# Patient Record
Sex: MALE | Race: WHITE | NOT HISPANIC OR LATINO | Employment: OTHER | URBAN - METROPOLITAN AREA
[De-identification: names, ages, dates, MRNs, and addresses within clinical notes are randomized per-mention and may not be internally consistent; named-entity substitution may affect disease eponyms.]

---

## 2017-01-04 ENCOUNTER — ANTICOAGULATION VISIT (OUTPATIENT)
Dept: FAMILY MEDICINE CLINIC | Facility: CLINIC | Age: 72
End: 2017-01-04

## 2017-01-04 LAB — INR PPP: 2.5 (ref 2–3)

## 2017-01-11 LAB — INR PPP: 1.9 (ref 2–3)

## 2017-01-12 ENCOUNTER — ANTICOAGULATION VISIT (OUTPATIENT)
Dept: FAMILY MEDICINE CLINIC | Facility: CLINIC | Age: 72
End: 2017-01-12

## 2017-01-18 ENCOUNTER — ANTICOAGULATION VISIT (OUTPATIENT)
Dept: FAMILY MEDICINE CLINIC | Facility: CLINIC | Age: 72
End: 2017-01-18

## 2017-01-18 LAB — INR PPP: 2.7 (ref 2–3)

## 2017-01-25 LAB — INR PPP: 2.3 (ref 2–3)

## 2017-01-26 ENCOUNTER — ANTICOAGULATION VISIT (OUTPATIENT)
Dept: FAMILY MEDICINE CLINIC | Facility: CLINIC | Age: 72
End: 2017-01-26

## 2017-02-01 DIAGNOSIS — I82.409 DEEP VEIN THROMBOSIS (DVT) (HCC): ICD-10-CM

## 2017-02-01 RX ORDER — WARFARIN SODIUM 7.5 MG/1
TABLET ORAL
Qty: 90 TABLET | Refills: 1 | Status: SHIPPED | OUTPATIENT
Start: 2017-02-01 | End: 2017-03-30 | Stop reason: SDUPTHER

## 2017-02-02 ENCOUNTER — ANTICOAGULATION VISIT (OUTPATIENT)
Dept: FAMILY MEDICINE CLINIC | Facility: CLINIC | Age: 72
End: 2017-02-02

## 2017-02-02 LAB — INR PPP: 2.2 (ref 2–3)

## 2017-02-08 ENCOUNTER — ANTICOAGULATION VISIT (OUTPATIENT)
Dept: FAMILY MEDICINE CLINIC | Facility: CLINIC | Age: 72
End: 2017-02-08

## 2017-02-08 LAB — INR PPP: 2.2 (ref 2–3)

## 2017-02-16 ENCOUNTER — ANTICOAGULATION VISIT (OUTPATIENT)
Dept: FAMILY MEDICINE CLINIC | Facility: CLINIC | Age: 72
End: 2017-02-16

## 2017-02-16 LAB — INR PPP: 2.4 (ref 2–3)

## 2017-02-22 ENCOUNTER — ANTICOAGULATION VISIT (OUTPATIENT)
Dept: FAMILY MEDICINE CLINIC | Facility: CLINIC | Age: 72
End: 2017-02-22

## 2017-02-22 LAB — INR PPP: 2.3 (ref 2–3)

## 2017-03-01 DIAGNOSIS — I10 BENIGN ESSENTIAL HYPERTENSION: ICD-10-CM

## 2017-03-01 DIAGNOSIS — I82.409 DEEP VEIN THROMBOSIS (DVT) (HCC): ICD-10-CM

## 2017-03-01 DIAGNOSIS — E78.5 HYPERLIPIDEMIA: ICD-10-CM

## 2017-03-01 RX ORDER — WARFARIN SODIUM 10 MG/1
TABLET ORAL
Qty: 90 TABLET | Refills: 1 | OUTPATIENT
Start: 2017-03-01

## 2017-03-01 RX ORDER — AMLODIPINE BESYLATE 10 MG/1
TABLET ORAL
Qty: 90 TABLET | Refills: 1 | OUTPATIENT
Start: 2017-03-01

## 2017-03-01 RX ORDER — ATORVASTATIN CALCIUM 10 MG/1
TABLET, FILM COATED ORAL
Qty: 90 TABLET | Refills: 1 | OUTPATIENT
Start: 2017-03-01

## 2017-03-02 ENCOUNTER — ANTICOAGULATION VISIT (OUTPATIENT)
Dept: FAMILY MEDICINE CLINIC | Facility: CLINIC | Age: 72
End: 2017-03-02

## 2017-03-02 LAB — INR PPP: 2.5 (ref 2–3)

## 2017-03-08 ENCOUNTER — ANTICOAGULATION VISIT (OUTPATIENT)
Dept: FAMILY MEDICINE CLINIC | Facility: CLINIC | Age: 72
End: 2017-03-08

## 2017-03-08 LAB — INR PPP: 2.1 (ref 2–3)

## 2017-03-30 ENCOUNTER — OFFICE VISIT (OUTPATIENT)
Dept: FAMILY MEDICINE CLINIC | Facility: CLINIC | Age: 72
End: 2017-03-30

## 2017-03-30 VITALS
RESPIRATION RATE: 16 BRPM | BODY MASS INDEX: 41.75 KG/M2 | WEIGHT: 315 LBS | HEART RATE: 64 BPM | HEIGHT: 73 IN | DIASTOLIC BLOOD PRESSURE: 87 MMHG | SYSTOLIC BLOOD PRESSURE: 147 MMHG | TEMPERATURE: 98.6 F

## 2017-03-30 DIAGNOSIS — I82.4Y9 DEEP VEIN THROMBOSIS (DVT) OF PROXIMAL LOWER EXTREMITY, UNSPECIFIED CHRONICITY, UNSPECIFIED LATERALITY (HCC): ICD-10-CM

## 2017-03-30 DIAGNOSIS — I10 BENIGN ESSENTIAL HYPERTENSION: ICD-10-CM

## 2017-03-30 DIAGNOSIS — E78.2 MIXED HYPERLIPIDEMIA: ICD-10-CM

## 2017-03-30 PROCEDURE — 99214 OFFICE O/P EST MOD 30 MIN: CPT | Performed by: FAMILY MEDICINE

## 2017-03-30 RX ORDER — WARFARIN SODIUM 10 MG/1
10 TABLET ORAL
Qty: 90 TABLET | Refills: 1 | Status: SHIPPED | OUTPATIENT
Start: 2017-03-30

## 2017-03-30 RX ORDER — ATORVASTATIN CALCIUM 10 MG/1
10 TABLET, FILM COATED ORAL DAILY
Qty: 90 TABLET | Refills: 1 | Status: SHIPPED | OUTPATIENT
Start: 2017-03-30

## 2017-03-30 RX ORDER — AMLODIPINE BESYLATE 10 MG/1
10 TABLET ORAL DAILY
Qty: 90 TABLET | Refills: 1 | Status: SHIPPED | OUTPATIENT
Start: 2017-03-30

## 2017-03-30 RX ORDER — WARFARIN SODIUM 7.5 MG/1
7.5 TABLET ORAL
Qty: 90 TABLET | Refills: 1 | Status: SHIPPED | OUTPATIENT
Start: 2017-03-30

## 2017-03-30 NOTE — PROGRESS NOTES
"Subjective   Sumit Jules is a 71 y.o. male.     History of Present Illness     Chief Complaint:   Chief Complaint   Patient presents with   • Hypertension   • Hyperlipidemia   • DVT Management       Sumit Jules 71 y.o. male who presents today for Medical Management of the below listed issues and medication refills.  he has a history of   Patient Active Problem List   Diagnosis   • Antithrombin III deficiency   • Atherosclerosis of aorta   • DVT (deep venous thrombosis)   • Impaired fasting glucose   • Prostate cancer   • Benign essential hypertension   • Hyperlipidemia   .  Since the last visit, he has overall felt well.  he has been compliant with   Current Outpatient Prescriptions:   •  amLODIPine (NORVASC) 10 MG tablet, Take 1 tablet by mouth Daily., Disp: 90 tablet, Rfl: 1  •  atorvastatin (LIPITOR) 10 MG tablet, Take 1 tablet by mouth Daily., Disp: 90 tablet, Rfl: 1  •  warfarin (COUMADIN) 10 MG tablet, Take 1 tablet by mouth Daily., Disp: 90 tablet, Rfl: 1  •  warfarin (COUMADIN) 7.5 MG tablet, Take 1 tablet by mouth Daily., Disp: 90 tablet, Rfl: 1.  he denies medication side effects.    All of the chronic condition(s) listed above are stable w/o issues.    /87  Pulse 64  Temp 98.6 °F (37 °C) (Oral)   Resp 16  Ht 73\" (185.4 cm)  Wt (!) 323 lb (147 kg)  BMI 42.61 kg/m2    Results for orders placed or performed in visit on 03/08/17   Protime-INR   Result Value Ref Range    INR 2.10 2 - 3         The following portions of the patient's history were reviewed and updated as appropriate: allergies, current medications, past family history, past medical history, past social history, past surgical history and problem list.    Review of Systems   Constitutional: Negative for activity change, chills, fatigue and fever.   Respiratory: Negative for cough and chest tightness.    Cardiovascular: Negative for chest pain and palpitations.   Gastrointestinal: Negative for abdominal pain and nausea. "   Endocrine: Negative for cold intolerance and polydipsia.   Psychiatric/Behavioral: Negative for behavioral problems and dysphoric mood.   All other systems reviewed and are negative.      Objective   Physical Exam   Constitutional: He appears well-developed and well-nourished.   Neck: Neck supple. No thyromegaly present.   Cardiovascular: Normal rate and regular rhythm.    No murmur heard.  Pulmonary/Chest: Effort normal and breath sounds normal.   Abdominal: Bowel sounds are normal.   Psychiatric: He has a normal mood and affect. His behavior is normal.   Nursing note and vitals reviewed.      Assessment/Plan   Sumit was seen today for hypertension, hyperlipidemia and dvt management.    Diagnoses and all orders for this visit:    Benign essential hypertension  -     amLODIPine (NORVASC) 10 MG tablet; Take 1 tablet by mouth Daily.    Mixed hyperlipidemia  -     atorvastatin (LIPITOR) 10 MG tablet; Take 1 tablet by mouth Daily.    Deep vein thrombosis (DVT) of proximal lower extremity, unspecified chronicity, unspecified laterality  -     warfarin (COUMADIN) 7.5 MG tablet; Take 1 tablet by mouth Daily.  -     warfarin (COUMADIN) 10 MG tablet; Take 1 tablet by mouth Daily.

## 2017-04-05 ENCOUNTER — TELEPHONE (OUTPATIENT)
Dept: FAMILY MEDICINE CLINIC | Facility: CLINIC | Age: 72
End: 2017-04-05

## 2017-04-05 DIAGNOSIS — D68.59 ANTITHROMBIN III DEFICIENCY (HCC): Primary | ICD-10-CM

## 2017-04-05 NOTE — TELEPHONE ENCOUNTER
Pt calls and states that we will no longer do his INR and we can discontinue the Home testing. [facility].

## 2017-04-10 ENCOUNTER — ANTICOAGULATION VISIT (OUTPATIENT)
Dept: FAMILY MEDICINE CLINIC | Facility: CLINIC | Age: 72
End: 2017-04-10

## 2024-02-12 ENCOUNTER — HOSPITAL ENCOUNTER (INPATIENT)
Facility: HOSPITAL | Age: 79
LOS: 2 days | Discharge: HOME | End: 2024-02-14
Attending: EMERGENCY MEDICINE
Payer: MEDICARE

## 2024-02-12 ENCOUNTER — APPOINTMENT (OUTPATIENT)
Dept: CT IMAGING | Facility: HOSPITAL | Age: 79
End: 2024-02-12
Payer: MEDICARE

## 2024-02-12 DIAGNOSIS — R53.83 FATIGUE, UNSPECIFIED TYPE: ICD-10-CM

## 2024-02-12 DIAGNOSIS — K85.90 ACUTE PANCREATITIS, UNSPECIFIED COMPLICATION STATUS, UNSPECIFIED PANCREATITIS TYPE: Primary | ICD-10-CM

## 2024-02-12 DIAGNOSIS — R10.13 EPIGASTRIC PAIN: ICD-10-CM

## 2024-02-12 PROBLEM — K85.00 IDIOPATHIC ACUTE PANCREATITIS WITHOUT INFECTION OR NECROSIS: Status: ACTIVE | Noted: 2024-02-12

## 2024-02-12 LAB
ALBUMIN SERPL-MCNC: 3.9 G/DL (ref 3.5–5.2)
ALBUMIN/GLOB SERPL: 1.2 G/DL
ALP SERPL-CCNC: 85 U/L (ref 39–117)
ALT SERPL W P-5'-P-CCNC: 10 U/L (ref 1–41)
ANION GAP SERPL CALCULATED.3IONS-SCNC: 9 MMOL/L (ref 5–15)
AST SERPL-CCNC: 16 U/L (ref 1–40)
BACTERIA UR QL AUTO: NORMAL /HPF
BASOPHILS # BLD AUTO: 0.1 10*3/MM3 (ref 0–0.2)
BASOPHILS NFR BLD AUTO: 0.3 % (ref 0–1.5)
BILIRUB SERPL-MCNC: 1.2 MG/DL (ref 0–1.2)
BILIRUB UR QL STRIP: NEGATIVE
BUN SERPL-MCNC: 13 MG/DL (ref 8–23)
BUN/CREAT SERPL: 13.1 (ref 7–25)
CALCIUM SPEC-SCNC: 9.3 MG/DL (ref 8.6–10.5)
CHLORIDE SERPL-SCNC: 103 MMOL/L (ref 98–107)
CK SERPL-CCNC: 105 U/L (ref 20–200)
CLARITY UR: CLEAR
CO2 SERPL-SCNC: 26 MMOL/L (ref 22–29)
COLOR UR: ABNORMAL
CREAT SERPL-MCNC: 0.99 MG/DL (ref 0.76–1.27)
DEPRECATED RDW RBC AUTO: 44.2 FL (ref 37–54)
EGFRCR SERPLBLD CKD-EPI 2021: 78.5 ML/MIN/1.73
EOSINOPHIL # BLD AUTO: 0 10*3/MM3 (ref 0–0.4)
EOSINOPHIL NFR BLD AUTO: 0 % (ref 0.3–6.2)
ERYTHROCYTE [DISTWIDTH] IN BLOOD BY AUTOMATED COUNT: 13.9 % (ref 12.3–15.4)
FLUAV SUBTYP SPEC NAA+PROBE: NOT DETECTED
FLUBV RNA ISLT QL NAA+PROBE: NOT DETECTED
GLOBULIN UR ELPH-MCNC: 3.2 GM/DL
GLUCOSE SERPL-MCNC: 149 MG/DL (ref 65–99)
GLUCOSE UR STRIP-MCNC: NEGATIVE MG/DL
HCT VFR BLD AUTO: 40.9 % (ref 37.5–51)
HGB BLD-MCNC: 13.2 G/DL (ref 13–17.7)
HGB UR QL STRIP.AUTO: ABNORMAL
HYALINE CASTS UR QL AUTO: NORMAL /LPF
INR PPP: 2.51 (ref 2–3)
KETONES UR QL STRIP: ABNORMAL
LEUKOCYTE ESTERASE UR QL STRIP.AUTO: ABNORMAL
LIPASE SERPL-CCNC: 1023 U/L (ref 13–60)
LYMPHOCYTES # BLD AUTO: 1 10*3/MM3 (ref 0.7–3.1)
LYMPHOCYTES NFR BLD AUTO: 6.1 % (ref 19.6–45.3)
MAGNESIUM SERPL-MCNC: 1.8 MG/DL (ref 1.6–2.4)
MCH RBC QN AUTO: 29.4 PG (ref 26.6–33)
MCHC RBC AUTO-ENTMCNC: 32.3 G/DL (ref 31.5–35.7)
MCV RBC AUTO: 91 FL (ref 79–97)
MONOCYTES # BLD AUTO: 1.4 10*3/MM3 (ref 0.1–0.9)
MONOCYTES NFR BLD AUTO: 8.5 % (ref 5–12)
NEUTROPHILS NFR BLD AUTO: 14.2 10*3/MM3 (ref 1.7–7)
NEUTROPHILS NFR BLD AUTO: 85.1 % (ref 42.7–76)
NITRITE UR QL STRIP: NEGATIVE
NRBC BLD AUTO-RTO: 0 /100 WBC (ref 0–0.2)
PH UR STRIP.AUTO: <=5 [PH] (ref 5–8)
PLATELET # BLD AUTO: 125 10*3/MM3 (ref 140–450)
PMV BLD AUTO: 11.2 FL (ref 6–12)
POTASSIUM SERPL-SCNC: 4 MMOL/L (ref 3.5–5.2)
PROT SERPL-MCNC: 7.1 G/DL (ref 6–8.5)
PROT UR QL STRIP: ABNORMAL
PROTHROMBIN TIME: 25.6 SECONDS (ref 19.4–28.5)
RBC # BLD AUTO: 4.49 10*6/MM3 (ref 4.14–5.8)
RBC # UR STRIP: NORMAL /HPF
REF LAB TEST METHOD: NORMAL
SARS-COV-2 RNA RESP QL NAA+PROBE: NOT DETECTED
SODIUM SERPL-SCNC: 138 MMOL/L (ref 136–145)
SP GR UR STRIP: 1.04 (ref 1–1.03)
SQUAMOUS #/AREA URNS HPF: NORMAL /HPF
UROBILINOGEN UR QL STRIP: ABNORMAL
WBC # UR STRIP: NORMAL /HPF
WBC NRBC COR # BLD AUTO: 16.6 10*3/MM3 (ref 3.4–10.8)

## 2024-02-12 PROCEDURE — 85025 COMPLETE CBC W/AUTO DIFF WBC: CPT | Performed by: PHYSICIAN ASSISTANT

## 2024-02-12 PROCEDURE — 25510000001 IOPAMIDOL PER 1 ML: Performed by: EMERGENCY MEDICINE

## 2024-02-12 PROCEDURE — 83690 ASSAY OF LIPASE: CPT | Performed by: PHYSICIAN ASSISTANT

## 2024-02-12 PROCEDURE — 25010000002 METRONIDAZOLE 500 MG/100ML SOLUTION: Performed by: STUDENT IN AN ORGANIZED HEALTH CARE EDUCATION/TRAINING PROGRAM

## 2024-02-12 PROCEDURE — 83735 ASSAY OF MAGNESIUM: CPT | Performed by: PHYSICIAN ASSISTANT

## 2024-02-12 PROCEDURE — 85610 PROTHROMBIN TIME: CPT | Performed by: PHYSICIAN ASSISTANT

## 2024-02-12 PROCEDURE — 80053 COMPREHEN METABOLIC PANEL: CPT | Performed by: PHYSICIAN ASSISTANT

## 2024-02-12 PROCEDURE — 82550 ASSAY OF CK (CPK): CPT | Performed by: PHYSICIAN ASSISTANT

## 2024-02-12 PROCEDURE — 87636 SARSCOV2 & INF A&B AMP PRB: CPT | Performed by: PHYSICIAN ASSISTANT

## 2024-02-12 PROCEDURE — 25810000003 SODIUM CHLORIDE 0.9 % SOLUTION: Performed by: PHYSICIAN ASSISTANT

## 2024-02-12 PROCEDURE — 25810000003 LACTATED RINGERS PER 1000 ML: Performed by: STUDENT IN AN ORGANIZED HEALTH CARE EDUCATION/TRAINING PROGRAM

## 2024-02-12 PROCEDURE — 81001 URINALYSIS AUTO W/SCOPE: CPT | Performed by: PHYSICIAN ASSISTANT

## 2024-02-12 PROCEDURE — 74177 CT ABD & PELVIS W/CONTRAST: CPT

## 2024-02-12 PROCEDURE — 93005 ELECTROCARDIOGRAM TRACING: CPT | Performed by: PHYSICIAN ASSISTANT

## 2024-02-12 PROCEDURE — 99285 EMERGENCY DEPT VISIT HI MDM: CPT

## 2024-02-12 PROCEDURE — 25010000002 CEFTRIAXONE PER 250 MG: Performed by: STUDENT IN AN ORGANIZED HEALTH CARE EDUCATION/TRAINING PROGRAM

## 2024-02-12 RX ORDER — METRONIDAZOLE 500 MG/100ML
500 INJECTION, SOLUTION INTRAVENOUS EVERY 8 HOURS
Qty: 900 ML | Refills: 0 | Status: DISCONTINUED | OUTPATIENT
Start: 2024-02-12 | End: 2024-02-14 | Stop reason: HOSPADM

## 2024-02-12 RX ORDER — WARFARIN SODIUM 3 MG/1
4 TABLET ORAL
COMMUNITY
End: 2024-02-23

## 2024-02-12 RX ORDER — SODIUM CHLORIDE, SODIUM LACTATE, POTASSIUM CHLORIDE, CALCIUM CHLORIDE 600; 310; 30; 20 MG/100ML; MG/100ML; MG/100ML; MG/100ML
150 INJECTION, SOLUTION INTRAVENOUS CONTINUOUS
Status: DISCONTINUED | OUTPATIENT
Start: 2024-02-12 | End: 2024-02-14 | Stop reason: HOSPADM

## 2024-02-12 RX ORDER — SODIUM CHLORIDE 0.9 % (FLUSH) 0.9 %
10 SYRINGE (ML) INJECTION AS NEEDED
Status: DISCONTINUED | OUTPATIENT
Start: 2024-02-12 | End: 2024-02-14 | Stop reason: HOSPADM

## 2024-02-12 RX ORDER — ONDANSETRON 2 MG/ML
4 INJECTION INTRAMUSCULAR; INTRAVENOUS EVERY 6 HOURS PRN
Status: DISCONTINUED | OUTPATIENT
Start: 2024-02-12 | End: 2024-02-14 | Stop reason: HOSPADM

## 2024-02-12 RX ORDER — TAMSULOSIN HYDROCHLORIDE 0.4 MG/1
1 CAPSULE ORAL DAILY
COMMUNITY

## 2024-02-12 RX ORDER — AMLODIPINE BESYLATE 10 MG/1
10 TABLET ORAL NIGHTLY
COMMUNITY
End: 2024-02-23 | Stop reason: SDUPTHER

## 2024-02-12 RX ADMIN — IOPAMIDOL 90 ML: 755 INJECTION, SOLUTION INTRAVENOUS at 12:05

## 2024-02-12 RX ADMIN — METRONIDAZOLE 500 MG: 500 INJECTION, SOLUTION INTRAVENOUS at 20:57

## 2024-02-12 RX ADMIN — CEFTRIAXONE 2000 MG: 2 INJECTION, POWDER, FOR SOLUTION INTRAMUSCULAR; INTRAVENOUS at 20:18

## 2024-02-12 RX ADMIN — SODIUM CHLORIDE, POTASSIUM CHLORIDE, SODIUM LACTATE AND CALCIUM CHLORIDE 150 ML/HR: 600; 310; 30; 20 INJECTION, SOLUTION INTRAVENOUS at 19:16

## 2024-02-12 RX ADMIN — SODIUM CHLORIDE 1000 ML: 9 INJECTION, SOLUTION INTRAVENOUS at 13:09

## 2024-02-12 NOTE — H&P
[facility]  History & Physical    Patient Name: Sumit Jules  : 1945  MRN: [id number]  Primary Care Physician:  Stevan, No Known  Date of admission: 2024  Date and Time of Service: 2024 at 4:00 PM    Subjective      Chief Complaint: abdominal pain    History of Present Illness: Sumit Jules is a 78 y.o. male with a PMH of Antithrombin deficiency, DVT, on chronic anticoagulation with Coumadin, hypertension, prostate cancer who presented to [facility] on 2024 with  gradual onset periumbilical abdominal pain since the past 2 days which the patient describes as constant, around 4 or 5/10 at times radiating to back dull-like.  Patient denies nausea, vomiting, diarrhea, melena, hematochezia, jaundice, recent weight loss.  Workup in ED is notable for CT abdomen showing stranding about the pancreatic head and of the known.  Findings may be suggestive of pancreatitis and duodenitis.  There is a 2.18 mm hypodense lesion within the body of the pancreas possibly pancreatic cysts.  There is mild biliary dilation.  CBC is notable for neutrophil leukocytosis white blood cell 16.6, chemistry notable for elevated lipase 1023.  Patient afebrile during ED course.  Hospitalist team is requested to admit.      Review of Systems   Constitutional:  Negative for appetite change and fatigue.   HENT:  Negative for dental problem, ear pain, nosebleeds, sinus pressure and tinnitus.    Eyes:  Negative for photophobia and itching.   Respiratory:  Negative for choking and stridor.    Cardiovascular:  Negative for leg swelling.   Gastrointestinal:  Positive for abdominal pain. Negative for anal bleeding, blood in stool, diarrhea, nausea, rectal pain and vomiting.   Musculoskeletal:  Negative for back pain and myalgias.   Neurological:  Negative for syncope, facial asymmetry and numbness.   Psychiatric/Behavioral:  Negative for behavioral problems and dysphoric mood. The patient is  not nervous/anxious.        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 Otherwise pertinent FHx was reviewed and not pertinent to current issue.    Social History:  reports that he has never smoked. He has never used smokeless tobacco. He reports that he does not drink alcohol and does not use drugs.    Home Medications:  Prior to Admission Medications       Prescriptions Last Dose Informant Patient Reported? Taking?    amLODIPine (NORVASC) 10 MG tablet 2/11/2024  Yes Yes    Take 1 tablet by mouth Every Night.    atorvastatin (LIPITOR) 10 MG tablet 2/11/2024  No Yes    Take 1 tablet by mouth Daily.    tamsulosin (FLOMAX) 0.4 MG capsule 24 hr capsule 2/11/2024  Yes Yes    Take 1 capsule by mouth Daily.    warfarin (COUMADIN) 3 MG tablet 2/11/2024  Yes Yes    Take 3 tablets by mouth Daily.              Allergies:  No Known Allergies    Objective      Vitals:   Temp:  [97.7 °F (36.5 °C)-98.2 °F (36.8 °C)] 97.7 °F (36.5 °C)  Heart Rate:  [53-73] 60  Resp:  [12-24] 14  BP: (144-173)/(64-81) 144/64  Body mass index is 40.31 kg/m².  Physical Exam  Constitutional:       Appearance: Normal appearance.   HENT:      Head: Normocephalic.      Nose: Nose normal.      Mouth/Throat:      Mouth: Mucous membranes are moist. Mucous membranes are dry.   Eyes:      Extraocular Movements: Extraocular movements  intact.      Pupils: Pupils are equal, round, and reactive to light.   Pulmonary:      Effort: Pulmonary effort is normal.      Breath sounds: Normal breath sounds.   Abdominal:      General: Abdomen is flat.      Palpations: Abdomen is soft.      Comments: Old healed horizontal surgical incision, incisional hernia present, easily reducible.  There is mild periumbilical tenderness.  No guarding or rigidity.  Edward sign is negative.   Musculoskeletal:         General: Normal range of motion.   Skin:     General: Skin is dry.      Coloration: Skin is not pale.   Neurological:      General: No focal deficit present.      Mental Status: He is alert and oriented to person, place, and time.      Cranial Nerves: No cranial nerve deficit.      Motor: No weakness.   Psychiatric:         Mood and Affect: Mood normal.         Behavior: Behavior normal.         Diagnostic Data:  Lab Results (last 24 hours)       Procedure Component Value Units Date/Time    Urinalysis With Culture If Indicated - Urine, Clean Catch [[id number]]  (Abnormal) Collected: 02/12/24 1215    Specimen: Urine, Clean Catch Updated: 02/12/24 1226     Color, UA Orange     Comment: Any Substance that causes an abnormal urine color can alter the accuracy of the chemical reactions.        Appearance, UA Clear     pH, UA <=5.0     Specific Gravity, UA 1.040     Glucose, UA Negative     Ketones, UA Trace     Bilirubin, UA Negative     Blood, UA Trace     Protein, UA 30 mg/dL (1+)     Leuk Esterase, UA Trace     Nitrite, UA Negative     Urobilinogen, UA 1.0 E.U./dL    Narrative:      In absence of clinical symptoms, the presence of pyuria, bacteria, and/or nitrites on the urinalysis result does not correlate with infection.    Urinalysis, Microscopic Only - Urine, Clean Catch [[id number]] Collected: 02/12/24 1215    Specimen: Urine, Clean Catch Updated: 02/12/24 1226     RBC, UA 0-2 /HPF      WBC, UA 0-2 /HPF      Comment: Urine culture not indicated.         Bacteria, UA None Seen /HPF      Squamous Epithelial Cells, UA 0-2 /HPF      Hyaline Casts, UA 0-2 /LPF      Methodology Automated Microscopy    Lipase [[id number]]  (Abnormal) Collected: 02/12/24 1019    Specimen: Blood from Arm, Right Updated: 02/12/24 1058     Lipase 1,023 U/L     Comprehensive Metabolic Panel [[id number]]  (Abnormal) Collected: 02/12/24 1019    Specimen: Blood from Arm, Right Updated: 02/12/24 1048     Glucose 149 mg/dL      BUN 13 mg/dL      Creatinine 0.99 mg/dL      Sodium 138 mmol/L      Potassium 4.0 mmol/L      Chloride 103 mmol/L      CO2 26.0 mmol/L      Calcium 9.3 mg/dL      Total Protein 7.1 g/dL      Albumin 3.9 g/dL      ALT (SGPT) 10 U/L      AST (SGOT) 16 U/L      Alkaline Phosphatase 85 U/L      Total Bilirubin 1.2 mg/dL      Globulin 3.2 gm/dL      A/G Ratio 1.2 g/dL      BUN/Creatinine Ratio 13.1     Anion Gap 9.0 mmol/L      eGFR 78.5 mL/min/1.73     Narrative:      GFR Normal >60  Chronic Kidney Disease <60  Kidney Failure <15    The GFR formula is only valid for adults with stable renal function between ages 18 and 70.    Magnesium [[id number]]  (Normal) Collected: 02/12/24 1019    Specimen: Blood from Arm, Right Updated: 02/12/24 1048     Magnesium 1.8 mg/dL     CK [[id number]]  (Normal) Collected: 02/12/24 1019    Specimen: Blood from Arm, Right Updated: 02/12/24 1048     Creatine Kinase 105 U/L     COVID-19 and FLU A/B PCR, 1 HR TAT - Swab, Nasopharynx [[id number]]  (Normal) Collected: 02/12/24 0946    Specimen: Swab from Nasopharynx Updated: 02/12/24 1010     COVID19 Not Detected     Influenza A PCR Not Detected     Influenza B PCR Not Detected    Narrative:      Fact sheet for providers: https://www.fda.gov/media/173196/download    Fact sheet for patients: https://www.fda.gov/media/459699/download    Test performed by PCR.    Protime-INR [[id number]]  (Normal) Collected: 02/12/24 0944    Specimen: Blood from Arm, Right Updated: 02/12/24 1004     Protime 25.6 Seconds      INR  2.51    CBC & Differential [[id number]]  (Abnormal) Collected: 02/12/24 0944    Specimen: Blood from Arm, Right Updated: 02/12/24 0958    Narrative:      The following orders were created for panel order CBC & Differential.  Procedure                               Abnormality         Status                     ---------                               -----------         ------                     CBC Auto Differential[[id number]]        Abnormal            Final result                 Please view results for these tests on the individual orders.    CBC Auto Differential [[id number]]  (Abnormal) Collected: 02/12/24 0944    Specimen: Blood from Arm, Right Updated: 02/12/24 0958     WBC 16.60 10*3/mm3      RBC 4.49 10*6/mm3      Hemoglobin 13.2 g/dL      Hematocrit 40.9 %      MCV 91.0 fL      MCH 29.4 pg      MCHC 32.3 g/dL      RDW 13.9 %      RDW-SD 44.2 fl      MPV 11.2 fL      Platelets 125 10*3/mm3      Neutrophil % 85.1 %      Lymphocyte % 6.1 %      Monocyte % 8.5 %      Eosinophil % 0.0 %      Basophil % 0.3 %      Neutrophils, Absolute 14.20 10*3/mm3      Lymphocytes, Absolute 1.00 10*3/mm3      Monocytes, Absolute 1.40 10*3/mm3      Eosinophils, Absolute 0.00 10*3/mm3      Basophils, Absolute 0.10 10*3/mm3      nRBC 0.0 /100 WBC              Imaging Results (Last 24 Hours)       Procedure Component Value Units Date/Time    CT Abdomen Pelvis With Contrast [[id number]] Collected: 02/12/24 1207     Updated: 02/12/24 1218    Narrative:      CT ABDOMEN PELVIS W CONTRAST    Date of Exam: 2/12/2024 11:56 AM EST    Indication: abd pain, nausea.    Comparison: None available.    Technique: Axial CT images were obtained of the abdomen and pelvis following the uneventful intravenous administration of iodinated contrast. Sagittal and coronal reconstructions were performed.  Automated exposure control and iterative reconstruction   methods were used.      Findings:    Liver: The liver is unremarkable in morphology. No focal  liver lesion is seen. There is mild biliary dilation which may be related to prior cholecystectomy.    Gallbladder: Surgically absent.    Pancreas: Stranding about the pancreatic head and duodenum. No discrete peripancreatic fluid collection is seen. 18 mm hypodense lesion within the body of the pancreas (series 5, image 52), possibly a pancreatic cyst or sidebranch IPMN.    Spleen: Several splenic granulomas.    Adrenal glands: 1 cm right adrenal gland is unremarkable. Low-density nodule of the apex of the left adrenal gland, incompletely characterized. Suspect an adenoma. Right adrenal gland appears unremarkable.    Genitourinary tract: 3 mm nonobstructing calculus within the left kidney. Right kidney is unremarkable. No hydronephrosis is seen. The ureters, urinary bladder, and prostate gland appear unremarkable.    Gastrointestinal tract: Stranding about the pancreatic head/duodenum. Supraumbilical fascial defect contains a knuckle of nonobstructed transverse colon (series 5, image 67). Hollow viscera is otherwise unremarkable. No findings to suggest bowel   obstruction.    Appendix: No findings to suggest acute appendicitis.    Other findings: No free air or free fluid is identified. No pathologically enlarged lymph nodes are seen. Mild vascular calcifications are present. The IVC is unremarkable.    Bones and soft tissues: Several paramidline fascial defects are seen above the level of the umbilicus. 1 of these contains a knuckle of nonobstructed transverse colon (series 5, image 67). The others contain only fat. No acute osseous lesion is seen.   There are degenerative changes of the spine.    Lung bases: Scattered bibasilar atelectasis.      Impression:      Impression:  1.Stranding about the pancreatic head and duodenum. Findings may be related to acute pancreatitis and/or duodenitis. Please correlate clinically.  2.18 mm hypodense lesion within the body of the pancreas (series 5, image 52), possibly a  pancreatic cyst or sidebranch IPMN. Further characterization with pancreatic protocol MRI may be considered if clinically indicated.  3.Left nonobstructive nephrolithiasis.  4.Several paramidline fascial defects are seen above the level of the umbilicus. 1 of these contains a knuckle of nonobstructed transverse colon (series 5, image 67). The others contain only fat.  5.Mild biliary dilation may be related to prior cholecystectomy. Please correlate with serum bilirubin levels.  6.Additional findings as detailed above.    Electronically Signed: Rene Clifford MD    2/12/2024 12:16 PM EST    Workstation ID: [id number]              Assessment & Plan        This is a 78 y.o. male with:    Active and Resolved Problems  Active Hospital Problems    Diagnosis  POA    **Acute pancreatitis [K85.90]  Yes    Idiopathic acute pancreatitis without infection or necrosis [K85.00]  Unknown      Resolved Hospital Problems   No resolved problems to display.     Possible acute pancreatitis vs duodenitis  Pancreatic cyst  Leukocytosis  Patient started on CLD in ED, tolerating well so far.   Will advance his diet gradually to low-fat diet.  IV morphine 2 mg every 4 as needed for severe pain  Zofran 4 mg every 8 as needed for nausea vomiting  Serial abdominal examinations  Start LR at 150 mill per hour, reassess rate in the morning  Strict intake and output  GI consultation  Obtain MRI abdomen  Started on IV ceftriaxone and flagyl course    History of Antithrombin III deficiency  Continue Coumadin, dosing per pharmacy      History of Hypertension  Continue Norvasc 10 mg once daily          DVT prophylaxis:  Medical DVT prophylaxis orders are signed and held. Medical DVT prophylaxis orders are present.        The patient desires to be as follows:    CODE STATUS:     Full code          Admission Status:  I believe this patient meets inpatient status.    Expected Length of Stay: 3    PDMP and Medication Dispenses via Sidebar reviewed and  consistent with patient reported medications.    I discussed the patient's findings and my recommendations with patient.      Signature:     This document has been electronically signed by Carlos Llanes Alvarez, MD on February 12, 2024 18:35 [facility] Team

## 2024-02-12 NOTE — ED PROVIDER NOTES
Subjective   History of Present Illness  Chief Complaint: Generalized weakness fatigue, abdominal pain    Patient is a 78-year-old  male with history of previous DVT, prostate cancer, clotting disorder presents to the ER with complaints of generalized fatigue and weakness for 2 days.  Patient states that he came home from grocery shopping not feeling well.  He states that he and his wife went to [employer] and had chicken dumplings and states that he felt like the chicken dumplings did not taste normal and had a hard time chewing them.  Patient states since then he has not felt well.  He denies any significant complaints of pain but does report some abdominal cramping.  He has some nausea but no vomiting or diarrhea.  No hematemesis hematochezia or melena.  No chest pain or shortness of breath.  No headache lightheadedness or dizziness.  No recent sick contacts.  No fever or chills.    PCP: Catarino Portillo    History provided by:  Patient      Review of Systems   Constitutional:  Positive for fatigue. Negative for fever.   HENT:  Negative for congestion, sore throat and trouble swallowing.    Eyes: Negative.    Respiratory:  Negative for shortness of breath and wheezing.    Cardiovascular:  Negative for chest pain.   Gastrointestinal:  Positive for abdominal pain and nausea. Negative for diarrhea and vomiting.   Endocrine: Negative.    Genitourinary:  Negative for dysuria.   Musculoskeletal:  Negative for myalgias.   Skin:  Negative for rash.   Allergic/Immunologic: Negative.    Neurological:  Negative for light-headedness and headaches.   Psychiatric/Behavioral:  Negative for behavioral problems.    All other systems reviewed and are negative.      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    DVT (deep venous thrombosis)     History of  complete eye exam SCHEDULED    Impaired fasting glucose 12/13/2011    Prostate cancer        No Known Allergies    Past Surgical History:   Procedure Laterality Date    CHOLECYSTECTOMY      COLONOSCOPY  10/2013       Family History   Problem Relation Age of Onset    Breast cancer Other     Diabetes Other     Deep vein thrombosis Other     Diabetes type II Other        Social History     Socioeconomic History    Marital status:    Tobacco Use    Smoking status: Never   Substance and Sexual Activity    Alcohol use: No    Drug use: Never           Objective   Physical Exam  Vitals and nursing note reviewed.   Constitutional:       Appearance: Normal appearance.   HENT:      Right Ear: Tympanic membrane normal.      Left Ear: Tympanic membrane normal.      Nose: Nose normal.      Mouth/Throat:      Mouth: Mucous membranes are moist.   Eyes:      Extraocular Movements: Extraocular movements intact.      Pupils: Pupils are equal, round, and reactive to light.   Cardiovascular:      Rate and Rhythm: Normal rate and regular rhythm.      Pulses: Normal pulses.      Heart sounds: Normal heart sounds. No murmur heard.  Pulmonary:      Effort: Pulmonary effort is normal.      Breath sounds: Normal breath sounds.   Abdominal:      General: Abdomen is flat.      Palpations: Abdomen is soft.      Tenderness: There is abdominal tenderness. There is no guarding.   Musculoskeletal:         General: Normal range of motion.      Cervical back: Normal range of motion.   Skin:     General: Skin is warm.      Capillary Refill: Capillary refill takes less than 2 seconds.   Neurological:      General: No focal deficit present.      Mental Status: He is alert and oriented to person, place, and time.      Cranial Nerves: No cranial nerve deficit.      Motor: No weakness.         ECG 12 Lead      Date/Time: 2/12/2024 4:54 PM    Performed by: Cira Walker PA  Authorized by: Alvarez, Carlos Llanes, MD  Interpreted by ED  "physician  Previous ECG: no previous ECG available  Rhythm: sinus rhythm  Rate: normal  BPM: 74  QRS axis: normal  Conduction: 1st degree  Clinical impression: non-specific ECG               ED Course  ED Course as of 02/12/24 1700   Mon Feb 12, 2024   1310 I spoke with Dr. Meyers for admission []      ED Course User Index  [] Cira Walker Kay, PA    /67   Pulse 62   Temp 98.2 °F (36.8 °C) (Oral)   Resp 16   Ht 182.9 cm (72\")   Wt (!) 144 kg (317 lb 7.4 oz)   SpO2 90%   BMI 43.06 kg/m²   Labs Reviewed   COMPREHENSIVE METABOLIC PANEL - Abnormal; Notable for the following components:       Result Value    Glucose 149 (*)     All other components within normal limits    Narrative:     GFR Normal >60  Chronic Kidney Disease <60  Kidney Failure <15    The GFR formula is only valid for adults with stable renal function between ages 18 and 70.   LIPASE - Abnormal; Notable for the following components:    Lipase 1,023 (*)     All other components within normal limits   URINALYSIS W/ CULTURE IF INDICATED - Abnormal; Notable for the following components:    Color, UA Orange (*)     Specific Gravity, UA 1.040 (*)     Ketones, UA Trace (*)     Blood, UA Trace (*)     Protein, UA 30 mg/dL (1+) (*)     Leuk Esterase, UA Trace (*)     All other components within normal limits    Narrative:     In absence of clinical symptoms, the presence of pyuria, bacteria, and/or nitrites on the urinalysis result does not correlate with infection.   CBC WITH AUTO DIFFERENTIAL - Abnormal; Notable for the following components:    WBC 16.60 (*)     Platelets 125 (*)     Neutrophil % 85.1 (*)     Lymphocyte % 6.1 (*)     Eosinophil % 0.0 (*)     Neutrophils, Absolute 14.20 (*)     Monocytes, Absolute 1.40 (*)     All other components within normal limits   COVID-19 AND FLU A/B, NP SWAB IN TRANSPORT MEDIA 1 HR TAT - Normal    Narrative:     Fact sheet for providers: https://www.fda.gov/media/941927/download    Fact sheet for " patients: https://www.[employer].gov/media/218008/download    Test performed by PCR.   MAGNESIUM - Normal   CK - Normal   PROTIME-INR - Normal   URINALYSIS, MICROSCOPIC ONLY   CBC AND DIFFERENTIAL    Narrative:     The following orders were created for panel order CBC & Differential.  Procedure                               Abnormality         Status                     ---------                               -----------         ------                     CBC Auto Differential[[id number]]        Abnormal            Final result                 Please view results for these tests on the individual orders.     Medications   sodium chloride 0.9 % flush 10 mL (has no administration in time range)   iopamidol (ISOVUE-370) 76 % injection 100 mL (90 mL Intravenous Given 2/12/24 1205)   sodium chloride 0.9 % bolus 1,000 mL (0 mL Intravenous Stopped 2/12/24 1339)     CT Abdomen Pelvis With Contrast    Result Date: 2/12/2024  Impression: 1.Stranding about the pancreatic head and duodenum. Findings may be related to acute pancreatitis and/or duodenitis. Please correlate clinically. 2.18 mm hypodense lesion within the body of the pancreas (series 5, image 52), possibly a pancreatic cyst or sidebranch IPMN. Further characterization with pancreatic protocol MRI may be considered if clinically indicated. 3.Left nonobstructive nephrolithiasis. 4.Several paramidline fascial defects are seen above the level of the umbilicus. 1 of these contains a knuckle of nonobstructed transverse colon (series 5, image 67). The others contain only fat. 5.Mild biliary dilation may be related to prior cholecystectomy. Please correlate with serum bilirubin levels. 6.Additional findings as detailed above. Electronically Signed: Rene Clifford MD  2/12/2024 12:16 PM EST  Workstation ID: [id number]                                            Medical Decision Making  Differential Dx (Includes but not limited to): Pancreatitis, NSTEMI, hiatal hernia, bowel  obstruction, diverticulitis  Medical Records Reviewed: No pertinent records to review  Labs: On my interpretation urinalysis orange, trace blood 1+ protein trace leukocytes.  No bacteria.  CMP glucose 149.  Lipase greater than 1000.  Normal magnesium, CK.  COVID and flu negative.  CBC leukocytosis 16,000.  Imaging: On my interpretation, there is stranding around the pancreatic head and duodenum concerning for acute pancreatitis and/or duodenitis.  There is also an 18 mm hypodense lesion in the body of the pancreas could be a pancreatic cyst.  There is a hernia above the level of umbilicus but no evidence of obstruction.  Telemetry: EKG interpretation: Reviewed myself interpreted by ER attending sinus rhythm rate of 74 with no acute ST changes.  There is mildly prolonged CO interval  Testing considered but not ordered: CT head patient denies headache or head injury  Nature of Complaint: Acute  Admission vs Discharge: Admission  Discussion: While in the ED IV was placed and labs were obtained appropriate PPE was worn during exam and throughout all encounters with the patient.  Patient had the above evaluation.  He is afebrile nontoxic and in no acute distress.  Patient pain controlled at this time vital signs are stable.  Lab work significant for elevated lipase, LFTs otherwise normal.  EKG unremarkable.  CT scan shows evidence of acute pancreatitis versus duodenitis with possible pancreatic cyst.  No evidence of biliary duct obstruction at this time.  On reevaluation he does report feeling better.  He was notified of lab work and imaging findings.  Patient was given IV fluids, recommended admission for IV fluids, GI consultation for acute pancreatitis to rule out biliary or pancreatic duct obstruction.  Patient is agreeable.  I spoke with Dr. Meyers regarding admission.    Based on the clinical findings at this time I anticipate that the patient will require 2 midnight stay.    Problems Addressed:  Acute  pancreatitis, unspecified complication status, unspecified pancreatitis type: acute illness or injury  Epigastric pain: acute illness or injury  Fatigue, unspecified type: acute illness or injury    Amount and/or Complexity of Data Reviewed  Labs: ordered. Decision-making details documented in ED Course.  Radiology: ordered. Decision-making details documented in ED Course.  ECG/medicine tests: ordered. Decision-making details documented in ED Course.    Risk  Prescription drug management.  Decision regarding hospitalization.        Final diagnoses:   Acute pancreatitis, unspecified complication status, unspecified pancreatitis type   Epigastric pain   Fatigue, unspecified type       ED Disposition  ED Disposition       ED Disposition   Decision to Admit    Condition   --    Comment   Level of Care: Telemetry [5]   Admitting Physician: ALVAREZ, CARLOS LLANES [[id number]]   Attending Physician: ALVAREZ, CARLOS LLANES [[id number]]                 No follow-up provider specified.       Medication List        ASK your doctor about these medications      amLODIPine 10 MG tablet  Commonly known as: NORVASC  Ask about: Which instructions should I use?     warfarin 3 MG tablet  Commonly known as: COUMADIN  Ask about: Which instructions should I use?                 Cira Walker PA  02/12/24 1700

## 2024-02-12 NOTE — Clinical Note
Level of Care: Med/Surg [1]   Diagnosis: UTI (urinary tract infection) [426728]   Admitting Physician: NAHEED JACINTO [[id number]]   Attending Physician: ALVAREZ, CARLOS LLANES [[id number]]   Certification: I Certify That Inpatient Hospital Services Are Medically Necessary For Greater Than 2 Midnights

## 2024-02-12 NOTE — PLAN OF CARE
Goal Outcome Evaluation:               Pt a/ox4, moved to ambulatory room 3 at 1800 from ER, pt here from home where he lives with his wife for abd pain and fatigue since Saturday, pt is on a clear liquid diet and umang well, up to bathroom with stand by assistance, call light in reach, reviewed plan of care with pt and verbalizes understanding,   Pt menu selection were faxed and received by dietary, dr sampson notified by secure chat where pt was at.

## 2024-02-12 NOTE — ED NOTES
Pt placed on a hospital bed for comfort in lowest position call system in reach. Pt given water to drink.

## 2024-02-13 ENCOUNTER — INPATIENT HOSPITAL (OUTPATIENT)
Dept: URBAN - METROPOLITAN AREA HOSPITAL 84 | Facility: HOSPITAL | Age: 79
End: 2024-02-13
Payer: MEDICARE

## 2024-02-13 VITALS
TEMPERATURE: 98.7 F | OXYGEN SATURATION: 94 % | HEART RATE: 66 BPM | SYSTOLIC BLOOD PRESSURE: 140 MMHG | DIASTOLIC BLOOD PRESSURE: 67 MMHG | BODY MASS INDEX: 40.43 KG/M2 | HEIGHT: 74 IN | WEIGHT: 315 LBS | RESPIRATION RATE: 16 BRPM

## 2024-02-13 DIAGNOSIS — K85.90 ACUTE PANCREATITIS WITHOUT NECROSIS OR INFECTION, UNSPECIFIE: ICD-10-CM

## 2024-02-13 DIAGNOSIS — Z90.49 ACQUIRED ABSENCE OF OTHER SPECIFIED PARTS OF DIGESTIVE TRACT: ICD-10-CM

## 2024-02-13 LAB
ANION GAP SERPL CALCULATED.3IONS-SCNC: 13 MMOL/L (ref 5–15)
BASOPHILS # BLD AUTO: 0 10*3/MM3 (ref 0–0.2)
BASOPHILS NFR BLD AUTO: 0.1 % (ref 0–1.5)
BUN SERPL-MCNC: 13 MG/DL (ref 8–23)
BUN/CREAT SERPL: 16.9 (ref 7–25)
CALCIUM SPEC-SCNC: 8.4 MG/DL (ref 8.6–10.5)
CANCER AG19-9 SERPL-ACNC: 222 U/ML
CHLORIDE SERPL-SCNC: 103 MMOL/L (ref 98–107)
CHOLEST SERPL-MCNC: 86 MG/DL (ref 0–200)
CO2 SERPL-SCNC: 22 MMOL/L (ref 22–29)
CREAT SERPL-MCNC: 0.77 MG/DL (ref 0.76–1.27)
DEPRECATED RDW RBC AUTO: 42.9 FL (ref 37–54)
EGFRCR SERPLBLD CKD-EPI 2021: 92.2 ML/MIN/1.73
EOSINOPHIL # BLD AUTO: 0 10*3/MM3 (ref 0–0.4)
EOSINOPHIL NFR BLD AUTO: 0.1 % (ref 0.3–6.2)
ERYTHROCYTE [DISTWIDTH] IN BLOOD BY AUTOMATED COUNT: 13.7 % (ref 12.3–15.4)
GLUCOSE SERPL-MCNC: 124 MG/DL (ref 65–99)
HCT VFR BLD AUTO: 38.1 % (ref 37.5–51)
HDLC SERPL-MCNC: 40 MG/DL (ref 40–60)
HGB BLD-MCNC: 12.4 G/DL (ref 13–17.7)
INR PPP: 2.17 (ref 2–3)
LDLC SERPL CALC-MCNC: 33 MG/DL (ref 0–100)
LDLC/HDLC SERPL: 0.9 {RATIO}
LIPASE SERPL-CCNC: 265 U/L (ref 13–60)
LYMPHOCYTES # BLD AUTO: 0.8 10*3/MM3 (ref 0.7–3.1)
LYMPHOCYTES NFR BLD AUTO: 6.5 % (ref 19.6–45.3)
MCH RBC QN AUTO: 29.4 PG (ref 26.6–33)
MCHC RBC AUTO-ENTMCNC: 32.5 G/DL (ref 31.5–35.7)
MCV RBC AUTO: 90.5 FL (ref 79–97)
MONOCYTES # BLD AUTO: 1.2 10*3/MM3 (ref 0.1–0.9)
MONOCYTES NFR BLD AUTO: 8.9 % (ref 5–12)
NEUTROPHILS NFR BLD AUTO: 11 10*3/MM3 (ref 1.7–7)
NEUTROPHILS NFR BLD AUTO: 84.4 % (ref 42.7–76)
NRBC BLD AUTO-RTO: 0 /100 WBC (ref 0–0.2)
PLATELET # BLD AUTO: 99 10*3/MM3 (ref 140–450)
PMV BLD AUTO: 11 FL (ref 6–12)
POTASSIUM SERPL-SCNC: 3.6 MMOL/L (ref 3.5–5.2)
PROTHROMBIN TIME: 22.3 SECONDS (ref 19.4–28.5)
QT INTERVAL: 387 MS
QTC INTERVAL: 431 MS
RBC # BLD AUTO: 4.21 10*6/MM3 (ref 4.14–5.8)
SODIUM SERPL-SCNC: 138 MMOL/L (ref 136–145)
TRIGL SERPL-MCNC: 50 MG/DL (ref 0–150)
VLDLC SERPL-MCNC: 13 MG/DL (ref 5–40)
WBC NRBC COR # BLD AUTO: 13 10*3/MM3 (ref 3.4–10.8)

## 2024-02-13 PROCEDURE — 80048 BASIC METABOLIC PNL TOTAL CA: CPT | Performed by: STUDENT IN AN ORGANIZED HEALTH CARE EDUCATION/TRAINING PROGRAM

## 2024-02-13 PROCEDURE — 86301 IMMUNOASSAY TUMOR CA 19-9: CPT | Performed by: NURSE PRACTITIONER

## 2024-02-13 PROCEDURE — 36415 COLL VENOUS BLD VENIPUNCTURE: CPT | Performed by: STUDENT IN AN ORGANIZED HEALTH CARE EDUCATION/TRAINING PROGRAM

## 2024-02-13 PROCEDURE — 85025 COMPLETE CBC W/AUTO DIFF WBC: CPT | Performed by: STUDENT IN AN ORGANIZED HEALTH CARE EDUCATION/TRAINING PROGRAM

## 2024-02-13 PROCEDURE — 82784 ASSAY IGA/IGD/IGG/IGM EACH: CPT | Performed by: NURSE PRACTITIONER

## 2024-02-13 PROCEDURE — 82787 IGG 1 2 3 OR 4 EACH: CPT | Performed by: NURSE PRACTITIONER

## 2024-02-13 PROCEDURE — 25810000003 LACTATED RINGERS PER 1000 ML: Performed by: STUDENT IN AN ORGANIZED HEALTH CARE EDUCATION/TRAINING PROGRAM

## 2024-02-13 PROCEDURE — 80061 LIPID PANEL: CPT | Performed by: INTERNAL MEDICINE

## 2024-02-13 PROCEDURE — 86038 ANTINUCLEAR ANTIBODIES: CPT | Performed by: NURSE PRACTITIONER

## 2024-02-13 PROCEDURE — 99222 1ST HOSP IP/OBS MODERATE 55: CPT | Performed by: NURSE PRACTITIONER

## 2024-02-13 PROCEDURE — 83690 ASSAY OF LIPASE: CPT | Performed by: INTERNAL MEDICINE

## 2024-02-13 PROCEDURE — 85610 PROTHROMBIN TIME: CPT | Performed by: INTERNAL MEDICINE

## 2024-02-13 PROCEDURE — 25010000002 METRONIDAZOLE 500 MG/100ML SOLUTION: Performed by: STUDENT IN AN ORGANIZED HEALTH CARE EDUCATION/TRAINING PROGRAM

## 2024-02-13 RX ORDER — AMOXICILLIN 250 MG
2 CAPSULE ORAL 2 TIMES DAILY PRN
Status: DISCONTINUED | OUTPATIENT
Start: 2024-02-13 | End: 2024-02-14 | Stop reason: HOSPADM

## 2024-02-13 RX ORDER — SODIUM CHLORIDE, SODIUM LACTATE, POTASSIUM CHLORIDE, CALCIUM CHLORIDE 600; 310; 30; 20 MG/100ML; MG/100ML; MG/100ML; MG/100ML
150 INJECTION, SOLUTION INTRAVENOUS CONTINUOUS
Status: DISCONTINUED | OUTPATIENT
Start: 2024-02-13 | End: 2024-02-14 | Stop reason: HOSPADM

## 2024-02-13 RX ORDER — SODIUM CHLORIDE 0.9 % (FLUSH) 0.9 %
10 SYRINGE (ML) INJECTION AS NEEDED
Status: DISCONTINUED | OUTPATIENT
Start: 2024-02-13 | End: 2024-02-14 | Stop reason: HOSPADM

## 2024-02-13 RX ORDER — ENOXAPARIN SODIUM 100 MG/ML
40 INJECTION SUBCUTANEOUS 2 TIMES DAILY
Status: DISCONTINUED | OUTPATIENT
Start: 2024-02-13 | End: 2024-02-13

## 2024-02-13 RX ORDER — BISACODYL 10 MG
10 SUPPOSITORY, RECTAL RECTAL DAILY PRN
Status: DISCONTINUED | OUTPATIENT
Start: 2024-02-13 | End: 2024-02-14 | Stop reason: HOSPADM

## 2024-02-13 RX ORDER — AMLODIPINE BESYLATE 5 MG/1
10 TABLET ORAL NIGHTLY
Status: DISCONTINUED | OUTPATIENT
Start: 2024-02-13 | End: 2024-02-14 | Stop reason: HOSPADM

## 2024-02-13 RX ORDER — POLYETHYLENE GLYCOL 3350 17 G/17G
17 POWDER, FOR SOLUTION ORAL DAILY PRN
Status: DISCONTINUED | OUTPATIENT
Start: 2024-02-13 | End: 2024-02-14 | Stop reason: HOSPADM

## 2024-02-13 RX ORDER — MORPHINE SULFATE 2 MG/ML
2 INJECTION, SOLUTION INTRAMUSCULAR; INTRAVENOUS EVERY 4 HOURS PRN
Status: DISCONTINUED | OUTPATIENT
Start: 2024-02-13 | End: 2024-02-14 | Stop reason: HOSPADM

## 2024-02-13 RX ORDER — SODIUM CHLORIDE 9 MG/ML
40 INJECTION, SOLUTION INTRAVENOUS AS NEEDED
Status: DISCONTINUED | OUTPATIENT
Start: 2024-02-13 | End: 2024-02-14 | Stop reason: HOSPADM

## 2024-02-13 RX ORDER — TAMSULOSIN HYDROCHLORIDE 0.4 MG/1
0.4 CAPSULE ORAL NIGHTLY
Status: DISCONTINUED | OUTPATIENT
Start: 2024-02-13 | End: 2024-02-14 | Stop reason: HOSPADM

## 2024-02-13 RX ORDER — BISACODYL 5 MG/1
5 TABLET, DELAYED RELEASE ORAL DAILY PRN
Status: DISCONTINUED | OUTPATIENT
Start: 2024-02-13 | End: 2024-02-14 | Stop reason: HOSPADM

## 2024-02-13 RX ORDER — WARFARIN SODIUM 3 MG/1
3 TABLET ORAL
Status: DISCONTINUED | OUTPATIENT
Start: 2024-02-13 | End: 2024-02-14 | Stop reason: HOSPADM

## 2024-02-13 RX ORDER — ATORVASTATIN CALCIUM 10 MG/1
10 TABLET, FILM COATED ORAL NIGHTLY
Status: DISCONTINUED | OUTPATIENT
Start: 2024-02-13 | End: 2024-02-14 | Stop reason: HOSPADM

## 2024-02-13 RX ORDER — SODIUM CHLORIDE 0.9 % (FLUSH) 0.9 %
10 SYRINGE (ML) INJECTION EVERY 12 HOURS SCHEDULED
Status: DISCONTINUED | OUTPATIENT
Start: 2024-02-13 | End: 2024-02-14 | Stop reason: HOSPADM

## 2024-02-13 RX ADMIN — METRONIDAZOLE 500 MG: 500 INJECTION, SOLUTION INTRAVENOUS at 03:40

## 2024-02-13 RX ADMIN — SODIUM CHLORIDE, POTASSIUM CHLORIDE, SODIUM LACTATE AND CALCIUM CHLORIDE 150 ML/HR: 600; 310; 30; 20 INJECTION, SOLUTION INTRAVENOUS at 03:33

## 2024-02-13 RX ADMIN — Medication 10 ML: at 09:24

## 2024-02-13 RX ADMIN — METRONIDAZOLE 500 MG: 500 INJECTION, SOLUTION INTRAVENOUS at 13:00

## 2024-02-13 NOTE — CONSULTS
"GI CONSULT  NOTE:    Referring Provider:  Dr. Youssef    Chief complaint: Pancreatitis    Subjective . \"I started having pain Sunday\"    History of present illness: Sumit Jules is a 78 y.o. male who has a history of Antithrombin III deficiency on warfarin, DVT and prostate cancer with previous radiation.  Patient presents with acute onset upper abdominal pain that started Sunday.  This started after eating out at [employer].  Pain was constant in the upper abdomen and radiated into his back.  No history of similar complaints.  No personal history of pancreatic disease.  No new medications or NSAID use.  No nausea or vomiting.  Regular daily bowel movements without melena or rectal bleeding but did have some diarrhea in hospital.  Pain has since completely resolved.  Gallbladder absent.  No tobacco or alcohol abuse.  Tolerating clear liquid diet.    Endo History:  No previous EGD.  Remote history of colonoscopy.  Reports negative cologuard 3 years ago.    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    DVT (deep venous thrombosis)     History of complete eye exam SCHEDULED    Impaired fasting glucose 12/13/2011    Prostate cancer        Past Surgical History:  Past Surgical History:   Procedure Laterality Date    CHOLECYSTECTOMY      COLONOSCOPY  10/2013       Social History:  Social History     Tobacco Use    Smoking status: Never    Smokeless tobacco: Never   Vaping Use    Vaping Use: Never used   Substance Use Topics    Alcohol use: No    Drug use: Never   No tobacco or alcohol use    Family History:  Family History   Problem Relation Age of Onset    Breast cancer Other     Diabetes Other     Deep vein thrombosis Other     Diabetes type II Other    No family history of pancreatic disease    Medications:  (Not in a hospital " admission)      Scheduled Meds:amLODIPine, 10 mg, Oral, Nightly  atorvastatin, 10 mg, Oral, Nightly  cefTRIAXone, 2,000 mg, Intravenous, Q24H  metroNIDAZOLE, 500 mg, Intravenous, Q8H  sodium chloride, 10 mL, Intravenous, Q12H  tamsulosin, 0.4 mg, Oral, Nightly  warfarin, 3 mg, Oral, Daily      Continuous Infusions:lactated ringers, 150 mL/hr, Last Rate: 150 mL/hr (02/13/24 0809)  lactated ringers, 150 mL/hr, Last Rate: 150 mL/hr (02/13/24 0333)  Pharmacy to dose warfarin,       PRN Meds:.  senna-docusate sodium **AND** polyethylene glycol **AND** bisacodyl **AND** bisacodyl    Morphine    ondansetron    Pharmacy to dose warfarin    [COMPLETED] Insert Peripheral IV **AND** sodium chloride    sodium chloride    sodium chloride    ALLERGIES:  Patient has no known allergies.    ROS:  The following systems were reviewed   Constitution:  No fevers, chills, no unintentional weight loss  Skin: no rash, no jaundice  Eyes:  No blurry vision, no eye pain  HENT:  No change in hearing or smell  Resp:  No dyspnea or cough  CV:  No chest pain or palpitations  :  No dysuria, hematuria  Musculoskeletal:  No leg cramps or arthralgias  Neuro:  No tremor, no numbness  Psych:  No depression or confusion    Objective resting in bed, no acute distress, no family present    Vital Signs:   Vitals:    02/12/24 2006 02/13/24 0342 02/13/24 0804 02/13/24 1150   BP: 145/60 139/65 145/75 140/67   BP Location: Left arm Left arm Left arm Left arm   Patient Position: Lying Lying Lying Lying   Pulse: 63 70 72 66   Resp: 16 20 20 16   Temp: 99.3 °F (37.4 °C) 98.7 °F (37.1 °C) 98.6 °F (37 °C) 98.7 °F (37.1 °C)   TempSrc: Oral Oral Oral Oral   SpO2: 95% 92% 93% 94%   Weight:       Height:           Physical Exam:     General Appearance:    Awake and alert, in no acute distress, obese   Head:    Normocephalic, without obvious abnormality, atraumatic   Throat:   No oral lesions, no thrush, oral mucosa moist   Lungs:     Respirations regular, even and  "unlabored   Chest Wall:    No abnormalities observed   Abdomen:     Soft, non-tender, nondistended, periumbilical hernia easily reduced   Rectal:     Deferred   Extremities:   Moves all extremities, no cyanosis       Skin:   No rash, no jaundice, normal palpation       Neurologic:   Cranial nerves 2 - 12 grossly intact       Results Review:   I reviewed the patient's labs and imaging.  CBC    Results from last 7 days   Lab Units 02/13/24  0849 02/12/24  0944   WBC 10*3/mm3 13.00* 16.60*   HEMOGLOBIN g/dL 12.4* 13.2   PLATELETS 10*3/mm3 99* 125*     CMP   Results from last 7 days   Lab Units 02/13/24  0849 02/12/24  1019   SODIUM mmol/L 138 138   POTASSIUM mmol/L 3.6 4.0   CHLORIDE mmol/L 103 103   CO2 mmol/L 22.0 26.0   BUN mg/dL 13 13   CREATININE mg/dL 0.77 0.99   GLUCOSE mg/dL 124* 149*   ALBUMIN g/dL  --  3.9   BILIRUBIN mg/dL  --  1.2   ALK PHOS U/L  --  85   AST (SGOT) U/L  --  16   ALT (SGPT) U/L  --  10   MAGNESIUM mg/dL  --  1.8   LIPASE U/L 265* 1,023*     Cr Clearance Estimated Creatinine Clearance: 122.7 mL/min (by C-G formula based on SCr of 0.77 mg/dL).  Coag   Results from last 7 days   Lab Units 02/13/24  0849 02/12/24  0944   INR  2.17 2.51     HbA1C   Lab Results   Component Value Date    HGBA1C 6.30 (H) 09/20/2016    HGBA1C 6.2 (H) 03/28/2016     Blood Glucose No results found for: \"POCGLU\"  Infection     UA    Results from last 7 days   Lab Units 02/12/24  1215   NITRITE UA  Negative   WBC UA /HPF 0-2   BACTERIA UA /HPF None Seen   SQUAM EPITHEL UA /HPF 0-2     Imaging Results (Last 72 Hours)       Procedure Component Value Units Date/Time    CT Abdomen Pelvis With Contrast [[id number]] Collected: 02/12/24 1207     Updated: 02/12/24 1218    Narrative:      CT ABDOMEN PELVIS W CONTRAST    Date of Exam: 2/12/2024 11:56 AM EST    Indication: abd pain, nausea.    Comparison: None available.    Technique: Axial CT images were obtained of the abdomen and pelvis following the uneventful intravenous " administration of iodinated contrast. Sagittal and coronal reconstructions were performed.  Automated exposure control and iterative reconstruction   methods were used.      Findings:    Liver: The liver is unremarkable in morphology. No focal liver lesion is seen. There is mild biliary dilation which may be related to prior cholecystectomy.    Gallbladder: Surgically absent.    Pancreas: Stranding about the pancreatic head and duodenum. No discrete peripancreatic fluid collection is seen. 18 mm hypodense lesion within the body of the pancreas (series 5, image 52), possibly a pancreatic cyst or sidebranch IPMN.    Spleen: Several splenic granulomas.    Adrenal glands: 1 cm right adrenal gland is unremarkable. Low-density nodule of the apex of the left adrenal gland, incompletely characterized. Suspect an adenoma. Right adrenal gland appears unremarkable.    Genitourinary tract: 3 mm nonobstructing calculus within the left kidney. Right kidney is unremarkable. No hydronephrosis is seen. The ureters, urinary bladder, and prostate gland appear unremarkable.    Gastrointestinal tract: Stranding about the pancreatic head/duodenum. Supraumbilical fascial defect contains a knuckle of nonobstructed transverse colon (series 5, image 67). Hollow viscera is otherwise unremarkable. No findings to suggest bowel   obstruction.    Appendix: No findings to suggest acute appendicitis.    Other findings: No free air or free fluid is identified. No pathologically enlarged lymph nodes are seen. Mild vascular calcifications are present. The IVC is unremarkable.    Bones and soft tissues: Several paramidline fascial defects are seen above the level of the umbilicus. 1 of these contains a knuckle of nonobstructed transverse colon (series 5, image 67). The others contain only fat. No acute osseous lesion is seen.   There are degenerative changes of the spine.    Lung bases: Scattered bibasilar atelectasis.      Impression:       Impression:  1.Stranding about the pancreatic head and duodenum. Findings may be related to acute pancreatitis and/or duodenitis. Please correlate clinically.  2.18 mm hypodense lesion within the body of the pancreas (series 5, image 52), possibly a pancreatic cyst or sidebranch IPMN. Further characterization with pancreatic protocol MRI may be considered if clinically indicated.  3.Left nonobstructive nephrolithiasis.  4.Several paramidline fascial defects are seen above the level of the umbilicus. 1 of these contains a knuckle of nonobstructed transverse colon (series 5, image 67). The others contain only fat.  5.Mild biliary dilation may be related to prior cholecystectomy. Please correlate with serum bilirubin levels.  6.Additional findings as detailed above.    Electronically Signed: Rene Clifford MD    2/12/2024 12:16 PM EST    Workstation ID: [id number]            ASSESSMENT:  Acute upper abdominal pain -resolved  Abnormal CT abdomen suggesting acute pancreatitis  Pancreatic body lesion  History of cholecystectomy  Thrombocytopenia  Antithrombin III deficiency/DVT -on warfarin  History of prostate cancer with radiation therapy    PLAN:  Patient is 78-year-old male who presents with acute onset upper abdominal pain that radiated to his back without history of similar complaints.  CT suggests stranding of the pancreatic head and duodenum consistent with acute pancreatitis.  Lipase 1000 with normal LFTs.  Triglycerides 50 and gallbladder absent.  Check MACY, IgG subclasses.  No new medications.  CT also suggest 18 mm pancreatic body lesion, ? IPMN or cyst.  Check CA 19-9.  Plan outpatient endoscopic ultrasound evaluation once acute inflammation has resolved.  MRI has been ordered.  Tolerating clear liquid diet, advance to low-fat diet as tolerated.  If tolerating diet without further abdominal pain.  Okay to discharge home from GI standpoint if otherwise medically stable with outpatient evaluation with  EUS.  Discussed with bedside RN.    I discussed the patients findings and my recommendations with the patient.    We appreciate the referral    Electronically signed by JARROD Singletary, 02/13/24, 12:43 PM EST.

## 2024-02-13 NOTE — DISCHARGE SUMMARY
[facility] Medicine Services       Patient Name: Sumit Jules  : 1945  MRN: [id number]  Primary Care Physician:  Stevan, No Known  Date of admission: 2024  Date and Time of Service: 2024 at 4:00 PM     Subjective       Chief Complaint: abdominal pain     History of Present Illness: Sumit Jules is a 78 y.o. male with a PMH of Antithrombin deficiency, DVT, on chronic anticoagulation with Coumadin, hypertension, prostate cancer who presented to [facility] on 2024 with  gradual onset periumbilical abdominal pain since the past 2 days which the patient describes as constant, around 4 or 5/10 at times radiating to back dull-like.  Patient denies nausea, vomiting, diarrhea, melena, hematochezia, jaundice, recent weight loss.  Workup in ED is notable for CT abdomen showing stranding about the pancreatic head and of the known.  Findings may be suggestive of pancreatitis and duodenitis.  There is a 2.18 mm hypodense lesion within the body of the pancreas possibly pancreatic cysts.  There is mild biliary dilation.  CBC is notable for neutrophil leukocytosis white blood cell 16.6, chemistry notable for elevated lipase 1023.  Patient afebrile during ED course.  Hospitalist team is requested to admit.        Review of Systems   Constitutional:  Negative for appetite change and fatigue.   HENT:  Negative for dental problem, ear pain, nosebleeds, sinus pressure and tinnitus.    Eyes:  Negative for photophobia and itching.   Respiratory:  Negative for choking and stridor.    Cardiovascular:  Negative for leg swelling.   Gastrointestinal:  Positive for abdominal pain. Negative for anal bleeding, blood in stool, diarrhea, nausea, rectal pain and vomiting.   Musculoskeletal:  Negative for back pain and myalgias.   Neurological:  Negative for syncope, facial asymmetry and numbness.   Psychiatric/Behavioral:  Negative for behavioral problems and dysphoric mood. The patient is not  nervous/anxious.     2/13  Patient admitted with acute pancreatitis with lipase of 1100  Patient had previous cholecystectomy  Denied alcohol  Will check lipid profile  Continue n.p.o. 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 Otherwise pertinent FHx was reviewed and not pertinent to current issue.     Social History:  reports that he has never smoked. He has never used smokeless tobacco. He reports that he does not drink alcohol and does not use drugs.     Home Medications:  Prior to Admission Medications         Prescriptions Last Dose Informant Patient Reported? Taking?     amLODIPine (NORVASC) 10 MG tablet 2/11/2024   Yes Yes     Take 1 tablet by mouth Every Night.     atorvastatin (LIPITOR) 10 MG tablet 2/11/2024   No Yes     Take 1 tablet by mouth Daily.     tamsulosin (FLOMAX) 0.4 MG capsule 24 hr capsule 2/11/2024   Yes Yes     Take 1 capsule by mouth Daily.     warfarin (COUMADIN) 3 MG tablet 2/11/2024   Yes Yes     Take 3 tablets by mouth Daily.                   Allergies:  No Known Allergies      Objective       Vitals:   Temp:  [97.7 °F (36.5 °C)-98.2 °F (36.8 °C)] 97.7 °F (36.5 °C)  Heart Rate:  [53-73] 60  Resp:  [12-24] 14  BP: (144-173)/(64-81) 144/64  Body mass index is 40.31 kg/m².  Physical Exam  Constitutional:       Appearance: Normal appearance.   HENT:      Head: Normocephalic.      Nose: Nose normal.      Mouth/Throat:      Mouth: Mucous membranes are moist. Mucous membranes are dry.   Eyes:      Extraocular Movements: Extraocular movements intact.      Pupils: Pupils are equal, round, and reactive to light.   Pulmonary:      Effort: Pulmonary effort is normal.      Breath sounds: Normal breath sounds.   Abdominal:      General: Abdomen is flat.      Palpations: Abdomen is soft.      Comments: Old healed horizontal surgical incision, incisional hernia present, easily reducible.  There is mild periumbilical tenderness.  No guarding or rigidity.  Edward sign is negative.   Musculoskeletal:         General: Normal range of motion.   Skin:     General: Skin is dry.      Coloration: Skin is not pale.   Neurological:      General: No focal deficit present.      Mental Status: He is alert and oriented to person, place, and time.      Cranial Nerves: No cranial nerve deficit.      Motor: No weakness.   Psychiatric:         Mood and Affect: Mood normal.         Behavior: Behavior normal.            Diagnostic Data:  Lab Results (last 24 hours)         Procedure Component Value Units Date/Time     Urinalysis With Culture If Indicated - Urine, Clean Catch [[id number]]  (Abnormal) Collected: 02/12/24 1215     Specimen: Urine, Clean Catch Updated: 02/12/24 1226       Color, UA Orange       Comment: Any Substance that causes an abnormal urine color can alter the accuracy of the chemical reactions.          Appearance, UA Clear       pH, UA <=5.0       Specific Gravity, UA 1.040       Glucose, UA Negative       Ketones, UA Trace       Bilirubin, UA Negative       Blood, UA Trace       Protein, UA 30 mg/dL  (1+)       Leuk Esterase, UA Trace       Nitrite, UA Negative       Urobilinogen, UA 1.0 E.U./dL     Narrative:       In absence of clinical symptoms, the presence of pyuria, bacteria, and/or nitrites on the urinalysis result does not correlate with infection.     Urinalysis, Microscopic Only - Urine, Clean Catch [[id number]] Collected: 02/12/24 1215     Specimen: Urine, Clean Catch Updated: 02/12/24 1226       RBC, UA 0-2 /HPF         WBC, UA 0-2 /HPF         Comment: Urine culture not indicated.          Bacteria, UA None Seen /HPF         Squamous Epithelial Cells, UA 0-2 /HPF         Hyaline Casts, UA 0-2 /LPF         Methodology Automated Microscopy     Lipase [[id number]]  (Abnormal) Collected: 02/12/24 1019     Specimen: Blood from Arm, Right Updated: 02/12/24 1058       Lipase 1,023 U/L       Comprehensive Metabolic Panel [[id number]]  (Abnormal) Collected: 02/12/24 1019     Specimen: Blood from Arm, Right Updated: 02/12/24 1048       Glucose 149 mg/dL         BUN 13 mg/dL         Creatinine 0.99 mg/dL         Sodium 138 mmol/L         Potassium 4.0 mmol/L         Chloride 103 mmol/L         CO2 26.0 mmol/L         Calcium 9.3 mg/dL         Total Protein 7.1 g/dL         Albumin 3.9 g/dL         ALT (SGPT) 10 U/L         AST (SGOT) 16 U/L         Alkaline Phosphatase 85 U/L         Total Bilirubin 1.2 mg/dL         Globulin 3.2 gm/dL         A/G Ratio 1.2 g/dL         BUN/Creatinine Ratio 13.1       Anion Gap 9.0 mmol/L         eGFR 78.5 mL/min/1.73       Narrative:       GFR Normal >60  Chronic Kidney Disease <60  Kidney Failure <15     The GFR formula is only valid for adults with stable renal function between ages 18 and 70.     Magnesium [[id number]]  (Normal) Collected: 02/12/24 1019     Specimen: Blood from Arm, Right Updated: 02/12/24 1048       Magnesium 1.8 mg/dL       CK [[id number]]  (Normal) Collected: 02/12/24 1019     Specimen: Blood from Arm, Right Updated: 02/12/24 1048       Creatine Kinase 105  U/L       COVID-19 and FLU A/B PCR, 1 HR TAT - Swab, Nasopharynx [[id number]]  (Normal) Collected: 02/12/24 0946     Specimen: Swab from Nasopharynx Updated: 02/12/24 1010       COVID19 Not Detected       Influenza A PCR Not Detected       Influenza B PCR Not Detected     Narrative:       Fact sheet for providers: https://www.fda.gov/media/242473/download     Fact sheet for patients: https://www.fda.gov/media/904516/download     Test performed by PCR.     Protime-INR [[id number]]  (Normal) Collected: 02/12/24 0944     Specimen: Blood from Arm, Right Updated: 02/12/24 1004       Protime 25.6 Seconds         INR 2.51     CBC & Differential [[id number]]  (Abnormal) Collected: 02/12/24 0944     Specimen: Blood from Arm, Right Updated: 02/12/24 0958     Narrative:       The following orders were created for panel order CBC & Differential.  Procedure                               Abnormality         Status                     ---------                               -----------         ------                     CBC Auto Differential[[id number]]        Abnormal            Final result                  Please view results for these tests on the individual orders.     CBC Auto Differential [[id number]]  (Abnormal) Collected: 02/12/24 0944     Specimen: Blood from Arm, Right Updated: 02/12/24 0958       WBC 16.60 10*3/mm3         RBC 4.49 10*6/mm3         Hemoglobin 13.2 g/dL         Hematocrit 40.9 %         MCV 91.0 fL         MCH 29.4 pg         MCHC 32.3 g/dL         RDW 13.9 %         RDW-SD 44.2 fl         MPV 11.2 fL         Platelets 125 10*3/mm3         Neutrophil % 85.1 %         Lymphocyte % 6.1 %         Monocyte % 8.5 %         Eosinophil % 0.0 %         Basophil % 0.3 %         Neutrophils, Absolute 14.20 10*3/mm3         Lymphocytes, Absolute 1.00 10*3/mm3         Monocytes, Absolute 1.40 10*3/mm3         Eosinophils, Absolute 0.00 10*3/mm3         Basophils, Absolute 0.10 10*3/mm3         nRBC 0.0 /100 WBC                              Imaging Results (Last 24 Hours)         Procedure Component Value Units Date/Time     CT Abdomen Pelvis With Contrast [[id number]] Collected: 02/12/24 1207       Updated: 02/12/24 1218     Narrative:       CT ABDOMEN PELVIS W CONTRAST     Date of Exam: 2/12/2024 11:56 AM EST     Indication: abd pain, nausea.     Comparison: None available.     Technique: Axial CT images were obtained of the abdomen and pelvis following the uneventful intravenous administration of iodinated contrast. Sagittal and coronal reconstructions were performed.  Automated exposure control and iterative reconstruction   methods were used.        Findings:     Liver: The liver is unremarkable in morphology. No focal liver lesion is seen. There is mild biliary dilation which may be related to prior cholecystectomy.     Gallbladder: Surgically absent.     Pancreas: Stranding about the pancreatic head and duodenum. No discrete peripancreatic fluid collection is seen. 18 mm hypodense lesion within the body of the pancreas (series 5, image 52), possibly a pancreatic cyst or sidebranch IPMN.     Spleen: Several splenic granulomas.     Adrenal glands: 1 cm right adrenal gland is unremarkable. Low-density nodule of the apex of the left adrenal gland, incompletely characterized. Suspect an adenoma. Right adrenal gland appears unremarkable.     Genitourinary tract: 3 mm nonobstructing calculus within the left kidney. Right kidney is unremarkable. No hydronephrosis is seen. The ureters, urinary bladder, and prostate gland appear unremarkable.     Gastrointestinal tract: Stranding about the pancreatic head/duodenum. Supraumbilical fascial defect contains a knuckle of nonobstructed transverse colon (series 5, image 67). Hollow viscera is otherwise unremarkable. No findings to suggest bowel   obstruction.     Appendix: No findings to suggest acute appendicitis.     Other findings: No free air or free fluid is identified. No pathologically  enlarged lymph nodes are seen. Mild vascular calcifications are present. The IVC is unremarkable.     Bones and soft tissues: Several paramidline fascial defects are seen above the level of the umbilicus. 1 of these contains a knuckle of nonobstructed transverse colon (series 5, image 67). The others contain only fat. No acute osseous lesion is seen.   There are degenerative changes of the spine.     Lung bases: Scattered bibasilar atelectasis.        Impression:       Impression:  1.Stranding about the pancreatic head and duodenum. Findings may be related to acute pancreatitis and/or duodenitis. Please correlate clinically.  2.18 mm hypodense lesion within the body of the pancreas (series 5, image 52), possibly a pancreatic cyst or sidebranch IPMN. Further characterization with pancreatic protocol MRI may be considered if clinically indicated.  3.Left nonobstructive nephrolithiasis.  4.Several paramidline fascial defects are seen above the level of the umbilicus. 1 of these contains a knuckle of nonobstructed transverse colon (series 5, image 67). The others contain only fat.  5.Mild biliary dilation may be related to prior cholecystectomy. Please correlate with serum bilirubin levels.  6.Additional findings as detailed above.     Electronically Signed: Rene Clifford MD    2/12/2024 12:16 PM EST    Workstation ID: [id number]                   Assessment & Plan          This is a 78 y.o. male with:     Active and Resolved Problems            Active Hospital Problems     Diagnosis   POA    **Acute pancreatitis [K85.90]   Yes    Idiopathic acute pancreatitis without infection or necrosis [K85.00]   Unknown       Resolved Hospital Problems   No resolved problems to display.      Possible acute pancreatitis vs duodenitis  Pancreatic cyst  Leukocytosis  Patient started on CLD in ED, tolerating well so far.   Will advance his diet gradually to low-fat diet.  IV morphine 2 mg every 4 as needed for severe pain  Zofran 4 mg  every 8 as needed for nausea vomiting  Serial abdominal examinations  Start LR at 150 mill per hour, reassess rate in the morning  Strict intake and output  GI consultation  Obtain MRI abdomen  Started on IV ceftriaxone and flagyl course     History of Antithrombin III deficiency  Continue Coumadin, dosing per pharmacy        History of Hypertension  Continue Norvasc 10 mg once daily              DVT prophylaxis:  Medical DVT prophylaxis orders are signed and held. Medical DVT prophylaxis orders are present.           The patient desires to be as follows:     CODE STATUS:     Full code              Admission Status:  I believe this patient meets inpatient status.     Expected Length of Stay: 3     PDMP and Medication Dispenses via Sidebar reviewed and consistent with patient reported medications.     I discussed the patient's findings and my recommendations with patient.

## 2024-02-13 NOTE — CASE MANAGEMENT/SOCIAL WORK
Discharge Planning Assessment   [facility]     Patient Name: Sumit Jules  MRN: [id number]  Today's Date: 2/13/2024    Admit Date: 2/12/2024    Plan: Routine home with spouse   Discharge Needs Assessment       Row Name 02/13/24 1205       Living Environment    People in Home spouse    Name(s) of People in Home Spouse, Bina    Current Living Arrangements home    Potentially Unsafe Housing Conditions none    In the past 12 months has the electric, gas, oil, or water company threatened to shut off services in your home? No    Primary Care Provided by self    Provides Primary Care For no one    Family Caregiver if Needed spouse    Quality of Family Relationships helpful;involved;supportive    Able to Return to Prior Arrangements yes       Resource/Environmental Concerns    Resource/Environmental Concerns none    Transportation Concerns none       Transportation Needs    In the past 12 months, has lack of transportation kept you from medical appointments or from getting medications? no    In the past 12 months, has lack of transportation kept you from meetings, work, or from getting things needed for daily living? No       Food Insecurity    Within the past 12 months, you worried that your food would run out before you got the money to buy more. Never true    Within the past 12 months, the food you bought just didn't last and you didn't have money to get more. Never true       Transition Planning    Patient/Family Anticipates Transition to home with family    Patient/Family Anticipated Services at Transition none    Transportation Anticipated car, drives self;family or friend will provide       Discharge Needs Assessment    Readmission Within the Last 30 Days no previous admission in last 30 days    Equipment Currently Used at Home none    Concerns to be Addressed no discharge needs identified    Anticipated Changes Related to Illness none    Equipment Needed After Discharge none              Discharge Plan       Kristy  Name 02/13/24 1205       Plan    Plan Routine home with spouse    Patient/Family in Agreement with Plan yes    Plan Comments CM met with patient at bedside. Confirmed PCP and pharmacy, cuadra not wish to enroll in M2B. IADLs, no DMEs. Pt lives at home with spouse, Bina and she will provide transportation at d/c. IMM 2/12/24 per registration. DC Barriers: Gastro consult              Demographic Summary       Row Name 02/13/24 1204       General Information    Admission Type inpatient    Arrived From emergency department    Required Notices Provided Important Message from Medicare    Referral Source admission list    Reason for Consult discharge planning    Preferred Language English                   Functional Status       Row Name 02/13/24 1205       Functional Status    Usual Activity Tolerance good    Current Activity Tolerance good       Functional Status, IADL    Medications independent    Meal Preparation independent    Housekeeping independent    Laundry independent    Shopping independent       Mental Status    General Appearance WDL WDL       Mental Status Summary    Recent Changes in Mental Status/Cognitive Functioning no changes              Patient Forms       Row Name 02/13/24 1208       Patient Forms    Important Message from Medicare ([facility]) --  IMM 2/12/24 per registration           Tabitha Deshpande RN    phone [phone]  fax [phone]

## 2024-02-13 NOTE — PROGRESS NOTES
"Pharmacy dosing service  Anticoagulant  Warfarin     Subjective:    Sumit Jules is a 78 y.o.male being continued on warfarin for History of DVT/PE.    INR Goal: 2 - 3  Home medication?: warfarin 3 mg PO daily  Bridge Therapy Present?:  No  Interacting Medications Evaluation (New/Present/Discontinued): Flagyl  Additional Contributing Factors: none      Assessment/Plan:    INR currently therapeutic.  Continue current home dose of warfarin 3mg daily.     Continue to monitor and adjust based on INR.         Date 2/12 2/13          INR 2.51           Dose  3mg              Objective:  [Ht: 189.2 cm (74.49\"); Wt: (!) 144 kg (318 lb 2 oz); BMI: Body mass index is 40.31 kg/m².]    Lab Results   Component Value Date    ALBUMIN 3.9 02/12/2024     Lab Results   Component Value Date    INR 2.51 02/12/2024    INR 2.10 03/08/2017    INR 2.50 03/02/2017    PROTIME 25.6 02/12/2024     Lab Results   Component Value Date    HGB 13.2 02/12/2024    HGB 14.1 09/20/2016     Lab Results   Component Value Date    HCT 40.9 02/12/2024    HCT 44.2 09/20/2016       Jose Luis Thomas, PharmEDWAR  02/13/24 08:24 EST   "

## 2024-02-13 NOTE — PROGRESS NOTES
[facility] Medicine Services  History & Physical     Patient Name: Sumit Jules  : 1945  MRN: [id number]  Primary Care Physician:  Stevan, No Known  Date of admission: 2024  Date and Time of Service: 2024 at 4:00 PM     Subjective       Chief Complaint: abdominal pain     History of Present Illness: Sumit Jules is a 78 y.o. male with a PMH of Antithrombin deficiency, DVT, on chronic anticoagulation with Coumadin, hypertension, prostate cancer who presented to [facility] on 2024 with  gradual onset periumbilical abdominal pain since the past 2 days which the patient describes as constant, around 4 or 5/10 at times radiating to back dull-like.  Patient denies nausea, vomiting, diarrhea, melena, hematochezia, jaundice, recent weight loss.  Workup in ED is notable for CT abdomen showing stranding about the pancreatic head and of the known.  Findings may be suggestive of pancreatitis and duodenitis.  There is a 2.18 mm hypodense lesion within the body of the pancreas possibly pancreatic cysts.  There is mild biliary dilation.  CBC is notable for neutrophil leukocytosis white blood cell 16.6, chemistry notable for elevated lipase 1023.  Patient afebrile during ED course.  Hospitalist team is requested to admit.        Review of Systems   Constitutional:  Negative for appetite change and fatigue.   HENT:  Negative for dental problem, ear pain, nosebleeds, sinus pressure and tinnitus.    Eyes:  Negative for photophobia and itching.   Respiratory:  Negative for choking and stridor.    Cardiovascular:  Negative for leg swelling.   Gastrointestinal:  Positive for abdominal pain. Negative for anal bleeding, blood in stool, diarrhea, nausea, rectal pain and vomiting.   Musculoskeletal:  Negative for back pain and myalgias.   Neurological:  Negative for syncope, facial asymmetry and numbness.   Psychiatric/Behavioral:  Negative for behavioral problems and dysphoric mood. The patient is  not nervous/anxious.     2/13  Patient admitted with acute pancreatitis with lipase of 1100  Patient had previous cholecystectomy  Denied alcohol  Will check lipid profile  Continue n.p.o. 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 Otherwise pertinent FHx was reviewed and not pertinent to current issue.     Social History:  reports that he has never smoked. He has never used smokeless tobacco. He reports that he does not drink alcohol and does not use drugs.     Home Medications:  Prior to Admission Medications         Prescriptions Last Dose Informant Patient Reported? Taking?     amLODIPine (NORVASC) 10 MG tablet 2/11/2024   Yes Yes     Take 1 tablet by mouth Every Night.     atorvastatin (LIPITOR) 10 MG tablet 2/11/2024   No Yes     Take 1 tablet by mouth Daily.     tamsulosin (FLOMAX) 0.4 MG capsule 24 hr capsule 2/11/2024   Yes Yes     Take 1 capsule by mouth Daily.     warfarin (COUMADIN) 3 MG tablet 2/11/2024   Yes Yes     Take 3 tablets by mouth Daily.                   Allergies:  No Known Allergies     Objective       Vitals:   Temp:  [97.7 °F (36.5 °C)-98.2 °F (36.8 °C)] 97.7 °F (36.5 °C)  Heart Rate:  [53-73] 60  Resp:  [12-24] 14  BP:  (144-173)/(64-81) 144/64  Body mass index is 40.31 kg/m².  Physical Exam  Constitutional:       Appearance: Normal appearance.   HENT:      Head: Normocephalic.      Nose: Nose normal.      Mouth/Throat:      Mouth: Mucous membranes are moist. Mucous membranes are dry.   Eyes:      Extraocular Movements: Extraocular movements intact.      Pupils: Pupils are equal, round, and reactive to light.   Pulmonary:      Effort: Pulmonary effort is normal.      Breath sounds: Normal breath sounds.   Abdominal:      General: Abdomen is flat.      Palpations: Abdomen is soft.      Comments: Old healed horizontal surgical incision, incisional hernia present, easily reducible.  There is mild periumbilical tenderness.  No guarding or rigidity.  Edward sign is negative.   Musculoskeletal:         General: Normal range of motion.   Skin:     General: Skin is dry.      Coloration: Skin is not pale.   Neurological:      General: No focal deficit present.      Mental Status: He is alert and oriented to person, place, and time.      Cranial Nerves: No cranial nerve deficit.      Motor: No weakness.   Psychiatric:         Mood and Affect: Mood normal.         Behavior: Behavior normal.            Diagnostic Data:  Lab Results (last 24 hours)         Procedure Component Value Units Date/Time     Urinalysis With Culture If Indicated - Urine, Clean Catch [[id number]]  (Abnormal) Collected: 02/12/24 1215     Specimen: Urine, Clean Catch Updated: 02/12/24 1226       Color, UA Orange       Comment: Any Substance that causes an abnormal urine color can alter the accuracy of the chemical reactions.          Appearance, UA Clear       pH, UA <=5.0       Specific Gravity, UA 1.040       Glucose, UA Negative       Ketones, UA Trace       Bilirubin, UA Negative       Blood, UA Trace       Protein, UA 30 mg/dL (1+)       Leuk Esterase, UA Trace       Nitrite, UA Negative       Urobilinogen, UA 1.0 E.U./dL     Narrative:       In absence of clinical  symptoms, the presence of pyuria, bacteria, and/or nitrites on the urinalysis result does not correlate with infection.     Urinalysis, Microscopic Only - Urine, Clean Catch [[id number]] Collected: 02/12/24 1215     Specimen: Urine, Clean Catch Updated: 02/12/24 1226       RBC, UA 0-2 /HPF         WBC, UA 0-2 /HPF         Comment: Urine culture not indicated.          Bacteria, UA None Seen /HPF         Squamous Epithelial Cells, UA 0-2 /HPF         Hyaline Casts, UA 0-2 /LPF         Methodology Automated Microscopy     Lipase [[id number]]  (Abnormal) Collected: 02/12/24 1019     Specimen: Blood from Arm, Right Updated: 02/12/24 1058       Lipase 1,023 U/L       Comprehensive Metabolic Panel [[id number]]  (Abnormal) Collected: 02/12/24 1019     Specimen: Blood from Arm, Right Updated: 02/12/24 1048       Glucose 149 mg/dL         BUN 13 mg/dL         Creatinine 0.99 mg/dL         Sodium 138 mmol/L         Potassium 4.0 mmol/L         Chloride 103 mmol/L         CO2 26.0 mmol/L         Calcium 9.3 mg/dL         Total Protein 7.1 g/dL         Albumin 3.9 g/dL         ALT (SGPT) 10 U/L         AST (SGOT) 16 U/L         Alkaline Phosphatase 85 U/L         Total Bilirubin 1.2 mg/dL         Globulin 3.2 gm/dL         A/G Ratio 1.2 g/dL         BUN/Creatinine Ratio 13.1       Anion Gap 9.0 mmol/L         eGFR 78.5 mL/min/1.73       Narrative:       GFR Normal >60  Chronic Kidney Disease <60  Kidney Failure <15     The GFR formula is only valid for adults with stable renal function between ages 18 and 70.     Magnesium [[id number]]  (Normal) Collected: 02/12/24 1019     Specimen: Blood from Arm, Right Updated: 02/12/24 1048       Magnesium 1.8 mg/dL       CK [[id number]]  (Normal) Collected: 02/12/24 1019     Specimen: Blood from Arm, Right Updated: 02/12/24 1048       Creatine Kinase 105 U/L       COVID-19 and FLU A/B PCR, 1 HR TAT - Swab, Nasopharynx [[id number]]  (Normal) Collected: 02/12/24 0946     Specimen: Swab from  Nasopharynx Updated: 02/12/24 1010       COVID19 Not Detected       Influenza A PCR Not Detected       Influenza B PCR Not Detected     Narrative:       Fact sheet for providers: https://www.fda.gov/media/963633/download     Fact sheet for patients: https://www.fda.gov/media/841841/download     Test performed by PCR.     Protime-INR [[id number]]  (Normal) Collected: 02/12/24 0944     Specimen: Blood from Arm, Right Updated: 02/12/24 1004       Protime 25.6 Seconds         INR 2.51     CBC & Differential [[id number]]  (Abnormal) Collected: 02/12/24 0944     Specimen: Blood from Arm, Right Updated: 02/12/24 0958     Narrative:       The following orders were created for panel order CBC & Differential.  Procedure                               Abnormality         Status                     ---------                               -----------         ------                     CBC Auto Differential[[id number]]        Abnormal            Final result                  Please view results for these tests on the individual orders.     CBC Auto Differential [[id number]]  (Abnormal) Collected: 02/12/24 0944     Specimen: Blood from Arm, Right Updated: 02/12/24 0958       WBC 16.60 10*3/mm3         RBC 4.49 10*6/mm3         Hemoglobin 13.2 g/dL         Hematocrit 40.9 %         MCV 91.0 fL         MCH 29.4 pg         MCHC 32.3 g/dL         RDW 13.9 %         RDW-SD 44.2 fl         MPV 11.2 fL         Platelets 125 10*3/mm3         Neutrophil % 85.1 %         Lymphocyte % 6.1 %         Monocyte % 8.5 %         Eosinophil % 0.0 %         Basophil % 0.3 %         Neutrophils, Absolute 14.20 10*3/mm3         Lymphocytes, Absolute 1.00 10*3/mm3         Monocytes, Absolute 1.40 10*3/mm3         Eosinophils, Absolute 0.00 10*3/mm3         Basophils, Absolute 0.10 10*3/mm3         nRBC 0.0 /100 WBC                             Imaging Results (Last 24 Hours)         Procedure Component Value Units Date/Time     CT Abdomen Pelvis With  Contrast [[id number]] Collected: 02/12/24 1207       Updated: 02/12/24 1218     Narrative:       CT ABDOMEN PELVIS W CONTRAST     Date of Exam: 2/12/2024 11:56 AM EST     Indication: abd pain, nausea.     Comparison: None available.     Technique: Axial CT images were obtained of the abdomen and pelvis following the uneventful intravenous administration of iodinated contrast. Sagittal and coronal reconstructions were performed.  Automated exposure control and iterative reconstruction   methods were used.        Findings:     Liver: The liver is unremarkable in morphology. No focal liver lesion is seen. There is mild biliary dilation which may be related to prior cholecystectomy.     Gallbladder: Surgically absent.     Pancreas: Stranding about the pancreatic head and duodenum. No discrete peripancreatic fluid collection is seen. 18 mm hypodense lesion within the body of the pancreas (series 5, image 52), possibly a pancreatic cyst or sidebranch IPMN.     Spleen: Several splenic granulomas.     Adrenal glands: 1 cm right adrenal gland is unremarkable. Low-density nodule of the apex of the left adrenal gland, incompletely characterized. Suspect an adenoma. Right adrenal gland appears unremarkable.     Genitourinary tract: 3 mm nonobstructing calculus within the left kidney. Right kidney is unremarkable. No hydronephrosis is seen. The ureters, urinary bladder, and prostate gland appear unremarkable.     Gastrointestinal tract: Stranding about the pancreatic head/duodenum. Supraumbilical fascial defect contains a knuckle of nonobstructed transverse colon (series 5, image 67). Hollow viscera is otherwise unremarkable. No findings to suggest bowel   obstruction.     Appendix: No findings to suggest acute appendicitis.     Other findings: No free air or free fluid is identified. No pathologically enlarged lymph nodes are seen. Mild vascular calcifications are present. The IVC is unremarkable.     Bones and soft tissues:  Several paramidline fascial defects are seen above the level of the umbilicus. 1 of these contains a knuckle of nonobstructed transverse colon (series 5, image 67). The others contain only fat. No acute osseous lesion is seen.   There are degenerative changes of the spine.     Lung bases: Scattered bibasilar atelectasis.        Impression:       Impression:  1.Stranding about the pancreatic head and duodenum. Findings may be related to acute pancreatitis and/or duodenitis. Please correlate clinically.  2.18 mm hypodense lesion within the body of the pancreas (series 5, image 52), possibly a pancreatic cyst or sidebranch IPMN. Further characterization with pancreatic protocol MRI may be considered if clinically indicated.  3.Left nonobstructive nephrolithiasis.  4.Several paramidline fascial defects are seen above the level of the umbilicus. 1 of these contains a knuckle of nonobstructed transverse colon (series 5, image 67). The others contain only fat.  5.Mild biliary dilation may be related to prior cholecystectomy. Please correlate with serum bilirubin levels.  6.Additional findings as detailed above.     Electronically Signed: Rene Clifford MD    2/12/2024 12:16 PM EST    Workstation ID: [id number]                   Assessment & Plan          This is a 78 y.o. male with:     Active and Resolved Problems        Active Hospital Problems     Diagnosis   POA    **Acute pancreatitis [K85.90]   Yes    Idiopathic acute pancreatitis without infection or necrosis [K85.00]   Unknown       Resolved Hospital Problems   No resolved problems to display.      Possible acute pancreatitis vs duodenitis  Pancreatic cyst  Leukocytosis  Patient started on CLD in ED, tolerating well so far.   Will advance his diet gradually to low-fat diet.  IV morphine 2 mg every 4 as needed for severe pain  Zofran 4 mg every 8 as needed for nausea vomiting  Serial abdominal examinations  Start LR at 150 mill per hour, reassess rate in the  morning  Strict intake and output  GI consultation  Obtain MRI abdomen  Started on IV ceftriaxone and flagyl course     History of Antithrombin III deficiency  Continue Coumadin, dosing per pharmacy        History of Hypertension  Continue Norvasc 10 mg once daily              DVT prophylaxis:  Medical DVT prophylaxis orders are signed and held. Medical DVT prophylaxis orders are present.           The patient desires to be as follows:     CODE STATUS:     Full code              Admission Status:  I believe this patient meets inpatient status.     Expected Length of Stay: 3     PDMP and Medication Dispenses via Sidebar reviewed and consistent with patient reported medications.     I discussed the patient's findings and my recommendations with patient.

## 2024-02-13 NOTE — PLAN OF CARE
Goal Outcome Evaluation:  Plan of Care Reviewed With: patient        Progress: no change  Outcome Evaluation: Patient in bed eyes closed resting.  Patient on continuous IV fluids. No c/o noted.

## 2024-02-13 NOTE — PAYOR COMM NOTE
"Minerva Jules (78 y.o. Male)       Date of Birth   1945    Social Security Number       Address   [address] [place] [address] [place] [facility]    Home Phone   [phone]    MRN   [id number]       Shinto   Unknown    Marital Status                               Admission Date   24    Admission Type   Emergency    Admitting Provider   Alvarez, Carlos Llanes, MD    Attending Provider   Ana Paula Youssef MD    Department, Room/Bed   [facility] EMERGENCY DEPARTMENT, [facility]/3       Discharge Date       Discharge Disposition       Discharge Destination                                 Attending Provider: Ana Paula Youssef MD    Allergies: No Known Allergies    Isolation: None   Infection: None   Code Status: CPR    Ht: 189.2 cm (74.49\")   Wt: 144 kg (318 lb 2 oz)    Admission Cmt: None   Principal Problem: Acute pancreatitis [K85.90]                   Active Insurance as of 2024       Primary Coverage       Payor Plan Insurance Group Employer/Plan Group    [employer] BETTER HE[employer] MEDICARE ADVANTAGE AET[employer] BETTER HEALTH MEDICARE ADVANTAGE [id number]       Payor Plan Address Payor Plan Phone Number Payor Plan Fax Number Effective Dates    [address] [address]   2023 - None Entered    [place] [address]         Subscriber Name Subscriber Birth Date Member ID       MINERVA JULES 1945 [id number]                     Emergency Contacts        (Rel.) Home Phone Work Phone Mobile Phone    Dianna Zambrano (Sister) -- -- [phone]                 History & Physical        Alvarez, Carlos Llanes, MD at [address] [place] [address] Medicine Services  History & Physical    Patient Name: Minerva Jules  : 1945  MRN: [id number]  Primary Care Physician:  Provider, No Known  Date of admission: 2024  Date and Time of Service: 2024 at 4:00 PM    Subjective      Chief Complaint: abdominal pain    History of Present Illness: Minerva Jules is a 78 y.o. male with a PMH " of Antithrombin deficiency, DVT, on chronic anticoagulation with Coumadin, hypertension, prostate cancer who presented to [facility] on 2/12/2024 with  gradual onset periumbilical abdominal pain since the past 2 days which the patient describes as constant, around 4 or 5/10 at times radiating to back dull-like.  Patient denies nausea, vomiting, diarrhea, melena, hematochezia, jaundice, recent weight loss.  Workup in ED is notable for CT abdomen showing stranding about the pancreatic head and of the known.  Findings may be suggestive of pancreatitis and duodenitis.  There is a 2.18 mm hypodense lesion within the body of the pancreas possibly pancreatic cysts.  There is mild biliary dilation.  CBC is notable for neutrophil leukocytosis white blood cell 16.6, chemistry notable for elevated lipase 1023.  Patient afebrile during ED course.  Hospitalist team is requested to admit.      Review of Systems   Constitutional:  Negative for appetite change and fatigue.   HENT:  Negative for dental problem, ear pain, nosebleeds, sinus pressure and tinnitus.    Eyes:  Negative for photophobia and itching.   Respiratory:  Negative for choking and stridor.    Cardiovascular:  Negative for leg swelling.   Gastrointestinal:  Positive for abdominal pain. Negative for anal bleeding, blood in stool, diarrhea, nausea, rectal pain and vomiting.   Musculoskeletal:  Negative for back pain and myalgias.   Neurological:  Negative for syncope, facial asymmetry and numbness.   Psychiatric/Behavioral:  Negative for behavioral problems and dysphoric mood. The patient is not nervous/anxious.        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 Otherwise pertinent FHx was reviewed and not pertinent to current issue.    Social History:  reports that he has never smoked. He has never used smokeless tobacco. He reports that he does not drink alcohol and does not use drugs.    Home Medications:  Prior to Admission Medications       Prescriptions Last Dose Informant Patient Reported? Taking?    amLODIPine (NORVASC) 10 MG tablet 2/11/2024  Yes Yes    Take 1 tablet by mouth Every Night.    atorvastatin (LIPITOR) 10 MG tablet 2/11/2024  No Yes    Take 1 tablet by mouth Daily.    tamsulosin (FLOMAX) 0.4 MG capsule 24 hr capsule 2/11/2024  Yes Yes    Take 1 capsule by mouth Daily.    warfarin (COUMADIN) 3 MG tablet 2/11/2024  Yes Yes    Take 3 tablets by mouth Daily.              Allergies:  No Known Allergies    Objective      Vitals:   Temp:  [97.7 °F (36.5 °C)-98.2 °F (36.8 °C)] 97.7 °F (36.5 °C)  Heart Rate:  [53-73] 60  Resp:  [12-24] 14  BP: (144-173)/(64-81) 144/64  Body mass index is 40.31 kg/m².  Physical Exam  Constitutional:       Appearance: Normal appearance.   HENT:      Head: Normocephalic.      Nose: Nose normal.      Mouth/Throat:      Mouth: Mucous membranes are moist. Mucous membranes are dry.   Eyes:      Extraocular Movements: Extraocular movements intact.      Pupils: Pupils are equal, round, and reactive to light.   Pulmonary:      Effort: Pulmonary effort is normal.      Breath sounds: Normal breath sounds.   Abdominal:      General: Abdomen is flat.      Palpations: Abdomen is soft.      Comments: Old healed horizontal surgical incision, incisional hernia present, easily reducible.  There is mild periumbilical  tenderness.  No guarding or rigidity.  Edward sign is negative.   Musculoskeletal:         General: Normal range of motion.   Skin:     General: Skin is dry.      Coloration: Skin is not pale.   Neurological:      General: No focal deficit present.      Mental Status: He is alert and oriented to person, place, and time.      Cranial Nerves: No cranial nerve deficit.      Motor: No weakness.   Psychiatric:         Mood and Affect: Mood normal.         Behavior: Behavior normal.         Diagnostic Data:  Lab Results (last 24 hours)       Procedure Component Value Units Date/Time    Urinalysis With Culture If Indicated - Urine, Clean Catch [[id number]]  (Abnormal) Collected: 02/12/24 1215    Specimen: Urine, Clean Catch Updated: 02/12/24 1226     Color, UA Orange     Comment: Any Substance that causes an abnormal urine color can alter the accuracy of the chemical reactions.        Appearance, UA Clear     pH, UA <=5.0     Specific Gravity, UA 1.040     Glucose, UA Negative     Ketones, UA Trace     Bilirubin, UA Negative     Blood, UA Trace     Protein, UA 30 mg/dL (1+)     Leuk Esterase, UA Trace     Nitrite, UA Negative     Urobilinogen, UA 1.0 E.U./dL    Narrative:      In absence of clinical symptoms, the presence of pyuria, bacteria, and/or nitrites on the urinalysis result does not correlate with infection.    Urinalysis, Microscopic Only - Urine, Clean Catch [[id number]] Collected: 02/12/24 1215    Specimen: Urine, Clean Catch Updated: 02/12/24 1226     RBC, UA 0-2 /HPF      WBC, UA 0-2 /HPF      Comment: Urine culture not indicated.        Bacteria, UA None Seen /HPF      Squamous Epithelial Cells, UA 0-2 /HPF      Hyaline Casts, UA 0-2 /LPF      Methodology Automated Microscopy    Lipase [[id number]]  (Abnormal) Collected: 02/12/24 1019    Specimen: Blood from Arm, Right Updated: 02/12/24 1058     Lipase 1,023 U/L     Comprehensive Metabolic Panel [[id number]]  (Abnormal) Collected: 02/12/24 1019    Specimen:  Blood from Arm, Right Updated: 02/12/24 1048     Glucose 149 mg/dL      BUN 13 mg/dL      Creatinine 0.99 mg/dL      Sodium 138 mmol/L      Potassium 4.0 mmol/L      Chloride 103 mmol/L      CO2 26.0 mmol/L      Calcium 9.3 mg/dL      Total Protein 7.1 g/dL      Albumin 3.9 g/dL      ALT (SGPT) 10 U/L      AST (SGOT) 16 U/L      Alkaline Phosphatase 85 U/L      Total Bilirubin 1.2 mg/dL      Globulin 3.2 gm/dL      A/G Ratio 1.2 g/dL      BUN/Creatinine Ratio 13.1     Anion Gap 9.0 mmol/L      eGFR 78.5 mL/min/1.73     Narrative:      GFR Normal >60  Chronic Kidney Disease <60  Kidney Failure <15    The GFR formula is only valid for adults with stable renal function between ages 18 and 70.    Magnesium [[id number]]  (Normal) Collected: 02/12/24 1019    Specimen: Blood from Arm, Right Updated: 02/12/24 1048     Magnesium 1.8 mg/dL     CK [[id number]]  (Normal) Collected: 02/12/24 1019    Specimen: Blood from Arm, Right Updated: 02/12/24 1048     Creatine Kinase 105 U/L     COVID-19 and FLU A/B PCR, 1 HR TAT - Swab, Nasopharynx [[id number]]  (Normal) Collected: 02/12/24 0946    Specimen: Swab from Nasopharynx Updated: 02/12/24 1010     COVID19 Not Detected     Influenza A PCR Not Detected     Influenza B PCR Not Detected    Narrative:      Fact sheet for providers: https://www.fda.gov/media/242332/download    Fact sheet for patients: https://www.fda.gov/media/227222/download    Test performed by PCR.    Protime-INR [[id number]]  (Normal) Collected: 02/12/24 0944    Specimen: Blood from Arm, Right Updated: 02/12/24 1004     Protime 25.6 Seconds      INR 2.51    CBC & Differential [[id number]]  (Abnormal) Collected: 02/12/24 0944    Specimen: Blood from Arm, Right Updated: 02/12/24 0958    Narrative:      The following orders were created for panel order CBC & Differential.  Procedure                               Abnormality         Status                     ---------                               -----------          ------                     CBC Auto Differential[[id number]]        Abnormal            Final result                 Please view results for these tests on the individual orders.    CBC Auto Differential [[id number]]  (Abnormal) Collected: 02/12/24 0944    Specimen: Blood from Arm, Right Updated: 02/12/24 0958     WBC 16.60 10*3/mm3      RBC 4.49 10*6/mm3      Hemoglobin 13.2 g/dL      Hematocrit 40.9 %      MCV 91.0 fL      MCH 29.4 pg      MCHC 32.3 g/dL      RDW 13.9 %      RDW-SD 44.2 fl      MPV 11.2 fL      Platelets 125 10*3/mm3      Neutrophil % 85.1 %      Lymphocyte % 6.1 %      Monocyte % 8.5 %      Eosinophil % 0.0 %      Basophil % 0.3 %      Neutrophils, Absolute 14.20 10*3/mm3      Lymphocytes, Absolute 1.00 10*3/mm3      Monocytes, Absolute 1.40 10*3/mm3      Eosinophils, Absolute 0.00 10*3/mm3      Basophils, Absolute 0.10 10*3/mm3      nRBC 0.0 /100 WBC              Imaging Results (Last 24 Hours)       Procedure Component Value Units Date/Time    CT Abdomen Pelvis With Contrast [[id number]] Collected: 02/12/24 1207     Updated: 02/12/24 1218    Narrative:      CT ABDOMEN PELVIS W CONTRAST    Date of Exam: 2/12/2024 11:56 AM EST    Indication: abd pain, nausea.    Comparison: None available.    Technique: Axial CT images were obtained of the abdomen and pelvis following the uneventful intravenous administration of iodinated contrast. Sagittal and coronal reconstructions were performed.  Automated exposure control and iterative reconstruction   methods were used.      Findings:    Liver: The liver is unremarkable in morphology. No focal liver lesion is seen. There is mild biliary dilation which may be related to prior cholecystectomy.    Gallbladder: Surgically absent.    Pancreas: Stranding about the pancreatic head and duodenum. No discrete peripancreatic fluid collection is seen. 18 mm hypodense lesion within the body of the pancreas (series 5, image 52), possibly a pancreatic cyst or sidebranch  IPMN.    Spleen: Several splenic granulomas.    Adrenal glands: 1 cm right adrenal gland is unremarkable. Low-density nodule of the apex of the left adrenal gland, incompletely characterized. Suspect an adenoma. Right adrenal gland appears unremarkable.    Genitourinary tract: 3 mm nonobstructing calculus within the left kidney. Right kidney is unremarkable. No hydronephrosis is seen. The ureters, urinary bladder, and prostate gland appear unremarkable.    Gastrointestinal tract: Stranding about the pancreatic head/duodenum. Supraumbilical fascial defect contains a knuckle of nonobstructed transverse colon (series 5, image 67). Hollow viscera is otherwise unremarkable. No findings to suggest bowel   obstruction.    Appendix: No findings to suggest acute appendicitis.    Other findings: No free air or free fluid is identified. No pathologically enlarged lymph nodes are seen. Mild vascular calcifications are present. The IVC is unremarkable.    Bones and soft tissues: Several paramidline fascial defects are seen above the level of the umbilicus. 1 of these contains a knuckle of nonobstructed transverse colon (series 5, image 67). The others contain only fat. No acute osseous lesion is seen.   There are degenerative changes of the spine.    Lung bases: Scattered bibasilar atelectasis.      Impression:      Impression:  1.Stranding about the pancreatic head and duodenum. Findings may be related to acute pancreatitis and/or duodenitis. Please correlate clinically.  2.18 mm hypodense lesion within the body of the pancreas (series 5, image 52), possibly a pancreatic cyst or sidebranch IPMN. Further characterization with pancreatic protocol MRI may be considered if clinically indicated.  3.Left nonobstructive nephrolithiasis.  4.Several paramidline fascial defects are seen above the level of the umbilicus. 1 of these contains a knuckle of nonobstructed transverse colon (series 5, image 67). The others contain only  fat.  5.Mild biliary dilation may be related to prior cholecystectomy. Please correlate with serum bilirubin levels.  6.Additional findings as detailed above.    Electronically Signed: Rene Clifford MD    2/12/2024 12:16 PM EST    Workstation ID: [id number]              Assessment & Plan        This is a 78 y.o. male with:    Active and Resolved Problems  Active Hospital Problems    Diagnosis  POA    **Acute pancreatitis [K85.90]  Yes    Idiopathic acute pancreatitis without infection or necrosis [K85.00]  Unknown      Resolved Hospital Problems   No resolved problems to display.     Possible acute pancreatitis vs duodenitis  Pancreatic cyst  Leukocytosis  Patient started on CLD in ED, tolerating well so far.   Will advance his diet gradually to low-fat diet.  IV morphine 2 mg every 4 as needed for severe pain  Zofran 4 mg every 8 as needed for nausea vomiting  Serial abdominal examinations  Start LR at 150 mill per hour, reassess rate in the morning  Strict intake and output  GI consultation  Obtain MRI abdomen  Started on IV ceftriaxone and flagyl course    History of Antithrombin III deficiency  Continue Coumadin, dosing per pharmacy      History of Hypertension  Continue Norvasc 10 mg once daily          DVT prophylaxis:  Medical DVT prophylaxis orders are signed and held. Medical DVT prophylaxis orders are present.        The patient desires to be as follows:    CODE STATUS:     Full code          Admission Status:  I believe this patient meets inpatient status.    Expected Length of Stay: 3    PDMP and Medication Dispenses via Sidebar reviewed and consistent with patient reported medications.    I discussed the patient's findings and my recommendations with patient.      Signature:     This document has been electronically signed by Carlos Llanes Alvarez, MD on February 12, 2024 18:35 EST   [facility] Hospitalist Team     Electronically signed by Alvarez, Carlos Llanes, MD at 02/12/24 8747           Emergency Department Notes        Cira Amin RN at 02/12/24 1517          Pt placed on a hospital bed for comfort in lowest position call system in reach. Pt given water to drink.    Electronically signed by Cira Amin RN at 02/12/24 1518       Cira Walker PA at 02/12/24 0924        Procedure Orders    1. ECG 12 Lead [[id number]] ordered by Cira Walker PA                 Subjective   History of Present Illness  Chief Complaint: Generalized weakness fatigue, abdominal pain    Patient is a 78-year-old  male with history of previous DVT, prostate cancer, clotting disorder presents to the ER with complaints of generalized fatigue and weakness for 2 days.  Patient states that he came home from grocery shopping not feeling well.  He states that he and his wife went to [employer] and had chicken dumplings and states that he felt like the chicken dumplings did not taste normal and had a hard time chewing them.  Patient states since then he has not felt well.  He denies any significant complaints of pain but does report some abdominal cramping.  He has some nausea but no vomiting or diarrhea.  No hematemesis hematochezia or melena.  No chest pain or shortness of breath.  No headache lightheadedness or dizziness.  No recent sick contacts.  No fever or chills.    PCP: Catarino Portillo    History provided by:  Patient      Review of Systems   Constitutional:  Positive for fatigue. Negative for fever.   HENT:  Negative for congestion, sore throat and trouble swallowing.    Eyes: Negative.    Respiratory:  Negative for shortness of breath and wheezing.    Cardiovascular:  Negative for chest pain.   Gastrointestinal:  Positive for abdominal pain and nausea. Negative for diarrhea and vomiting.   Endocrine: Negative.    Genitourinary:  Negative for dysuria.   Musculoskeletal:  Negative for myalgias.   Skin:  Negative for rash.   Allergic/Immunologic: Negative.    Neurological:  Negative for  light-headedness and headaches.   Psychiatric/Behavioral:  Negative for behavioral problems.    All other systems reviewed and are negative.      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    DVT (deep venous thrombosis)     History of complete eye exam SCHEDULED    Impaired fasting glucose 12/13/2011    Prostate cancer        No Known Allergies    Past Surgical History:   Procedure Laterality Date    CHOLECYSTECTOMY      COLONOSCOPY  10/2013       Family History   Problem Relation Age of Onset    Breast cancer Other     Diabetes Other     Deep vein thrombosis Other     Diabetes type II Other        Social History     Socioeconomic History    Marital status:    Tobacco Use    Smoking status: Never   Substance and Sexual Activity    Alcohol use: No    Drug use: Never           Objective   Physical Exam  Vitals and nursing note reviewed.   Constitutional:       Appearance: Normal appearance.   HENT:      Right Ear: Tympanic membrane normal.      Left Ear: Tympanic membrane normal.      Nose: Nose normal.      Mouth/Throat:      Mouth: Mucous membranes are moist.   Eyes:      Extraocular Movements: Extraocular movements intact.      Pupils: Pupils are equal, round, and reactive to light.   Cardiovascular:      Rate and Rhythm: Normal rate and regular rhythm.      Pulses: Normal pulses.      Heart sounds: Normal heart sounds. No murmur heard.  Pulmonary:      Effort: Pulmonary effort is normal.      Breath sounds: Normal breath sounds.   Abdominal:      General: Abdomen is flat.      Palpations: Abdomen is soft.      Tenderness: There is abdominal tenderness. There is no guarding.   Musculoskeletal:         General: Normal range of motion.      Cervical back: Normal range of motion.   Skin:     General: Skin is warm.      Capillary Refill: Capillary  "refill takes less than 2 seconds.   Neurological:      General: No focal deficit present.      Mental Status: He is alert and oriented to person, place, and time.      Cranial Nerves: No cranial nerve deficit.      Motor: No weakness.         ECG 12 Lead      Date/Time: 2/12/2024 4:54 PM    Performed by: Cira Walker PA  Authorized by: Alvarez, Carlos Llanes, MD  Interpreted by ED physician  Previous ECG: no previous ECG available  Rhythm: sinus rhythm  Rate: normal  BPM: 74  QRS axis: normal  Conduction: 1st degree  Clinical impression: non-specific ECG              ED Course  ED Course as of 02/12/24 1700   Mon Feb 12, 2024   1310 I spoke with Dr. Meyers for admission [MC]      ED Course User Index  [] Cira Walker PA    /67   Pulse 62   Temp 98.2 °F (36.8 °C) (Oral)   Resp 16   Ht 182.9 cm (72\")   Wt (!) 144 kg (317 lb 7.4 oz)   SpO2 90%   BMI 43.06 kg/m²   Labs Reviewed   COMPREHENSIVE METABOLIC PANEL - Abnormal; Notable for the following components:       Result Value    Glucose 149 (*)     All other components within normal limits    Narrative:     GFR Normal >60  Chronic Kidney Disease <60  Kidney Failure <15    The GFR formula is only valid for adults with stable renal function between ages 18 and 70.   LIPASE - Abnormal; Notable for the following components:    Lipase 1,023 (*)     All other components within normal limits   URINALYSIS W/ CULTURE IF INDICATED - Abnormal; Notable for the following components:    Color, UA Orange (*)     Specific Gravity, UA 1.040 (*)     Ketones, UA Trace (*)     Blood, UA Trace (*)     Protein, UA 30 mg/dL (1+) (*)     Leuk Esterase, UA Trace (*)     All other components within normal limits    Narrative:     In absence of clinical symptoms, the presence of pyuria, bacteria, and/or nitrites on the urinalysis result does not correlate with infection.   CBC WITH AUTO DIFFERENTIAL - Abnormal; Notable for the following components:    WBC 16.60 (*)     " Platelets 125 (*)     Neutrophil % 85.1 (*)     Lymphocyte % 6.1 (*)     Eosinophil % 0.0 (*)     Neutrophils, Absolute 14.20 (*)     Monocytes, Absolute 1.40 (*)     All other components within normal limits   COVID-19 AND FLU A/B, NP SWAB IN TRANSPORT MEDIA 1 HR TAT - Normal    Narrative:     Fact sheet for providers: https://www.fda.gov/media/246550/download    Fact sheet for patients: https://www.fda.gov/media/285904/download    Test performed by PCR.   MAGNESIUM - Normal   CK - Normal   PROTIME-INR - Normal   URINALYSIS, MICROSCOPIC ONLY   CBC AND DIFFERENTIAL    Narrative:     The following orders were created for panel order CBC & Differential.  Procedure                               Abnormality         Status                     ---------                               -----------         ------                     CBC Auto Differential[[id number]]        Abnormal            Final result                 Please view results for these tests on the individual orders.     Medications   sodium chloride 0.9 % flush 10 mL (has no administration in time range)   iopamidol (ISOVUE-370) 76 % injection 100 mL (90 mL Intravenous Given 2/12/24 1205)   sodium chloride 0.9 % bolus 1,000 mL (0 mL Intravenous Stopped 2/12/24 1339)     CT Abdomen Pelvis With Contrast    Result Date: 2/12/2024  Impression: 1.Stranding about the pancreatic head and duodenum. Findings may be related to acute pancreatitis and/or duodenitis. Please correlate clinically. 2.18 mm hypodense lesion within the body of the pancreas (series 5, image 52), possibly a pancreatic cyst or sidebranch IPMN. Further characterization with pancreatic protocol MRI may be considered if clinically indicated. 3.Left nonobstructive nephrolithiasis. 4.Several paramidline fascial defects are seen above the level of the umbilicus. 1 of these contains a knuckle of nonobstructed transverse colon (series 5, image 67). The others contain only fat. 5.Mild biliary dilation may  be related to prior cholecystectomy. Please correlate with serum bilirubin levels. 6.Additional findings as detailed above. Electronically Signed: Rene Clifford MD  2/12/2024 12:16 PM EST  Workstation ID: [id number]                                            Medical Decision Making  Differential Dx (Includes but not limited to): Pancreatitis, NSTEMI, hiatal hernia, bowel obstruction, diverticulitis  Medical Records Reviewed: No pertinent records to review  Labs: On my interpretation urinalysis orange, trace blood 1+ protein trace leukocytes.  No bacteria.  CMP glucose 149.  Lipase greater than 1000.  Normal magnesium, CK.  COVID and flu negative.  CBC leukocytosis 16,000.  Imaging: On my interpretation, there is stranding around the pancreatic head and duodenum concerning for acute pancreatitis and/or duodenitis.  There is also an 18 mm hypodense lesion in the body of the pancreas could be a pancreatic cyst.  There is a hernia above the level of umbilicus but no evidence of obstruction.  Telemetry: EKG interpretation: Reviewed myself interpreted by ER attending sinus rhythm rate of 74 with no acute ST changes.  There is mildly prolonged VA interval  Testing considered but not ordered: CT head patient denies headache or head injury  Nature of Complaint: Acute  Admission vs Discharge: Admission  Discussion: While in the ED IV was placed and labs were obtained appropriate PPE was worn during exam and throughout all encounters with the patient.  Patient had the above evaluation.  He is afebrile nontoxic and in no acute distress.  Patient pain controlled at this time vital signs are stable.  Lab work significant for elevated lipase, LFTs otherwise normal.  EKG unremarkable.  CT scan shows evidence of acute pancreatitis versus duodenitis with possible pancreatic cyst.  No evidence of biliary duct obstruction at this time.  On reevaluation he does report feeling better.  He was notified of lab work and imaging findings.   Patient was given IV fluids, recommended admission for IV fluids, GI consultation for acute pancreatitis to rule out biliary or pancreatic duct obstruction.  Patient is agreeable.  I spoke with Dr. Meyers regarding admission.    Based on the clinical findings at this time I anticipate that the patient will require 2 midnight stay.    Problems Addressed:  Acute pancreatitis, unspecified complication status, unspecified pancreatitis type: acute illness or injury  Epigastric pain: acute illness or injury  Fatigue, unspecified type: acute illness or injury    Amount and/or Complexity of Data Reviewed  Labs: ordered. Decision-making details documented in ED Course.  Radiology: ordered. Decision-making details documented in ED Course.  ECG/medicine tests: ordered. Decision-making details documented in ED Course.    Risk  Prescription drug management.  Decision regarding hospitalization.        Final diagnoses:   Acute pancreatitis, unspecified complication status, unspecified pancreatitis type   Epigastric pain   Fatigue, unspecified type       ED Disposition  ED Disposition       ED Disposition   Decision to Admit    Condition   --    Comment   Level of Care: Telemetry [5]   Admitting Physician: ALVAREZ, CARLOS LLANES [[id number]]   Attending Physician: ALVAREZ, CARLOS LLANES [[id number]]                 No follow-up provider specified.       Medication List        ASK your doctor about these medications      amLODIPine 10 MG tablet  Commonly known as: NORVASC  Ask about: Which instructions should I use?     warfarin 3 MG tablet  Commonly known as: COUMADIN  Ask about: Which instructions should I use?                 Cira Walker PA  02/12/24 1700      Electronically signed by Cira Walker PA at 02/12/24 1700       Vital Signs (last 2 days)       Date/Time Temp Temp src Pulse Resp BP Patient Position SpO2    02/13/24 0804 98.6 (37) Oral 72 20 145/75 Lying 93    02/13/24 0342 98.7 (37.1) Oral 70 20 139/65 Lying 92     02/12/24 2006 99.3 (37.4) Oral 63 16 145/60 Lying 95    02/12/24 1810 97.7 (36.5) Oral 60 14 144/64 Lying 94    02/12/24 1615 -- -- 62 -- 151/67 -- 90    02/12/24 1602 -- -- 53 16 152/70 -- 91    02/12/24 1547 -- -- 56 -- 149/64 Lying 92    02/12/24 1532 -- -- 63 16 151/76 Lying 92    02/12/24 1502 -- -- 64 17 156/68 Lying 92    02/12/24 1454 -- -- 57 -- -- -- 91    02/12/24 1439 -- -- 65 -- -- -- 93    02/12/24 1432 -- -- 61 16 147/64 -- 93    02/12/24 1424 -- -- 59 -- -- -- 93    02/12/24 1409 -- -- 53 -- -- -- 92    02/12/24 1354 -- -- 59 -- -- -- 92    02/12/24 1339 -- -- 65 -- -- -- 92    02/12/24 1332 -- -- 63 23 148/68 -- 94    02/12/24 1301 -- -- 59 19 156/74 -- 93    02/12/24 12:10:46 -- -- 63 20 152/65 Lying 94    02/12/24 1146 -- -- 69 17 152/67 Sitting 92    02/12/24 11:22:32 -- -- 73 12 154/77 -- 95    02/12/24 10:00:48 -- -- 72 24 173/81 Sitting 91    02/12/24 0905 98.2 (36.8) Oral 66 18 154/66 Sitting 98          Oxygen Therapy (last 2 days)       Date/Time SpO2 Device (Oxygen Therapy) Flow (L/min) Oxygen Concentration (%) ETCO2 (mmHg)    02/13/24 0804 93 room air -- -- --    02/13/24 0620 -- room air -- -- --    02/13/24 0405 -- room air -- -- --    02/13/24 0342 92 room air -- -- --    02/13/24 0200 -- room air -- -- --    02/13/24 0040 -- room air -- -- --    02/12/24 2225 -- room air -- -- --    02/12/24 2030 -- room air -- -- --    02/12/24 2006 95 room air -- -- --    02/12/24 1810 94 room air -- -- --    02/12/24 1800 -- room air -- -- --    02/12/24 1615 90 room air -- -- --    02/12/24 1602 91 room air -- -- --    02/12/24 1547 92 -- -- -- --    02/12/24 1532 92 room air -- -- --    02/12/24 1502 92 room air -- -- --    02/12/24 1454 91 -- -- -- --    02/12/24 1439 93 -- -- -- --    02/12/24 1432 93 room air -- -- --    02/12/24 1424 93 -- -- -- --    02/12/24 1409 92 -- -- -- --    02/12/24 1354 92 -- -- -- --    02/12/24 1339 92 -- -- -- --    02/12/24 1332 94 -- -- -- --    02/12/24 1301  93 room air -- -- --    02/12/24 12:10:46 94 -- -- -- --    02/12/24 1146 92 room air -- -- --    02/12/24 11:22:32 95 -- -- -- --    02/12/24 10:00:48 91 -- -- -- --    02/12/24 0905 98 -- -- -- --          Facility-Administered Medications as of 2/12/2024   Medication Dose Route Frequency Provider Last Rate Last Admin    amLODIPine (NORVASC) tablet 10 mg  10 mg Oral Nightly Alvarez, Carlos Llanes, MD        atorvastatin (LIPITOR) tablet 10 mg  10 mg Oral Nightly Alvarez, Carlos Llanes, MD        sennosides-docusate (PERICOLACE) 8.6-50 MG per tablet 2 tablet  2 tablet Oral BID PRN Alvarez, Carlos Llanes, MD        And    polyethylene glycol (MIRALAX) packet 17 g  17 g Oral Daily PRN Alvarez, Carlos Llanes, MD        And    bisacodyl (DULCOLAX) EC tablet 5 mg  5 mg Oral Daily PRN Alvarez, Carlos Llanes, MD        And    bisacodyl (DULCOLAX) suppository 10 mg  10 mg Rectal Daily PRN Alvarez, Carlos Llanes, MD        cefTRIAXone (ROCEPHIN) 2,000 mg in sodium chloride 0.9 % 100 mL IVPB  2,000 mg Intravenous Q24H Alvarez, Carlos Llanes,  mL/hr at 02/12/24 2018 2,000 mg at 02/12/24 2018    [COMPLETED] iopamidol (ISOVUE-370) 76 % injection 100 mL  100 mL Intravenous Once in imaging Adan Espinal,    90 mL at 02/12/24 1205    lactated ringers infusion  150 mL/hr Intravenous Continuous Alvarez, Carlos Llanes,  mL/hr at 02/13/24 0809 150 mL/hr at 02/13/24 0809    lactated ringers infusion  150 mL/hr Intravenous Continuous Alvarez, Carlos Llanes,  mL/hr at 02/13/24 0333 150 mL/hr at 02/13/24 0333    metroNIDAZOLE (FLAGYL) IVPB 500 mg  500 mg Intravenous Q8H Alvarez, Carlos Llanes,  mL/hr at 02/13/24 0340 500 mg at 02/13/24 0340    morphine injection 2 mg  2 mg Intravenous Q4H PRN Alvarez, Carlos Llanes, MD        ondansetron (ZOFRAN) injection 4 mg  4 mg Intravenous Q6H PRN Alvarez, Carlos Llanes, MD        Pharmacy to dose warfarin   Does not apply Continuous PRN Alvarez, Carlos Llanes, MD         [COMPLETED] sodium chloride 0.9 % bolus 1,000 mL  1,000 mL Intravenous Once Cira Walker PA   Stopped at 02/12/24 1339    sodium chloride 0.9 % flush 10 mL  10 mL Intravenous PRN Alvarez, Carlos Llanes, MD        sodium chloride 0.9 % flush 10 mL  10 mL Intravenous Q12H Alvarez, Carlos Llanes, MD   10 mL at 02/13/24 0924    sodium chloride 0.9 % flush 10 mL  10 mL Intravenous PRN Alvarez, Carlos Llanes, MD        sodium chloride 0.9 % infusion 40 mL  40 mL Intravenous PRN Alvarez, Carlos Llanes, MD        tamsulosin (FLOMAX) 24 hr capsule 0.4 mg  0.4 mg Oral Nightly Alvarez, Carlos Llanes, MD        warfarin (COUMADIN) tablet 3 mg  3 mg Oral Daily Ana Paula Youssef MD         Lab Results (all)       Procedure Component Value Units Date/Time    Basic Metabolic Panel [[id number]]  (Abnormal) Collected: 02/13/24 0849    Specimen: Blood Updated: 02/13/24 1001     Glucose 124 mg/dL      BUN 13 mg/dL      Creatinine 0.77 mg/dL      Sodium 138 mmol/L      Potassium 3.6 mmol/L      Chloride 103 mmol/L      CO2 22.0 mmol/L      Calcium 8.4 mg/dL      BUN/Creatinine Ratio 16.9     Anion Gap 13.0 mmol/L      eGFR 92.2 mL/min/1.73     Narrative:      GFR Normal >60  Chronic Kidney Disease <60  Kidney Failure <15    The GFR formula is only valid for adults with stable renal function between ages 18 and 70.    CBC & Differential [[id number]]  (Abnormal) Collected: 02/13/24 0849    Specimen: Blood Updated: 02/13/24 0938    Narrative:      The following orders were created for panel order CBC & Differential.  Procedure                               Abnormality         Status                     ---------                               -----------         ------                     CBC Auto Differential[[id number]]        Abnormal            Final result                 Please view results for these tests on the individual orders.    CBC Auto Differential [[id number]]  (Abnormal) Collected: 02/13/24 0849    Specimen: Blood  Updated: 02/13/24 0938     WBC 13.00 10*3/mm3      RBC 4.21 10*6/mm3      Hemoglobin 12.4 g/dL      Hematocrit 38.1 %      MCV 90.5 fL      MCH 29.4 pg      MCHC 32.5 g/dL      RDW 13.7 %      RDW-SD 42.9 fl      MPV 11.0 fL      Platelets 99 10*3/mm3      Neutrophil % 84.4 %      Lymphocyte % 6.5 %      Monocyte % 8.9 %      Eosinophil % 0.1 %      Basophil % 0.1 %      Neutrophils, Absolute 11.00 10*3/mm3      Lymphocytes, Absolute 0.80 10*3/mm3      Monocytes, Absolute 1.20 10*3/mm3      Eosinophils, Absolute 0.00 10*3/mm3      Basophils, Absolute 0.00 10*3/mm3      nRBC 0.0 /100 WBC     Protime-INR [[id number]]  (Normal) Collected: 02/13/24 0849    Specimen: Blood Updated: 02/13/24 0936     Protime 22.3 Seconds      INR 2.17    Urinalysis With Culture If Indicated - Urine, Clean Catch [[id number]]  (Abnormal) Collected: 02/12/24 1215    Specimen: Urine, Clean Catch Updated: 02/12/24 1226     Color, UA Orange     Comment: Any Substance that causes an abnormal urine color can alter the accuracy of the chemical reactions.        Appearance, UA Clear     pH, UA <=5.0     Specific Gravity, UA 1.040     Glucose, UA Negative     Ketones, UA Trace     Bilirubin, UA Negative     Blood, UA Trace     Protein, UA 30 mg/dL (1+)     Leuk Esterase, UA Trace     Nitrite, UA Negative     Urobilinogen, UA 1.0 E.U./dL    Narrative:      In absence of clinical symptoms, the presence of pyuria, bacteria, and/or nitrites on the urinalysis result does not correlate with infection.    Urinalysis, Microscopic Only - Urine, Clean Catch [[id number]] Collected: 02/12/24 1215    Specimen: Urine, Clean Catch Updated: 02/12/24 1226     RBC, UA 0-2 /HPF      WBC, UA 0-2 /HPF      Comment: Urine culture not indicated.        Bacteria, UA None Seen /HPF      Squamous Epithelial Cells, UA 0-2 /HPF      Hyaline Casts, UA 0-2 /LPF      Methodology Automated Microscopy    Lipase [[id number]]  (Abnormal) Collected: 02/12/24 1019    Specimen: Blood from  Arm, Right Updated: 02/12/24 1058     Lipase 1,023 U/L     Comprehensive Metabolic Panel [[id number]]  (Abnormal) Collected: 02/12/24 1019    Specimen: Blood from Arm, Right Updated: 02/12/24 1048     Glucose 149 mg/dL      BUN 13 mg/dL      Creatinine 0.99 mg/dL      Sodium 138 mmol/L      Potassium 4.0 mmol/L      Chloride 103 mmol/L      CO2 26.0 mmol/L      Calcium 9.3 mg/dL      Total Protein 7.1 g/dL      Albumin 3.9 g/dL      ALT (SGPT) 10 U/L      AST (SGOT) 16 U/L      Alkaline Phosphatase 85 U/L      Total Bilirubin 1.2 mg/dL      Globulin 3.2 gm/dL      A/G Ratio 1.2 g/dL      BUN/Creatinine Ratio 13.1     Anion Gap 9.0 mmol/L      eGFR 78.5 mL/min/1.73     Narrative:      GFR Normal >60  Chronic Kidney Disease <60  Kidney Failure <15    The GFR formula is only valid for adults with stable renal function between ages 18 and 70.    Magnesium [[id number]]  (Normal) Collected: 02/12/24 1019    Specimen: Blood from Arm, Right Updated: 02/12/24 1048     Magnesium 1.8 mg/dL     CK [[id number]]  (Normal) Collected: 02/12/24 1019    Specimen: Blood from Arm, Right Updated: 02/12/24 1048     Creatine Kinase 105 U/L     COVID-19 and FLU A/B PCR, 1 HR TAT - Swab, Nasopharynx [[id number]]  (Normal) Collected: 02/12/24 0946    Specimen: Swab from Nasopharynx Updated: 02/12/24 1010     COVID19 Not Detected     Influenza A PCR Not Detected     Influenza B PCR Not Detected    Narrative:      Fact sheet for providers: https://www.fda.gov/media/094491/download    Fact sheet for patients: https://www.fda.gov/media/433418/download    Test performed by PCR.    Protime-INR [[id number]]  (Normal) Collected: 02/12/24 0944    Specimen: Blood from Arm, Right Updated: 02/12/24 1004     Protime 25.6 Seconds      INR 2.51    CBC & Differential [[id number]]  (Abnormal) Collected: 02/12/24 0944    Specimen: Blood from Arm, Right Updated: 02/12/24 0958    Narrative:      The following orders were created for panel order CBC &  Differential.  Procedure                               Abnormality         Status                     ---------                               -----------         ------                     CBC Auto Differential[[id number]]        Abnormal            Final result                 Please view results for these tests on the individual orders.    CBC Auto Differential [[id number]]  (Abnormal) Collected: 02/12/24 0944    Specimen: Blood from Arm, Right Updated: 02/12/24 0958     WBC 16.60 10*3/mm3      RBC 4.49 10*6/mm3      Hemoglobin 13.2 g/dL      Hematocrit 40.9 %      MCV 91.0 fL      MCH 29.4 pg      MCHC 32.3 g/dL      RDW 13.9 %      RDW-SD 44.2 fl      MPV 11.2 fL      Platelets 125 10*3/mm3      Neutrophil % 85.1 %      Lymphocyte % 6.1 %      Monocyte % 8.5 %      Eosinophil % 0.0 %      Basophil % 0.3 %      Neutrophils, Absolute 14.20 10*3/mm3      Lymphocytes, Absolute 1.00 10*3/mm3      Monocytes, Absolute 1.40 10*3/mm3      Eosinophils, Absolute 0.00 10*3/mm3      Basophils, Absolute 0.10 10*3/mm3      nRBC 0.0 /100 WBC           Imaging Results (All)       Procedure Component Value Units Date/Time    CT Abdomen Pelvis With Contrast [[id number]] Collected: 02/12/24 1207     Updated: 02/12/24 1218    Narrative:      CT ABDOMEN PELVIS W CONTRAST    Date of Exam: 2/12/2024 11:56 AM EST    Indication: abd pain, nausea.    Comparison: None available.    Technique: Axial CT images were obtained of the abdomen and pelvis following the uneventful intravenous administration of iodinated contrast. Sagittal and coronal reconstructions were performed.  Automated exposure control and iterative reconstruction   methods were used.      Findings:    Liver: The liver is unremarkable in morphology. No focal liver lesion is seen. There is mild biliary dilation which may be related to prior cholecystectomy.    Gallbladder: Surgically absent.    Pancreas: Stranding about the pancreatic head and duodenum. No discrete  peripancreatic fluid collection is seen. 18 mm hypodense lesion within the body of the pancreas (series 5, image 52), possibly a pancreatic cyst or sidebranch IPMN.    Spleen: Several splenic granulomas.    Adrenal glands: 1 cm right adrenal gland is unremarkable. Low-density nodule of the apex of the left adrenal gland, incompletely characterized. Suspect an adenoma. Right adrenal gland appears unremarkable.    Genitourinary tract: 3 mm nonobstructing calculus within the left kidney. Right kidney is unremarkable. No hydronephrosis is seen. The ureters, urinary bladder, and prostate gland appear unremarkable.    Gastrointestinal tract: Stranding about the pancreatic head/duodenum. Supraumbilical fascial defect contains a knuckle of nonobstructed transverse colon (series 5, image 67). Hollow viscera is otherwise unremarkable. No findings to suggest bowel   obstruction.    Appendix: No findings to suggest acute appendicitis.    Other findings: No free air or free fluid is identified. No pathologically enlarged lymph nodes are seen. Mild vascular calcifications are present. The IVC is unremarkable.    Bones and soft tissues: Several paramidline fascial defects are seen above the level of the umbilicus. 1 of these contains a knuckle of nonobstructed transverse colon (series 5, image 67). The others contain only fat. No acute osseous lesion is seen.   There are degenerative changes of the spine.    Lung bases: Scattered bibasilar atelectasis.      Impression:      Impression:  1.Stranding about the pancreatic head and duodenum. Findings may be related to acute pancreatitis and/or duodenitis. Please correlate clinically.  2.18 mm hypodense lesion within the body of the pancreas (series 5, image 52), possibly a pancreatic cyst or sidebranch IPMN. Further characterization with pancreatic protocol MRI may be considered if clinically indicated.  3.Left nonobstructive nephrolithiasis.  4.Several paramidline fascial defects are  seen above the level of the umbilicus. 1 of these contains a knuckle of nonobstructed transverse colon (series 5, image 67). The others contain only fat.  5.Mild biliary dilation may be related to prior cholecystectomy. Please correlate with serum bilirubin levels.  6.Additional findings as detailed above.    Electronically Signed: Rene Clifford MD    2/12/2024 12:16 PM EST    Workstation ID: [id number]          Physician Progress Notes (all)    No notes of this type exist for this encounter.       Consult Notes (all)    No notes of this type exist for this encounter.

## 2024-02-14 ENCOUNTER — READMISSION MANAGEMENT (OUTPATIENT)
Dept: CALL CENTER | Facility: HOSPITAL | Age: 79
End: 2024-02-14
Payer: MEDICARE

## 2024-02-14 PROBLEM — N39.0 UTI (URINARY TRACT INFECTION): Status: ACTIVE | Noted: 2024-02-14

## 2024-02-14 LAB
ANA SER QL: NEGATIVE
IGG SERPL-MCNC: 1226 MG/DL (ref 603–1613)
IGG1 SER-MCNC: 556 MG/DL (ref 248–810)
IGG2 SER-MCNC: 462 MG/DL (ref 130–555)
IGG3 SER-MCNC: 48 MG/DL (ref 15–102)
IGG4 SER-MCNC: 115 MG/DL (ref 2–96)

## 2024-02-15 ENCOUNTER — TRANSITIONAL CARE MANAGEMENT TELEPHONE ENCOUNTER (OUTPATIENT)
Dept: CALL CENTER | Facility: HOSPITAL | Age: 79
End: 2024-02-15
Payer: MEDICARE

## 2024-02-15 NOTE — PAYOR COMM NOTE
"This is discharge notification for   Reference/Auth # [id number]   Pt discharged on 2/13/24    Confirmed with ED RN Kristie Lo RN that patient discharged on 2/13 despite discharge date showing as 2/14    PENDING INPATIENT AUTHORIZATION    Tabby Leija RN, BSN  Utilization Review Nurse  [facility]  Direct & confidential phone # [phone]  Fax # [phone]      Sumit Jules (78 y.o. Male)       Date of Birth   1945    Social Security Number       Address   [address] [place] [address] [address] [place] [facility]    Home Phone   [phone]    MRN   [id number]       Congregational   Unknown    Marital Status                               Admission Date   2/12/24    Admission Type   Emergency    Admitting Provider       Attending Provider       Department, Room/Bed   [facility] EMERGENCY DEPARTMENT, [facility]       Discharge Date   2/14/2024    Discharge Disposition   Home or Self Care    Discharge Destination   Home                              Attending Provider: (none)   Allergies: No Known Allergies    Isolation: None   Infection: None   Code Status: Prior    Ht: 189.2 cm (74.49\")   Wt: 144 kg (318 lb 2 oz)    Admission Cmt: None   Principal Problem: Acute pancreatitis [K85.90]                   Active Insurance as of 2/12/2024       Primary Coverage       Payor Plan Insurance Group Employer/Plan Group    [employer]NA MEDICARE REPLACEMENT [id number]       Payor Plan Address Payor Plan Phone Number Payor Plan Fax Number Effective Dates    [address] [phone]  2/1/2024 - None Entered    [facility] [id number]         Subscriber Name Subscriber Birth Date Member ID       SUMIT JULES 1945 [id number]                     Emergency Contacts        (Rel.) Home Phone Work Phone Mobile Phone    Dianna Zambrano (Sister) -- -- [phone]                 Discharge Summary        Ana Paula Youssef MD at 02/13/24 [facility]8          [facility] Medicine Services     "   Patient Name: Sumit Jules  : 1945  MRN: [id number]  Primary Care Physician:  Provider, No Known  Date of admission: 2024  Date and Time of Service: 2024 at 4:00 PM     Subjective       Chief Complaint: abdominal pain     History of Present Illness: Sumit Jules is a 78 y.o. male with a PMH of Antithrombin deficiency, DVT, on chronic anticoagulation with Coumadin, hypertension, prostate cancer who presented to [facility] on 2024 with  gradual onset periumbilical abdominal pain since the past 2 days which the patient describes as constant, around 4 or 5/10 at times radiating to back dull-like.  Patient denies nausea, vomiting, diarrhea, melena, hematochezia, jaundice, recent weight loss.  Workup in ED is notable for CT abdomen showing stranding about the pancreatic head and of the known.  Findings may be suggestive of pancreatitis and duodenitis.  There is a 2.18 mm hypodense lesion within the body of the pancreas possibly pancreatic cysts.  There is mild biliary dilation.  CBC is notable for neutrophil leukocytosis white blood cell 16.6, chemistry notable for elevated lipase 1023.  Patient afebrile during ED course.  Hospitalist team is requested to admit.        Review of Systems   Constitutional:  Negative for appetite change and fatigue.   HENT:  Negative for dental problem, ear pain, nosebleeds, sinus pressure and tinnitus.    Eyes:  Negative for photophobia and itching.   Respiratory:  Negative for choking and stridor.    Cardiovascular:  Negative for leg swelling.   Gastrointestinal:  Positive for abdominal pain. Negative for anal bleeding, blood in stool, diarrhea, nausea, rectal pain and vomiting.   Musculoskeletal:  Negative for back pain and myalgias.   Neurological:  Negative for syncope, facial asymmetry and numbness.   Psychiatric/Behavioral:  Negative for behavioral problems and dysphoric mood. The patient is not nervous/anxious.       Patient admitted with  acute pancreatitis with lipase of 1100  Patient had previous cholecystectomy  Denied alcohol  Will check lipid profile  Continue n.p.o. 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 Otherwise pertinent FHx was reviewed and not pertinent to current issue.     Social History:  reports that he has never smoked. He has never used smokeless tobacco. He reports that he does not drink alcohol and does not use drugs.     Home Medications:  Prior to Admission Medications         Prescriptions Last Dose Informant Patient Reported? Taking?     amLODIPine (NORVASC) 10 MG tablet 2/11/2024   Yes Yes     Take 1 tablet by mouth Every Night.     atorvastatin (LIPITOR) 10 MG tablet 2/11/2024   No Yes     Take 1 tablet by mouth Daily.     tamsulosin (FLOMAX) 0.4 MG capsule 24 hr capsule 2/11/2024   Yes Yes     Take 1 capsule by mouth Daily.     warfarin (COUMADIN) 3 MG tablet 2/11/2024   Yes Yes     Take 3 tablets by mouth Daily.                   Allergies:  No Known Allergies     Objective       Vitals:   Temp:  [97.7 °F (36.5  °C)-98.2 °F (36.8 °C)] 97.7 °F (36.5 °C)  Heart Rate:  [53-73] 60  Resp:  [12-24] 14  BP: (144-173)/(64-81) 144/64  Body mass index is 40.31 kg/m².  Physical Exam  Constitutional:       Appearance: Normal appearance.   HENT:      Head: Normocephalic.      Nose: Nose normal.      Mouth/Throat:      Mouth: Mucous membranes are moist. Mucous membranes are dry.   Eyes:      Extraocular Movements: Extraocular movements intact.      Pupils: Pupils are equal, round, and reactive to light.   Pulmonary:      Effort: Pulmonary effort is normal.      Breath sounds: Normal breath sounds.   Abdominal:      General: Abdomen is flat.      Palpations: Abdomen is soft.      Comments: Old healed horizontal surgical incision, incisional hernia present, easily reducible.  There is mild periumbilical tenderness.  No guarding or rigidity.  Edward sign is negative.   Musculoskeletal:         General: Normal range of motion.   Skin:     General: Skin is dry.      Coloration: Skin is not pale.   Neurological:      General: No focal deficit present.      Mental Status: He is alert and oriented to person, place, and time.      Cranial Nerves: No cranial nerve deficit.      Motor: No weakness.   Psychiatric:         Mood and Affect: Mood normal.         Behavior: Behavior normal.            Diagnostic Data:  Lab Results (last 24 hours)         Procedure Component Value Units Date/Time     Urinalysis With Culture If Indicated - Urine, Clean Catch [[id number]]  (Abnormal) Collected: 02/12/24 1215     Specimen: Urine, Clean Catch Updated: 02/12/24 1226       Color, UA Orange       Comment: Any Substance that causes an abnormal urine color can alter the accuracy of the chemical reactions.          Appearance, UA Clear       pH, UA <=5.0       Specific Gravity, UA 1.040       Glucose, UA Negative       Ketones, UA Trace       Bilirubin, UA Negative       Blood, UA Trace       Protein, UA 30 mg/dL (1+)       Leuk Esterase, UA Trace       Nitrite, UA  Negative       Urobilinogen, UA 1.0 E.U./dL     Narrative:       In absence of clinical symptoms, the presence of pyuria, bacteria, and/or nitrites on the urinalysis result does not correlate with infection.     Urinalysis, Microscopic Only - Urine, Clean Catch [[id number]] Collected: 02/12/24 1215     Specimen: Urine, Clean Catch Updated: 02/12/24 1226       RBC, UA 0-2 /HPF         WBC, UA 0-2 /HPF         Comment: Urine culture not indicated.          Bacteria, UA None Seen /HPF         Squamous Epithelial Cells, UA 0-2 /HPF         Hyaline Casts, UA 0-2 /LPF         Methodology Automated Microscopy     Lipase [[id number]]  (Abnormal) Collected: 02/12/24 1019     Specimen: Blood from Arm, Right Updated: 02/12/24 1058       Lipase 1,023 U/L       Comprehensive Metabolic Panel [[id number]]  (Abnormal) Collected: 02/12/24 1019     Specimen: Blood from Arm, Right Updated: 02/12/24 1048       Glucose 149 mg/dL         BUN 13 mg/dL         Creatinine 0.99 mg/dL         Sodium 138 mmol/L         Potassium 4.0 mmol/L         Chloride 103 mmol/L         CO2 26.0 mmol/L         Calcium 9.3 mg/dL         Total Protein 7.1 g/dL         Albumin 3.9 g/dL         ALT (SGPT) 10 U/L         AST (SGOT) 16 U/L         Alkaline Phosphatase 85 U/L         Total Bilirubin 1.2 mg/dL         Globulin 3.2 gm/dL         A/G Ratio 1.2 g/dL         BUN/Creatinine Ratio 13.1       Anion Gap 9.0 mmol/L         eGFR 78.5 mL/min/1.73       Narrative:       GFR Normal >60  Chronic Kidney Disease <60  Kidney Failure <15     The GFR formula is only valid for adults with stable renal function between ages 18 and 70.     Magnesium [[id number]]  (Normal) Collected: 02/12/24 1019     Specimen: Blood from Arm, Right Updated: 02/12/24 1048       Magnesium 1.8 mg/dL       CK [[id number]]  (Normal) Collected: 02/12/24 1019     Specimen: Blood from Arm, Right Updated: 02/12/24 1048       Creatine Kinase 105 U/L       COVID-19 and FLU A/B PCR, 1 HR TAT - Swab,  Nasopharynx [[id number]]  (Normal) Collected: 02/12/24 0946     Specimen: Swab from Nasopharynx Updated: 02/12/24 1010       COVID19 Not Detected       Influenza A PCR Not Detected       Influenza B PCR Not Detected     Narrative:       Fact sheet for providers: https://www.fda.gov/media/868663/download     Fact sheet for patients: https://www.fda.gov/media/572261/download     Test performed by PCR.     Protime-INR [[id number]]  (Normal) Collected: 02/12/24 0944     Specimen: Blood from Arm, Right Updated: 02/12/24 1004       Protime 25.6 Seconds         INR 2.51     CBC & Differential [[id number]]  (Abnormal) Collected: 02/12/24 0944     Specimen: Blood from Arm, Right Updated: 02/12/24 0958     Narrative:       The following orders were created for panel order CBC & Differential.  Procedure                               Abnormality         Status                     ---------                               -----------         ------                     CBC Auto Differential[[id number]]        Abnormal            Final result                  Please view results for these tests on the individual orders.     CBC Auto Differential [[id number]]  (Abnormal) Collected: 02/12/24 0944     Specimen: Blood from Arm, Right Updated: 02/12/24 0958       WBC 16.60 10*3/mm3         RBC 4.49 10*6/mm3         Hemoglobin 13.2 g/dL         Hematocrit 40.9 %         MCV 91.0 fL         MCH 29.4 pg         MCHC 32.3 g/dL         RDW 13.9 %         RDW-SD 44.2 fl         MPV 11.2 fL         Platelets 125 10*3/mm3         Neutrophil % 85.1 %         Lymphocyte % 6.1 %         Monocyte % 8.5 %         Eosinophil % 0.0 %         Basophil % 0.3 %         Neutrophils, Absolute 14.20 10*3/mm3         Lymphocytes, Absolute 1.00 10*3/mm3         Monocytes, Absolute 1.40 10*3/mm3         Eosinophils, Absolute 0.00 10*3/mm3         Basophils, Absolute 0.10 10*3/mm3         nRBC 0.0 /100 WBC                             Imaging Results (Last 24 Hours)          Procedure Component Value Units Date/Time     CT Abdomen Pelvis With Contrast [[id number]] Collected: 02/12/24 1207       Updated: 02/12/24 1218     Narrative:       CT ABDOMEN PELVIS W CONTRAST     Date of Exam: 2/12/2024 11:56 AM EST     Indication: abd pain, nausea.     Comparison: None available.     Technique: Axial CT images were obtained of the abdomen and pelvis following the uneventful intravenous administration of iodinated contrast. Sagittal and coronal reconstructions were performed.  Automated exposure control and iterative reconstruction   methods were used.        Findings:     Liver: The liver is unremarkable in morphology. No focal liver lesion is seen. There is mild biliary dilation which may be related to prior cholecystectomy.     Gallbladder: Surgically absent.     Pancreas: Stranding about the pancreatic head and duodenum. No discrete peripancreatic fluid collection is seen. 18 mm hypodense lesion within the body of the pancreas (series 5, image 52), possibly a pancreatic cyst or sidebranch IPMN.     Spleen: Several splenic granulomas.     Adrenal glands: 1 cm right adrenal gland is unremarkable. Low-density nodule of the apex of the left adrenal gland, incompletely characterized. Suspect an adenoma. Right adrenal gland appears unremarkable.     Genitourinary tract: 3 mm nonobstructing calculus within the left kidney. Right kidney is unremarkable. No hydronephrosis is seen. The ureters, urinary bladder, and prostate gland appear unremarkable.     Gastrointestinal tract: Stranding about the pancreatic head/duodenum. Supraumbilical fascial defect contains a knuckle of nonobstructed transverse colon (series 5, image 67). Hollow viscera is otherwise unremarkable. No findings to suggest bowel   obstruction.     Appendix: No findings to suggest acute appendicitis.     Other findings: No free air or free fluid is identified. No pathologically enlarged lymph nodes are seen. Mild vascular  calcifications are present. The IVC is unremarkable.     Bones and soft tissues: Several paramidline fascial defects are seen above the level of the umbilicus. 1 of these contains a knuckle of nonobstructed transverse colon (series 5, image 67). The others contain only fat. No acute osseous lesion is seen.   There are degenerative changes of the spine.     Lung bases: Scattered bibasilar atelectasis.        Impression:       Impression:  1.Stranding about the pancreatic head and duodenum. Findings may be related to acute pancreatitis and/or duodenitis. Please correlate clinically.  2.18 mm hypodense lesion within the body of the pancreas (series 5, image 52), possibly a pancreatic cyst or sidebranch IPMN. Further characterization with pancreatic protocol MRI may be considered if clinically indicated.  3.Left nonobstructive nephrolithiasis.  4.Several paramidline fascial defects are seen above the level of the umbilicus. 1 of these contains a knuckle of nonobstructed transverse colon (series 5, image 67). The others contain only fat.  5.Mild biliary dilation may be related to prior cholecystectomy. Please correlate with serum bilirubin levels.  6.Additional findings as detailed above.     Electronically Signed: Rene Clifford MD    2/12/2024 12:16 PM EST    Workstation ID: [id number]                   Assessment & Plan          This is a 78 y.o. male with:     Active and Resolved Problems            Active Hospital Problems     Diagnosis   POA    **Acute pancreatitis [K85.90]   Yes    Idiopathic acute pancreatitis without infection or necrosis [K85.00]   Unknown       Resolved Hospital Problems   No resolved problems to display.      Possible acute pancreatitis vs duodenitis  Pancreatic cyst  Leukocytosis  Patient started on CLD in ED, tolerating well so far.   Will advance his diet gradually to low-fat diet.  IV morphine 2 mg every 4 as needed for severe pain  Zofran 4 mg every 8 as needed for nausea vomiting  Serial  abdominal examinations  Start LR at 150 mill per hour, reassess rate in the morning  Strict intake and output  GI consultation  Obtain MRI abdomen  Started on IV ceftriaxone and flagyl course     History of Antithrombin III deficiency  Continue Coumadin, dosing per pharmacy        History of Hypertension  Continue Norvasc 10 mg once daily              DVT prophylaxis:  Medical DVT prophylaxis orders are signed and held. Medical DVT prophylaxis orders are present.           The patient desires to be as follows:     CODE STATUS:     Full code              Admission Status:  I believe this patient meets inpatient status.     Expected Length of Stay: 3     PDMP and Medication Dispenses via Sidebar reviewed and consistent with patient reported medications.     I discussed the patient's findings and my recommendations with patient.          Electronically signed by Ana Paula Youssef MD at 02/13/24 [id number]

## 2024-02-16 ENCOUNTER — TRANSITIONAL CARE MANAGEMENT TELEPHONE ENCOUNTER (OUTPATIENT)
Dept: CALL CENTER | Facility: HOSPITAL | Age: 79
End: 2024-02-16
Payer: MEDICARE

## 2024-02-23 ENCOUNTER — OFFICE VISIT (OUTPATIENT)
Dept: FAMILY MEDICINE CLINIC | Facility: CLINIC | Age: 79
End: 2024-02-23
Payer: MEDICARE

## 2024-02-23 VITALS
SYSTOLIC BLOOD PRESSURE: 150 MMHG | DIASTOLIC BLOOD PRESSURE: 80 MMHG | TEMPERATURE: 97.3 F | OXYGEN SATURATION: 97 % | WEIGHT: 309 LBS | RESPIRATION RATE: 18 BRPM | BODY MASS INDEX: 41.85 KG/M2 | HEART RATE: 60 BPM | HEIGHT: 72 IN

## 2024-02-23 DIAGNOSIS — Z76.89 ENCOUNTER TO ESTABLISH CARE: ICD-10-CM

## 2024-02-23 DIAGNOSIS — K86.9 PANCREATIC LESION: ICD-10-CM

## 2024-02-23 DIAGNOSIS — Z85.46 HISTORY OF PROSTATE CANCER: ICD-10-CM

## 2024-02-23 DIAGNOSIS — Z79.01 LONG TERM CURRENT USE OF ANTICOAGULANT: ICD-10-CM

## 2024-02-23 DIAGNOSIS — D68.59 ANTITHROMBIN III DEFICIENCY: ICD-10-CM

## 2024-02-23 DIAGNOSIS — I10 BENIGN ESSENTIAL HYPERTENSION: Primary | ICD-10-CM

## 2024-02-23 DIAGNOSIS — R73.01 IMPAIRED FASTING GLUCOSE: ICD-10-CM

## 2024-02-23 DIAGNOSIS — E78.5 HYPERLIPIDEMIA, UNSPECIFIED HYPERLIPIDEMIA TYPE: ICD-10-CM

## 2024-02-23 PROBLEM — I25.10 CHRONIC CORONARY ARTERY DISEASE: Status: ACTIVE | Noted: 2018-06-27

## 2024-02-23 PROBLEM — R93.3 ABNORMAL CT SCAN, GASTROINTESTINAL TRACT: Status: ACTIVE | Noted: 2024-02-23

## 2024-02-23 PROBLEM — L95.9 VASCULITIS LIMITED TO SKIN: Status: RESOLVED | Noted: 2024-02-23 | Resolved: 2024-02-23

## 2024-02-23 PROCEDURE — 84443 ASSAY THYROID STIM HORMONE: CPT | Performed by: NURSE PRACTITIONER

## 2024-02-23 PROCEDURE — 82043 UR ALBUMIN QUANTITATIVE: CPT | Performed by: NURSE PRACTITIONER

## 2024-02-23 PROCEDURE — 80061 LIPID PANEL: CPT | Performed by: NURSE PRACTITIONER

## 2024-02-23 PROCEDURE — 85027 COMPLETE CBC AUTOMATED: CPT | Performed by: NURSE PRACTITIONER

## 2024-02-23 PROCEDURE — 83036 HEMOGLOBIN GLYCOSYLATED A1C: CPT | Performed by: NURSE PRACTITIONER

## 2024-02-23 PROCEDURE — 80053 COMPREHEN METABOLIC PANEL: CPT | Performed by: NURSE PRACTITIONER

## 2024-02-23 RX ORDER — WARFARIN SODIUM 4 MG/1
4 TABLET ORAL
COMMUNITY

## 2024-02-23 RX ORDER — AMLODIPINE BESYLATE 10 MG/1
10 TABLET ORAL NIGHTLY
Qty: 90 TABLET | Refills: 0 | Status: SHIPPED | OUTPATIENT
Start: 2024-02-23

## 2024-02-23 RX ORDER — WARFARIN SODIUM 5 MG/1
5 TABLET ORAL
COMMUNITY

## 2024-02-23 NOTE — PROGRESS NOTES
"Chief Complaint  Establish Care    Subjective        Sumit Jules presents to [facility] PRIMARY CARE  History of Present Illness    Patient presents today to establish care.  Recent move from [place], [place]. Last PCP Rduolph Huerta MD with [facility]. Has a ranch in [place] currently for sale; moved to be near family. 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  Cologuard negative 3 years ago.     ER visit February 12, 2024:   Dx acute pancreatitis with observation.  Per notes- \"Atorvastatin could possibly contribute (class III, not common but possible cause). Recommend outpatient EUS for further evaluation of pancreatic cyst to rule out malignancy\". Contact information in hand today to schedule procedure;not wanting to take off work until summer.     Long term anticoagulation:   Coumadin since 1978; off one time and took Xarelto; could not afford at the time. Would consider switch to Eliquis. Coumadin dose 9 mg daily (5 mg tablet and 4 mg tablet). 2/13/24 INR 2.17. (Goal 2-3).     Hypertension:  Year of onset 1965. Severity is moderate. Status is no change. Current medication includes: amlodipine 10 mg daily. Context/risk factors in include age over 60, family hx hypertension, CAD, male gender, obesity. Nonsmoker. Documented atherosclerosis of aorta. There is no known stroke, DM II, MI, heart failure, or PVD. There is no known history of coarctation of aorta, hyperaldosteronism, hypercortisolism, or renovascular disease.  Pertinent negatives include chest pain, claudication, confusion, diaphoresis, dyspnea, epistaxis, fatigue, headache, hematuria, palpitations, nausea, vomiting, tinnitus,  transient weakness, tremor, and visual disturbance.         Objective   Vital Signs:  /80 (BP Location: Left arm, Patient " "Position: Sitting, Cuff Size: Adult)   Pulse 60   Temp 97.3 °F (36.3 °C)   Resp 18   Ht 182.9 cm (72\")   Wt (!) 140 kg (309 lb)   SpO2 97%   BMI 41.91 kg/m²   Estimated body mass index is 41.91 kg/m² as calculated from the following:    Height as of this encounter: 182.9 cm (72\").    Weight as of this encounter: 140 kg (309 lb).             Physical Exam  Constitutional:       General: He is not in acute distress.     Appearance: He is obese.      Comments: Pleasant, converses appropriately    HENT:      Head: Normocephalic and atraumatic.      Right Ear: Tympanic membrane, ear canal and external ear normal.      Left Ear: Tympanic membrane, ear canal and external ear normal.      Nose: Nose normal.      Mouth/Throat:      Lips: Pink.      Mouth: Mucous membranes are moist.      Pharynx: Oropharynx is clear.   Eyes:      Conjunctiva/sclera: Conjunctivae normal.   Cardiovascular:      Rate and Rhythm: Normal rate and regular rhythm.      Chest Wall: PMI is not displaced.      Pulses: Normal pulses.      Heart sounds: Normal heart sounds, S1 normal and S2 normal.   Pulmonary:      Effort: Pulmonary effort is normal.      Breath sounds: Normal breath sounds.   Abdominal:      General: Bowel sounds are normal.      Palpations: Abdomen is soft.      Tenderness: There is no abdominal tenderness.   Musculoskeletal:         General: Normal range of motion.      Cervical back: Neck supple.      Right lower leg: No edema.      Left lower leg: No edema.   Lymphadenopathy:      Cervical: No cervical adenopathy.      Upper Body:      Right upper body: No supraclavicular adenopathy.      Left upper body: No supraclavicular adenopathy.   Skin:     General: Skin is warm and dry.   Neurological:      Mental Status: He is alert and oriented to person, place, and time.      Gait: Gait is intact.   Psychiatric:         Mood and Affect: Mood and affect normal.         Thought Content: Thought content normal.         Judgment: " Judgment normal.        Result Review :      CMP          7/19/2023    12:41 2/12/2024    10:19 2/13/2024    08:49   CMP   Glucose  149  124    Glucose 147         BUN 17     13  13    Creatinine 0.95     0.99  0.77    EGFR  78.5  92.2    Sodium 141     138  138    Potassium 4.0     4.0  3.6    Chloride 106     103  103    Calcium 8.8     9.3  8.4    Total Protein  7.1     Albumin  3.9     Globulin  3.2     Total Bilirubin  1.2     Alkaline Phosphatase  85     AST (SGOT)  16     ALT (SGPT)  10     Albumin/Globulin Ratio  1.2     BUN/Creatinine Ratio 18     13.1  16.9    Anion Gap 14     9.0  13.0       Details          This result is from an external source.             CBC          2/12/2024    09:44 2/13/2024    08:49   CBC   WBC 16.60  13.00    RBC 4.49  4.21    Hemoglobin 13.2  12.4    Hematocrit 40.9  38.1    MCV 91.0  90.5    MCH 29.4  29.4    MCHC 32.3  32.5    RDW 13.9  13.7    Platelets 125  99      CBC w/diff          2/12/2024    09:44 2/13/2024    08:49   CBC w/Diff   WBC 16.60  13.00    RBC 4.49  4.21    Hemoglobin 13.2  12.4    Hematocrit 40.9  38.1    MCV 91.0  90.5    MCH 29.4  29.4    MCHC 32.3  32.5    RDW 13.9  13.7    Platelets 125  99    Neutrophil Rel % 85.1  84.4    Lymphocyte Rel % 6.1  6.5    Monocyte Rel % 8.5  8.9    Eosinophil Rel % 0.0  0.1    Basophil Rel % 0.3  0.1      Lipid Panel          2/13/2024    08:49   Lipid Panel   Total Cholesterol 86    Triglycerides 50    HDL Cholesterol 40    VLDL Cholesterol 13    LDL Cholesterol  33    LDL/HDL Ratio 0.90            UA          2/12/2024    12:15   Urinalysis   Squamous Epithelial Cells, UA 0-2    Specific Gravity, UA 1.040    Ketones, UA Trace    Blood, UA Trace    Leukocytes, UA Trace    Nitrite, UA Negative    RBC, UA 0-2    WBC, UA 0-2    Bacteria, UA None Seen            CT ABDOMEN PELVIS W CONTRAST     Date of Exam: 2/12/2024 11:56 AM EST     Indication: abd pain, nausea.     Comparison: None available.     Technique: Axial CT  images were obtained of the abdomen and pelvis following the uneventful intravenous administration of iodinated contrast. Sagittal and coronal reconstructions were performed.  Automated exposure control and iterative reconstruction   methods were used.        Findings:     Liver: The liver is unremarkable in morphology. No focal liver lesion is seen. There is mild biliary dilation which may be related to prior cholecystectomy.     Gallbladder: Surgically absent.     Pancreas: Stranding about the pancreatic head and duodenum. No discrete peripancreatic fluid collection is seen. 18 mm hypodense lesion within the body of the pancreas (series 5, image 52), possibly a pancreatic cyst or sidebranch IPMN.     Spleen: Several splenic granulomas.     Adrenal glands: 1 cm right adrenal gland is unremarkable. Low-density nodule of the apex of the left adrenal gland, incompletely characterized. Suspect an adenoma. Right adrenal gland appears unremarkable.     Genitourinary tract: 3 mm nonobstructing calculus within the left kidney. Right kidney is unremarkable. No hydronephrosis is seen. The ureters, urinary bladder, and prostate gland appear unremarkable.     Gastrointestinal tract: Stranding about the pancreatic head/duodenum. Supraumbilical fascial defect contains a knuckle of nonobstructed transverse colon (series 5, image 67). Hollow viscera is otherwise unremarkable. No findings to suggest bowel   obstruction.     Appendix: No findings to suggest acute appendicitis.     Other findings: No free air or free fluid is identified. No pathologically enlarged lymph nodes are seen. Mild vascular calcifications are present. The IVC is unremarkable.     Bones and soft tissues: Several paramidline fascial defects are seen above the level of the umbilicus. 1 of these contains a knuckle of nonobstructed transverse colon (series 5, image 67). The others contain only fat. No acute osseous lesion is seen.   There are degenerative changes  of the spine.     Lung bases: Scattered bibasilar atelectasis.     IMPRESSION:  Impression:  1.Stranding about the pancreatic head and duodenum. Findings may be related to acute pancreatitis and/or duodenitis. Please correlate clinically.  2.18 mm hypodense lesion within the body of the pancreas (series 5, image 52), possibly a pancreatic cyst or sidebranch IPMN. Further characterization with pancreatic protocol MRI may be considered if clinically indicated.  3.Left nonobstructive nephrolithiasis.  4.Several paramidline fascial defects are seen above the level of the umbilicus. 1 of these contains a knuckle of nonobstructed transverse colon (series 5, image 67). The others contain only fat.  5.Mild biliary dilation may be related to prior cholecystectomy. Please correlate with serum bilirubin levels.  6.Additional findings as detailed above.            Assessment and Plan     Diagnoses and all orders for this visit:    1. Benign essential hypertension (Primary)  -     CBC (No Diff)  -     Lipid Panel  -     Hemoglobin A1c  -     TSH  -     Comprehensive Metabolic Panel  -     MicroAlbumin, Urine, Random - Urine, Clean Catch  -     amLODIPine (NORVASC) 10 MG tablet; Take 1 tablet by mouth Every Night.  Dispense: 90 tablet; Refill: 0    2. Long term current use of anticoagulant  -     Protime-INR; Future  -     Cancel: Protime-INR    3. Antithrombin III deficiency  -     Protime-INR; Future  -     Cancel: Protime-INR    4. Hyperlipidemia, unspecified hyperlipidemia type  -     Lipid Panel    5. Pancreatic lesion  Comments:  Discussed importance of timely follow up with GI for endoscopy procedure.    6. Encounter to establish care  -     CBC (No Diff)  -     Lipid Panel  -     Hemoglobin A1c  -     TSH  -     Comprehensive Metabolic Panel    7. Impaired fasting glucose  -     Hemoglobin A1c    8. History of prostate cancer  -     Ambulatory Referral to Urology             Follow Up     Return in about 2 weeks (around  3/8/2024) for follow up new patient labs and discuss additional problems--30 min .  Patient was given instructions and counseling regarding his condition or for health maintenance advice. Please see specific information pulled into the AVS if appropriate.

## 2024-02-24 LAB
ALBUMIN SERPL-MCNC: 3.9 G/DL (ref 3.5–5.2)
ALBUMIN UR-MCNC: 9.1 MG/DL
ALBUMIN/GLOB SERPL: 1.1 G/DL
ALP SERPL-CCNC: 68 U/L (ref 39–117)
ALT SERPL W P-5'-P-CCNC: 13 U/L (ref 1–41)
ANION GAP SERPL CALCULATED.3IONS-SCNC: 13.7 MMOL/L (ref 5–15)
AST SERPL-CCNC: 13 U/L (ref 1–40)
BILIRUB SERPL-MCNC: 0.8 MG/DL (ref 0–1.2)
BUN SERPL-MCNC: 12 MG/DL (ref 8–23)
BUN/CREAT SERPL: 12.9 (ref 7–25)
CALCIUM SPEC-SCNC: 9 MG/DL (ref 8.6–10.5)
CHLORIDE SERPL-SCNC: 103 MMOL/L (ref 98–107)
CHOLEST SERPL-MCNC: 137 MG/DL (ref 0–200)
CO2 SERPL-SCNC: 23.3 MMOL/L (ref 22–29)
CREAT SERPL-MCNC: 0.93 MG/DL (ref 0.76–1.27)
DEPRECATED RDW RBC AUTO: 40.5 FL (ref 37–54)
EGFRCR SERPLBLD CKD-EPI 2021: 84.6 ML/MIN/1.73
ERYTHROCYTE [DISTWIDTH] IN BLOOD BY AUTOMATED COUNT: 12.7 % (ref 12.3–15.4)
GLOBULIN UR ELPH-MCNC: 3.6 GM/DL
GLUCOSE SERPL-MCNC: 165 MG/DL (ref 65–99)
HBA1C MFR BLD: 7.1 % (ref 4.8–5.6)
HCT VFR BLD AUTO: 40.9 % (ref 37.5–51)
HDLC SERPL-MCNC: 37 MG/DL (ref 40–60)
HGB BLD-MCNC: 13.3 G/DL (ref 13–17.7)
LDLC SERPL CALC-MCNC: 85 MG/DL (ref 0–100)
LDLC/HDLC SERPL: 2.29 {RATIO}
MCH RBC QN AUTO: 28.9 PG (ref 26.6–33)
MCHC RBC AUTO-ENTMCNC: 32.5 G/DL (ref 31.5–35.7)
MCV RBC AUTO: 88.7 FL (ref 79–97)
PLATELET # BLD AUTO: 173 10*3/MM3 (ref 140–450)
PMV BLD AUTO: 13.9 FL (ref 6–12)
POTASSIUM SERPL-SCNC: 4.2 MMOL/L (ref 3.5–5.2)
PROT SERPL-MCNC: 7.5 G/DL (ref 6–8.5)
RBC # BLD AUTO: 4.61 10*6/MM3 (ref 4.14–5.8)
SODIUM SERPL-SCNC: 140 MMOL/L (ref 136–145)
TRIGL SERPL-MCNC: 77 MG/DL (ref 0–150)
TSH SERPL DL<=0.05 MIU/L-ACNC: 1.93 UIU/ML (ref 0.27–4.2)
VLDLC SERPL-MCNC: 15 MG/DL (ref 5–40)
WBC NRBC COR # BLD AUTO: 8.41 10*3/MM3 (ref 3.4–10.8)

## 2024-03-04 ENCOUNTER — TELEPHONE (OUTPATIENT)
Dept: FAMILY MEDICINE CLINIC | Facility: CLINIC | Age: 79
End: 2024-03-04
Payer: MEDICARE

## 2024-03-04 NOTE — TELEPHONE ENCOUNTER
I called patient last week and left voicemail to return call for the following    Received notification from gastroenterology regarding need to hold coumadin for 5 days prior to endoscopic ultrasound (to evaluate pancreatic lesion).  Need to discuss risk/s involved. At recent appt, patient verbalized interest in switching from coumadin to Eliquis. There is scheduled follow up on 3/8 to discuss new patient labs. I can try to call him again tomorrow at end of day if needed, or we can wait and discuss all at follow up on Friday. Please call and ask what he prefers.

## 2024-03-08 ENCOUNTER — OFFICE VISIT (OUTPATIENT)
Dept: FAMILY MEDICINE CLINIC | Facility: CLINIC | Age: 79
End: 2024-03-08
Payer: MEDICARE

## 2024-03-08 VITALS
WEIGHT: 299.8 LBS | OXYGEN SATURATION: 98 % | BODY MASS INDEX: 40.61 KG/M2 | SYSTOLIC BLOOD PRESSURE: 142 MMHG | HEIGHT: 72 IN | DIASTOLIC BLOOD PRESSURE: 88 MMHG | HEART RATE: 78 BPM | TEMPERATURE: 97.3 F | RESPIRATION RATE: 18 BRPM

## 2024-03-08 DIAGNOSIS — E11.65 TYPE 2 DIABETES MELLITUS WITH HYPERGLYCEMIA, WITHOUT LONG-TERM CURRENT USE OF INSULIN: Primary | ICD-10-CM

## 2024-03-08 DIAGNOSIS — K86.9 PANCREATIC LESION: ICD-10-CM

## 2024-03-08 DIAGNOSIS — E78.2 MIXED HYPERLIPIDEMIA: ICD-10-CM

## 2024-03-08 DIAGNOSIS — N40.0 BENIGN PROSTATIC HYPERPLASIA WITHOUT LOWER URINARY TRACT SYMPTOMS: ICD-10-CM

## 2024-03-08 DIAGNOSIS — I10 BENIGN ESSENTIAL HYPERTENSION: ICD-10-CM

## 2024-03-08 RX ORDER — TAMSULOSIN HYDROCHLORIDE 0.4 MG/1
1 CAPSULE ORAL DAILY
Qty: 90 CAPSULE | Refills: 0 | Status: SHIPPED | OUTPATIENT
Start: 2024-03-08

## 2024-03-08 RX ORDER — ATORVASTATIN CALCIUM 10 MG/1
10 TABLET, FILM COATED ORAL DAILY
Qty: 90 TABLET | Refills: 1 | Status: SHIPPED | OUTPATIENT
Start: 2024-03-08

## 2024-03-08 RX ORDER — LOSARTAN POTASSIUM 25 MG/1
25 TABLET ORAL DAILY
Qty: 90 TABLET | Refills: 0 | Status: SHIPPED | OUTPATIENT
Start: 2024-03-08

## 2024-03-08 NOTE — PROGRESS NOTES
"Chief Complaint  Follow-up    Subjective        Sumit Jules presents to [facility] PRIMARY CARE  History of Present Illness    Patient presents today to follow-up on hypertension and new patient labs.  There is scheduled appointment with dermatology for skin check on March 20, 2023 with Dr. Riley.  There is scheduled appointment with urology on April 25, 2024 with Dr. Greenfield for hx prostate cancer; needs refill on Flomax for BPH. There is scheduled ultrasound to evaluate pancreatic lesion on May 3, 2024 with gastroenterology; currently taking Coumadin.  Aware of need to discontinue 5 days prior to procedure.  Risks and benefits have been discussed today.  Would like to switch to Eliquis following procedure.  Current with Coumadin 9 mg daily; INR due next week.     Hypertension:  Status is no change. Current medication includes: amlodipine 10 mg daily. . Nonsmoker.  Current A1c 7.10.  There is no known history of coarctation of aorta, hyperaldosteronism, hypercortisolism, or renovascular disease.  Pertinent negatives include chest pain, claudication, diaphoresis, dyspnea, headache,  palpitations, nausea, vomiting, transient weakness, tremor, and visual disturbance.     Diabetes II:   Status: New diagnosis. Risk factors include age over 45 years, family history, and obesity. Comorbidity includes: CAD, HTN. There has been weight loss since last visit.  Pertinent negatives include blurred vision, burning of extremities, chest pain, constant hunger, frequent urination, nocturia, diarrhea, dysesthesias, dyspnea, frequent infections, heartburn, increased fatigue, and polydipsia.           Objective   Vital Signs:  /88 (BP Location: Left arm, Patient Position: Sitting, Cuff Size: Adult)   Pulse 78   Temp 97.3 °F (36.3 °C)   Resp 18   Ht 182.9 cm (72\")   Wt 136 kg (299 lb 12.8 oz)   SpO2 98%   BMI 40.66 kg/m²   Estimated body mass index is 40.66 kg/m² as calculated from the following:    " "Height as of this encounter: 182.9 cm (72\").    Weight as of this encounter: 136 kg (299 lb 12.8 oz).       Class 3 Severe Obesity (BMI >=40). Obesity-related health conditions include the following: hypertension, coronary heart disease, diabetes mellitus, and dyslipidemias. Obesity is newly identified. BMI is is above average; BMI management plan is completed. We discussed portion control and increasing exercise.      Physical Exam  Constitutional:       General: He is not in acute distress.     Appearance: He is obese.   Cardiovascular:      Rate and Rhythm: Normal rate and regular rhythm.      Chest Wall: PMI is not displaced.      Heart sounds: Normal heart sounds, S1 normal and S2 normal.   Pulmonary:      Effort: Pulmonary effort is normal.      Breath sounds: Normal breath sounds and air entry.   Musculoskeletal:      Right lower leg: No edema.      Left lower leg: No edema.   Skin:     General: Skin is warm and dry.   Neurological:      Mental Status: He is alert and oriented to person, place, and time.      Gait: Gait is intact.   Psychiatric:         Mood and Affect: Mood normal.         Thought Content: Thought content normal.         Judgment: Judgment normal.        Result Review :      CMP          2/12/2024    10:19 2/13/2024    08:49 2/23/2024    11:10   CMP   Glucose 149  124  165    BUN 13  13  12    Creatinine 0.99  0.77  0.93    EGFR 78.5  92.2  84.6    Sodium 138  138  140    Potassium 4.0  3.6  4.2    Chloride 103  103  103    Calcium 9.3  8.4  9.0    Total Protein 7.1   7.5    Albumin 3.9   3.9    Globulin 3.2   3.6    Total Bilirubin 1.2   0.8    Alkaline Phosphatase 85   68    AST (SGOT) 16   13    ALT (SGPT) 10   13    Albumin/Globulin Ratio 1.2   1.1    BUN/Creatinine Ratio 13.1  16.9  12.9    Anion Gap 9.0  13.0  13.7      CBC          2/12/2024    09:44 2/13/2024    08:49 2/23/2024    11:10   CBC   WBC 16.60  13.00  8.41    RBC 4.49  4.21  4.61    Hemoglobin 13.2  12.4  13.3  "   Hematocrit 40.9  38.1  40.9    MCV 91.0  90.5  88.7    MCH 29.4  29.4  28.9    MCHC 32.3  32.5  32.5    RDW 13.9  13.7  12.7    Platelets 125  99  173      CBC w/diff          2/12/2024    09:44 2/13/2024    08:49 2/23/2024    11:10   CBC w/Diff   WBC 16.60  13.00  8.41    RBC 4.49  4.21  4.61    Hemoglobin 13.2  12.4  13.3    Hematocrit 40.9  38.1  40.9    MCV 91.0  90.5  88.7    MCH 29.4  29.4  28.9    MCHC 32.3  32.5  32.5    RDW 13.9  13.7  12.7    Platelets 125  99  173    Neutrophil Rel % 85.1  84.4     Lymphocyte Rel % 6.1  6.5     Monocyte Rel % 8.5  8.9     Eosinophil Rel % 0.0  0.1     Basophil Rel % 0.3  0.1       Lipid Panel          2/13/2024    08:49 2/23/2024    11:10   Lipid Panel   Total Cholesterol 86  137    Triglycerides 50  77    HDL Cholesterol 40  37    VLDL Cholesterol 13  15    LDL Cholesterol  33  85    LDL/HDL Ratio 0.90  2.29      TSH          2/23/2024    11:10   TSH   TSH 1.930      Most Recent A1C          2/23/2024    11:10   HGBA1C Most Recent   Hemoglobin A1C 7.10      Microalbumin          2/23/2024    11:10   Microalbumin   Microalbumin, Urine 9.1      UA          2/12/2024    12:15   Urinalysis   Squamous Epithelial Cells, UA 0-2    Specific Gravity, UA 1.040    Ketones, UA Trace    Blood, UA Trace    Leukocytes, UA Trace    Nitrite, UA Negative    RBC, UA 0-2    WBC, UA 0-2    Bacteria, UA None Seen            Microalbumin, Urine  mg/dL 9.1              Assessment and Plan     Diagnoses and all orders for this visit:    1. Type 2 diabetes mellitus with hyperglycemia, without long-term current use of insulin (Primary)  Assessment & Plan:  Start metformin 500 mg twice daily. Discussed common side effects.   Start losartan 25 mg daily.  Continue atorvastatin 10 mg daily.   Follow up 3 months.     Orders:  -     metFORMIN (GLUCOPHAGE) 500 MG tablet; Take 1 tablet by mouth 2 (Two) Times a Day With Meals.  Dispense: 180 tablet; Refill: 0  -     Comprehensive Metabolic Panel;  Future  -     Urinalysis without microscopic (no culture) - Urine, Clean Catch; Future  -     Hemoglobin A1c; Future    2. Benign essential hypertension  Assessment & Plan:  Continue amlodipine 10 mg daily.   Start losartan 25 mg daily.   Monitor home blood pressure and keep record.   Follow up in 3 months.     Orders:  -     losartan (COZAAR) 25 MG tablet; Take 1 tablet by mouth Daily.  Dispense: 90 tablet; Refill: 0  -     Comprehensive Metabolic Panel; Future  -     Urinalysis without microscopic (no culture) - Urine, Clean Catch; Future    3. Pancreatic lesion  Assessment & Plan:  Scheduled for endoscopic ultrasound May 3, 2024. Discussed need to hold coumadin 5 days prior to procedure. Risks discussed. Plan switch to Eliquis 5 mg BID following procedure. Follow up with gastroenterology as directed.       4. Mixed hyperlipidemia  Assessment & Plan:  Discussed LDL goal less than 70. Will continue atorvastatin 10 mg daily for now.   Follow up 6 months.       Orders:  -     atorvastatin (LIPITOR) 10 MG tablet; Take 1 tablet by mouth Daily.  Dispense: 90 tablet; Refill: 1    5. Benign prostatic hyperplasia without lower urinary tract symptoms  Comments:  Follow up with urology.  Orders:  -     tamsulosin (FLOMAX) 0.4 MG capsule 24 hr capsule; Take 1 capsule by mouth Daily.  Dispense: 90 capsule; Refill: 0         I spent 42 minutes caring for Sumit on this date of service. This time includes time spent by me in the following activities:preparing for the visit, reviewing tests, performing a medically appropriate examination and/or evaluation , counseling and educating the patient/family/caregiver, ordering medications, tests, or procedures, and documenting information in the medical record  Follow Up     Return in about 3 months (around 6/8/2024) for follow 3 months DMII, HTN, Anticoag check, Urology update .  Patient was given instructions and counseling regarding his condition or for health maintenance advice.  Please see specific information pulled into the AVS if appropriate.

## 2024-03-09 PROBLEM — N40.0 BENIGN PROSTATIC HYPERPLASIA WITHOUT LOWER URINARY TRACT SYMPTOMS: Status: ACTIVE | Noted: 2024-03-09

## 2024-03-09 PROBLEM — E11.65 TYPE 2 DIABETES MELLITUS WITH HYPERGLYCEMIA, WITHOUT LONG-TERM CURRENT USE OF INSULIN: Status: ACTIVE | Noted: 2024-03-09

## 2024-03-09 NOTE — ASSESSMENT & PLAN NOTE
Start metformin 500 mg twice daily. Discussed common side effects.   Start losartan 25 mg daily.  Continue atorvastatin 10 mg daily.   Follow up 3 months.

## 2024-03-09 NOTE — ASSESSMENT & PLAN NOTE
Continue amlodipine 10 mg daily.   Start losartan 25 mg daily.   Monitor home blood pressure and keep record.   Follow up in 3 months.

## 2024-03-09 NOTE — ASSESSMENT & PLAN NOTE
Scheduled for endoscopic ultrasound May 3, 2024. Discussed need to hold coumadin 5 days prior to procedure. Risks discussed. Plan switch to Eliquis 5 mg BID following procedure. Follow up with gastroenterology as directed.

## 2024-03-09 NOTE — ASSESSMENT & PLAN NOTE
Discussed LDL goal less than 70. Will continue atorvastatin 10 mg daily for now.   Follow up 6 months.

## 2024-03-12 ENCOUNTER — CLINICAL SUPPORT (OUTPATIENT)
Dept: FAMILY MEDICINE CLINIC | Facility: CLINIC | Age: 79
End: 2024-03-12
Payer: MEDICARE

## 2024-03-12 DIAGNOSIS — D68.9 COAGULATION DEFECT: Primary | ICD-10-CM

## 2024-03-12 DIAGNOSIS — I82.4Y9 DEEP VEIN THROMBOSIS (DVT) OF PROXIMAL LOWER EXTREMITY, UNSPECIFIED CHRONICITY, UNSPECIFIED LATERALITY: ICD-10-CM

## 2024-03-12 DIAGNOSIS — Z79.01 LONG TERM CURRENT USE OF ANTICOAGULANT: ICD-10-CM

## 2024-03-12 DIAGNOSIS — D68.59 ANTITHROMBIN III DEFICIENCY: Primary | ICD-10-CM

## 2024-03-12 LAB — INR PPP: 3.4 (ref 2–3)

## 2024-03-12 RX ORDER — WARFARIN SODIUM 4 MG/1
TABLET ORAL
Qty: 60 TABLET | Refills: 0 | Status: SHIPPED | OUTPATIENT
Start: 2024-03-12

## 2024-03-12 NOTE — PROGRESS NOTES
Capillary Blood Specimen Collection  Capillary blood collection performed in the right hand for INR check by Yani Walsh MA. Patient tolerated the procedure well without complications.   03/12/24   Yani Walsh MA

## 2024-04-15 ENCOUNTER — CLINICAL SUPPORT (OUTPATIENT)
Dept: FAMILY MEDICINE CLINIC | Facility: CLINIC | Age: 79
End: 2024-04-15
Payer: MEDICARE

## 2024-04-15 DIAGNOSIS — I10 BENIGN ESSENTIAL HYPERTENSION: ICD-10-CM

## 2024-04-15 DIAGNOSIS — E11.65 TYPE 2 DIABETES MELLITUS WITH HYPERGLYCEMIA, WITHOUT LONG-TERM CURRENT USE OF INSULIN: ICD-10-CM

## 2024-04-15 DIAGNOSIS — I82.4Y9 DEEP VEIN THROMBOSIS (DVT) OF PROXIMAL LOWER EXTREMITY, UNSPECIFIED CHRONICITY, UNSPECIFIED LATERALITY: Primary | ICD-10-CM

## 2024-04-15 LAB
ALBUMIN SERPL-MCNC: 3.8 G/DL (ref 3.5–5.2)
ALBUMIN/GLOB SERPL: 1.2 G/DL
ALP SERPL-CCNC: 74 U/L (ref 39–117)
ALT SERPL W P-5'-P-CCNC: 14 U/L (ref 1–41)
ANION GAP SERPL CALCULATED.3IONS-SCNC: 9.6 MMOL/L (ref 5–15)
AST SERPL-CCNC: 22 U/L (ref 1–40)
BILIRUB SERPL-MCNC: 0.7 MG/DL (ref 0–1.2)
BUN SERPL-MCNC: 12 MG/DL (ref 8–23)
BUN/CREAT SERPL: 12.5 (ref 7–25)
CALCIUM SPEC-SCNC: 9 MG/DL (ref 8.6–10.5)
CHLORIDE SERPL-SCNC: 105 MMOL/L (ref 98–107)
CO2 SERPL-SCNC: 25.4 MMOL/L (ref 22–29)
CREAT SERPL-MCNC: 0.96 MG/DL (ref 0.76–1.27)
EGFRCR SERPLBLD CKD-EPI 2021: 81.4 ML/MIN/1.73
GLOBULIN UR ELPH-MCNC: 3.3 GM/DL
GLUCOSE SERPL-MCNC: 134 MG/DL (ref 65–99)
HBA1C MFR BLD: 7.4 % (ref 4.8–5.6)
INR PPP: 1.7 (ref 2–3)
POTASSIUM SERPL-SCNC: 4.5 MMOL/L (ref 3.5–5.2)
PROT SERPL-MCNC: 7.1 G/DL (ref 6–8.5)
SODIUM SERPL-SCNC: 140 MMOL/L (ref 136–145)

## 2024-04-15 PROCEDURE — 36416 COLLJ CAPILLARY BLOOD SPEC: CPT | Performed by: NURSE PRACTITIONER

## 2024-04-15 PROCEDURE — 81003 URINALYSIS AUTO W/O SCOPE: CPT | Performed by: NURSE PRACTITIONER

## 2024-04-15 PROCEDURE — 80053 COMPREHEN METABOLIC PANEL: CPT | Performed by: NURSE PRACTITIONER

## 2024-04-15 PROCEDURE — 36415 COLL VENOUS BLD VENIPUNCTURE: CPT | Performed by: NURSE PRACTITIONER

## 2024-04-15 PROCEDURE — 83036 HEMOGLOBIN GLYCOSYLATED A1C: CPT | Performed by: NURSE PRACTITIONER

## 2024-04-15 PROCEDURE — 85610 PROTHROMBIN TIME: CPT | Performed by: NURSE PRACTITIONER

## 2024-04-15 NOTE — PROGRESS NOTES
Venipuncture Blood Specimen Collection  Venipuncture performed in the right arm by Yani Walsh MA with good hemostasis. Patient tolerated the procedure well without complications.   04/15/24   Yani Walsh MA

## 2024-04-16 DIAGNOSIS — E11.65 TYPE 2 DIABETES MELLITUS WITH HYPERGLYCEMIA, WITHOUT LONG-TERM CURRENT USE OF INSULIN: Primary | ICD-10-CM

## 2024-04-16 LAB
BILIRUB UR QL STRIP: NEGATIVE
CLARITY UR: ABNORMAL
COLOR UR: YELLOW
GLUCOSE UR STRIP-MCNC: NEGATIVE MG/DL
HGB UR QL STRIP.AUTO: NEGATIVE
KETONES UR QL STRIP: NEGATIVE
LEUKOCYTE ESTERASE UR QL STRIP.AUTO: NEGATIVE
NITRITE UR QL STRIP: NEGATIVE
PH UR STRIP.AUTO: 5.5 [PH] (ref 5–8)
PROT UR QL STRIP: NEGATIVE
SP GR UR STRIP: >=1.03 (ref 1–1.03)
UROBILINOGEN UR QL STRIP: ABNORMAL

## 2024-04-16 NOTE — PROGRESS NOTES
INR is 1.7. Coumadin needs to be increased to previous dose at 9 mg daily. He had been taking a 5 mg tab and a 4 mg tab. Will you please call and ask what he has available at home?

## 2024-05-02 ENCOUNTER — TELEPHONE (OUTPATIENT)
Dept: FAMILY MEDICINE CLINIC | Facility: CLINIC | Age: 79
End: 2024-05-02
Payer: MEDICARE

## 2024-05-02 DIAGNOSIS — D68.59 ANTITHROMBIN III DEFICIENCY: Primary | ICD-10-CM

## 2024-05-02 DIAGNOSIS — Z79.01 LONG TERM CURRENT USE OF ANTICOAGULANT: ICD-10-CM

## 2024-05-02 NOTE — TELEPHONE ENCOUNTER
Caller: Sumit Jules    Relationship: Self    Best call back number: [phone]    What medication are you requesting: ELOQUIS    What are your current symptoms: SUPPOSED TO START 2 DAYS AFTER PANCREAS TEST     How long have you been experiencing symptoms:     Have you had these symptoms before:    [x] Yes  [] No    Have you been treated for these symptoms before:   [x] Yes  [] No    If a prescription is needed, what is your preferred pharmacy and phone number: [id number] - [place], [place] - [address] - [phone]  - [phone]      Additional notes:  HAVING THE TEST DONE 5/3/24

## 2024-05-03 ENCOUNTER — ANESTHESIA (OUTPATIENT)
Dept: GASTROENTEROLOGY | Facility: HOSPITAL | Age: 79
End: 2024-05-03
Payer: MEDICARE

## 2024-05-03 ENCOUNTER — HOSPITAL ENCOUNTER (OUTPATIENT)
Facility: HOSPITAL | Age: 79
Setting detail: HOSPITAL OUTPATIENT SURGERY
Discharge: HOME | End: 2024-05-03
Attending: INTERNAL MEDICINE | Admitting: INTERNAL MEDICINE
Payer: MEDICARE

## 2024-05-03 ENCOUNTER — ANESTHESIA EVENT (OUTPATIENT)
Dept: GASTROENTEROLOGY | Facility: HOSPITAL | Age: 79
End: 2024-05-03
Payer: MEDICARE

## 2024-05-03 ENCOUNTER — ON CAMPUS - OUTPATIENT (OUTPATIENT)
Dept: URBAN - METROPOLITAN AREA HOSPITAL 85 | Facility: HOSPITAL | Age: 79
End: 2024-05-03

## 2024-05-03 VITALS
OXYGEN SATURATION: 94 % | DIASTOLIC BLOOD PRESSURE: 66 MMHG | WEIGHT: 300.27 LBS | TEMPERATURE: 98.2 F | SYSTOLIC BLOOD PRESSURE: 114 MMHG | BODY MASS INDEX: 40.67 KG/M2 | HEIGHT: 72 IN | HEART RATE: 65 BPM | RESPIRATION RATE: 16 BRPM

## 2024-05-03 DIAGNOSIS — K85.90 ACUTE PANCREATITIS: ICD-10-CM

## 2024-05-03 DIAGNOSIS — K86.2 CYST OF PANCREAS: ICD-10-CM

## 2024-05-03 DIAGNOSIS — K86.1 OTHER CHRONIC PANCREATITIS: ICD-10-CM

## 2024-05-03 DIAGNOSIS — E11.65 TYPE 2 DIABETES MELLITUS WITH HYPERGLYCEMIA, WITHOUT LONG-TERM CURRENT USE OF INSULIN: ICD-10-CM

## 2024-05-03 DIAGNOSIS — R93.3 ABNORMAL CT SCAN, GASTROINTESTINAL TRACT: ICD-10-CM

## 2024-05-03 DIAGNOSIS — K86.9 DISEASE OF PANCREAS, UNSPECIFIED: ICD-10-CM

## 2024-05-03 DIAGNOSIS — K86.9 PANCREATIC LESION: ICD-10-CM

## 2024-05-03 DIAGNOSIS — K85.90 ACUTE PANCREATITIS WITHOUT NECROSIS OR INFECTION, UNSPECIFIE: ICD-10-CM

## 2024-05-03 DIAGNOSIS — R93.3 ABNORMAL FINDINGS ON DIAGNOSTIC IMAGING OF OTHER PARTS OF DI: ICD-10-CM

## 2024-05-03 LAB
GLUCOSE BLDC GLUCOMTR-MCNC: 144 MG/DL (ref 70–105)
GLUCOSE FLD-MCNC: 63 MG/DL
INR PPP: 1.02 (ref 2–3)
PROTHROMBIN TIME: 11.1 SECONDS (ref 19.4–28.5)

## 2024-05-03 PROCEDURE — 25010000002 CEFTRIAXONE PER 250 MG

## 2024-05-03 PROCEDURE — 82945 GLUCOSE OTHER FLUID: CPT | Performed by: INTERNAL MEDICINE

## 2024-05-03 PROCEDURE — 88305 TISSUE EXAM BY PATHOLOGIST: CPT | Performed by: INTERNAL MEDICINE

## 2024-05-03 PROCEDURE — 82378 CARCINOEMBRYONIC ANTIGEN: CPT | Performed by: INTERNAL MEDICINE

## 2024-05-03 PROCEDURE — 85610 PROTHROMBIN TIME: CPT | Performed by: INTERNAL MEDICINE

## 2024-05-03 PROCEDURE — 88108 CYTOPATH CONCENTRATE TECH: CPT | Performed by: INTERNAL MEDICINE

## 2024-05-03 PROCEDURE — 43242 EGD US FINE NEEDLE BX/ASPIR: CPT | Performed by: INTERNAL MEDICINE

## 2024-05-03 PROCEDURE — 25010000002 PROPOFOL 200 MG/20ML EMULSION

## 2024-05-03 PROCEDURE — 82948 REAGENT STRIP/BLOOD GLUCOSE: CPT

## 2024-05-03 PROCEDURE — 25010000002 METRONIDAZOLE 500 MG/100ML SOLUTION

## 2024-05-03 RX ORDER — ONDANSETRON 2 MG/ML
4 INJECTION INTRAMUSCULAR; INTRAVENOUS ONCE AS NEEDED
Status: DISCONTINUED | OUTPATIENT
Start: 2024-05-03 | End: 2024-05-03 | Stop reason: HOSPADM

## 2024-05-03 RX ORDER — LIDOCAINE HYDROCHLORIDE 20 MG/ML
INJECTION, SOLUTION EPIDURAL; INFILTRATION; INTRACAUDAL; PERINEURAL AS NEEDED
Status: DISCONTINUED | OUTPATIENT
Start: 2024-05-03 | End: 2024-05-03 | Stop reason: SURG

## 2024-05-03 RX ORDER — CEFTRIAXONE 2 G/1
INJECTION, POWDER, FOR SOLUTION INTRAMUSCULAR; INTRAVENOUS AS NEEDED
Status: DISCONTINUED | OUTPATIENT
Start: 2024-05-03 | End: 2024-05-03 | Stop reason: SURG

## 2024-05-03 RX ORDER — IPRATROPIUM BROMIDE AND ALBUTEROL SULFATE 2.5; .5 MG/3ML; MG/3ML
3 SOLUTION RESPIRATORY (INHALATION) ONCE AS NEEDED
Status: DISCONTINUED | OUTPATIENT
Start: 2024-05-03 | End: 2024-05-03 | Stop reason: HOSPADM

## 2024-05-03 RX ORDER — LABETALOL HYDROCHLORIDE 5 MG/ML
5 INJECTION, SOLUTION INTRAVENOUS
Status: DISCONTINUED | OUTPATIENT
Start: 2024-05-03 | End: 2024-05-03 | Stop reason: HOSPADM

## 2024-05-03 RX ORDER — HYDRALAZINE HYDROCHLORIDE 20 MG/ML
5 INJECTION INTRAMUSCULAR; INTRAVENOUS
Status: DISCONTINUED | OUTPATIENT
Start: 2024-05-03 | End: 2024-05-03 | Stop reason: HOSPADM

## 2024-05-03 RX ORDER — EPHEDRINE SULFATE 5 MG/ML
5 INJECTION INTRAVENOUS ONCE AS NEEDED
Status: DISCONTINUED | OUTPATIENT
Start: 2024-05-03 | End: 2024-05-03 | Stop reason: HOSPADM

## 2024-05-03 RX ORDER — METRONIDAZOLE 500 MG/100ML
INJECTION, SOLUTION INTRAVENOUS AS NEEDED
Status: DISCONTINUED | OUTPATIENT
Start: 2024-05-03 | End: 2024-05-03 | Stop reason: SURG

## 2024-05-03 RX ORDER — DEXMEDETOMIDINE HYDROCHLORIDE 100 UG/ML
INJECTION, SOLUTION INTRAVENOUS AS NEEDED
Status: DISCONTINUED | OUTPATIENT
Start: 2024-05-03 | End: 2024-05-03 | Stop reason: SURG

## 2024-05-03 RX ORDER — PROPOFOL 10 MG/ML
INJECTION, EMULSION INTRAVENOUS AS NEEDED
Status: DISCONTINUED | OUTPATIENT
Start: 2024-05-03 | End: 2024-05-03 | Stop reason: SURG

## 2024-05-03 RX ORDER — SODIUM CHLORIDE 9 MG/ML
INJECTION, SOLUTION INTRAVENOUS CONTINUOUS PRN
Status: DISCONTINUED | OUTPATIENT
Start: 2024-05-03 | End: 2024-05-03 | Stop reason: SURG

## 2024-05-03 RX ADMIN — CEFTRIAXONE SODIUM 2 G: 2 INJECTION, POWDER, FOR SOLUTION INTRAMUSCULAR; INTRAVENOUS at 11:43

## 2024-05-03 RX ADMIN — DEXMEDETOMIDINE HYDROCHLORIDE 10 MCG: 100 INJECTION, SOLUTION INTRAVENOUS at 11:25

## 2024-05-03 RX ADMIN — SODIUM CHLORIDE: 9 INJECTION, SOLUTION INTRAVENOUS at 11:23

## 2024-05-03 RX ADMIN — DEXMEDETOMIDINE HYDROCHLORIDE 10 MCG: 100 INJECTION, SOLUTION INTRAVENOUS at 11:35

## 2024-05-03 RX ADMIN — PROPOFOL 100 MG: 10 INJECTION, EMULSION INTRAVENOUS at 11:28

## 2024-05-03 RX ADMIN — LIDOCAINE HYDROCHLORIDE 50 MG: 20 INJECTION, SOLUTION EPIDURAL; INFILTRATION; INTRACAUDAL; PERINEURAL at 11:28

## 2024-05-03 RX ADMIN — PROPOFOL 100 MCG/KG/MIN: 10 INJECTION, EMULSION INTRAVENOUS at 11:29

## 2024-05-03 RX ADMIN — METRONIDAZOLE 500 MG: 500 INJECTION, SOLUTION INTRAVENOUS at 11:43

## 2024-05-03 NOTE — DISCHARGE INSTRUCTIONS
A responsible adult should stay with you and you should rest quietly for the rest of the day.    Do not drink alcohol, drive, operate any heavy machinery or power tools or make any legal/important decisions for the next 24 hours.     Progress your diet as tolerated.  If you begin to experience severe pain, increased shortness of breath, racing heartbeat or a fever above 101 F, seek immediate medical attention.     Follow up with MD as instructed. Call office for results in 3 to 5 days if needed. Dr Ribera [phone]    Impression:  1.  Normal pancreatic parenchyma except minimal changes of chronic pancreatitis as well as 1.8 to 1.9 cm cystic lesion of the body of the pancreas with possible sidebranch communication likely represent IPMN.  Cystic fluid was aspirated and sent for CEA amylase as well as glucose.  Cyst wall was also sampled using 22-gauge needle and slides were made.  No other obvious mass or lesion was seen.  2.  Pancreatic duct and common bile duct was slightly prominent at the head of the pancreas with CBD close to 8.4 mm and pancreas duct 4.8 mm no obvious mass was noticed.  3.  Mild changes of chronic gastritis      Recommendations:   Patient to call GI clinic for any postprocedure abdominal pain nausea vomiting or fever   Continue with the PPI.  Follow-up with amylase, glucose CEA level as well as fine-needle biopsy.  Resume other preop medication.  Consider repeating CT scan of the pancreatic protocol again in 3 to 4 months depending on CEA level.           Gifty Ribera MD      Date: 5/3/2024    Time: 11:49 EDT

## 2024-05-03 NOTE — OP NOTE
ESOPHAGOGASTRODUODENOSCOPY WITH ULTRASOUND AND FINE NEEDLE ASPIRATION Procedure Report    Patient Name:  Sumit Jules  YOB: 1945    Date of Surgery:  5/3/2024     Pre-Op Diagnosis:  Acute pancreatitis [K85.90]  Pancreatic lesion [K86.9]  Abnormal CT scan, gastrointestinal tract [R93.3]       Post-Op Diagnosis Codes:     * Acute pancreatitis [K85.90]     * Pancreatic lesion [K86.9]     * Abnormal CT scan, gastrointestinal tract [R93.3]      Procedure/CPT® Codes:      Procedure(s):  ENDOSCOPIC ULTRASOUND with fine needle aspiration x1 area    Staff:  Surgeon(s):  Gifty Ribera MD      Anesthesia: Monitored Anesthesia Care    Description of Procedure:  A description of the procedure as well as risks, benefits and alternative methods were explained to the patient who voiced understanding and signed the corresponding consent form. A physical exam was performed and vital signs were monitored throughout the procedure.    Initially Pentax radial scope was advanced under direct realization from oropharynx, esophagus stomach and the second part duodenum.  Limited evaluation of the esophageal gastric antral mucosa looks normal.    Scanning of the body and the tail of the pancreas showed normal pancreatic parenchyma in the body and the tail area PD was really 1.3 mm there was no significant evidence of chronic pancreatitis except minimal hyperechoic bright foci noted the body and tail area right in the body there was a cystic lesion close to 1.8 to 1.9 cm noticed without mural nodule except slightly thickened wall was seen which was separately sampled using a 19-gauge needle.  Initially the cyst was aspirated with a linear scope and fluid was sent for CEA amylase and glucose.  Scanning of the head of the pancreas shows a normal pancreatic parenchyma except common bile duct is prominent 8.5 mm and PD was around 4.8 to 5 mm.  There was no obvious stone or mass noticed.  No pancreatic visit.  Patient tolerated  procedure very.  Esophageal gastric dual mucosa grossly looks normal.    Impression:  1.  Normal pancreatic parenchyma except minimal changes of chronic pancreatitis as well as 1.8 to 1.9 cm cystic lesion of the body of the pancreas with possible sidebranch communication likely represent IPMN.  Cystic fluid was aspirated and sent for CEA amylase as well as glucose.  Cyst wall was also sampled using 22-gauge needle and slides were made.  No other obvious mass or lesion was seen.  2.  Pancreatic duct and common bile duct was slightly prominent at the head of the pancreas with CBD close to 8.4 mm and pancreas duct 4.8 mm no obvious mass was noticed.  3.  Mild changes of chronic gastritis     Recommendations:   Patient to call GI clinic for any postprocedure abdominal pain nausea vomiting or fever   Continue with the PPI.  Follow-up with amylase, glucose CEA level as well as fine-needle biopsy.  Resume other preop medication.  Consider repeating CT scan of the pancreatic protocol again in 3 to 4 months depending on CEA level.        Gifty Ribera MD     Date: 5/3/2024    Time: 11:49 EDT

## 2024-05-03 NOTE — H&P
Patient Care Team:  Carol Rios APRN as PCP - General (Nurse Practitioner)      Subjective .     History of present illness:    Sumit Jules is a 78 y.o. male who presents today for Procedure(s):  ENDOSCOPIC ULTRASOUND for the indications listed below.     The updated Patient Profile was reviewed prior to the procedure, in conjunction with the Physical Exam, including medical conditions, surgical procedures, medications, allergies, family history and social history.     Pre-operatively, I reviewed the indication(s) for the procedure, the risks of the procedure [including but not limited to: unexpected bleeding possibly requiring hospitalization and/or unplanned repeat procedures, perforation possibly requiring surgical treatment, missed lesions and complications of sedation/MAC (also explained by anesthesia staff)].     I have evaluated the patient for risks associated with the planned anesthesia and the procedure to be performed and find the patient an acceptable candidate for IV sedation.    Multiple opportunities were provided for any questions or concerns, and all questions were answered satisfactorily before any anesthesia was administered. We will proceed with the planned procedure.      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   Converted from [employer]:   Description - SEBORRHEIC KERATOSIS    Pain of foot 03/19/2013    Formatting of this note might be different from the original.   Converted from [employer]:   Description - HEEL PAIN    Prostate cancer     Thrombocytopenia 03/19/2013    Formatting of this note might be different from the original.   Converted from [employer]:   Description - THROMBOCYTOPENIA    Vasculitis limited to skin 02/23/2024    Formatting of this note might be different from the original.   Converted from [employer]:   Description - VASCULAR DISORDER OF SKIN       Past Surgical History:  Past Surgical History:   Procedure Laterality Date    CHOLECYSTECTOMY      COLONOSCOPY  10/2013       Social History:  Social History     Tobacco Use    Smoking status: Never     Passive exposure: Never    Smokeless tobacco: Never   Vaping Use    Vaping status: Never Used   Substance Use Topics    Alcohol use: No    Drug use: Never       Family History:  Family History   Problem Relation Age of Onset    Breast cancer Other     Diabetes Other     Deep vein thrombosis Other     Diabetes type II Other        Medications:  Medications Prior to Admission   Medication Sig Dispense Refill Last Dose    amLODIPine (NORVASC) 10 MG tablet Take 1 tablet by mouth Every Night. 90 tablet 0     atorvastatin (LIPITOR) 10 MG tablet Take 1 tablet by mouth Daily. 90 tablet 1     losartan (COZAAR) 25 MG tablet Take 1 tablet by mouth Daily. 90 tablet 0     metFORMIN (GLUCOPHAGE) 500 MG tablet Take 1 tablet by mouth 2 (Two) Times a Day With Meals. 180 tablet 0     tamsulosin (FLOMAX) 0.4 MG capsule 24 hr capsule Take 1 capsule by mouth Daily. 90 capsule 0     apixaban (ELIQUIS) 5 MG tablet tablet Take 1 tablet by mouth 2 (Two) Times a Day. 60 tablet 2        Scheduled Meds:  Continuous Infusions:No current facility-administered medications for this  encounter.    PRN Meds:.    ALLERGIES:  Patient has no known allergies.        Objective     Vital Signs:        Physical Exam:      General Appearance:    Awake and alert, in no acute distress   Lungs:     Clear to auscultation bilaterally, respirations regular, even and unlabored    Heart:    Regular rhythm and normal rate, normal S1 and S2, no            murmur, no gallop, no rub   Abdomen:     Normal bowel sounds, soft, non-tender, no rebound or guarding, non-distended, no hepatosplenomegaly        Results Review:   I reviewed the patient's labs and imaging.  Lab Results (last 24 hours)       ** No results found for the last 24 hours. **            Imaging Results (Last 24 Hours)       ** No results found for the last 24 hours. **               I discussed the patients findings and my recommendations with the patient.  Gifty Ribera MD  05/03/24  09:30 EDT

## 2024-05-03 NOTE — ANESTHESIA POSTPROCEDURE EVALUATION
Patient: Sumit Jules    Procedure Summary       Date: 05/03/24 Room / Location: [facility] ENDOSCOPY 2 / [facility] ENDOSCOPY    Anesthesia Start: 1123 Anesthesia Stop: 1151    Procedure: ENDOSCOPIC ULTRASOUND with fine needle aspiration x1 area Diagnosis:       Acute pancreatitis      Pancreatic lesion      Abnormal CT scan, gastrointestinal tract      (Acute pancreatitis [K85.90])      (Pancreatic lesion [K86.9])      (Abnormal CT scan, gastrointestinal tract [R93.3])    Surgeons: Gifty Ribera MD Provider: Florentin Goff MD    Anesthesia Type: general, MAC ASA Status: 3            Anesthesia Type: general, MAC    Vitals  Vitals Value Taken Time   /66 05/03/24 1216   Temp     Pulse 54 05/03/24 1227   Resp 16 05/03/24 1215   SpO2 93 % 05/03/24 1227   Vitals shown include unfiled device data.        Post Anesthesia Care and Evaluation    Patient location during evaluation: PACU  Patient participation: complete - patient participated  Level of consciousness: awake  Pain scale: See nurse's notes for pain score.  Pain management: adequate    Airway patency: patent  Anesthetic complications: No anesthetic complications  PONV Status: none  Cardiovascular status: acceptable  Respiratory status: acceptable and spontaneous ventilation  Hydration status: acceptable    Comments: Patient seen and examined postoperatively; vital signs stable; SpO2 greater than or equal to 90%; cardiopulmonary status stable; nausea/vomiting adequately controlled; pain adequately controlled; no apparent anesthesia complications; patient discharged from anesthesia care when discharge criteria were met

## 2024-05-03 NOTE — ANESTHESIA PREPROCEDURE EVALUATION
Anesthesia Evaluation     Patient summary reviewed and Nursing notes reviewed   NPO Solid Status: > 8 hours  NPO Liquid Status: > 8 hours           Airway   Mallampati: II  TM distance: >3 FB  Neck ROM: full  No difficulty expected  Dental - normal exam     Pulmonary    Cardiovascular     (+) hypertension, CAD, DVT, hyperlipidemia      Neuro/Psych  GI/Hepatic/Renal/Endo    (+) morbid obesity, diabetes mellitus    Musculoskeletal     Abdominal    Substance History      OB/GYN          Other      history of cancer                Anesthesia Plan    ASA 3     general and MAC     intravenous induction     Anesthetic plan, risks, benefits, and alternatives have been provided, discussed and informed consent has been obtained with: patient.    Plan discussed with CRNA.    CODE STATUS:

## 2024-05-06 LAB
LAB AP CASE REPORT: NORMAL
PATH REPORT.FINAL DX SPEC: NORMAL
PATH REPORT.GROSS SPEC: NORMAL

## 2024-05-07 LAB — REF LAB TEST METHOD: NORMAL

## 2024-05-21 DIAGNOSIS — I10 BENIGN ESSENTIAL HYPERTENSION: ICD-10-CM

## 2024-05-21 RX ORDER — AMLODIPINE BESYLATE 10 MG/1
10 TABLET ORAL
Qty: 90 TABLET | Refills: 0 | Status: SHIPPED | OUTPATIENT
Start: 2024-05-21

## 2024-05-28 ENCOUNTER — TELEPHONE (OUTPATIENT)
Dept: FAMILY MEDICINE CLINIC | Facility: CLINIC | Age: 79
End: 2024-05-28
Payer: MEDICARE

## 2024-05-28 NOTE — TELEPHONE ENCOUNTER
Please call Sumit and let him know I ran an interaction check with Xarelto and his current medications, there are no drug interactions to be concerned about.  We can switch the Eliquis to Xarelto if it is preferable for cost savings.  Looks like he has a follow-up appointment scheduled next week, can switch prescription at that point in time or can right now if he is due to  another prescription refill.  Let me know.

## 2024-05-29 DIAGNOSIS — D68.59 ANTITHROMBIN III DEFICIENCY: Primary | ICD-10-CM

## 2024-05-29 DIAGNOSIS — E11.65 TYPE 2 DIABETES MELLITUS WITH HYPERGLYCEMIA, WITHOUT LONG-TERM CURRENT USE OF INSULIN: Primary | ICD-10-CM

## 2024-05-29 NOTE — TELEPHONE ENCOUNTER
He would like you to go ahead and sent in the Xerelto today and he will talk more about it with you next week at his visit.

## 2024-05-31 DIAGNOSIS — E11.65 TYPE 2 DIABETES MELLITUS WITH HYPERGLYCEMIA, WITHOUT LONG-TERM CURRENT USE OF INSULIN: ICD-10-CM

## 2024-05-31 DIAGNOSIS — I10 BENIGN ESSENTIAL HYPERTENSION: ICD-10-CM

## 2024-05-31 RX ORDER — LOSARTAN POTASSIUM 25 MG/1
25 TABLET ORAL DAILY
Qty: 90 TABLET | Refills: 0 | Status: SHIPPED | OUTPATIENT
Start: 2024-05-31

## 2024-06-07 ENCOUNTER — LAB (OUTPATIENT)
Dept: FAMILY MEDICINE CLINIC | Facility: CLINIC | Age: 79
End: 2024-06-07
Payer: MEDICARE

## 2024-06-07 DIAGNOSIS — E11.65 TYPE 2 DIABETES MELLITUS WITH HYPERGLYCEMIA, WITHOUT LONG-TERM CURRENT USE OF INSULIN: ICD-10-CM

## 2024-06-07 LAB — HBA1C MFR BLD: 6.9 % (ref 4.8–5.6)

## 2024-06-07 PROCEDURE — 80053 COMPREHEN METABOLIC PANEL: CPT | Performed by: NURSE PRACTITIONER

## 2024-06-07 PROCEDURE — 81003 URINALYSIS AUTO W/O SCOPE: CPT | Performed by: NURSE PRACTITIONER

## 2024-06-07 PROCEDURE — 36415 COLL VENOUS BLD VENIPUNCTURE: CPT

## 2024-06-07 PROCEDURE — 83036 HEMOGLOBIN GLYCOSYLATED A1C: CPT | Performed by: NURSE PRACTITIONER

## 2024-06-07 PROCEDURE — 80061 LIPID PANEL: CPT | Performed by: NURSE PRACTITIONER

## 2024-06-08 LAB
ALBUMIN SERPL-MCNC: 3.8 G/DL (ref 3.5–5.2)
ALBUMIN/GLOB SERPL: 1.1 G/DL
ALP SERPL-CCNC: 70 U/L (ref 39–117)
ALT SERPL W P-5'-P-CCNC: 9 U/L (ref 1–41)
ANION GAP SERPL CALCULATED.3IONS-SCNC: 11.5 MMOL/L (ref 5–15)
AST SERPL-CCNC: 18 U/L (ref 1–40)
BILIRUB SERPL-MCNC: 0.4 MG/DL (ref 0–1.2)
BILIRUB UR QL STRIP: NEGATIVE
BUN SERPL-MCNC: 15 MG/DL (ref 8–23)
BUN/CREAT SERPL: 13.2 (ref 7–25)
CALCIUM SPEC-SCNC: 9.1 MG/DL (ref 8.6–10.5)
CHLORIDE SERPL-SCNC: 104 MMOL/L (ref 98–107)
CHOLEST SERPL-MCNC: 128 MG/DL (ref 0–200)
CLARITY UR: CLEAR
CO2 SERPL-SCNC: 24.5 MMOL/L (ref 22–29)
COLOR UR: YELLOW
CREAT SERPL-MCNC: 1.14 MG/DL (ref 0.76–1.27)
EGFRCR SERPLBLD CKD-EPI 2021: 66.2 ML/MIN/1.73
GLOBULIN UR ELPH-MCNC: 3.5 GM/DL
GLUCOSE SERPL-MCNC: 130 MG/DL (ref 65–99)
GLUCOSE UR STRIP-MCNC: NEGATIVE MG/DL
HDLC SERPL-MCNC: 34 MG/DL (ref 40–60)
HGB UR QL STRIP.AUTO: NEGATIVE
HOLD SPECIMEN: NORMAL
KETONES UR QL STRIP: NEGATIVE
LDLC SERPL CALC-MCNC: 78 MG/DL (ref 0–100)
LDLC/HDLC SERPL: 2.3 {RATIO}
LEUKOCYTE ESTERASE UR QL STRIP.AUTO: NEGATIVE
NITRITE UR QL STRIP: NEGATIVE
PH UR STRIP.AUTO: 5.5 [PH] (ref 5–8)
POTASSIUM SERPL-SCNC: 4.3 MMOL/L (ref 3.5–5.2)
PROT SERPL-MCNC: 7.3 G/DL (ref 6–8.5)
PROT UR QL STRIP: NEGATIVE
SODIUM SERPL-SCNC: 140 MMOL/L (ref 136–145)
SP GR UR STRIP: 1.02 (ref 1–1.03)
TRIGL SERPL-MCNC: 79 MG/DL (ref 0–150)
UROBILINOGEN UR QL STRIP: NORMAL
VLDLC SERPL-MCNC: 16 MG/DL (ref 5–40)

## 2024-06-14 ENCOUNTER — OFFICE VISIT (OUTPATIENT)
Dept: FAMILY MEDICINE CLINIC | Facility: CLINIC | Age: 79
End: 2024-06-14
Payer: MEDICARE

## 2024-06-14 VITALS
OXYGEN SATURATION: 95 % | SYSTOLIC BLOOD PRESSURE: 140 MMHG | HEART RATE: 70 BPM | DIASTOLIC BLOOD PRESSURE: 72 MMHG | BODY MASS INDEX: 40.14 KG/M2 | RESPIRATION RATE: 18 BRPM | TEMPERATURE: 97.7 F | HEIGHT: 72 IN | WEIGHT: 296.4 LBS

## 2024-06-14 DIAGNOSIS — E11.65 TYPE 2 DIABETES MELLITUS WITH HYPERGLYCEMIA, WITHOUT LONG-TERM CURRENT USE OF INSULIN: Primary | ICD-10-CM

## 2024-06-14 DIAGNOSIS — K43.2 VENTRAL INCISIONAL HERNIA: ICD-10-CM

## 2024-06-14 DIAGNOSIS — I10 BENIGN ESSENTIAL HYPERTENSION: ICD-10-CM

## 2024-06-14 DIAGNOSIS — D68.59 ANTITHROMBIN III DEFICIENCY: ICD-10-CM

## 2024-06-14 DIAGNOSIS — E78.2 MIXED HYPERLIPIDEMIA: ICD-10-CM

## 2024-06-14 PROBLEM — N39.0 UTI (URINARY TRACT INFECTION): Status: RESOLVED | Noted: 2024-02-14 | Resolved: 2024-06-14

## 2024-06-14 PROBLEM — K85.00 IDIOPATHIC ACUTE PANCREATITIS WITHOUT INFECTION OR NECROSIS: Status: RESOLVED | Noted: 2024-02-12 | Resolved: 2024-06-14

## 2024-06-14 PROBLEM — K85.90 ACUTE PANCREATITIS: Status: RESOLVED | Noted: 2024-02-12 | Resolved: 2024-06-14

## 2024-06-14 PROCEDURE — 3078F DIAST BP <80 MM HG: CPT | Performed by: NURSE PRACTITIONER

## 2024-06-14 PROCEDURE — G2211 COMPLEX E/M VISIT ADD ON: HCPCS | Performed by: NURSE PRACTITIONER

## 2024-06-14 PROCEDURE — 99214 OFFICE O/P EST MOD 30 MIN: CPT | Performed by: NURSE PRACTITIONER

## 2024-06-14 PROCEDURE — 1160F RVW MEDS BY RX/DR IN RCRD: CPT | Performed by: NURSE PRACTITIONER

## 2024-06-14 PROCEDURE — 3077F SYST BP >= 140 MM HG: CPT | Performed by: NURSE PRACTITIONER

## 2024-06-14 PROCEDURE — 1159F MED LIST DOCD IN RCRD: CPT | Performed by: NURSE PRACTITIONER

## 2024-06-14 NOTE — ASSESSMENT & PLAN NOTE
Continue amlodipine 10 mg daily.  Continue losartan 25 mg daily.  Increase to 2 tabs daily if home blood pressure readings consistently greater than 140/90.  Weight loss encouraged.  Education reiterated.  Follow-up in 3 months.

## 2024-06-14 NOTE — ASSESSMENT & PLAN NOTE
Lipid abnormalities are stable    Plan:  Continue same medication/s without change.    Continue atorvastatin 10 mg daily.    Discussed medication dosage, use, side effects, and goals of treatment in detail.    Counseled patient on lifestyle modifications to help control hyperlipidemia.   Weight Loss encouraged    Patient Treatment Goals:   LDL goal is less than 70    Followup in 3 months.

## 2024-06-14 NOTE — PROGRESS NOTES
Chief Complaint  Follow-up    Subjective        Sumit Jules presents to [facility]  History of Present Illness    Patient presents today to follow-up on diabetes type 2, hypertension, anticoagulant, and GI update/endoscopy.  Reported follow-up with Dr. Greenfield/[facility] for history of prostate cancer.  Continued Flomax 0.4 mg daily; no changes.  Reported recent trip to [place] and back.  Currently building a small retaining wall around flower beds at home for wife.      Diabetes type 2: Status is improved.  Current medication includes metformin 500 mg twice daily. Loose stool for 3 days then resolved. Eating better, making healthier choices.  Pertinent negatives include blurred vision, burning of extremities, chest pain, constant hunger, dysuria, diarrhea, dyspnea, frequent infections, heartburn, increased fatigue, and polydipsia.        Hypertension: Status is stable.  Current medication includes amlodipine 10 mg daily and losartan 25 mg daily. Takes meds at night.  Reported home blood pressure reading of /XX. Wearing compression hose.  Reported 1 episode of dizziness which quickly resolved.  Pertinent negatives include chest pain, claudication, diaphoresis, dyspnea, headache, palpitations, nausea, vomiting, transient weakness, tremor, and visual disturbance.     Anticoagulation: Current medication includes Xarelto 20 mg daily $89/month.  Recently discontinued Coumadin and started Eliquis which was more costly $211/month. Doing fine; no side effects. Reported need to sign form from .     GI update/pancreatic lesion: Endoscopy completed May 3, 2024 per Dr. Ribera.  Reported no follow-up needed.  Per procedure report:       Pancreatic cyst, fine-needle aspiration with smears and cell block:  Reactive duct epithelial cells in a background of mixed inflammation, macrophages and amorphous debris  No malignancy identified                Objective   Vital Signs:  BP  "140/72 (BP Location: Left arm, Patient Position: Sitting, Cuff Size: Adult)   Pulse 70   Temp 97.7 °F (36.5 °C)   Resp 18   Ht 182.9 cm (72\")   Wt 134 kg (296 lb 6.4 oz)   SpO2 95%   BMI 40.20 kg/m²   Estimated body mass index is 40.2 kg/m² as calculated from the following:    Height as of this encounter: 182.9 cm (72\").    Weight as of this encounter: 134 kg (296 lb 6.4 oz).               Physical Exam  Constitutional:       General: He is not in acute distress.     Appearance: He is obese.   HENT:      Head: Normocephalic.      Right Ear: External ear normal.      Left Ear: External ear normal.      Nose: Nose normal.   Eyes:      Conjunctiva/sclera: Conjunctivae normal.   Cardiovascular:      Rate and Rhythm: Normal rate and regular rhythm.      Chest Wall: PMI is not displaced.      Heart sounds: S1 normal and S2 normal.   Pulmonary:      Effort: Pulmonary effort is normal.      Breath sounds: Normal breath sounds and air entry.   Abdominal:      General: Bowel sounds are normal.      Palpations: Abdomen is soft.      Tenderness: There is no abdominal tenderness.      Hernia: A hernia is present. Hernia is present in the ventral area.      Comments: Moderate size reducible incisional hernia; reported previous evaluation per general surgeon.  Not bothersome.   Musculoskeletal:      Cervical back: Neck supple.      Right lower leg: No edema.      Left lower leg: No edema.   Skin:     General: Skin is warm and dry.   Neurological:      Mental Status: He is alert and oriented to person, place, and time.      Gait: Gait is intact.   Psychiatric:         Mood and Affect: Mood normal.         Thought Content: Thought content normal.         Judgment: Judgment normal.        Result Review :      CMP          2/23/2024    11:10 4/15/2024    09:03 6/7/2024    09:15   CMP   Glucose 165  134  130    BUN 12  12  15    Creatinine 0.93  0.96  1.14    EGFR 84.6  81.4  66.2    Sodium 140  140  140    Potassium 4.2  4.5  " 4.3    Chloride 103  105  104    Calcium 9.0  9.0  9.1    Total Protein 7.5  7.1  7.3    Albumin 3.9  3.8  3.8    Globulin 3.6  3.3  3.5    Total Bilirubin 0.8  0.7  0.4    Alkaline Phosphatase 68  74  70    AST (SGOT) 13  22  18    ALT (SGPT) 13  14  9    Albumin/Globulin Ratio 1.1  1.2  1.1    BUN/Creatinine Ratio 12.9  12.5  13.2    Anion Gap 13.7  9.6  11.5      CBC w/diff          2/12/2024    09:44 2/13/2024    08:49 2/23/2024    11:10   CBC w/Diff   WBC 16.60  13.00  8.41    RBC 4.49  4.21  4.61    Hemoglobin 13.2  12.4  13.3    Hematocrit 40.9  38.1  40.9    MCV 91.0  90.5  88.7    MCH 29.4  29.4  28.9    MCHC 32.3  32.5  32.5    RDW 13.9  13.7  12.7    Platelets 125  99  173    Neutrophil Rel % 85.1  84.4     Lymphocyte Rel % 6.1  6.5     Monocyte Rel % 8.5  8.9     Eosinophil Rel % 0.0  0.1     Basophil Rel % 0.3  0.1       Lipid Panel          2/13/2024    08:49 2/23/2024    11:10 6/7/2024    09:15   Lipid Panel   Total Cholesterol 86  137  128    Triglycerides 50  77  79    HDL Cholesterol 40  37  34    VLDL Cholesterol 13  15  16    LDL Cholesterol  33  85  78    LDL/HDL Ratio 0.90  2.29  2.30      TSH          2/23/2024    11:10   TSH   TSH 1.930      Most Recent A1C          6/7/2024    09:15   HGBA1C Most Recent   Hemoglobin A1C 6.90      Microalbumin          2/23/2024    11:10   Microalbumin   Microalbumin, Urine 9.1      UA          2/12/2024    12:15 4/15/2024    09:03 6/7/2024    09:15   Urinalysis   Squamous Epithelial Cells, UA 0-2      Specific Gravity, UA 1.040  >=1.030  1.018    Ketones, UA Trace  Negative  Negative    Blood, UA Trace  Negative  Negative    Leukocytes, UA Trace  Negative  Negative    Nitrite, UA Negative  Negative  Negative    RBC, UA 0-2      WBC, UA 0-2      Bacteria, UA None Seen                       Assessment and Plan     Diagnoses and all orders for this visit:    1. Type 2 diabetes mellitus with hyperglycemia, without long-term current use of insulin  (Primary)  Assessment & Plan:  Diabetes is improving with treatment.   Increase metformin to 1000 mg twice daily.  Medication changes per orders.  Recommended a Mediterranean style of eating  Regular aerobic exercise.  Diabetes will be reassessed in 3 months    Orders:  -     metFORMIN (GLUCOPHAGE) 1000 MG tablet; Take 1 tablet by mouth 2 (Two) Times a Day With Meals.  Dispense: 180 tablet; Refill: 1  -     Comprehensive Metabolic Panel; Future  -     Lipid Panel; Future  -     Hemoglobin A1c; Future  -     Urinalysis without microscopic (no culture) - Urine, Clean Catch; Future  -     MicroAlbumin, Urine, Random - Urine, Clean Catch; Future    2. Benign essential hypertension  Assessment & Plan:  Continue amlodipine 10 mg daily.  Continue losartan 25 mg daily.  Increase to 2 tabs daily if home blood pressure readings consistently greater than 140/90.  Weight loss encouraged.  Education reiterated.  Follow-up in 3 months.    Orders:  -     Comprehensive Metabolic Panel; Future  -     Urinalysis without microscopic (no culture) - Urine, Clean Catch; Future  -     MicroAlbumin, Urine, Random - Urine, Clean Catch; Future    3. Antithrombin III deficiency  Assessment & Plan:  Continue Xarelto 20 mg daily.  Follow-up in 3 months.    Orders:  -     CBC (No Diff); Future    4. Mixed hyperlipidemia  Assessment & Plan:   Lipid abnormalities are stable    Plan:  Continue same medication/s without change.    Continue atorvastatin 10 mg daily.    Discussed medication dosage, use, side effects, and goals of treatment in detail.    Counseled patient on lifestyle modifications to help control hyperlipidemia.   Weight Loss encouraged    Patient Treatment Goals:   LDL goal is less than 70    Followup in 3 months.    Orders:  -     Lipid Panel; Future    5. Ventral incisional hernia  Comments:  Discussed general surgery referral.  Not interested at this time.             Follow Up     Return in about 3 months (around 9/14/2024) for  follow up DMII/HTN/Med refill .  Patient was given instructions and counseling regarding his condition or for health maintenance advice. Please see specific information pulled into the AVS if appropriate.

## 2024-06-14 NOTE — ASSESSMENT & PLAN NOTE
Diabetes is improving with treatment.   Increase metformin to 1000 mg twice daily.  Medication changes per orders.  Recommended a Mediterranean style of eating  Regular aerobic exercise.  Diabetes will be reassessed in 3 months

## 2024-07-01 ENCOUNTER — TELEPHONE (OUTPATIENT)
Dept: FAMILY MEDICINE CLINIC | Facility: CLINIC | Age: 79
End: 2024-07-01
Payer: MEDICARE

## 2024-07-01 DIAGNOSIS — D68.59 ANTITHROMBIN III DEFICIENCY: ICD-10-CM

## 2024-07-07 DIAGNOSIS — I10 BENIGN ESSENTIAL HYPERTENSION: ICD-10-CM

## 2024-07-08 RX ORDER — AMLODIPINE BESYLATE 10 MG/1
10 TABLET ORAL
Qty: 90 TABLET | Refills: 0 | Status: SHIPPED | OUTPATIENT
Start: 2024-07-08

## 2024-08-21 DIAGNOSIS — I10 BENIGN ESSENTIAL HYPERTENSION: ICD-10-CM

## 2024-08-21 RX ORDER — LOSARTAN POTASSIUM 25 MG/1
25 TABLET ORAL DAILY
Qty: 90 TABLET | Refills: 0 | Status: SHIPPED | OUTPATIENT
Start: 2024-08-21

## 2024-08-23 DIAGNOSIS — E78.2 MIXED HYPERLIPIDEMIA: ICD-10-CM

## 2024-08-24 RX ORDER — ATORVASTATIN CALCIUM 10 MG/1
10 TABLET, FILM COATED ORAL DAILY
Qty: 90 TABLET | Refills: 1 | Status: SHIPPED | OUTPATIENT
Start: 2024-08-24

## 2024-08-26 DIAGNOSIS — E11.65 TYPE 2 DIABETES MELLITUS WITH HYPERGLYCEMIA, WITHOUT LONG-TERM CURRENT USE OF INSULIN: ICD-10-CM

## 2024-09-17 DIAGNOSIS — E11.65 TYPE 2 DIABETES MELLITUS WITH HYPERGLYCEMIA, WITHOUT LONG-TERM CURRENT USE OF INSULIN: ICD-10-CM

## 2024-09-23 DIAGNOSIS — D68.59 ANTITHROMBIN III DEFICIENCY: ICD-10-CM

## 2024-09-23 RX ORDER — RIVAROXABAN 20 MG/1
20 TABLET, FILM COATED ORAL DAILY
Qty: 90 TABLET | Refills: 0 | Status: SHIPPED | OUTPATIENT
Start: 2024-09-23

## 2024-11-15 DIAGNOSIS — I10 BENIGN ESSENTIAL HYPERTENSION: ICD-10-CM

## 2024-11-15 RX ORDER — LOSARTAN POTASSIUM 25 MG/1
25 TABLET ORAL DAILY
Qty: 90 TABLET | Refills: 0 | Status: SHIPPED | OUTPATIENT
Start: 2024-11-15

## 2024-11-16 DIAGNOSIS — I10 BENIGN ESSENTIAL HYPERTENSION: ICD-10-CM

## 2024-11-18 RX ORDER — AMLODIPINE BESYLATE 10 MG/1
10 TABLET ORAL
Qty: 90 TABLET | Refills: 0 | Status: SHIPPED | OUTPATIENT
Start: 2024-11-18

## 2024-11-25 DIAGNOSIS — E11.65 TYPE 2 DIABETES MELLITUS WITH HYPERGLYCEMIA, WITHOUT LONG-TERM CURRENT USE OF INSULIN: ICD-10-CM

## 2024-12-03 ENCOUNTER — LAB (OUTPATIENT)
Dept: FAMILY MEDICINE CLINIC | Facility: CLINIC | Age: 79
End: 2024-12-03
Payer: MEDICARE

## 2024-12-03 ENCOUNTER — OFFICE VISIT (OUTPATIENT)
Dept: FAMILY MEDICINE CLINIC | Facility: CLINIC | Age: 79
End: 2024-12-03
Payer: MEDICARE

## 2024-12-03 VITALS
BODY MASS INDEX: 38.97 KG/M2 | WEIGHT: 287.7 LBS | HEART RATE: 73 BPM | DIASTOLIC BLOOD PRESSURE: 68 MMHG | HEIGHT: 72 IN | SYSTOLIC BLOOD PRESSURE: 126 MMHG | OXYGEN SATURATION: 93 % | RESPIRATION RATE: 18 BRPM | TEMPERATURE: 97.7 F

## 2024-12-03 DIAGNOSIS — R09.81 NASAL SINUS CONGESTION: ICD-10-CM

## 2024-12-03 DIAGNOSIS — Z13.6 ENCOUNTER FOR SCREENING FOR VASCULAR DISEASE: ICD-10-CM

## 2024-12-03 DIAGNOSIS — I10 BENIGN ESSENTIAL HYPERTENSION: ICD-10-CM

## 2024-12-03 DIAGNOSIS — D68.59 ANTITHROMBIN III DEFICIENCY: ICD-10-CM

## 2024-12-03 DIAGNOSIS — E11.65 TYPE 2 DIABETES MELLITUS WITH HYPERGLYCEMIA, WITHOUT LONG-TERM CURRENT USE OF INSULIN: ICD-10-CM

## 2024-12-03 DIAGNOSIS — E78.2 MIXED HYPERLIPIDEMIA: ICD-10-CM

## 2024-12-03 DIAGNOSIS — Z85.46 HISTORY OF PROSTATE CANCER: ICD-10-CM

## 2024-12-03 DIAGNOSIS — Z71.85 VACCINE COUNSELING: ICD-10-CM

## 2024-12-03 DIAGNOSIS — Z12.11 COLON CANCER SCREENING: ICD-10-CM

## 2024-12-03 DIAGNOSIS — Z00.00 MEDICARE ANNUAL WELLNESS VISIT, SUBSEQUENT: Primary | ICD-10-CM

## 2024-12-03 DIAGNOSIS — Z23 NEED FOR SHINGLES VACCINE: ICD-10-CM

## 2024-12-03 LAB
ALBUMIN SERPL-MCNC: 3.9 G/DL (ref 3.5–5.2)
ALBUMIN UR-MCNC: 2.1 MG/DL
ALBUMIN/GLOB SERPL: 1.1 G/DL
ALP SERPL-CCNC: 79 U/L (ref 39–117)
ALT SERPL W P-5'-P-CCNC: 12 U/L (ref 1–41)
ANION GAP SERPL CALCULATED.3IONS-SCNC: 11 MMOL/L (ref 5–15)
AST SERPL-CCNC: 18 U/L (ref 1–40)
BILIRUB SERPL-MCNC: 0.5 MG/DL (ref 0–1.2)
BILIRUB UR QL STRIP: NEGATIVE
BUN SERPL-MCNC: 13 MG/DL (ref 8–23)
BUN/CREAT SERPL: 13.7 (ref 7–25)
CALCIUM SPEC-SCNC: 9 MG/DL (ref 8.6–10.5)
CHLORIDE SERPL-SCNC: 104 MMOL/L (ref 98–107)
CHOLEST SERPL-MCNC: 136 MG/DL (ref 0–200)
CLARITY UR: CLEAR
CO2 SERPL-SCNC: 25 MMOL/L (ref 22–29)
COLOR UR: YELLOW
CREAT SERPL-MCNC: 0.95 MG/DL (ref 0.76–1.27)
DEPRECATED RDW RBC AUTO: 38.1 FL (ref 37–54)
EGFRCR SERPLBLD CKD-EPI 2021: 81.9 ML/MIN/1.73
ERYTHROCYTE [DISTWIDTH] IN BLOOD BY AUTOMATED COUNT: 11.9 % (ref 12.3–15.4)
GLOBULIN UR ELPH-MCNC: 3.5 GM/DL
GLUCOSE SERPL-MCNC: 136 MG/DL (ref 65–99)
GLUCOSE UR STRIP-MCNC: NEGATIVE MG/DL
HBA1C MFR BLD: 7.4 % (ref 4.8–5.6)
HCT VFR BLD AUTO: 41.6 % (ref 37.5–51)
HDLC SERPL-MCNC: 35 MG/DL (ref 40–60)
HGB BLD-MCNC: 13.7 G/DL (ref 13–17.7)
HGB UR QL STRIP.AUTO: NEGATIVE
KETONES UR QL STRIP: NEGATIVE
LDLC SERPL CALC-MCNC: 82 MG/DL (ref 0–100)
LDLC/HDLC SERPL: 2.32 {RATIO}
LEUKOCYTE ESTERASE UR QL STRIP.AUTO: NEGATIVE
MCH RBC QN AUTO: 29 PG (ref 26.6–33)
MCHC RBC AUTO-ENTMCNC: 32.9 G/DL (ref 31.5–35.7)
MCV RBC AUTO: 87.9 FL (ref 79–97)
NITRITE UR QL STRIP: NEGATIVE
PH UR STRIP.AUTO: 5.5 [PH] (ref 5–8)
PLATELET # BLD AUTO: 176 10*3/MM3 (ref 140–450)
PMV BLD AUTO: 13.1 FL (ref 6–12)
POTASSIUM SERPL-SCNC: 4.6 MMOL/L (ref 3.5–5.2)
PROT SERPL-MCNC: 7.4 G/DL (ref 6–8.5)
PROT UR QL STRIP: ABNORMAL
RBC # BLD AUTO: 4.73 10*6/MM3 (ref 4.14–5.8)
SODIUM SERPL-SCNC: 140 MMOL/L (ref 136–145)
SP GR UR STRIP: 1.03 (ref 1–1.03)
TRIGL SERPL-MCNC: 99 MG/DL (ref 0–150)
UROBILINOGEN UR QL STRIP: ABNORMAL
VLDLC SERPL-MCNC: 19 MG/DL (ref 5–40)
WBC NRBC COR # BLD AUTO: 8.44 10*3/MM3 (ref 3.4–10.8)

## 2024-12-03 PROCEDURE — 3074F SYST BP LT 130 MM HG: CPT | Performed by: NURSE PRACTITIONER

## 2024-12-03 PROCEDURE — 85027 COMPLETE CBC AUTOMATED: CPT | Performed by: NURSE PRACTITIONER

## 2024-12-03 PROCEDURE — 80053 COMPREHEN METABOLIC PANEL: CPT | Performed by: NURSE PRACTITIONER

## 2024-12-03 PROCEDURE — 1160F RVW MEDS BY RX/DR IN RCRD: CPT | Performed by: NURSE PRACTITIONER

## 2024-12-03 PROCEDURE — 81003 URINALYSIS AUTO W/O SCOPE: CPT | Performed by: NURSE PRACTITIONER

## 2024-12-03 PROCEDURE — G0439 PPPS, SUBSEQ VISIT: HCPCS | Performed by: NURSE PRACTITIONER

## 2024-12-03 PROCEDURE — 82043 UR ALBUMIN QUANTITATIVE: CPT | Performed by: NURSE PRACTITIONER

## 2024-12-03 PROCEDURE — 3078F DIAST BP <80 MM HG: CPT | Performed by: NURSE PRACTITIONER

## 2024-12-03 PROCEDURE — 83036 HEMOGLOBIN GLYCOSYLATED A1C: CPT | Performed by: NURSE PRACTITIONER

## 2024-12-03 PROCEDURE — 1170F FXNL STATUS ASSESSED: CPT | Performed by: NURSE PRACTITIONER

## 2024-12-03 PROCEDURE — 1126F AMNT PAIN NOTED NONE PRSNT: CPT | Performed by: NURSE PRACTITIONER

## 2024-12-03 PROCEDURE — 80061 LIPID PANEL: CPT | Performed by: NURSE PRACTITIONER

## 2024-12-03 PROCEDURE — 1159F MED LIST DOCD IN RCRD: CPT | Performed by: NURSE PRACTITIONER

## 2024-12-03 PROCEDURE — 99214 OFFICE O/P EST MOD 30 MIN: CPT | Performed by: NURSE PRACTITIONER

## 2024-12-03 RX ORDER — ZOSTER VACCINE RECOMBINANT, ADJUVANTED 50 MCG/0.5
0.5 KIT INTRAMUSCULAR ONCE
Qty: 1 EACH | Refills: 0 | Status: SHIPPED | OUTPATIENT
Start: 2024-12-03 | End: 2024-12-03

## 2024-12-03 NOTE — PATIENT INSTRUCTIONS
Please call [facility] scheduling department @ [phone] for appointment.      Flonase (fluticasone) OTC daily for congestion.

## 2024-12-03 NOTE — PROGRESS NOTES
Subjective   The ABCs of the Annual Wellness Visit  Medicare Wellness Visit      Sumit Jules is a 79 y.o. patient who presents for a Medicare Wellness Visit.    The following portions of the patient's history were reviewed and   updated as appropriate: allergies, current medications, past family history, past medical history, past social history, past surgical history, and problem list.    Compared to one year ago, the patient's physical   health is the same.  Compared to one year ago, the patient's mental   health is the same.    Recent Hospitalizations:  This patient has had a [facility] admission record on file within the last 365 days.  Current Medical Providers:  Patient Care Team:  Carol Rios APRN as PCP - General (Nurse Practitioner)  Enedelia Riley MD as Consulting Physician (Dermatology)  Gifty Ribera MD as Consulting Physician (Gastroenterology)  Chaitanya Greenfield MD as Consulting Physician (Urology)    Outpatient Medications Prior to Visit   Medication Sig Dispense Refill    amLODIPine (NORVASC) 10 MG tablet TAKE 1 TABLET BY MOUTH EVERY DAY AT NIGHT 90 tablet 0    atorvastatin (LIPITOR) 10 MG tablet TAKE 1 TABLET BY MOUTH EVERY DAY 90 tablet 1    losartan (COZAAR) 25 MG tablet TAKE 1 TABLET BY MOUTH EVERY DAY 90 tablet 0    metFORMIN (GLUCOPHAGE) 500 MG tablet TAKE 1 TABLET BY MOUTH TWICE A DAY WITH FOOD 180 tablet 0    tamsulosin (FLOMAX) 0.4 MG capsule 24 hr capsule Take 1 capsule by mouth Daily. 90 capsule 0    Xarelto 20 MG tablet TAKE 1 TABLET BY MOUTH EVERY DAY 90 tablet 0    metFORMIN (GLUCOPHAGE) 1000 MG tablet TAKE 1 TABLET BY MOUTH TWICE A DAY WITH MEALS 180 tablet 1     No facility-administered medications prior to visit.     No opioid medication identified on active medication list. I have reviewed chart for other potential  high risk medication/s and harmful drug interactions in the elderly.      Aspirin is not on active medication list.  Aspirin use is not indicated  "based on review of current medical condition/s. Risk of harm outweighs potential benefits.  .    Patient Active Problem List   Diagnosis    Antithrombin III deficiency    Atherosclerosis of aorta    Prostate cancer    Benign essential hypertension    Chronic coronary artery disease    Coagulation defect    Hyperlipidemia    Essential (primary) hypertension    Pancreatic lesion    Long term current use of anticoagulant    Asymptomatic varicose veins of lower extremity    Abnormal CT scan, gastrointestinal tract    Benign prostatic hyperplasia without lower urinary tract symptoms    Type 2 diabetes mellitus with hyperglycemia, without long-term current use of insulin    Ventral incisional hernia     Advance Care Planning Advance Directive is not on file.  ACP discussion was held with the patient during this visit. Patient does not have an advance directive, information provided.            Objective   Vitals:    12/03/24 1108   BP: 126/68   BP Location: Left arm   Patient Position: Sitting   Cuff Size: Large Adult   Pulse: 73   Resp: 18   Temp: 97.7 °F (36.5 °C)   TempSrc: Temporal   SpO2: 93%   Weight: 130 kg (287 lb 11.2 oz)   Height: 182.9 cm (72\")   PainSc: 0-No pain       Estimated body mass index is 39.02 kg/m² as calculated from the following:    Height as of this encounter: 182.9 cm (72\").    Weight as of this encounter: 130 kg (287 lb 11.2 oz).            Does the patient have evidence of cognitive impairment? No                                                                                                Health  Risk Assessment    Smoking Status:  Social History     Tobacco Use   Smoking Status Never    Passive exposure: Never   Smokeless Tobacco Never     Alcohol Consumption:  Social History     Substance and Sexual Activity   Alcohol Use No       Fall Risk Screen  STEADI Fall Risk Assessment was completed, and patient is at LOW risk for falls.Assessment completed on:12/3/2024    Depression Screening "   Little interest or pleasure in doing things? Not at all   Feeling down, depressed, or hopeless? Not at all   PHQ-2 Total Score 0      Health Habits and Functional and Cognitive Screenin/3/2024    10:00 AM   Functional & Cognitive Status   Do you have difficulty preparing food and eating? No   Do you have difficulty bathing yourself, getting dressed or grooming yourself? No   Do you have difficulty using the toilet? No   Do you have difficulty moving around from place to place? No   Do you have trouble with steps or getting out of a bed or a chair? No   Current Diet Unhealthy Diet   Dental Exam Up to date   Eye Exam Up to date   Exercise (times per week) 0 times per week   Current Exercises Include No Regular Exercise   Do you need help using the phone?  No   Are you deaf or do you have serious difficulty hearing?  No   Do you need help to go to places out of walking distance? No   Do you need help shopping? No   Do you need help preparing meals?  No   Do you need help with housework?  No   Do you need help with laundry? No   Do you need help taking your medications? No   Do you need help managing money? No   Do you ever drive or ride in a car without wearing a seat belt? No   Have you felt unusual stress, anger or loneliness in the last month? No   Who do you live with? Spouse   If you need help, do you have trouble finding someone available to you? No   Have you been bothered in the last four weeks by sexual problems? No   Do you have difficulty concentrating, remembering or making decisions? No         ATTENTION  What is the year: correct  What is the month of the year: correct  What is the day of the week?: correct  What is the date?: correct  MEMORY  Repeat address three times, only score third attempt: [address], [place]: 7  HOW MANY ANIMALS DID THE PATIENT NAME  Verbal Fluency -- Animal Names (0-25): 9-10  CLOCK DRAWING  Clock Drawing: All Correct  MEMORY RECALL  Tell  me what you remember about that name and address we were repeating at the beginnin  ACE TOTAL SCORE  Total ACE Score - <25/30 strongly suggests cognitive impairment; <21/30 almost certainly shows dementia: 21           Age-appropriate Screening Schedule:  Refer to the list below for future screening recommendations based on patient's age, sex and/or medical conditions. Orders for these recommended tests are listed in the plan section. The patient has been provided with a written plan.    Health Maintenance List  Health Maintenance   Topic Date Due    COLORECTAL CANCER SCREENING  10/15/2023    HEMOGLOBIN A1C  2024    ZOSTER VACCINE (2 of 2) 2024 (Originally 2017)    COVID-19 Vaccine (2 - - season) 2025 (Originally 2024)    RSV Vaccine - Adults (1 - 1-dose 75+ series) 2025 (Originally 2021)    BMI FOLLOWUP  2025    LIPID PANEL  2025    DIABETIC EYE EXAM  10/22/2025    ANNUAL WELLNESS VISIT  2025    INFLUENZA VACCINE  Completed    Pneumococcal Vaccine 65+  Completed    HEPATITIS C SCREENING  Discontinued    URINE MICROALBUMIN  Discontinued    TDAP/TD VACCINES  Discontinued                                                                                                                                                CMS Preventative Services Quick Reference  Risk Factors Identified During Encounter  Immunizations Discussed/Encouraged: Shingrix, COVID19, and RSV (Respiratory Syncytial Virus)      The above risks/problems have been discussed with the patient.  Pertinent information has been shared with the patient in the After Visit Summary.  An After Visit Summary and PPPS were made available to the patient.    Follow Up:   Next Medicare Wellness visit to be scheduled in 1 year.         Additional E&M Note during same encounter follows:  Patient has additional, significant, and separately identifiable condition(s)/problem(s) that require work above and beyond  the Medicare Wellness Visit     Chief Complaint  Medicare Wellness-Initial Visit    Subjective   HPI  Sumit is also being seen today for additional medical problem/s.    Diabetes type 2: Status is stable.  Current medication includes metformin 500 milligrams twice daily; 1000 mg caused too many loose stools. Eye exam completed approximately 2 months ago.  Negative for polyphagia, polyuria, polydipsia, nausea, vomiting, abdominal pain, slow healing wounds or sores.     Hypertension: Status is controlled.  Current medication includes amlodipine 10 mg daily, losartan 25 mg daily.  Negative for headache, chest pain, dizziness, shortness of air, diaphoresis, palpitations, visual disturbance, and claudication.    Antithrombin III: Current medication includes Xarelto 20 mg daily.  No side effects. There was previous transition from Coumadin.    Hyperlipidemia: Current medication includes atorvastatin 10 mg daily (reported not taking in 2 months).  Negative for myalgia and leg pain.    BPH: Status is stable.  Current medication includes Flomax 0.4 mg daily. Stopped for 5 days- did not go well. Restarted and doing fine now.  There is some associated daytime frequency, otherwise no urinary symptoms.    History Prostate cancer: Follow-up urology/Dr. Greenfield annually.     Pancreatic lesion- Endoscopy completed May 3, 2024 per Dr. Ribera.  Reported no follow-up needed.     Colonoscopy screening?  Needs, currently overdue.    Vaccines needed-COVID, flu, zoster #2, RSV      Review of Systems   Constitutional:  Negative for activity change, chills, diaphoresis, fatigue and fever.   HENT:  Positive for congestion and sinus pressure. Negative for ear pain, hearing loss, postnasal drip, sore throat, tinnitus and trouble swallowing.    Eyes:  Negative for visual disturbance.   Respiratory:  Negative for cough, chest tightness, shortness of breath and wheezing.    Cardiovascular:  Negative for chest pain, palpitations and leg swelling.  "       Wearing compression hose for history of venous insufficiency.   Gastrointestinal:  Negative for abdominal pain, blood in stool, constipation, diarrhea, nausea and vomiting.   Genitourinary:  Positive for frequency. Negative for decreased urine volume, difficulty urinating, dysuria and urgency.   Musculoskeletal:  Positive for arthralgias. Negative for back pain, myalgias and neck pain.   Skin:  Negative for rash and wound.   Neurological:  Negative for dizziness, tremors, weakness, light-headedness, numbness and confusion.   Psychiatric/Behavioral:  Negative for decreased concentration and sleep disturbance. The patient is not nervous/anxious.           Objective   Vital Signs:  /68 (BP Location: Left arm, Patient Position: Sitting, Cuff Size: Large Adult)   Pulse 73   Temp 97.7 °F (36.5 °C) (Temporal)   Resp 18   Ht 182.9 cm (72\")   Wt 130 kg (287 lb 11.2 oz)   SpO2 93%   BMI 39.02 kg/m²     Physical Exam  Vitals and nursing note reviewed.   Constitutional:       General: He is not in acute distress.     Appearance: He is well-developed.      Comments: Pleasant, converses appropriately     HENT:      Head: Normocephalic and atraumatic.      Right Ear: Hearing, tympanic membrane, ear canal and external ear normal.      Left Ear: Hearing, tympanic membrane, ear canal and external ear normal.      Nose: Nose normal.      Mouth/Throat:      Lips: Pink.      Mouth: Mucous membranes are moist.      Pharynx: Oropharynx is clear.   Eyes:      Conjunctiva/sclera: Conjunctivae normal.      Pupils: Pupils are equal, round, and reactive to light.   Neck:      Thyroid: No thyromegaly.   Cardiovascular:      Rate and Rhythm: Normal rate and regular rhythm.      Pulses: Normal pulses.      Heart sounds: S1 normal and S2 normal.      Comments: Bilateral lower extremity trace edema.  Pulmonary:      Effort: Pulmonary effort is normal.      Breath sounds: Normal breath sounds.   Abdominal:      General: Bowel " sounds are normal.      Palpations: Abdomen is soft.      Tenderness: There is no abdominal tenderness.      Hernia: A hernia is present.      Comments: Reducible abdominal wall hernia   Musculoskeletal:         General: Normal range of motion.      Cervical back: Normal range of motion and neck supple.      Comments: Bilateral upper and lower extremity strength normal.   Lymphadenopathy:      Cervical: No cervical adenopathy.      Upper Body:      Right upper body: No supraclavicular adenopathy.      Left upper body: No supraclavicular adenopathy.   Skin:     General: Skin is warm and dry.      Findings: No rash.   Neurological:      General: No focal deficit present.      Mental Status: He is alert and oriented to person, place, and time.      Cranial Nerves: Cranial nerves 2-12 are intact.      Motor: Motor function is intact.      Gait: Gait is intact.   Psychiatric:         Attention and Perception: Attention normal.         Mood and Affect: Mood and affect normal.         Speech: Speech normal.         Behavior: Behavior normal.         Thought Content: Thought content normal.         Cognition and Memory: Memory normal.         Judgment: Judgment normal.      Comments: Dressed appropriately.          The following data was reviewed by: JARROD Mix on 12/03/2024:    CMP          2/23/2024    11:10 4/15/2024    09:03 6/7/2024    09:15   CMP   Glucose 165  134  130    BUN 12  12  15    Creatinine 0.93  0.96  1.14    EGFR 84.6  81.4  66.2    Sodium 140  140  140    Potassium 4.2  4.5  4.3    Chloride 103  105  104    Calcium 9.0  9.0  9.1    Total Protein 7.5  7.1  7.3    Albumin 3.9  3.8  3.8    Globulin 3.6  3.3  3.5    Total Bilirubin 0.8  0.7  0.4    Alkaline Phosphatase 68  74  70    AST (SGOT) 13  22  18    ALT (SGPT) 13  14  9    Albumin/Globulin Ratio 1.1  1.2  1.1    BUN/Creatinine Ratio 12.9  12.5  13.2    Anion Gap 13.7  9.6  11.5      CBC w/diff          2/12/2024    09:44 2/13/2024     08:49 2/23/2024    11:10   CBC w/Diff   WBC 16.60  13.00  8.41    RBC 4.49  4.21  4.61    Hemoglobin 13.2  12.4  13.3    Hematocrit 40.9  38.1  40.9    MCV 91.0  90.5  88.7    MCH 29.4  29.4  28.9    MCHC 32.3  32.5  32.5    RDW 13.9  13.7  12.7    Platelets 125  99  173    Neutrophil Rel % 85.1  84.4     Lymphocyte Rel % 6.1  6.5     Monocyte Rel % 8.5  8.9     Eosinophil Rel % 0.0  0.1     Basophil Rel % 0.3  0.1       Lipid Panel          2/13/2024    08:49 2/23/2024    11:10 6/7/2024    09:15   Lipid Panel   Total Cholesterol 86  137  128    Triglycerides 50  77  79    HDL Cholesterol 40  37  34    VLDL Cholesterol 13  15  16    LDL Cholesterol  33  85  78    LDL/HDL Ratio 0.90  2.29  2.30      TSH          2/23/2024    11:10   TSH   TSH 1.930      Most Recent A1C          6/7/2024    09:15   HGBA1C Most Recent   Hemoglobin A1C 6.90      Microalbumin          2/23/2024    11:10   Microalbumin   Microalbumin, Urine 9.1      UA          2/12/2024    12:15 4/15/2024    09:03 6/7/2024    09:15   Urinalysis   Squamous Epithelial Cells, UA 0-2      Specific Gravity, UA 1.040  >=1.030  1.018    Ketones, UA Trace  Negative  Negative    Blood, UA Trace  Negative  Negative    Leukocytes, UA Trace  Negative  Negative    Nitrite, UA Negative  Negative  Negative    RBC, UA 0-2      WBC, UA 0-2      Bacteria, UA None Seen                    Assessment and Plan Additional age appropriate preventative wellness advice topics were discussed during today's preventative wellness exam(some topics already addressed during AWV portion of the note above):    Physical Activity: Advised cardiovascular activity 150 minutes per week as tolerated. (example brisk walk for 30 minutes, 5 days a week).     Nutrition: Discussed nutrition plan with patient. Information shared in after visit summary. Goal is for a well balanced diet to enhance overall health.     Healthy Weight: Discussed current and goal BMI with patient. Steps to attain this  goal discussed. Information shared in after visit summary.           Medicare annual wellness visit, subsequent  Labs today ordered in June 2024.     Discussed injury prevention, diet and exercise, safe sexual practices, and screening for common diseases. Encouraged use of sunscreen and seatbelts. Discussed timing of colon cancer cancer screening, prostate cancer screening, and review of skin for lesions. Avoidance of tobacco encouraged. Limitation or avoidance of alcohol encouraged. Recommend yearly dental and eye exams. Also discussed monitoring of blood pressure and lipids.    Type 2 diabetes mellitus with hyperglycemia, without long-term current use of insulin  Continue metformin 500 mg twice daily.  Discussed adding GLP-1 vs SGLT2 following completion of labs. There is no known personal or family history of medullary thyroid cancer. There is positive history of pancreatitis (discussed need to proceed with caution if medication is started).   Follow-up in 3 months.             Benign essential hypertension  Complete labs today ordered in June.  Continue amlodipine 10 mg daily.  Continue losartan 25 mg daily.  Discussed need for weight loss.  Blood pressure check next visit.         Mixed hyperlipidemia  Check lipid panel today.  Will adjust atorvastatin 10 mg daily if not indicated.  Education reiterated.    Orders:    CT Cardiac Calcium Score Without Dye; Future    Vascular Screening (Bundle) CAR; Future    Antithrombin III deficiency  CBC today.  Continue Xarelto 20 mg daily.  Follow-up 6 months.       History of prostate cancer  Follow-up with urology as directed.       Vaccine counseling  Discussed recommended vaccines.       Colon cancer screening  Discussed Cologuard versus colonoscopy.  Agreeable to Cologuard.  Orders:    Cologuard - Stool, Per Rectum; Future    Encounter for screening for vascular disease    Orders:    CT Cardiac Calcium Score Without Dye; Future    Vascular Screening (Bundle) CAR;  Future    Need for shingles vaccine  Needs shingles #2 vaccine.  Orders:    Zoster Vac Recomb Adjuvanted (Shingrix) 50 MCG/0.5ML reconstituted suspension; Inject 0.5 mL into the appropriate muscle as directed by prescriber 1 (One) Time for 1 dose.    Nasal sinus congestion  Advised starting Flonase daily over-the-counter.               Follow Up   Return in about 3 months (around 3/3/2025).  Patient was given instructions and counseling regarding his condition or for health maintenance advice. Please see specific information pulled into the AVS if appropriate.

## 2024-12-03 NOTE — ASSESSMENT & PLAN NOTE
Check lipid panel today.  Will adjust atorvastatin 10 mg daily if not indicated.  Education reiterated.    Orders:    CT Cardiac Calcium Score Without Dye; Future    Vascular Screening (Bundle) CAR; Future

## 2024-12-03 NOTE — ASSESSMENT & PLAN NOTE
Continue metformin 500 mg twice daily.  Discussed adding GLP-1 vs SGLT2 following completion of labs. There is no known personal or family history of medullary thyroid cancer. There is positive history of pancreatitis (discussed need to proceed with caution if medication is started).   Follow-up in 3 months.

## 2024-12-03 NOTE — ASSESSMENT & PLAN NOTE
Complete labs today ordered in June.  Continue amlodipine 10 mg daily.  Continue losartan 25 mg daily.  Discussed need for weight loss.  Blood pressure check next visit.

## 2024-12-04 DIAGNOSIS — E11.65 TYPE 2 DIABETES MELLITUS WITH HYPERGLYCEMIA, WITHOUT LONG-TERM CURRENT USE OF INSULIN: Primary | ICD-10-CM

## 2024-12-09 DIAGNOSIS — D68.59 ANTITHROMBIN III DEFICIENCY: ICD-10-CM

## 2024-12-09 RX ORDER — RIVAROXABAN 20 MG/1
20 TABLET, FILM COATED ORAL DAILY
Qty: 90 TABLET | Refills: 0 | Status: SHIPPED | OUTPATIENT
Start: 2024-12-09

## 2024-12-13 ENCOUNTER — TELEPHONE (OUTPATIENT)
Dept: FAMILY MEDICINE CLINIC | Facility: CLINIC | Age: 79
End: 2024-12-13

## 2024-12-13 DIAGNOSIS — E11.65 TYPE 2 DIABETES MELLITUS WITH HYPERGLYCEMIA, WITHOUT LONG-TERM CURRENT USE OF INSULIN: Primary | ICD-10-CM

## 2024-12-13 RX ORDER — DAPAGLIFLOZIN 5 MG/1
5 TABLET, FILM COATED ORAL DAILY
Qty: 90 TABLET | Refills: 0 | Status: SHIPPED | OUTPATIENT
Start: 2024-12-13

## 2024-12-13 NOTE — TELEPHONE ENCOUNTER
Caller: Sumit Jules    Relationship: Self    Best call back number: [phone]     What medication are you requesting: SOMETHING TO REPLACE THE JARDIANCE 10 MG . IT COST OVER $360 FOR 90 DAY SUPPLY WANTING TO KNOW IF THERE IS A MEDICATION THAT WILL BE COVERED BY INSURANCE HE CAN SWITCH TO PLEASE CALL AND ADVISE     If a prescription is needed, what is your preferred pharmacy and phone number: [id number] - [place], [place] - [address] - [phone]  - [phone] FX     Additional notes:

## 2024-12-31 ENCOUNTER — OFFICE VISIT (OUTPATIENT)
Dept: FAMILY MEDICINE CLINIC | Facility: CLINIC | Age: 79
End: 2024-12-31
Payer: MEDICARE

## 2024-12-31 VITALS
BODY MASS INDEX: 39.18 KG/M2 | OXYGEN SATURATION: 98 % | DIASTOLIC BLOOD PRESSURE: 68 MMHG | RESPIRATION RATE: 18 BRPM | TEMPERATURE: 96.8 F | SYSTOLIC BLOOD PRESSURE: 118 MMHG | WEIGHT: 289.3 LBS | HEIGHT: 72 IN | HEART RATE: 78 BPM

## 2024-12-31 DIAGNOSIS — E78.5 HYPERLIPIDEMIA, UNSPECIFIED HYPERLIPIDEMIA TYPE: ICD-10-CM

## 2024-12-31 DIAGNOSIS — R29.898 WEAKNESS OF BOTH LEGS: Primary | ICD-10-CM

## 2024-12-31 DIAGNOSIS — E11.65 TYPE 2 DIABETES MELLITUS WITH HYPERGLYCEMIA, WITHOUT LONG-TERM CURRENT USE OF INSULIN: ICD-10-CM

## 2024-12-31 PROCEDURE — 1126F AMNT PAIN NOTED NONE PRSNT: CPT | Performed by: NURSE PRACTITIONER

## 2024-12-31 PROCEDURE — 3074F SYST BP LT 130 MM HG: CPT | Performed by: NURSE PRACTITIONER

## 2024-12-31 PROCEDURE — 99214 OFFICE O/P EST MOD 30 MIN: CPT | Performed by: NURSE PRACTITIONER

## 2024-12-31 PROCEDURE — 1160F RVW MEDS BY RX/DR IN RCRD: CPT | Performed by: NURSE PRACTITIONER

## 2024-12-31 PROCEDURE — 1159F MED LIST DOCD IN RCRD: CPT | Performed by: NURSE PRACTITIONER

## 2024-12-31 PROCEDURE — 3078F DIAST BP <80 MM HG: CPT | Performed by: NURSE PRACTITIONER

## 2024-12-31 NOTE — ASSESSMENT & PLAN NOTE
Continue metformin 500 mg twice daily.  Start Farxiga 5 mg tablet daily; samples provided at visit today.  Follow-up in 3 months.

## 2024-12-31 NOTE — PROGRESS NOTES
"Chief Complaint  Extremity Weakness (Sometimes feels like he can't get up. Its just the getting up part. )    Subjective        Sumit Jules presents to [facility]  History of Present Illness    Patient presents today for lower extremity weakness. Also did not receive scrip for Farxiga.       Weakness:  Onset of symptoms began 3-4 weeks ago. Severity of symptoms are mild. Status is fluctuating.  Frequency is intermittent. Location/side of weakness is: both legs. Negative for upper body weakness. Activities affected include arising from a chair.  No problem with climbing steps, combing hair, holding up head, lifting, picking up objects, pushing, sitting, standing, swallowing, or walking. Aggravating factors include none. Relieving factors include: none.   Associated symptoms include weight gain.  Pertinent negatives include back pain, abdominal pain, apnea, ataxia, bladder incontinence, bladder retention, bowel incontinence, chest pain, confusion, constipation, diplopia, dizziness, dysarthria, dysphasia, dyspnea, falling, fever, gagging, generalized pain, headache, involuntary movements, lightheadedness, nausea, numbness, tinnitus, vertigo, visual disturbance. Use of statins- yes.   Use of corticosteroids- no.  Hx of cancer- yes.        Objective   Vital Signs:  /68 (BP Location: Left arm, Patient Position: Sitting, Cuff Size: Adult)   Pulse 78   Temp 96.8 °F (36 °C) (Temporal)   Resp 18   Ht 182.9 cm (72\")   Wt 131 kg (289 lb 4.8 oz)   SpO2 98%   BMI 39.24 kg/m²   Estimated body mass index is 39.24 kg/m² as calculated from the following:    Height as of this encounter: 182.9 cm (72\").    Weight as of this encounter: 131 kg (289 lb 4.8 oz).            Physical Exam  Constitutional:       General: He is not in acute distress.     Appearance: He is obese.   Cardiovascular:      Rate and Rhythm: Normal rate and regular rhythm.      Heart sounds: S1 normal and S2 normal. "   Pulmonary:      Effort: Pulmonary effort is normal.      Breath sounds: Normal breath sounds and air entry.   Musculoskeletal:      Right lower leg: No edema.      Left lower leg: No edema.   Skin:     General: Skin is warm and dry.   Neurological:      General: No focal deficit present.      Mental Status: He is alert and oriented to person, place, and time.      Sensory: Sensation is intact.      Motor: Motor function is intact.      Gait: Gait is intact.   Psychiatric:         Mood and Affect: Mood normal.         Thought Content: Thought content normal.         Judgment: Judgment normal.        Result Review :    CMP          4/15/2024    09:03 6/7/2024    09:15 12/3/2024    12:01   CMP   Glucose 134  130  136    BUN 12  15  13    Creatinine 0.96  1.14  0.95    EGFR 81.4  66.2  81.9    Sodium 140  140  140    Potassium 4.5  4.3  4.6    Chloride 105  104  104    Calcium 9.0  9.1  9.0    Total Protein 7.1  7.3  7.4    Albumin 3.8  3.8  3.9    Globulin 3.3  3.5  3.5    Total Bilirubin 0.7  0.4  0.5    Alkaline Phosphatase 74  70  79    AST (SGOT) 22  18  18    ALT (SGPT) 14  9  12    Albumin/Globulin Ratio 1.2  1.1  1.1    BUN/Creatinine Ratio 12.5  13.2  13.7    Anion Gap 9.6  11.5  11.0      CBC          2/13/2024    08:49 2/23/2024    11:10 12/3/2024    12:01   CBC   WBC 13.00  8.41  8.44    RBC 4.21  4.61  4.73    Hemoglobin 12.4  13.3  13.7    Hematocrit 38.1  40.9  41.6    MCV 90.5  88.7  87.9    MCH 29.4  28.9  29.0    MCHC 32.5  32.5  32.9    RDW 13.7  12.7  11.9    Platelets 99  173  176      CBC w/diff          2/13/2024    08:49 2/23/2024    11:10 12/3/2024    12:01   CBC w/Diff   WBC 13.00  8.41  8.44    RBC 4.21  4.61  4.73    Hemoglobin 12.4  13.3  13.7    Hematocrit 38.1  40.9  41.6    MCV 90.5  88.7  87.9    MCH 29.4  28.9  29.0    MCHC 32.5  32.5  32.9    RDW 13.7  12.7  11.9    Platelets 99  173  176    Neutrophil Rel % 84.4      Lymphocyte Rel % 6.5      Monocyte Rel % 8.9      Eosinophil Rel %  0.1      Basophil Rel % 0.1        Lipid Panel          2/23/2024    11:10 6/7/2024    09:15 12/3/2024    12:01   Lipid Panel   Total Cholesterol 137  128  136    Triglycerides 77  79  99    HDL Cholesterol 37  34  35    VLDL Cholesterol 15  16  19    LDL Cholesterol  85  78  82    LDL/HDL Ratio 2.29  2.30  2.32      TSH          2/23/2024    11:10   TSH   TSH 1.930      BMP          4/15/2024    09:03 6/7/2024    09:15 12/3/2024    12:01   BMP   BUN 12  15  13    Creatinine 0.96  1.14  0.95    Sodium 140  140  140    Potassium 4.5  4.3  4.6    Chloride 105  104  104    CO2 25.4  24.5  25.0    Calcium 9.0  9.1  9.0      A1C Last 3 Results          4/15/2024    09:03 6/7/2024    09:15 12/3/2024    12:01   HGBA1C Last 3 Results   Hemoglobin A1C 7.40  6.90  7.40      Microalbumin          2/23/2024    11:10 12/3/2024    12:01   Microalbumin   Microalbumin, Urine 9.1  2.1      UA          4/15/2024    09:03 6/7/2024    09:15 12/3/2024    12:01   Urinalysis   Specific Gravity, UA >=1.030  1.018  1.026    Ketones, UA Negative  Negative  Negative    Blood, UA Negative  Negative  Negative    Leukocytes, UA Negative  Negative  Negative    Nitrite, UA Negative  Negative  Negative                    Assessment and Plan   Diagnoses and all orders for this visit:    1. Weakness of both legs (Primary)  -     CK; Future  -     TSH Rfx On Abnormal To Free T4; Future  -     Comprehensive Metabolic Panel; Future  -     PTH, Intact; Future  -     Magnesium; Future  -     Lactate Dehydrogenase; Future    2. Type 2 diabetes mellitus with hyperglycemia, without long-term current use of insulin  Assessment & Plan:  Continue metformin 500 mg twice daily.  Start Farxiga 5 mg tablet daily; samples provided at visit today.  Follow-up in 3 months.      3. Hyperlipidemia, unspecified hyperlipidemia type  -     TSH Rfx On Abnormal To Free T4; Future             Follow Up   Return for Next scheduled follow up.  Patient was given instructions and  counseling regarding his condition or for health maintenance advice. Please see specific information pulled into the AVS if appropriate.

## 2025-01-03 ENCOUNTER — LAB (OUTPATIENT)
Dept: FAMILY MEDICINE CLINIC | Facility: CLINIC | Age: 80
End: 2025-01-03
Payer: MEDICARE

## 2025-01-03 DIAGNOSIS — E78.5 HYPERLIPIDEMIA, UNSPECIFIED HYPERLIPIDEMIA TYPE: ICD-10-CM

## 2025-01-03 DIAGNOSIS — R29.898 WEAKNESS OF BOTH LEGS: ICD-10-CM

## 2025-01-03 LAB
ALBUMIN SERPL-MCNC: 4 G/DL (ref 3.5–5.2)
ALBUMIN/GLOB SERPL: 1.1 G/DL
ALP SERPL-CCNC: 87 U/L (ref 39–117)
ALT SERPL W P-5'-P-CCNC: 8 U/L (ref 1–41)
ANION GAP SERPL CALCULATED.3IONS-SCNC: 10.6 MMOL/L (ref 5–15)
AST SERPL-CCNC: 15 U/L (ref 1–40)
BILIRUB SERPL-MCNC: 0.8 MG/DL (ref 0–1.2)
BUN SERPL-MCNC: 14 MG/DL (ref 8–23)
BUN/CREAT SERPL: 13 (ref 7–25)
CALCIUM SPEC-SCNC: 9 MG/DL (ref 8.6–10.5)
CHLORIDE SERPL-SCNC: 105 MMOL/L (ref 98–107)
CK SERPL-CCNC: 71 U/L (ref 20–200)
CO2 SERPL-SCNC: 24.4 MMOL/L (ref 22–29)
CREAT SERPL-MCNC: 1.08 MG/DL (ref 0.76–1.27)
EGFRCR SERPLBLD CKD-EPI 2021: 70.2 ML/MIN/1.73
GLOBULIN UR ELPH-MCNC: 3.5 GM/DL
GLUCOSE SERPL-MCNC: 149 MG/DL (ref 65–99)
LDH SERPL-CCNC: 178 U/L (ref 135–225)
MAGNESIUM SERPL-MCNC: 2.3 MG/DL (ref 1.6–2.4)
POTASSIUM SERPL-SCNC: 4 MMOL/L (ref 3.5–5.2)
PROT SERPL-MCNC: 7.5 G/DL (ref 6–8.5)
PTH-INTACT SERPL-MCNC: 20.2 PG/ML (ref 15–65)
SODIUM SERPL-SCNC: 140 MMOL/L (ref 136–145)
TSH SERPL DL<=0.05 MIU/L-ACNC: 3.38 UIU/ML (ref 0.27–4.2)

## 2025-01-03 PROCEDURE — 83970 ASSAY OF PARATHORMONE: CPT | Performed by: NURSE PRACTITIONER

## 2025-01-03 PROCEDURE — 80053 COMPREHEN METABOLIC PANEL: CPT | Performed by: NURSE PRACTITIONER

## 2025-01-03 PROCEDURE — 83735 ASSAY OF MAGNESIUM: CPT | Performed by: NURSE PRACTITIONER

## 2025-01-03 PROCEDURE — 83615 LACTATE (LD) (LDH) ENZYME: CPT | Performed by: NURSE PRACTITIONER

## 2025-01-03 PROCEDURE — 84443 ASSAY THYROID STIM HORMONE: CPT | Performed by: NURSE PRACTITIONER

## 2025-01-03 PROCEDURE — 82550 ASSAY OF CK (CPK): CPT | Performed by: NURSE PRACTITIONER

## 2025-01-04 NOTE — PROGRESS NOTES
Please call patient and inform that labs look good overall.  Chemistry with normal electrolytes, normal liver and kidney function; blood glucose is elevated at 149. Magnesium level is normal.  Thyroid and parathyroid hormone levels are normal.  Muscle markers are normal-no signs of any muscle breakdown.     Encourage checking morning fasting blood glucose at home on regular basis. Does he need a new glucometer and any supplies?  If so, let me know.  Encourage weight loss, routine activity/exercise and walking to help strengthen muscle mass (would likely benefit from light weights and use of elastic bands at home).  Consider referral to physical therapy if no improvement by next office visit.

## 2025-01-07 ENCOUNTER — TELEPHONE (OUTPATIENT)
Dept: FAMILY MEDICINE CLINIC | Facility: CLINIC | Age: 80
End: 2025-01-07

## 2025-01-07 DIAGNOSIS — E11.65 TYPE 2 DIABETES MELLITUS WITH HYPERGLYCEMIA, WITHOUT LONG-TERM CURRENT USE OF INSULIN: Primary | ICD-10-CM

## 2025-01-07 RX ORDER — BLOOD-GLUCOSE METER
1 EACH MISCELLANEOUS TAKE AS DIRECTED
Qty: 1 KIT | Refills: 0 | Status: SHIPPED | OUTPATIENT
Start: 2025-01-07

## 2025-01-07 RX ORDER — LANCETS 28 GAUGE
EACH MISCELLANEOUS
Qty: 100 EACH | Refills: 3 | Status: SHIPPED | OUTPATIENT
Start: 2025-01-07

## 2025-01-07 NOTE — TELEPHONE ENCOUNTER
Caller: Sumit Jules    Relationship to patient: Self    Best call back number: [phone]    Patient is needing:   WOULD LIKE A CALL TO GO OVER MOST RECENT LAB RESULTS.

## 2025-01-21 ENCOUNTER — OFFICE VISIT (OUTPATIENT)
Dept: FAMILY MEDICINE CLINIC | Facility: CLINIC | Age: 80
End: 2025-01-21
Payer: MEDICARE

## 2025-01-21 VITALS
HEART RATE: 66 BPM | RESPIRATION RATE: 16 BRPM | SYSTOLIC BLOOD PRESSURE: 132 MMHG | WEIGHT: 285 LBS | HEIGHT: 74 IN | BODY MASS INDEX: 36.57 KG/M2 | DIASTOLIC BLOOD PRESSURE: 69 MMHG | TEMPERATURE: 98.6 F | OXYGEN SATURATION: 99 %

## 2025-01-21 DIAGNOSIS — R47.89 WORD FINDING DIFFICULTY: ICD-10-CM

## 2025-01-21 DIAGNOSIS — R41.3 MEMORY LOSS, SHORT TERM: Primary | ICD-10-CM

## 2025-01-21 DIAGNOSIS — R29.898 WEAKNESS OF BOTH LEGS: ICD-10-CM

## 2025-01-21 PROCEDURE — 3075F SYST BP GE 130 - 139MM HG: CPT | Performed by: NURSE PRACTITIONER

## 2025-01-21 PROCEDURE — 1159F MED LIST DOCD IN RCRD: CPT | Performed by: NURSE PRACTITIONER

## 2025-01-21 PROCEDURE — 3078F DIAST BP <80 MM HG: CPT | Performed by: NURSE PRACTITIONER

## 2025-01-21 PROCEDURE — 99214 OFFICE O/P EST MOD 30 MIN: CPT | Performed by: NURSE PRACTITIONER

## 2025-01-21 PROCEDURE — 1126F AMNT PAIN NOTED NONE PRSNT: CPT | Performed by: NURSE PRACTITIONER

## 2025-01-21 PROCEDURE — 1160F RVW MEDS BY RX/DR IN RCRD: CPT | Performed by: NURSE PRACTITIONER

## 2025-01-21 NOTE — PATIENT INSTRUCTIONS
-Please call [facility] centralized scheduling department @ [phone] for appointment to schedule imaging.      -Schedule Vascular screenings ordered 12/3/24  -Schedule MRI brain     -Check with pharmacy on Farxiga 5 mg daily.

## 2025-01-21 NOTE — PROGRESS NOTES
"Chief Complaint  Memory Loss    Subjective        Sumit Jules presents to [facility]  History of Present Illness    Patient presents today with complaints of memory loss.  Accompanied to visit today by wife.  Recent visit on December 31, 2024 for weakness in both legs.    Memory problem:  Onset: gradual- worse, short term memory-yes, long term memory- no.  Executive functions: difficulty with finances- no, difficulty with shopping- no, difficulty with laundry- no, difficulty with transportation- no   ADL's: difficulty with feeding-no, dressing-no, bathing-no, toileting-no, transfers- yes; some.   Tasks: able to complete majority of time.    Confusion: episodic- yes, aphasia- no, slurred speech- no, focal weakness- no  Language: word finding difficulty- yes  Spatial ability/orientation: lost in familiar places- no. Positive for difficulty drawing and writing.    Recognition: family/friends- no problems.   Attention/concentration: problem with focus- yes. Gets distracted.   Judgement:  decision making impaired- yes.   Mood: depressed- no, lack of interest- yes, grief- no, coping with unexpected events- yes.   Activities/Hobbies: no new interests since moved from [place].  Reported ranch in [place] is still for sale; sometimes missing activities related to living there.      Objective   Vital Signs:  /69 (BP Location: Right arm, Patient Position: Sitting, Cuff Size: Large Adult)   Pulse 66   Temp 98.6 °F (37 °C) (Infrared)   Resp 16   Ht 188 cm (74\")   Wt 129 kg (285 lb)   SpO2 99%   BMI 36.59 kg/m²   Estimated body mass index is 36.59 kg/m² as calculated from the following:    Height as of this encounter: 188 cm (74\").    Weight as of this encounter: 129 kg (285 lb).            Physical Exam  Constitutional:       General: He is not in acute distress.     Appearance: He is obese.   Cardiovascular:      Rate and Rhythm: Normal rate.      Heart sounds: S1 normal and S2 " normal.   Pulmonary:      Effort: Pulmonary effort is normal.      Breath sounds: Normal breath sounds and air entry.   Musculoskeletal:      Right lower leg: No edema.      Left lower leg: No edema.   Skin:     General: Skin is warm and dry.   Neurological:      General: No focal deficit present.      Mental Status: He is alert and oriented to person, place, and time.      Cranial Nerves: No dysarthria or facial asymmetry.      Sensory: Sensation is intact.      Motor: No weakness or tremor.      Coordination: Impaired rapid alternating movements.      Gait: Gait is intact.      Comments: Rare moment of word finding difficulty during exam.   Psychiatric:         Attention and Perception: Attention and perception normal.         Mood and Affect: Mood and affect normal.         Speech: Speech normal.         Behavior: Behavior normal. Behavior is cooperative.         Thought Content: Thought content normal.         Cognition and Memory: He does not exhibit impaired remote memory.         Judgment: Judgment normal.        Result Review :    CMP          6/7/2024    09:15 12/3/2024    12:01 1/3/2025    09:08   CMP   Glucose 130  136  149    BUN 15  13  14    Creatinine 1.14  0.95  1.08    EGFR 66.2  81.9  70.2    Sodium 140  140  140    Potassium 4.3  4.6  4.0    Chloride 104  104  105    Calcium 9.1  9.0  9.0    Total Protein 7.3  7.4  7.5    Albumin 3.8  3.9  4.0    Globulin 3.5  3.5  3.5    Total Bilirubin 0.4  0.5  0.8    Alkaline Phosphatase 70  79  87    AST (SGOT) 18  18  15    ALT (SGPT) 9  12  8    Albumin/Globulin Ratio 1.1  1.1  1.1    BUN/Creatinine Ratio 13.2  13.7  13.0    Anion Gap 11.5  11.0  10.6      CBC          2/13/2024    08:49 2/23/2024    11:10 12/3/2024    12:01   CBC   WBC 13.00  8.41  8.44    RBC 4.21  4.61  4.73    Hemoglobin 12.4  13.3  13.7    Hematocrit 38.1  40.9  41.6    MCV 90.5  88.7  87.9    MCH 29.4  28.9  29.0    MCHC 32.5  32.5  32.9    RDW 13.7  12.7  11.9    Platelets 99  173   176      CBC w/diff          2/13/2024    08:49 2/23/2024    11:10 12/3/2024    12:01   CBC w/Diff   WBC 13.00  8.41  8.44    RBC 4.21  4.61  4.73    Hemoglobin 12.4  13.3  13.7    Hematocrit 38.1  40.9  41.6    MCV 90.5  88.7  87.9    MCH 29.4  28.9  29.0    MCHC 32.5  32.5  32.9    RDW 13.7  12.7  11.9    Platelets 99  173  176    Neutrophil Rel % 84.4      Lymphocyte Rel % 6.5      Monocyte Rel % 8.9      Eosinophil Rel % 0.1      Basophil Rel % 0.1        Lipid Panel          2/23/2024    11:10 6/7/2024    09:15 12/3/2024    12:01   Lipid Panel   Total Cholesterol 137  128  136    Triglycerides 77  79  99    HDL Cholesterol 37  34  35    VLDL Cholesterol 15  16  19    LDL Cholesterol  85  78  82    LDL/HDL Ratio 2.29  2.30  2.32      TSH          2/23/2024    11:10 1/3/2025    09:08   TSH   TSH 1.930  3.380      BMP          6/7/2024    09:15 12/3/2024    12:01 1/3/2025    09:08   BMP   BUN 15  13  14    Creatinine 1.14  0.95  1.08    Sodium 140  140  140    Potassium 4.3  4.6  4.0    Chloride 104  104  105    CO2 24.5  25.0  24.4    Calcium 9.1  9.0  9.0      A1C Last 3 Results          4/15/2024    09:03 6/7/2024    09:15 12/3/2024    12:01   HGBA1C Last 3 Results   Hemoglobin A1C 7.40  6.90  7.40      Microalbumin          2/23/2024    11:10 12/3/2024    12:01   Microalbumin   Microalbumin, Urine 9.1  2.1             Component  Ref Range & Units 2 wk ago   LDH  135 - 225 U/L 178     Component  Ref Range & Units 2 wk ago 11 mo ago   Magnesium  1.6 - 2.4 mg/dL 2.3 1.8     Component  Ref Range & Units 2 wk ago   PTH, Intact  15.0 - 65.0 pg/mL 20.2     Component  Ref Range & Units 2 wk ago 11 mo ago   Creatine Kinase  20 - 200 U/L 71 105        Assessment and Plan   Diagnoses and all orders for this visit:    1. Memory loss, short term (Primary)  -     MRI Brain With & Without Contrast; Future    2. Word finding difficulty  -     MRI Brain With & Without Contrast; Future    3. Weakness of both legs  -     MRI  Brain With & Without Contrast; Future    Completed Mini-Mental state examination with score of 27/30.  Recommended completing vascular screenings ASAP.        I spent 30 minutes caring for Sumit on this date of service. This time includes time spent by me in the following activities:preparing for the visit, reviewing tests, obtaining and/or reviewing a separately obtained history, performing a medically appropriate examination and/or evaluation , counseling and educating the patient/family/caregiver, ordering medications, tests, or procedures, documenting information in the medical record, independently interpreting results and communicating that information with the patient/family/caregiver, and care coordination  Follow Up   Return for Next scheduled follow up.  Patient was given instructions and counseling regarding his condition or for health maintenance advice. Please see specific information pulled into the AVS if appropriate.

## 2025-01-29 ENCOUNTER — HOSPITAL ENCOUNTER (INPATIENT)
Facility: HOSPITAL | Age: 80
LOS: 1 days | Discharge: PSYCHIATRIC OR SUBSTANCE USE FACILITY | End: 2025-01-30
Attending: EMERGENCY MEDICINE | Admitting: STUDENT IN AN ORGANIZED HEALTH CARE EDUCATION/TRAINING PROGRAM
Payer: MEDICARE

## 2025-01-29 ENCOUNTER — APPOINTMENT (OUTPATIENT)
Dept: CT IMAGING | Facility: HOSPITAL | Age: 80
End: 2025-01-29
Payer: MEDICARE

## 2025-01-29 ENCOUNTER — APPOINTMENT (OUTPATIENT)
Dept: MRI IMAGING | Facility: HOSPITAL | Age: 80
End: 2025-01-29
Payer: MEDICARE

## 2025-01-29 ENCOUNTER — APPOINTMENT (OUTPATIENT)
Dept: GENERAL RADIOLOGY | Facility: HOSPITAL | Age: 80
End: 2025-01-29
Payer: MEDICARE

## 2025-01-29 DIAGNOSIS — R47.9 SPEECH DISTURBANCE, UNSPECIFIED TYPE: ICD-10-CM

## 2025-01-29 DIAGNOSIS — G93.89 BRAIN MASS: Primary | ICD-10-CM

## 2025-01-29 LAB
ABO GROUP BLD: NORMAL
ALBUMIN SERPL-MCNC: 4.2 G/DL (ref 3.5–5.2)
ALBUMIN/GLOB SERPL: 1.2 G/DL
ALP SERPL-CCNC: 85 U/L (ref 39–117)
ALT SERPL W P-5'-P-CCNC: 13 U/L (ref 1–41)
ANION GAP SERPL CALCULATED.3IONS-SCNC: 9.7 MMOL/L (ref 5–15)
AST SERPL-CCNC: 20 U/L (ref 1–40)
BACTERIA UR QL AUTO: ABNORMAL /HPF
BASOPHILS # BLD AUTO: 0.02 10*3/MM3 (ref 0–0.2)
BASOPHILS NFR BLD AUTO: 0.3 % (ref 0–1.5)
BILIRUB SERPL-MCNC: 0.7 MG/DL (ref 0–1.2)
BILIRUB UR QL STRIP: NEGATIVE
BLD GP AB SCN SERPL QL: NEGATIVE
BUN SERPL-MCNC: 14 MG/DL (ref 8–23)
BUN/CREAT SERPL: 13 (ref 7–25)
CALCIUM SPEC-SCNC: 9.7 MG/DL (ref 8.6–10.5)
CHLORIDE SERPL-SCNC: 104 MMOL/L (ref 98–107)
CLARITY UR: CLEAR
CO2 SERPL-SCNC: 27.3 MMOL/L (ref 22–29)
COLOR UR: YELLOW
CREAT SERPL-MCNC: 1.08 MG/DL (ref 0.76–1.27)
DEPRECATED RDW RBC AUTO: 45.1 FL (ref 37–54)
EGFRCR SERPLBLD CKD-EPI 2021: 70.2 ML/MIN/1.73
EOSINOPHIL # BLD AUTO: 0.05 10*3/MM3 (ref 0–0.4)
EOSINOPHIL NFR BLD AUTO: 0.7 % (ref 0.3–6.2)
ERYTHROCYTE [DISTWIDTH] IN BLOOD BY AUTOMATED COUNT: 13.2 % (ref 12.3–15.4)
GLOBULIN UR ELPH-MCNC: 3.4 GM/DL
GLUCOSE BLDC GLUCOMTR-MCNC: 149 MG/DL (ref 70–105)
GLUCOSE BLDC GLUCOMTR-MCNC: 193 MG/DL (ref 70–105)
GLUCOSE SERPL-MCNC: 134 MG/DL (ref 65–99)
GLUCOSE UR STRIP-MCNC: ABNORMAL MG/DL
HCT VFR BLD AUTO: 44.3 % (ref 37.5–51)
HGB BLD-MCNC: 13.9 G/DL (ref 13–17.7)
HGB UR QL STRIP.AUTO: ABNORMAL
HOLD SPECIMEN: NORMAL
HYALINE CASTS UR QL AUTO: ABNORMAL /LPF
IMM GRANULOCYTES # BLD AUTO: 0.01 10*3/MM3 (ref 0–0.05)
IMM GRANULOCYTES NFR BLD AUTO: 0.1 % (ref 0–0.5)
INR PPP: 2.11 (ref 0.9–1.1)
KETONES UR QL STRIP: ABNORMAL
LARGE PLATELETS: NORMAL
LEUKOCYTE ESTERASE UR QL STRIP.AUTO: NEGATIVE
LYMPHOCYTES # BLD AUTO: 1.4 10*3/MM3 (ref 0.7–3.1)
LYMPHOCYTES NFR BLD AUTO: 20.5 % (ref 19.6–45.3)
MCH RBC QN AUTO: 29.1 PG (ref 26.6–33)
MCHC RBC AUTO-ENTMCNC: 31.4 G/DL (ref 31.5–35.7)
MCV RBC AUTO: 92.9 FL (ref 79–97)
MONOCYTES # BLD AUTO: 0.63 10*3/MM3 (ref 0.1–0.9)
MONOCYTES NFR BLD AUTO: 9.2 % (ref 5–12)
NEUTROPHILS NFR BLD AUTO: 4.73 10*3/MM3 (ref 1.7–7)
NEUTROPHILS NFR BLD AUTO: 69.2 % (ref 42.7–76)
NITRITE UR QL STRIP: NEGATIVE
NRBC BLD AUTO-RTO: 0 /100 WBC (ref 0–0.2)
PH UR STRIP.AUTO: <=5 [PH] (ref 5–8)
PLATELET # BLD AUTO: 115 10*3/MM3 (ref 140–450)
PMV BLD AUTO: 13 FL (ref 6–12)
POTASSIUM SERPL-SCNC: 4.2 MMOL/L (ref 3.5–5.2)
PROT SERPL-MCNC: 7.6 G/DL (ref 6–8.5)
PROT UR QL STRIP: NEGATIVE
PROTHROMBIN TIME: 23.7 SECONDS (ref 11.7–14.2)
RBC # BLD AUTO: 4.77 10*6/MM3 (ref 4.14–5.8)
RBC # UR STRIP: ABNORMAL /HPF
RBC MORPH BLD: NORMAL
REF LAB TEST METHOD: ABNORMAL
RH BLD: NEGATIVE
SMALL PLATELETS BLD QL SMEAR: NORMAL
SODIUM SERPL-SCNC: 141 MMOL/L (ref 136–145)
SP GR UR STRIP: 1.04 (ref 1–1.03)
SQUAMOUS #/AREA URNS HPF: ABNORMAL /HPF
T&S EXPIRATION DATE: NORMAL
UROBILINOGEN UR QL STRIP: ABNORMAL
WBC # UR STRIP: ABNORMAL /HPF
WBC MORPH BLD: NORMAL
WBC NRBC COR # BLD AUTO: 6.84 10*3/MM3 (ref 3.4–10.8)
WHOLE BLOOD HOLD COAG: NORMAL
WHOLE BLOOD HOLD SPECIMEN: NORMAL

## 2025-01-29 PROCEDURE — A9579 GAD-BASE MR CONTRAST NOS,1ML: HCPCS | Performed by: EMERGENCY MEDICINE

## 2025-01-29 PROCEDURE — 71260 CT THORAX DX C+: CPT

## 2025-01-29 PROCEDURE — 86850 RBC ANTIBODY SCREEN: CPT | Performed by: EMERGENCY MEDICINE

## 2025-01-29 PROCEDURE — 93005 ELECTROCARDIOGRAM TRACING: CPT | Performed by: EMERGENCY MEDICINE

## 2025-01-29 PROCEDURE — 71045 X-RAY EXAM CHEST 1 VIEW: CPT

## 2025-01-29 PROCEDURE — 70486 CT MAXILLOFACIAL W/O DYE: CPT

## 2025-01-29 PROCEDURE — 80053 COMPREHEN METABOLIC PANEL: CPT | Performed by: EMERGENCY MEDICINE

## 2025-01-29 PROCEDURE — 99285 EMERGENCY DEPT VISIT HI MDM: CPT

## 2025-01-29 PROCEDURE — 70450 CT HEAD/BRAIN W/O DYE: CPT

## 2025-01-29 PROCEDURE — 63710000001 INSULIN LISPRO (HUMAN) PER 5 UNITS

## 2025-01-29 PROCEDURE — 82948 REAGENT STRIP/BLOOD GLUCOSE: CPT

## 2025-01-29 PROCEDURE — 36415 COLL VENOUS BLD VENIPUNCTURE: CPT

## 2025-01-29 PROCEDURE — 85610 PROTHROMBIN TIME: CPT | Performed by: EMERGENCY MEDICINE

## 2025-01-29 PROCEDURE — 86901 BLOOD TYPING SEROLOGIC RH(D): CPT | Performed by: EMERGENCY MEDICINE

## 2025-01-29 PROCEDURE — 85007 BL SMEAR W/DIFF WBC COUNT: CPT | Performed by: EMERGENCY MEDICINE

## 2025-01-29 PROCEDURE — 25510000001 IOPAMIDOL PER 1 ML: Performed by: EMERGENCY MEDICINE

## 2025-01-29 PROCEDURE — 72125 CT NECK SPINE W/O DYE: CPT

## 2025-01-29 PROCEDURE — 86900 BLOOD TYPING SEROLOGIC ABO: CPT | Performed by: EMERGENCY MEDICINE

## 2025-01-29 PROCEDURE — 86901 BLOOD TYPING SEROLOGIC RH(D): CPT

## 2025-01-29 PROCEDURE — 81001 URINALYSIS AUTO W/SCOPE: CPT | Performed by: EMERGENCY MEDICINE

## 2025-01-29 PROCEDURE — 85025 COMPLETE CBC W/AUTO DIFF WBC: CPT | Performed by: EMERGENCY MEDICINE

## 2025-01-29 PROCEDURE — 74177 CT ABD & PELVIS W/CONTRAST: CPT

## 2025-01-29 PROCEDURE — 70553 MRI BRAIN STEM W/O & W/DYE: CPT

## 2025-01-29 PROCEDURE — 25010000002 GADOTERIDOL PER 1 ML: Performed by: EMERGENCY MEDICINE

## 2025-01-29 PROCEDURE — 86900 BLOOD TYPING SEROLOGIC ABO: CPT

## 2025-01-29 RX ORDER — IOPAMIDOL 755 MG/ML
100 INJECTION, SOLUTION INTRAVASCULAR
Status: COMPLETED | OUTPATIENT
Start: 2025-01-29 | End: 2025-01-29

## 2025-01-29 RX ORDER — TAMSULOSIN HYDROCHLORIDE 0.4 MG/1
0.4 CAPSULE ORAL DAILY
Status: DISCONTINUED | OUTPATIENT
Start: 2025-01-30 | End: 2025-01-30 | Stop reason: HOSPADM

## 2025-01-29 RX ORDER — ACETAMINOPHEN 325 MG/1
650 TABLET ORAL EVERY 6 HOURS PRN
Status: ON HOLD | COMMUNITY

## 2025-01-29 RX ORDER — DEXTROSE MONOHYDRATE 25 G/50ML
25 INJECTION, SOLUTION INTRAVENOUS
Status: DISCONTINUED | OUTPATIENT
Start: 2025-01-29 | End: 2025-01-30 | Stop reason: HOSPADM

## 2025-01-29 RX ORDER — INSULIN LISPRO 100 [IU]/ML
2-7 INJECTION, SOLUTION INTRAVENOUS; SUBCUTANEOUS
Status: DISCONTINUED | OUTPATIENT
Start: 2025-01-29 | End: 2025-01-30 | Stop reason: HOSPADM

## 2025-01-29 RX ORDER — BISACODYL 5 MG/1
5 TABLET, DELAYED RELEASE ORAL DAILY PRN
Status: DISCONTINUED | OUTPATIENT
Start: 2025-01-29 | End: 2025-01-30 | Stop reason: HOSPADM

## 2025-01-29 RX ORDER — SODIUM CHLORIDE 0.9 % (FLUSH) 0.9 %
10 SYRINGE (ML) INJECTION AS NEEDED
Status: DISCONTINUED | OUTPATIENT
Start: 2025-01-29 | End: 2025-01-30 | Stop reason: HOSPADM

## 2025-01-29 RX ORDER — ONDANSETRON 2 MG/ML
4 INJECTION INTRAMUSCULAR; INTRAVENOUS EVERY 6 HOURS PRN
Status: DISCONTINUED | OUTPATIENT
Start: 2025-01-29 | End: 2025-01-30 | Stop reason: HOSPADM

## 2025-01-29 RX ORDER — AMLODIPINE BESYLATE 5 MG/1
10 TABLET ORAL
Status: DISCONTINUED | OUTPATIENT
Start: 2025-01-30 | End: 2025-01-30 | Stop reason: HOSPADM

## 2025-01-29 RX ORDER — NICOTINE POLACRILEX 4 MG
15 LOZENGE BUCCAL
Status: DISCONTINUED | OUTPATIENT
Start: 2025-01-29 | End: 2025-01-30 | Stop reason: HOSPADM

## 2025-01-29 RX ORDER — POLYETHYLENE GLYCOL 3350 17 G/17G
17 POWDER, FOR SOLUTION ORAL DAILY PRN
Status: DISCONTINUED | OUTPATIENT
Start: 2025-01-29 | End: 2025-01-30 | Stop reason: HOSPADM

## 2025-01-29 RX ORDER — AMOXICILLIN 250 MG
2 CAPSULE ORAL 2 TIMES DAILY PRN
Status: DISCONTINUED | OUTPATIENT
Start: 2025-01-29 | End: 2025-01-30 | Stop reason: HOSPADM

## 2025-01-29 RX ORDER — IBUPROFEN 600 MG/1
1 TABLET ORAL
Status: DISCONTINUED | OUTPATIENT
Start: 2025-01-29 | End: 2025-01-30 | Stop reason: HOSPADM

## 2025-01-29 RX ORDER — LOSARTAN POTASSIUM 25 MG/1
25 TABLET ORAL DAILY
Status: DISCONTINUED | OUTPATIENT
Start: 2025-01-30 | End: 2025-01-30 | Stop reason: HOSPADM

## 2025-01-29 RX ORDER — ONDANSETRON 4 MG/1
4 TABLET, ORALLY DISINTEGRATING ORAL EVERY 6 HOURS PRN
Status: DISCONTINUED | OUTPATIENT
Start: 2025-01-29 | End: 2025-01-30 | Stop reason: HOSPADM

## 2025-01-29 RX ORDER — ACETAMINOPHEN 160 MG/5ML
650 SOLUTION ORAL EVERY 4 HOURS PRN
Status: DISCONTINUED | OUTPATIENT
Start: 2025-01-29 | End: 2025-01-30 | Stop reason: HOSPADM

## 2025-01-29 RX ORDER — ATORVASTATIN CALCIUM 10 MG/1
10 TABLET, FILM COATED ORAL DAILY
Status: DISCONTINUED | OUTPATIENT
Start: 2025-01-30 | End: 2025-01-30 | Stop reason: HOSPADM

## 2025-01-29 RX ORDER — BISACODYL 10 MG
10 SUPPOSITORY, RECTAL RECTAL DAILY PRN
Status: DISCONTINUED | OUTPATIENT
Start: 2025-01-29 | End: 2025-01-30 | Stop reason: HOSPADM

## 2025-01-29 RX ORDER — ACETAMINOPHEN 325 MG/1
650 TABLET ORAL EVERY 4 HOURS PRN
Status: DISCONTINUED | OUTPATIENT
Start: 2025-01-29 | End: 2025-01-30 | Stop reason: HOSPADM

## 2025-01-29 RX ORDER — ACETAMINOPHEN 650 MG/1
650 SUPPOSITORY RECTAL EVERY 4 HOURS PRN
Status: DISCONTINUED | OUTPATIENT
Start: 2025-01-29 | End: 2025-01-30 | Stop reason: HOSPADM

## 2025-01-29 RX ADMIN — GADOTERIDOL 20 ML: 279.3 INJECTION, SOLUTION INTRAVENOUS at 14:27

## 2025-01-29 RX ADMIN — IOPAMIDOL 100 ML: 755 INJECTION, SOLUTION INTRAVENOUS at 16:23

## 2025-01-29 RX ADMIN — INSULIN LISPRO 2 UNITS: 100 INJECTION, SOLUTION INTRAVENOUS; SUBCUTANEOUS at 21:20

## 2025-01-29 NOTE — ED NOTES
Patient is alert and oriented at times with periods of confusion and difficulty gathering his words.

## 2025-01-29 NOTE — H&P
[facility]  History & Physical    Patient Name: Sumit Jules  : 1945  MRN: [id number]  Primary Care Physician:  Carol Rios APRN  Date of admission: 2025  Date and Time of Service: 2025 at 1650    Subjective      Chief Complaint: Weakness, slurred speech    History of Present Illness: Sumit Jules is a 79 y.o. male with a CMH of HTN, HLD, Antithrombin III deficiency with a history of DVT (on Xarelto), diabetes mellitus who presented to [facility] on 2025 with slurred speech.  With intermittent confusion and is unable to provide reliable history at this time.  Family is not at bedside, however she has obtained via phone call.  Family states that for the last 3 months, patient has had increasing bilateral lower extremity weakness, confusion, bizarre behavior, expressive aphasia as well as inability to write.  For patient HydroVal instructed states within the last week.  Patient was evaluated by PCP on 2024 for similar complaints which labs were ordered and MRI was scheduled for .     In the ED, patient hemodynamically stable and afebrile.  CBC and CMP were otherwise unremarkable.  UA with TNTC RBCs but otherwise negative.  CXR of chest with no acute findings.  CT head was concerning for mass in the left basal ganglia extending into left cerebral peduncle and mony with surrounding vasogenic edema, suspicious for malignancy.  CT face and cervical spine with no acute findings.  Neurosurgery consulted in ED and suggest no steroids or Keppra at this time until primary malignancy is identified.  Hospital service to admit for further evaluation management.      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   Converted from [employer]:   Description - SEBORRHEIC KERATOSIS    Pain of foot 03/19/2013    Formatting of this note might be different from the original.   Converted from [employer]:   Description - HEEL PAIN    Prostate cancer     Thrombocytopenia 03/19/2013    Formatting of this note might be different from the original.   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   Converted from [employer]:   Description - VASCULAR DISORDER OF SKIN       Past Surgical History:   Procedure Laterality Date    CHOLECYSTECTOMY      COLONOSCOPY  10/2013    UPPER ENDOSCOPIC ULTRASOUND W/ FNA N/A 5/3/2024    Procedure: ENDOSCOPIC ULTRASOUND with fine needle aspiration x1 area;  Surgeon: Gifty Ribera MD;  Location: [facility] ENDOSCOPY;  Service: Gastroenterology;  Laterality: N/A;  Post- pancreatic cyst       Family History: family history includes Breast cancer in an other family member; Deep vein thrombosis in an other family member; Diabetes in an other family member; Diabetes type II in an other family member. Otherwise pertinent FHx was reviewed and not pertinent to current issue.    Social History:  reports that he has never smoked. He has  never been exposed to tobacco smoke. He has never used smokeless tobacco. He reports that he does not drink alcohol and does not use drugs.    Home Medications:  Prior to Admission Medications       Prescriptions Last Dose Informant Patient Reported? Taking?    amLODIPine (NORVASC) 10 MG tablet 1/28/2025  No Yes    TAKE 1 TABLET BY MOUTH EVERY DAY AT NIGHT    atorvastatin (LIPITOR) 10 MG tablet 1/28/2025  No Yes    TAKE 1 TABLET BY MOUTH EVERY DAY    dapagliflozin (Farxiga) 5 MG tablet tablet 1/28/2025  No Yes    Take 1 tablet by mouth Daily.    losartan (COZAAR) 25 MG tablet 1/28/2025  No Yes    TAKE 1 TABLET BY MOUTH EVERY DAY    metFORMIN (GLUCOPHAGE) 500 MG tablet 1/29/2025  No Yes    TAKE 1 TABLET BY MOUTH TWICE A DAY WITH FOOD    tamsulosin (FLOMAX) 0.4 MG capsule 24 hr capsule 1/29/2025  No Yes    Take 1 capsule by mouth Daily.    Xarelto 20 MG tablet 1/29/2025  No Yes    TAKE 1 TABLET BY MOUTH EVERY DAY    acetaminophen (TYLENOL) 325 MG tablet   Yes No    Take 2 tablets by mouth Every 6 (Six) Hours As Needed for Mild Pain.              Allergies:  No Known Allergies    Objective      Vitals:   Temp:  [98.6 °F (37 °C)] 98.6 °F (37 °C)  Heart Rate:  [73-94] 74  Resp:  [12-18] 12  BP: ()/(60-76) 130/68  Body mass index is 37.85 kg/m².    Physical Exam  General: 80 yo WM, Alert, intermittent confusion but oriented x 3 on exam, well nourished, no acute distress.  HENT: Normocephalic, normal hearing, moist oral mucosa, no scleral icterus.  Neck: Supple, nontender, no carotid bruits, no JVD, no LAD.  Lungs: Clear to auscultation, nonlabored respiration.  Heart: RRR, no murmur, gallop or edema.  Abdomen: Soft, nontender, nondistended, + bowel sounds.  Musculoskeletal: Normal range of motion and strength, no tenderness or swelling.  Skin: Skin is warm, dry and pink, no rashes or lesions.  Psychiatric: Cooperative, appropriate mood and affect.  Neurologic: Equal strength bilaterally. No focal motor or sensory  deficits appreciated. No facial droop or aphasia present.        Diagnostic Data:  Lab Results (last 24 hours)       Procedure Component Value Units Date/Time    Urinalysis With Culture If Indicated - Urine, Clean Catch [[id number]]  (Abnormal) Collected: 01/29/25 1531    Specimen: Urine, Clean Catch Updated: 01/29/25 1541     Color, UA Yellow     Appearance, UA Clear     pH, UA <=5.0     Specific Gravity, UA 1.036     Glucose, UA >=1000 mg/dL (3+)     Ketones, UA Trace     Bilirubin, UA Negative     Blood, UA Large (3+)     Protein, UA Negative     Leuk Esterase, UA Negative     Nitrite, UA Negative     Urobilinogen, UA 0.2 E.U./dL    Narrative:      In absence of clinical symptoms, the presence of pyuria, bacteria, and/or nitrites on the urinalysis result does not correlate with infection.    Urinalysis, Microscopic Only - Urine, Clean Catch [[id number]]  (Abnormal) Collected: 01/29/25 1531    Specimen: Urine, Clean Catch Updated: 01/29/25 1541     RBC, UA Too Numerous to Count /HPF      WBC, UA 0-2 /HPF      Comment: Urine culture not indicated.        Bacteria, UA None Seen /HPF      Squamous Epithelial Cells, UA 0-2 /HPF      Hyaline Casts, UA None Seen /LPF      Methodology Automated Microscopy    [place] Draw [[id number]] Collected: 01/29/25 1302    Specimen: Blood from Arm, Left Updated: 01/29/25 1346    Narrative:      The following orders were created for panel order [place] Draw.  Procedure                               Abnormality         Status                     ---------                               -----------         ------                     Green Top (Gel)[[id number]]                                  Final result               Lavender Top[[id number]]                                     Final result               Gold Top - SST[[id number]]                                                              Light Blue Top[[id number]]                                   Final result                 Please view  results for these tests on the individual orders.    Green Top (Gel) [[id number]] Collected: 01/29/25 1302    Specimen: Blood from Arm, Left Updated: 01/29/25 1345     Extra Tube Hold for add-ons.     Comment: Auto resulted.       Comprehensive Metabolic Panel [[id number]]  (Abnormal) Collected: 01/29/25 1302    Specimen: Blood from Arm, Left Updated: 01/29/25 1338     Glucose 134 mg/dL      BUN 14 mg/dL      Creatinine 1.08 mg/dL      Sodium 141 mmol/L      Potassium 4.2 mmol/L      Chloride 104 mmol/L      CO2 27.3 mmol/L      Calcium 9.7 mg/dL      Total Protein 7.6 g/dL      Albumin 4.2 g/dL      ALT (SGPT) 13 U/L      AST (SGOT) 20 U/L      Alkaline Phosphatase 85 U/L      Total Bilirubin 0.7 mg/dL      Globulin 3.4 gm/dL      A/G Ratio 1.2 g/dL      BUN/Creatinine Ratio 13.0     Anion Gap 9.7 mmol/L      eGFR 70.2 mL/min/1.73     Narrative:      GFR Categories in Chronic Kidney Disease (CKD)      GFR Category          GFR (mL/min/1.73)    Interpretation  G1                     90 or greater         Normal or high (1)  G2                      60-89                Mild decrease (1)  G3a                   45-59                Mild to moderate decrease  G3b                   30-44                Moderate to severe decrease  G4                    15-29                Severe decrease  G5                    14 or less           Kidney failure          (1)In the absence of evidence of kidney disease, neither GFR category G1 or G2 fulfill the criteria for CKD.    eGFR calculation 2021 CKD-EPI creatinine equation, which does not include race as a factor    CBC & Differential [[id number]]  (Abnormal) Collected: 01/29/25 1302    Specimen: Blood from Arm, Left Updated: 01/29/25 1326    Narrative:      The following orders were created for panel order CBC & Differential.  Procedure                               Abnormality         Status                     ---------                               -----------         ------                      CBC Auto Differential[[id number]]        Abnormal            Final result               Scan Slide[[id number]]                                       Final result                 Please view results for these tests on the individual orders.    Scan Slide [[id number]] Collected: 01/29/25 1302    Specimen: Blood from Arm, Left Updated: 01/29/25 1326     RBC Morphology Normal     WBC Morphology Normal     Platelet Estimate Decreased     Large Platelets Slight/1+    CBC Auto Differential [[id number]]  (Abnormal) Collected: 01/29/25 1302    Specimen: Blood from Arm, Left Updated: 01/29/25 1326     WBC 6.84 10*3/mm3      RBC 4.77 10*6/mm3      Hemoglobin 13.9 g/dL      Hematocrit 44.3 %      MCV 92.9 fL      MCH 29.1 pg      MCHC 31.4 g/dL      RDW 13.2 %      RDW-SD 45.1 fl      MPV 13.0 fL      Platelets 115 10*3/mm3      Neutrophil % 69.2 %      Lymphocyte % 20.5 %      Monocyte % 9.2 %      Eosinophil % 0.7 %      Basophil % 0.3 %      Immature Grans % 0.1 %      Neutrophils, Absolute 4.73 10*3/mm3      Lymphocytes, Absolute 1.40 10*3/mm3      Monocytes, Absolute 0.63 10*3/mm3      Eosinophils, Absolute 0.05 10*3/mm3      Basophils, Absolute 0.02 10*3/mm3      Immature Grans, Absolute 0.01 10*3/mm3      nRBC 0.0 /100 WBC     Protime-INR [[id number]]  (Abnormal) Collected: 01/29/25 1302    Specimen: Blood from Arm, Left Updated: 01/29/25 1321     Protime 23.7 Seconds      INR 2.11    Light Blue Top [[id number]] Collected: 01/29/25 1302    Specimen: Blood from Arm, Left Updated: 01/29/25 1315     Extra Tube Hold for add-ons.     Comment: Auto resulted       Lavender Top [[id number]] Collected: 01/29/25 1302    Specimen: Blood from Arm, Left Updated: 01/29/25 1315     Extra Tube hold for add-on     Comment: Auto resulted       POC Glucose Once [[id number]]  (Abnormal) Collected: 01/29/25 1308    Specimen: Blood Updated: 01/29/25 1310     Glucose 149 mg/dL      Comment: Serial Number: [id number]:  [id number]                 Imaging Results (Last 24 Hours)       Procedure Component Value Units Date/Time    CT Chest With Contrast Diagnostic [[id number]] Collected: 01/29/25 1625     Updated: 01/29/25 1647    Narrative:      CT ABDOMEN PELVIS W CONTRAST, CT CHEST W CONTRAST DIAGNOSTIC    Date of Exam: 1/29/2025 4:10 PM EST    Indication: metastatic disease evaluation.    Comparison: 2/12/2024    Technique: Axial CT images were obtained of the chest abdomen and pelvis following the uneventful intravenous administration of iodinated contrast. Sagittal and coronal reconstructions were performed.  Automated exposure control and iterative   reconstruction methods were used.        FINDINGS:  Scattered atelectasis is noted. No well-defined consolidations or pleural effusions are observed. There is a tiny nodule in the left upper lobe measuring 4 mm on image #31 series 3. A calcified granuloma is noted in the left lower lobe. No suspicious   pulmonary nodules or abnormal pulmonary masses are seen.    There is a mildly prominent subcarinal lymph node measuring 2.2 cm on image #37 series 2. Otherwise nonspecific lymph nodes are seen throughout the hilar, mediastinal, and axillary regions. Mild atherosclerosis is noted. Coronary artery calcifications   are observed. The thyroid gland is unremarkable. The esophagus is unremarkable.    The liver and spleen are stable. The patient is status post cholecystectomy. There is atrophy throughout the pancreas. There is ill-defined fullness involving the pancreatic head. There is a subtle area of apparent decreased attenuation which measures   approximately 2 cm. The findings suggest a lesion within the pancreatic head. Multiple low-density foci are seen throughout the pancreatic head, body, and tail suggesting cysts. There is a prominent focus in the pancreatic body seen on the prior study   measuring approximately 1.8 cm. There may be dilatation of the proximal pancreatic duct as well at the  level of the pancreatic head and proximal body. The edema surrounding the pancreas has improved since the prior.    There is a small hypodense nodule involving the left adrenal gland likely related to a small adenoma measuring 1.2 cm. The right adrenal gland is unremarkable. The bilateral kidneys are stable.    The liver, spleen, and pancreas are stable. The gallbladder and bile ducts are unremarkable. The bilateral adrenal glands are stable. The bilateral kidneys are stable.    There is no bowel dilatation or obstruction. There is no evidence for acute appendicitis. No significant free fluid is observed. No abnormal fluid collections are identified. No significant lymphadenopathy is seen throughout the abdomen or pelvis. The   bladder is partially decompressed. The celiac and superior mesenteric arterial distributions are opacified without evidence for occlusion. Mild atherosclerosis is noted.    No acute osseous abnormalities are observed. No new aggressive lytic or sclerotic lesions are seen.      Impression:      1.Evidence for an ill-defined lesion involving the pancreatic head measuring approximately 2 cm. The findings suggest possible pancreatic malignancy of the pancreatic head. Changes secondary to pancreatitis may also contribute to this appearance.   Recommend correlation with MRI of the pancreas for better characterization.  2.Additional hypodense foci are seen throughout the pancreas suggesting cysts. There is mild prominence of the pancreatic duct at the level of proximal pancreatic head/proximal body.  3.A mildly prominent subcarinal lymph node is noted in the chest. Recommend correlation with PET/CT imaging to exclude abnormal activity. No suspicious pulmonary nodules are identified.  4.No evidence for acute abnormality throughout the chest abdomen or pelvis.  5.No evidence for additional definitive metastatic disease throughout the abdomen or pelvis.  6.Evidence for small adenoma involving the left  adrenal gland measuring 1.2 cm.              Electronically Signed: Shiv Lozano MD    1/29/2025 4:45 PM EST    Workstation ID: [id number]    CT Abdomen Pelvis With Contrast [[id number]] Collected: 01/29/25 1625     Updated: 01/29/25 1647    Narrative:      CT ABDOMEN PELVIS W CONTRAST, CT CHEST W CONTRAST DIAGNOSTIC    Date of Exam: 1/29/2025 4:10 PM EST    Indication: metastatic disease evaluation.    Comparison: 2/12/2024    Technique: Axial CT images were obtained of the chest abdomen and pelvis following the uneventful intravenous administration of iodinated contrast. Sagittal and coronal reconstructions were performed.  Automated exposure control and iterative   reconstruction methods were used.        FINDINGS:  Scattered atelectasis is noted. No well-defined consolidations or pleural effusions are observed. There is a tiny nodule in the left upper lobe measuring 4 mm on image #31 series 3. A calcified granuloma is noted in the left lower lobe. No suspicious   pulmonary nodules or abnormal pulmonary masses are seen.    There is a mildly prominent subcarinal lymph node measuring 2.2 cm on image #37 series 2. Otherwise nonspecific lymph nodes are seen throughout the hilar, mediastinal, and axillary regions. Mild atherosclerosis is noted. Coronary artery calcifications   are observed. The thyroid gland is unremarkable. The esophagus is unremarkable.    The liver and spleen are stable. The patient is status post cholecystectomy. There is atrophy throughout the pancreas. There is ill-defined fullness involving the pancreatic head. There is a subtle area of apparent decreased attenuation which measures   approximately 2 cm. The findings suggest a lesion within the pancreatic head. Multiple low-density foci are seen throughout the pancreatic head, body, and tail suggesting cysts. There is a prominent focus in the pancreatic body seen on the prior study   measuring approximately 1.8 cm. There may be dilatation of the  proximal pancreatic duct as well at the level of the pancreatic head and proximal body. The edema surrounding the pancreas has improved since the prior.    There is a small hypodense nodule involving the left adrenal gland likely related to a small adenoma measuring 1.2 cm. The right adrenal gland is unremarkable. The bilateral kidneys are stable.    The liver, spleen, and pancreas are stable. The gallbladder and bile ducts are unremarkable. The bilateral adrenal glands are stable. The bilateral kidneys are stable.    There is no bowel dilatation or obstruction. There is no evidence for acute appendicitis. No significant free fluid is observed. No abnormal fluid collections are identified. No significant lymphadenopathy is seen throughout the abdomen or pelvis. The   bladder is partially decompressed. The celiac and superior mesenteric arterial distributions are opacified without evidence for occlusion. Mild atherosclerosis is noted.    No acute osseous abnormalities are observed. No new aggressive lytic or sclerotic lesions are seen.      Impression:      1.Evidence for an ill-defined lesion involving the pancreatic head measuring approximately 2 cm. The findings suggest possible pancreatic malignancy of the pancreatic head. Changes secondary to pancreatitis may also contribute to this appearance.   Recommend correlation with MRI of the pancreas for better characterization.  2.Additional hypodense foci are seen throughout the pancreas suggesting cysts. There is mild prominence of the pancreatic duct at the level of proximal pancreatic head/proximal body.  3.A mildly prominent subcarinal lymph node is noted in the chest. Recommend correlation with PET/CT imaging to exclude abnormal activity. No suspicious pulmonary nodules are identified.  4.No evidence for acute abnormality throughout the chest abdomen or pelvis.  5.No evidence for additional definitive metastatic disease throughout the abdomen or  pelvis.  6.Evidence for small adenoma involving the left adrenal gland measuring 1.2 cm.              Electronically Signed: Shiv Lozano MD    1/29/2025 4:45 PM EST    Workstation ID: [id number]    MRI Brain With & Without Contrast [[id number]] Collected: 01/29/25 1443     Updated: 01/29/25 1502    Narrative:      MRI BRAIN W WO CONTRAST    Date of Exam: 1/29/2025 1:56 PM EST    Indication: speech abnormality confusion.     Comparison: Noncontrast head CT from today    Technique:  Routine multiplanar/multisequence sequence images of the brain were obtained before and after the uneventful administration of Prohance.      FINDINGS:  Irregular enhancing masslike focus centered about the left basal ganglia and left anterior temporal lobe measuring 2.9 x 1.9 x 2.5 cm. Lesion demonstrates somewhat heterogeneous, intermediate DWI signal.    1.6 x 0.6 cm enhancing lesion involving the mid body of the corpus callosum (series 17, image 100).    Mild patchy enhancement along the left lateral ventricle centered on series 17, image 108.    1 cm irregular enhancing focus within the right thalamus (series 17, image 85).    There is edema about the left basal ganglia, left anterior temporal lobe, and extending into the left cerebral peduncle.      Foci of T2/FLAIR signal hyperintensity are seen within the bilateral hemispheric white matter    No midline shift or hydrocephalus is seen. No acute infarct is definitely identified. The visualized intracranial flow-voids appear unremarkable. Scattered paranasal sinus mucosal thickening is seen. Orbital structures are unremarkable. The visualized   superficial soft tissues and cervical spine demonstrate no significant abnormality.      Impression:        1.Multiple enhancing brain lesions, largest involving the left basal ganglia/left anterior temporal lobe measuring 2.9 x 1.9 x 2.5 cm. Smaller lesions involving the mid body of the corpus callosum, left frontal lobe adjacent to the left  lateral   ventricle, and within the right thalamus. Findings are concerning for underlying neoplastic process with considerations including infiltrating glial neoplasm, CNS lymphoma, or metastatic disease. Subacute ischemia, tumefactive demyelination, or   infectious/inflammatory processes considered less likely but not excluded.   2.Edema about the left basal ganglia, left anterior temporal lobe, and left cerebral peduncle. No appreciable midline shift or hydrocephalus.  3.No acute infarct is definitely identified.  4.Additional findings compatible with chronic microvascular ischemic change.      Electronically Signed: Rene Clifford MD    1/29/2025 3:00 PM EST    Workstation ID: [id number]    CT Head Without Contrast Stroke Protocol [[id number]] Collected: 01/29/25 1257     Updated: 01/29/25 1321    Narrative:      CT HEAD WO CONTRAST STROKE PROTOCOL, CT FACIAL BONES WO CONTRAST    Date of Exam: 1/29/2025 12:40 PM EST    Indication: Stroke, follow up  Neuro deficit, acute, stroke suspected.    Comparison: None available.    Technique: Axial CT images were obtained of the head and maxillofacial bones without contrast administration.  Reconstructed coronal and sagittal images were also obtained. Automated exposure control and iterative construction methods were used.      Results discussed with Dr. Espinal at time of interpretation.        FINDINGS:    Brain/Ventricles: There is limitation secondary to streak artifact and motion. There is areas of low-attenuation surrounding the left basal ganglia involving the frontal temporal and to lesser the parietal lobes. This appears to surround a relatively   circumferential area of preserved or increased density within the left basal ganglia, subinsular cortex measuring approximately 2.5 cm. This could be artifactual from subacute infarct or related to an underlying lesion, mass with surrounding vasogenic   edema. Changes also appear to extend into the left cerebral peduncle  and to lesser extent the mony. There is very mild degree of mass effect on the lateral ventricle. No acute hemorrhage is noted. There is a mild degree of diffuse atrophy and white   matter changes additionally noted not unexpected for patient stated age.    Orbits: The visualized portion of the orbits demonstrate no acute abnormality.    Sinuses and maxillofacial bones.: Paranasal sinuses appear to be clear with some mucosal thickening, possible mucous retention cyst inferiorly within the maxillary sinuses. No air-fluid levels noted. The maxillofacial bones appear to be grossly intact.   There is some mild irregularity along the inferior aspect of the left side of the nasal bone which is age indeterminate. Nasal bone fracture is not excluded.    Chronic left-sided mastoid effusion noted.    Mandible and temporomandibular joints are grossly unremarkable in appearance    Soft Tissues/Skull: No acute abnormality of the visualized skull or soft tissues.      Impression:      1.There is limitation secondary to motion and streak artifact.  2.There is a large area of low-attenuation surrounding the left basal ganglia and extending into the left cerebral peduncle and mony. Findings are concerning for underlying mass lesion with surrounding vasogenic edema. Changes from subacute infarct with   areas of preserved density of brain parenchyma also a potential consideration. Recommend correlation with patient history particularly any known history of cancer.. Recommend MRI of the brain with and without contrast for further evaluation.  3.No acute intracranial hemorrhage.  4.Irregularity of the inferior aspect of the left side of the nasal bone is age indeterminate. Please correlate for point tenderness.  5.Chronic left-sided mastoid effusion.        Electronically Signed: Obey Burgos MD    1/29/2025 1:18 PM EST    Workstation ID: [id number]    CT Facial Bones Without Contrast [[id number]] Collected: 01/29/25 1257     Updated:  01/29/25 1321    Narrative:      CT HEAD WO CONTRAST STROKE PROTOCOL, CT FACIAL BONES WO CONTRAST    Date of Exam: 1/29/2025 12:40 PM EST    Indication: Stroke, follow up  Neuro deficit, acute, stroke suspected.    Comparison: None available.    Technique: Axial CT images were obtained of the head and maxillofacial bones without contrast administration.  Reconstructed coronal and sagittal images were also obtained. Automated exposure control and iterative construction methods were used.      Results discussed with Dr. Espinal at time of interpretation.        FINDINGS:    Brain/Ventricles: There is limitation secondary to streak artifact and motion. There is areas of low-attenuation surrounding the left basal ganglia involving the frontal temporal and to lesser the parietal lobes. This appears to surround a relatively   circumferential area of preserved or increased density within the left basal ganglia, subinsular cortex measuring approximately 2.5 cm. This could be artifactual from subacute infarct or related to an underlying lesion, mass with surrounding vasogenic   edema. Changes also appear to extend into the left cerebral peduncle and to lesser extent the mony. There is very mild degree of mass effect on the lateral ventricle. No acute hemorrhage is noted. There is a mild degree of diffuse atrophy and white   matter changes additionally noted not unexpected for patient stated age.    Orbits: The visualized portion of the orbits demonstrate no acute abnormality.    Sinuses and maxillofacial bones.: Paranasal sinuses appear to be clear with some mucosal thickening, possible mucous retention cyst inferiorly within the maxillary sinuses. No air-fluid levels noted. The maxillofacial bones appear to be grossly intact.   There is some mild irregularity along the inferior aspect of the left side of the nasal bone which is age indeterminate. Nasal bone fracture is not excluded.    Chronic left-sided mastoid effusion  noted.    Mandible and temporomandibular joints are grossly unremarkable in appearance    Soft Tissues/Skull: No acute abnormality of the visualized skull or soft tissues.      Impression:      1.There is limitation secondary to motion and streak artifact.  2.There is a large area of low-attenuation surrounding the left basal ganglia and extending into the left cerebral peduncle and mony. Findings are concerning for underlying mass lesion with surrounding vasogenic edema. Changes from subacute infarct with   areas of preserved density of brain parenchyma also a potential consideration. Recommend correlation with patient history particularly any known history of cancer.. Recommend MRI of the brain with and without contrast for further evaluation.  3.No acute intracranial hemorrhage.  4.Irregularity of the inferior aspect of the left side of the nasal bone is age indeterminate. Please correlate for point tenderness.  5.Chronic left-sided mastoid effusion.        Electronically Signed: Obey Burgos MD    1/29/2025 1:18 PM EST    Workstation ID: [id number]    CT Cervical Spine Without Contrast [[id number]] Collected: 01/29/25 1302     Updated: 01/29/25 1315    Narrative:        CT CERVICAL SPINE WO CONTRAST    Date of Exam: 1/29/2025 12:40 PM EST    Indication: trauma.    Comparison: None available.    Technique: Axial CT images were obtained of the cervical spine without contrast administration.  Sagittal and coronal reconstructions were performed.  Automated exposure control and iterative reconstruction methods were used.      Findings:  Craniocervical alignment is normal. No cervical spine fracture or subluxation is seen. Multilevel degenerative changes are present. There is a continuation in the upper lobes may reflect changes of chronic small airways disease, and the remainder the   paraspinal soft tissues are within normal limits.    There are degenerative changes of the C2 dens/anterior C1 ring junction.    At  C2-3, advanced left facet arthropathy is present. No significant disc bulge or canal stenosis. Right neural foramen is patent. Mild to moderate left neural foraminal stenosis.    At C3-4, severe left facet arthropathy and moderate right facet arthropathy is present. There is left greater than right uncovertebral spurring and moderate disc bulge. No significant central canal stenosis. Severe left neural foraminal stenosis. Mild to   moderate right neural foraminal stenosis    At C4-5, mild posterior disc osteophyte formation is present with bilateral uncovertebral spurring. Severe left and moderate right facet arthropathy is present. Mild central canal stenosis. Severe bilateral neural foraminal stenosis.    At C5-6, posterior disc osteophyte formation is present with borderline to mild central canal stenosis. Severe left and moderate right facet arthropathy is present with bilateral uncovertebral spurring. Severe left neural foraminal stenosis. Moderate to   severe right neural foraminal stenosis.    At C6-7, mild posterior disc osteophyte formation is present. Borderline to mild central canal stenosis. Right greater than left uncovertebral spurring with mild to moderate facet arthropathy. Severe right neural foraminal stenosis. Moderate left neural   foraminal stenosis    At C7-T1, no significant disc bulge or canal stenosis. Mild bilateral facet arthropathy. No significant neural foraminal stenosis.      Impression:      Impression:    1. Degenerative changes in the cervical spine. No acute cervical spine findings.      Electronically Signed: Naty Keenan MD    1/29/2025 1:12 PM EST    Workstation ID: [id number]    XR Chest 1 View [[id number]] Collected: 01/29/25 1234     Updated: 01/29/25 1237    Narrative:      XR CHEST 1 VW    Date of Exam: 1/29/2025 12:10 PM EST    Indication: Stroke Protocol (Onset > 12 hrs)    Comparison: None available.    Findings:  No acute airspace disease. Heart size is normal. Mild  right hemidiaphragm elevation. No pleural effusion or pneumothorax or acute osseous abnormalities identified.      Impression:      Impression:  No acute cardiopulmonary findings.      Electronically Signed: Naty Keenan MD    1/29/2025 12:35 PM EST    Workstation ID: [id number]              Assessment & Plan        This is a 79 y.o. male with:    Active and Resolved Problems  Active Hospital Problems    Diagnosis  POA    **Brain mass [G93.89]  Yes      Resolved Hospital Problems   No resolved problems to display.       Brain mass  MRI brain: Multiple enhancing brain lesions, largest involving the left basal ganglia/left anterior temporal lobe measuring 2.9 x 1.9 x 2.5 cm. Smaller lesions involving the mid body of the corpus callosum, left frontal lobe adjacent to the left lateral   ventricle, and within the right thalamus, Edema about the left basal ganglia, left anterior temporal lobe, and left cerebral peduncle. No appreciable midline shift or hydrocephalus  CT chest/abdomen/pelvis with and without contrast for metastatic workup  Hold Xarelto  No steroids or Keppra for now until primary malignancy determined  Bedside swallow study, okay for diet if passes  NPO after MN   May need speech therapy for evaluation  Neurochecks q4h   Neurosurgery following  Heme-onc consulted    Antithrombin III deficiency  H/o DVT  Holding Xarelto    Type 2 diabetes mellitus  POC BG q6h while NPO   Low SSI   Hold Metformin and Farxiga  Hypoglycemia protocol     Hypertension-BP stable, continue amlodipine and losartan  Hyperlipidemia- Continue statin         VTE Prophylaxis:  Mechanical VTE prophylaxis orders are present.        The patient desires to be as follows:    CODE STATUS:    Code Status (Patient has no pulse and is not breathing): CPR (Attempt to Resuscitate)  Medical Interventions (Patient has pulse or is breathing): Full Support        Dianna Sr, who can be contacted at [phone], is the designated person to make  medical decisions on the patient's behalf if He is incapable of doing so. This was clarified with patient and/or next of kin on 1/29/2025 during the course of this H&P.    Admission Status:  I believe this patient meets inpatient status.    Expected Length of Stay: 2-3 days    PDMP and Medication Dispenses via Sidebar reviewed and consistent with patient reported medications.    I discussed the patient's findings and my recommendations with patient.      Signature:     This document has been electronically signed by Hollie Long PA-C on January 29, 2025 16:51 [facility] Hospitalist Team

## 2025-01-29 NOTE — ED PROVIDER NOTES
Subjective   History of Present Illness  Chief complaint: Patient is 79-year-old.  He has been having difficulty with communication.  He has not been able to speak and communicate with his wife well.  This has been going on since November.  They state it has been worsening.  He is chronically on Xarelto.  He has a history of clotting disorder and thromboembolism of the lower extremity.  He has fallen and struck his face.  He fell twice in the last week on the ice at home.  He has no focal weakness in his arms or legs or face.  No abdominal pain or chest pain.    Context:    Duration:    Timing:    Severity:    Associated Symptoms:        PCP:  LMP:      Review of Systems   HENT: Negative.     Eyes: Negative.    Cardiovascular:  Negative for chest pain.   Gastrointestinal:  Negative for abdominal pain.   Musculoskeletal: Negative.    Skin:  Positive for color change.   Neurological:  Positive for speech difficulty.   Psychiatric/Behavioral: Negative.         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   Converted from [employer]:   Description - SEBORRHEIC KERATOSIS    Pain of foot 03/19/2013    Formatting of this note might be different from the  original.   Converted from [employer]:   Description - HEEL PAIN    Prostate cancer     Thrombocytopenia 03/19/2013    Formatting of this note might be different from the original.   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   Converted from [employer]:   Description - VASCULAR DISORDER OF SKIN       No Known Allergies    Past Surgical History:   Procedure Laterality Date    CHOLECYSTECTOMY      COLONOSCOPY  10/2013    UPPER ENDOSCOPIC ULTRASOUND W/ FNA N/A 5/3/2024    Procedure: ENDOSCOPIC ULTRASOUND with fine needle aspiration x1 area;  Surgeon: Gifty Ribera MD;  Location: [facility] ENDOSCOPY;  Service: Gastroenterology;  Laterality: N/A;  Post- pancreatic cyst       Family History   Problem Relation Age of Onset    Breast cancer Other     Diabetes Other     Deep vein thrombosis Other     Diabetes type II Other        Social History     Socioeconomic History    Marital status:    Tobacco Use    Smoking status: Never     Passive exposure: Never    Smokeless tobacco: Never   Vaping Use    Vaping status: Never Used   Substance and Sexual Activity    Alcohol use: No    Drug use: Never    Sexual activity: Defer           Objective   Physical Exam  Vitals and nursing note reviewed.   HENT:      Head: Normocephalic.        Comments: Contusion to the right maxillary region  Eyes:      Extraocular Movements: Extraocular movements intact.      Pupils: Pupils are equal, round, and reactive to light.   Neck:      Comments: Distracting injury    Cardiovascular:      Rate and Rhythm: Normal rate.   Pulmonary:      Effort: Pulmonary effort is normal.   Abdominal:      Palpations: Abdomen is soft.      Tenderness: There is no abdominal tenderness.   Musculoskeletal:         General: No tenderness.      Cervical back: Normal range of motion.   Neurological:       General: No focal deficit present.      Mental Status: He is alert and oriented to person, place, and time.      Cranial Nerves: No cranial nerve deficit.      Sensory: No sensory deficit.      Motor: No weakness.      Coordination: Coordination normal.      Comments: Patient has a difficult time communicating his full complete thoughts at times.         Procedures           ED Course                                           Results for orders placed or performed during the hospital encounter of 01/29/25   Comprehensive Metabolic Panel    Collection Time: 01/29/25  1:02 PM    Specimen: Arm, Left; Blood   Result Value Ref Range    Glucose 134 (H) 65 - 99 mg/dL    BUN 14 8 - 23 mg/dL    Creatinine 1.08 0.76 - 1.27 mg/dL    Sodium 141 136 - 145 mmol/L    Potassium 4.2 3.5 - 5.2 mmol/L    Chloride 104 98 - 107 mmol/L    CO2 27.3 22.0 - 29.0 mmol/L    Calcium 9.7 8.6 - 10.5 mg/dL    Total Protein 7.6 6.0 - 8.5 g/dL    Albumin 4.2 3.5 - 5.2 g/dL    ALT (SGPT) 13 1 - 41 U/L    AST (SGOT) 20 1 - 40 U/L    Alkaline Phosphatase 85 39 - 117 U/L    Total Bilirubin 0.7 0.0 - 1.2 mg/dL    Globulin 3.4 gm/dL    A/G Ratio 1.2 g/dL    BUN/Creatinine Ratio 13.0 7.0 - 25.0    Anion Gap 9.7 5.0 - 15.0 mmol/L    eGFR 70.2 >60.0 mL/min/1.73   Protime-INR    Collection Time: 01/29/25  1:02 PM    Specimen: Arm, Left; Blood   Result Value Ref Range    Protime 23.7 (H) 11.7 - 14.2 Seconds    INR 2.11 (C) 0.90 - 1.10   CBC Auto Differential    Collection Time: 01/29/25  1:02 PM    Specimen: Arm, Left; Blood   Result Value Ref Range    WBC 6.84 3.40 - 10.80 10*3/mm3    RBC 4.77 4.14 - 5.80 10*6/mm3    Hemoglobin 13.9 13.0 - 17.7 g/dL    Hematocrit 44.3 37.5 - 51.0 %    MCV 92.9 79.0 - 97.0 fL    MCH 29.1 26.6 - 33.0 pg    MCHC 31.4 (L) 31.5 - 35.7 g/dL    RDW 13.2 12.3 - 15.4 %    RDW-SD 45.1 37.0 - 54.0 fl    MPV 13.0 (H) 6.0 - 12.0 fL    Platelets 115 (L) 140 - 450 10*3/mm3    Neutrophil % 69.2 42.7 - 76.0 %    Lymphocyte % 20.5 19.6 -  45.3 %    Monocyte % 9.2 5.0 - 12.0 %    Eosinophil % 0.7 0.3 - 6.2 %    Basophil % 0.3 0.0 - 1.5 %    Immature Grans % 0.1 0.0 - 0.5 %    Neutrophils, Absolute 4.73 1.70 - 7.00 10*3/mm3    Lymphocytes, Absolute 1.40 0.70 - 3.10 10*3/mm3    Monocytes, Absolute 0.63 0.10 - 0.90 10*3/mm3    Eosinophils, Absolute 0.05 0.00 - 0.40 10*3/mm3    Basophils, Absolute 0.02 0.00 - 0.20 10*3/mm3    Immature Grans, Absolute 0.01 0.00 - 0.05 10*3/mm3    nRBC 0.0 0.0 - 0.2 /100 WBC   Scan Slide    Collection Time: 01/29/25  1:02 PM    Specimen: Arm, Left; Blood   Result Value Ref Range    RBC Morphology Normal Normal    WBC Morphology Normal Normal    Platelet Estimate Decreased Normal    Large Platelets Slight/1+ None Seen   Type & Screen    Collection Time: 01/29/25  1:02 PM    Specimen: Arm, Left; Blood   Result Value Ref Range    ABO Type A     RH type Negative     Antibody Screen Negative     T&S Expiration Date 2/1/2025 11:59:59 PM    Green Top (Gel)    Collection Time: 01/29/25  1:02 PM   Result Value Ref Range    Extra Tube Hold for add-ons.    Lavender Top    Collection Time: 01/29/25  1:02 PM   Result Value Ref Range    Extra Tube hold for add-on    Light Blue Top    Collection Time: 01/29/25  1:02 PM   Result Value Ref Range    Extra Tube Hold for add-ons.    ECG 12 Lead ED Triage Standing Order; Stroke (Onset >12 hrs)    Collection Time: 01/29/25  1:05 PM   Result Value Ref Range    QT Interval 389 ms    QTC Interval 437 ms   POC Glucose Once    Collection Time: 01/29/25  1:08 PM    Specimen: Blood   Result Value Ref Range    Glucose 149 (H) 70 - 105 mg/dL   Urinalysis With Culture If Indicated - Urine, Clean Catch    Collection Time: 01/29/25  3:31 PM    Specimen: Urine, Clean Catch   Result Value Ref Range    Color, UA Yellow Yellow, Straw    Appearance, UA Clear Clear    pH, UA <=5.0 5.0 - 8.0    Specific Gravity, UA 1.036 (H) 1.005 - 1.030    Glucose, UA >=1000 mg/dL (3+) (A) Negative    Ketones, UA Trace (A)  Negative    Bilirubin, UA Negative Negative    Blood, UA Large (3+) (A) Negative    Protein, UA Negative Negative    Leuk Esterase, UA Negative Negative    Nitrite, UA Negative Negative    Urobilinogen, UA 0.2 E.U./dL 0.2 - 1.0 E.U./dL   Urinalysis, Microscopic Only - Urine, Clean Catch    Collection Time: 01/29/25  3:31 PM    Specimen: Urine, Clean Catch   Result Value Ref Range    RBC, UA Too Numerous to Count (A) None Seen, 0-2 /HPF    WBC, UA 0-2 None Seen, 0-2 /HPF    Bacteria, UA None Seen None Seen /HPF    Squamous Epithelial Cells, UA 0-2 None Seen, 0-2 /HPF    Hyaline Casts, UA None Seen None Seen /LPF    Methodology Automated Microscopy        MRI Brain With & Without Contrast    Result Date: 1/29/2025  1.Multiple enhancing brain lesions, largest involving the left basal ganglia/left anterior temporal lobe measuring 2.9 x 1.9 x 2.5 cm. Smaller lesions involving the mid body of the corpus callosum, left frontal lobe adjacent to the left lateral ventricle, and within the right thalamus. Findings are concerning for underlying neoplastic process with considerations including infiltrating glial neoplasm, CNS lymphoma, or metastatic disease. Subacute ischemia, tumefactive demyelination, or infectious/inflammatory processes considered less likely but not excluded. 2.Edema about the left basal ganglia, left anterior temporal lobe, and left cerebral peduncle. No appreciable midline shift or hydrocephalus. 3.No acute infarct is definitely identified. 4.Additional findings compatible with chronic microvascular ischemic change. Electronically Signed: Rene Clifford MD  1/29/2025 3:00 PM EST  Workstation ID: [id number]    CT Head Without Contrast Stroke Protocol    Result Date: 1/29/2025  1.There is limitation secondary to motion and streak artifact. 2.There is a large area of low-attenuation surrounding the left basal ganglia and extending into the left cerebral peduncle and mony. Findings are concerning for underlying  mass lesion with surrounding vasogenic edema. Changes from subacute infarct with areas of preserved density of brain parenchyma also a potential consideration. Recommend correlation with patient history particularly any known history of cancer.. Recommend MRI of the brain with and without contrast for further evaluation. 3.No acute intracranial hemorrhage. 4.Irregularity of the inferior aspect of the left side of the nasal bone is age indeterminate. Please correlate for point tenderness. 5.Chronic left-sided mastoid effusion. Electronically Signed: Obey Burgos MD  1/29/2025 1:18 PM EST  Workstation ID: [id number]    CT Facial Bones Without Contrast    Result Date: 1/29/2025  1.There is limitation secondary to motion and streak artifact. 2.There is a large area of low-attenuation surrounding the left basal ganglia and extending into the left cerebral peduncle and mony. Findings are concerning for underlying mass lesion with surrounding vasogenic edema. Changes from subacute infarct with areas of preserved density of brain parenchyma also a potential consideration. Recommend correlation with patient history particularly any known history of cancer.. Recommend MRI of the brain with and without contrast for further evaluation. 3.No acute intracranial hemorrhage. 4.Irregularity of the inferior aspect of the left side of the nasal bone is age indeterminate. Please correlate for point tenderness. 5.Chronic left-sided mastoid effusion. Electronically Signed: Obey Burgos MD  1/29/2025 1:18 PM EST  Workstation ID: [id number]    CT Cervical Spine Without Contrast    Result Date: 1/29/2025  Impression: 1. Degenerative changes in the cervical spine. No acute cervical spine findings. Electronically Signed: Naty Keenan MD  1/29/2025 1:12 PM EST  Workstation ID: [id number]    XR Chest 1 View    Result Date: 1/29/2025  Impression: No acute cardiopulmonary findings. Electronically Signed: Naty Keenan MD  1/29/2025 12:35 PM  EST  Workstation ID: [id number]               Medical Decision Making  Patient was seen and evaluated for the above problem    Differential diagnosis includes but is not limited to stroke, brain mass, intracerebral hemorrhage,    Patient is on Xarelto.  He did hit his head and I was concerned about intracerebral hemorrhage.  However he has had communication problems for couple of months.  CT scan questions of brain mass.  I did send for MRI with and without contrast.  This does show multiple brain masses.  There is some edema present.  I spoke with [place] on-call for neurosurgery.  He states no Decadron or steroid at this time.  He also states no Keppra unless the patient has a history of seizures.  He wants the patient to have Xarelto held in case they need to obtain a biopsy.  I spoke with family and patient on results.  Patient will be admitted for further neurologic workup and cancer workup.  I spoke with [place] on-call for the hospitalist agrees to admit the patient.    Problems Addressed:  Brain mass: complicated acute illness or injury  Speech disturbance, unspecified type: complicated acute illness or injury    Amount and/or Complexity of Data Reviewed  Labs: ordered. Decision-making details documented in ED Course.     Details: Labs reviewed by myself  Radiology: ordered and independent interpretation performed.     Details: MRI and CTs reviewed by myself as above  ECG/medicine tests: ordered and independent interpretation performed.     Details: EKG interpreted by myself sinus rhythm rate 76 inferior Q waves.    Risk  Prescription drug management.  Decision regarding hospitalization.        Final diagnoses:   None   Multiple brain masses  Difficulty with speech      ED Disposition  ED Disposition       None            No follow-up provider specified.       Medication List      No changes were made to your prescriptions during this visit.            Adan Espinal DO  01/29/25 3040

## 2025-01-29 NOTE — PLAN OF CARE
Patient presented w/ insidious onset speech difficulties for the past 2-3 months which has continued to worsen. No known hx of cancer. No focal neurologic deficits on exam. Patient able to communicate, but the longer he talks, the more difficulty he has with word-finding. Had 2 falls on the ice last week w/ minor facial trauma.    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 There is some surrounding edema but no significant shift and no evidence of hydrocephalus.    Patient is being admitted for further workup. I have ordered a contrast CT chest/abdomen/pelvis for further metastatic workup. He has no hx of seizures so we will hold off on Keppra for now. Lymphoma is still in the differential so no steroids for now.    With involvement of the corpus callosum and bihemispheric spread, neurosurgical intervention is unlikely to be indicated. If a diagnosis cannot be made through biopsy of an axial mass, then we could consider a brain biopsy for diagnosis. For now we will see what the patients CT scans look like and proceed accordingly.      New enhancing brain mass  - hold Xarelto  - CT chest/abdomen/pelvis w/ and w/o contrast for metastatic workup  - hold off on Keppra for now  - no steroids for now  - routine sodium monitoring w/ goal >135  - consult hematology/oncology    Full neurosurgical consult to follow once patients CT scans are complete. Call with any questions or concerns.    I discussed my assessment and recommendations with Dr. Espinal and Dr. Ofe Whittington PA-C  1/29/2025

## 2025-01-30 ENCOUNTER — HOSPITAL ENCOUNTER (INPATIENT)
Facility: HOSPITAL | Age: 80
LOS: 11 days | Discharge: HOME HEALTH | DRG: 820 | End: 2025-02-10
Attending: HOSPITALIST | Admitting: HOSPITALIST
Payer: MEDICARE

## 2025-01-30 VITALS
TEMPERATURE: 98.2 F | DIASTOLIC BLOOD PRESSURE: 72 MMHG | BODY MASS INDEX: 37.8 KG/M2 | RESPIRATION RATE: 14 BRPM | OXYGEN SATURATION: 92 % | WEIGHT: 279.1 LBS | HEART RATE: 80 BPM | SYSTOLIC BLOOD PRESSURE: 124 MMHG | HEIGHT: 72 IN

## 2025-01-30 DIAGNOSIS — G93.89 BRAIN MASS: Primary | ICD-10-CM

## 2025-01-30 DIAGNOSIS — I10 BENIGN ESSENTIAL HYPERTENSION: ICD-10-CM

## 2025-01-30 LAB
ANION GAP SERPL CALCULATED.3IONS-SCNC: 10.8 MMOL/L (ref 5–15)
BASOPHILS # BLD AUTO: 0.02 10*3/MM3 (ref 0–0.2)
BASOPHILS NFR BLD AUTO: 0.3 % (ref 0–1.5)
BUN SERPL-MCNC: 13 MG/DL (ref 8–23)
BUN/CREAT SERPL: 13.4 (ref 7–25)
CALCIUM SPEC-SCNC: 8.8 MG/DL (ref 8.6–10.5)
CHLORIDE SERPL-SCNC: 105 MMOL/L (ref 98–107)
CO2 SERPL-SCNC: 23.2 MMOL/L (ref 22–29)
CREAT SERPL-MCNC: 0.97 MG/DL (ref 0.76–1.27)
DEPRECATED RDW RBC AUTO: 46.5 FL (ref 37–54)
EGFRCR SERPLBLD CKD-EPI 2021: 79.9 ML/MIN/1.73
EOSINOPHIL # BLD AUTO: 0.12 10*3/MM3 (ref 0–0.4)
EOSINOPHIL NFR BLD AUTO: 2 % (ref 0.3–6.2)
ERYTHROCYTE [DISTWIDTH] IN BLOOD BY AUTOMATED COUNT: 13.1 % (ref 12.3–15.4)
GIANT PLATELETS: NORMAL
GLUCOSE BLDC GLUCOMTR-MCNC: 139 MG/DL (ref 70–105)
GLUCOSE BLDC GLUCOMTR-MCNC: 148 MG/DL (ref 70–105)
GLUCOSE BLDC GLUCOMTR-MCNC: 187 MG/DL (ref 70–130)
GLUCOSE BLDC GLUCOMTR-MCNC: 228 MG/DL (ref 70–105)
GLUCOSE SERPL-MCNC: 109 MG/DL (ref 65–99)
HCT VFR BLD AUTO: 41.7 % (ref 37.5–51)
HGB BLD-MCNC: 12.7 G/DL (ref 13–17.7)
IMM GRANULOCYTES # BLD AUTO: 0.01 10*3/MM3 (ref 0–0.05)
IMM GRANULOCYTES NFR BLD AUTO: 0.2 % (ref 0–0.5)
LARGE PLATELETS: NORMAL
LYMPHOCYTES # BLD AUTO: 1.66 10*3/MM3 (ref 0.7–3.1)
LYMPHOCYTES NFR BLD AUTO: 27.7 % (ref 19.6–45.3)
MAGNESIUM SERPL-MCNC: 2.3 MG/DL (ref 1.6–2.4)
MCH RBC QN AUTO: 29.3 PG (ref 26.6–33)
MCHC RBC AUTO-ENTMCNC: 30.5 G/DL (ref 31.5–35.7)
MCV RBC AUTO: 96.1 FL (ref 79–97)
MONOCYTES # BLD AUTO: 0.54 10*3/MM3 (ref 0.1–0.9)
MONOCYTES NFR BLD AUTO: 9 % (ref 5–12)
NEUTROPHILS NFR BLD AUTO: 3.65 10*3/MM3 (ref 1.7–7)
NEUTROPHILS NFR BLD AUTO: 60.8 % (ref 42.7–76)
NRBC BLD AUTO-RTO: 0 /100 WBC (ref 0–0.2)
PLATELET # BLD AUTO: 110 10*3/MM3 (ref 140–450)
PMV BLD AUTO: 12.8 FL (ref 6–12)
POTASSIUM SERPL-SCNC: 4.1 MMOL/L (ref 3.5–5.2)
QT INTERVAL: 389 MS
QTC INTERVAL: 437 MS
RBC # BLD AUTO: 4.34 10*6/MM3 (ref 4.14–5.8)
RBC MORPH BLD: NORMAL
SMALL PLATELETS BLD QL SMEAR: NORMAL
SODIUM SERPL-SCNC: 139 MMOL/L (ref 136–145)
WBC MORPH BLD: NORMAL
WBC NRBC COR # BLD AUTO: 6 10*3/MM3 (ref 3.4–10.8)

## 2025-01-30 PROCEDURE — 25010000002 DEXAMETHASONE PER 1 MG: Performed by: HOSPITALIST

## 2025-01-30 PROCEDURE — 99221 1ST HOSP IP/OBS SF/LOW 40: CPT | Performed by: INTERNAL MEDICINE

## 2025-01-30 PROCEDURE — 80048 BASIC METABOLIC PNL TOTAL CA: CPT

## 2025-01-30 PROCEDURE — 83735 ASSAY OF MAGNESIUM: CPT

## 2025-01-30 PROCEDURE — 63710000001 INSULIN LISPRO (HUMAN) PER 5 UNITS: Performed by: HOSPITALIST

## 2025-01-30 PROCEDURE — 63710000001 INSULIN LISPRO (HUMAN) PER 5 UNITS

## 2025-01-30 PROCEDURE — 82948 REAGENT STRIP/BLOOD GLUCOSE: CPT

## 2025-01-30 PROCEDURE — 99222 1ST HOSP IP/OBS MODERATE 55: CPT

## 2025-01-30 PROCEDURE — 85007 BL SMEAR W/DIFF WBC COUNT: CPT

## 2025-01-30 PROCEDURE — 85025 COMPLETE CBC W/AUTO DIFF WBC: CPT

## 2025-01-30 RX ORDER — SODIUM CHLORIDE 0.9 % (FLUSH) 0.9 %
10 SYRINGE (ML) INJECTION AS NEEDED
Status: DISCONTINUED | OUTPATIENT
Start: 2025-01-30 | End: 2025-02-10 | Stop reason: HOSPADM

## 2025-01-30 RX ORDER — AMOXICILLIN 250 MG
2 CAPSULE ORAL 2 TIMES DAILY PRN
Status: DISCONTINUED | OUTPATIENT
Start: 2025-01-30 | End: 2025-02-03 | Stop reason: SDUPTHER

## 2025-01-30 RX ORDER — INSULIN LISPRO 100 [IU]/ML
2-7 INJECTION, SOLUTION INTRAVENOUS; SUBCUTANEOUS
Status: DISCONTINUED | OUTPATIENT
Start: 2025-01-30 | End: 2025-02-04

## 2025-01-30 RX ORDER — TAMSULOSIN HYDROCHLORIDE 0.4 MG/1
0.4 CAPSULE ORAL DAILY
Status: DISCONTINUED | OUTPATIENT
Start: 2025-01-31 | End: 2025-02-10 | Stop reason: HOSPADM

## 2025-01-30 RX ORDER — BISACODYL 10 MG
10 SUPPOSITORY, RECTAL RECTAL DAILY PRN
Status: DISCONTINUED | OUTPATIENT
Start: 2025-01-30 | End: 2025-02-03 | Stop reason: SDUPTHER

## 2025-01-30 RX ORDER — NICOTINE POLACRILEX 4 MG
15 LOZENGE BUCCAL
Status: DISCONTINUED | OUTPATIENT
Start: 2025-01-30 | End: 2025-02-10 | Stop reason: HOSPADM

## 2025-01-30 RX ORDER — BISACODYL 5 MG/1
5 TABLET, DELAYED RELEASE ORAL DAILY PRN
Status: DISCONTINUED | OUTPATIENT
Start: 2025-01-30 | End: 2025-02-03 | Stop reason: SDUPTHER

## 2025-01-30 RX ORDER — LOSARTAN POTASSIUM 50 MG/1
25 TABLET ORAL DAILY
Status: DISCONTINUED | OUTPATIENT
Start: 2025-01-31 | End: 2025-02-04

## 2025-01-30 RX ORDER — DEXAMETHASONE SODIUM PHOSPHATE 4 MG/ML
4 INJECTION, SOLUTION INTRA-ARTICULAR; INTRALESIONAL; INTRAMUSCULAR; INTRAVENOUS; SOFT TISSUE EVERY 6 HOURS
Status: DISCONTINUED | OUTPATIENT
Start: 2025-01-31 | End: 2025-02-06

## 2025-01-30 RX ORDER — ONDANSETRON 2 MG/ML
4 INJECTION INTRAMUSCULAR; INTRAVENOUS EVERY 6 HOURS PRN
Status: DISCONTINUED | OUTPATIENT
Start: 2025-01-30 | End: 2025-02-03 | Stop reason: SDUPTHER

## 2025-01-30 RX ORDER — DEXTROSE MONOHYDRATE 25 G/50ML
25 INJECTION, SOLUTION INTRAVENOUS
Status: DISCONTINUED | OUTPATIENT
Start: 2025-01-30 | End: 2025-02-10 | Stop reason: HOSPADM

## 2025-01-30 RX ORDER — SODIUM CHLORIDE 0.9 % (FLUSH) 0.9 %
10 SYRINGE (ML) INJECTION EVERY 12 HOURS SCHEDULED
Status: DISCONTINUED | OUTPATIENT
Start: 2025-01-30 | End: 2025-02-10 | Stop reason: HOSPADM

## 2025-01-30 RX ORDER — ACETAMINOPHEN 160 MG/5ML
650 SOLUTION ORAL EVERY 4 HOURS PRN
Status: DISCONTINUED | OUTPATIENT
Start: 2025-01-30 | End: 2025-02-03 | Stop reason: SDUPTHER

## 2025-01-30 RX ORDER — ATORVASTATIN CALCIUM 20 MG/1
10 TABLET, FILM COATED ORAL DAILY
Status: DISCONTINUED | OUTPATIENT
Start: 2025-01-31 | End: 2025-02-10 | Stop reason: HOSPADM

## 2025-01-30 RX ORDER — SODIUM CHLORIDE 9 MG/ML
40 INJECTION, SOLUTION INTRAVENOUS AS NEEDED
Status: DISCONTINUED | OUTPATIENT
Start: 2025-01-30 | End: 2025-02-10 | Stop reason: HOSPADM

## 2025-01-30 RX ORDER — ONDANSETRON 4 MG/1
4 TABLET, ORALLY DISINTEGRATING ORAL EVERY 6 HOURS PRN
Status: DISCONTINUED | OUTPATIENT
Start: 2025-01-30 | End: 2025-02-03 | Stop reason: SDUPTHER

## 2025-01-30 RX ORDER — FAMOTIDINE 20 MG/1
40 TABLET, FILM COATED ORAL DAILY
Status: DISCONTINUED | OUTPATIENT
Start: 2025-01-31 | End: 2025-02-10 | Stop reason: HOSPADM

## 2025-01-30 RX ORDER — ACETAMINOPHEN 325 MG/1
650 TABLET ORAL EVERY 4 HOURS PRN
Status: DISCONTINUED | OUTPATIENT
Start: 2025-01-30 | End: 2025-02-03 | Stop reason: SDUPTHER

## 2025-01-30 RX ORDER — ACETAMINOPHEN 650 MG/1
650 SUPPOSITORY RECTAL EVERY 4 HOURS PRN
Status: DISCONTINUED | OUTPATIENT
Start: 2025-01-30 | End: 2025-02-03 | Stop reason: SDUPTHER

## 2025-01-30 RX ORDER — POLYETHYLENE GLYCOL 3350 17 G/17G
17 POWDER, FOR SOLUTION ORAL DAILY PRN
Status: DISCONTINUED | OUTPATIENT
Start: 2025-01-30 | End: 2025-02-03 | Stop reason: SDUPTHER

## 2025-01-30 RX ORDER — DEXAMETHASONE SODIUM PHOSPHATE 10 MG/ML
10 INJECTION INTRAMUSCULAR; INTRAVENOUS ONCE
Status: COMPLETED | OUTPATIENT
Start: 2025-01-30 | End: 2025-01-30

## 2025-01-30 RX ORDER — AMLODIPINE BESYLATE 10 MG/1
10 TABLET ORAL
Status: DISCONTINUED | OUTPATIENT
Start: 2025-01-31 | End: 2025-01-31

## 2025-01-30 RX ORDER — IBUPROFEN 600 MG/1
1 TABLET ORAL
Status: DISCONTINUED | OUTPATIENT
Start: 2025-01-30 | End: 2025-02-10 | Stop reason: HOSPADM

## 2025-01-30 RX ADMIN — DEXAMETHASONE SODIUM PHOSPHATE 10 MG: 10 INJECTION INTRAMUSCULAR; INTRAVENOUS at 21:59

## 2025-01-30 RX ADMIN — INSULIN LISPRO 3 UNITS: 100 INJECTION, SOLUTION INTRAVENOUS; SUBCUTANEOUS at 12:12

## 2025-01-30 RX ADMIN — LOSARTAN POTASSIUM 25 MG: 25 TABLET, FILM COATED ORAL at 10:00

## 2025-01-30 RX ADMIN — INSULIN LISPRO 2 UNITS: 100 INJECTION, SOLUTION INTRAVENOUS; SUBCUTANEOUS at 21:59

## 2025-01-30 RX ADMIN — TAMSULOSIN HYDROCHLORIDE 0.4 MG: 0.4 CAPSULE ORAL at 10:00

## 2025-01-30 RX ADMIN — ATORVASTATIN CALCIUM 10 MG: 10 TABLET ORAL at 10:00

## 2025-01-30 RX ADMIN — AMLODIPINE BESYLATE 10 MG: 5 TABLET ORAL at 10:00

## 2025-01-30 RX ADMIN — Medication 10 ML: at 22:00

## 2025-01-30 NOTE — PLAN OF CARE
Problem: Violence Risk or Actual  Goal: Anger and Impulse Control  1/30/2025 1817 by Pattie Preston RN  Outcome: Met  1/30/2025 1727 by Pattie Preston RN  Outcome: Progressing  Intervention: Minimize Safety Risk  Recent Flowsheet Documentation  Taken 1/30/2025 0830 by Pattie Preston RN  Enhanced Safety Measures:   bed alarm refused   room near unit station  Intervention: Promote Self-Control  Recent Flowsheet Documentation  Taken 1/30/2025 0830 by Pattie Preston RN  Supportive Measures: active listening utilized  Environmental Support: calm environment promoted     Problem: Adult Inpatient Plan of Care  Goal: Plan of Care Review  1/30/2025 1817 by Pattie Preston RN  Outcome: Met  1/30/2025 1727 by Pattie Preston RN  Outcome: Progressing  Goal: Patient-Specific Goal (Individualized)  1/30/2025 1817 by Pattie Preston RN  Outcome: Met  1/30/2025 1727 by Pattie Preston RN  Outcome: Progressing  Goal: Absence of Hospital-Acquired Illness or Injury  1/30/2025 1817 by Pattie Preston RN  Outcome: Met  1/30/2025 1727 by Pattie Preston RN  Outcome: Progressing  Intervention: Identify and Manage Fall Risk  Recent Flowsheet Documentation  Taken 1/30/2025 1800 by Pattie Preston RN  Safety Promotion/Fall Prevention:   fall prevention program maintained   safety round/check completed  Taken 1/30/2025 1702 by Pattie Preston RN  Safety Promotion/Fall Prevention:   fall prevention program maintained   safety round/check completed  Taken 1/30/2025 1603 by Pattie Preston RN  Safety Promotion/Fall Prevention:   fall prevention program maintained   safety round/check completed  Taken 1/30/2025 1432 by Pattie Preston, RN  Safety Promotion/Fall Prevention:   fall prevention program maintained   safety round/check completed  Taken 1/30/2025 1200 by Pattie Preston, RN  Safety Promotion/Fall Prevention:   fall prevention program maintained   safety round/check completed  Taken  1/30/2025 1019 by Pattie Preston RN  Safety Promotion/Fall Prevention:   fall prevention program maintained   safety round/check completed  Taken 1/30/2025 0830 by Pattie Preston RN  Safety Promotion/Fall Prevention:   safety round/check completed   fall prevention program maintained  Intervention: Prevent Skin Injury  Recent Flowsheet Documentation  Taken 1/30/2025 0830 by Pattie Preston RN  Body Position: position changed independently  Skin Protection: incontinence pads utilized  Intervention: Prevent and Manage VTE (Venous Thromboembolism) Risk  Recent Flowsheet Documentation  Taken 1/30/2025 0830 by Pattie Preston RN  VTE Prevention/Management: compression stockings on  Intervention: Prevent Infection  Recent Flowsheet Documentation  Taken 1/30/2025 0830 by Pattie Preston RN  Infection Prevention: single patient room provided  Goal: Optimal Comfort and Wellbeing  1/30/2025 1817 by Pattie Preston RN  Outcome: Met  1/30/2025 1727 by Pattie Preston RN  Outcome: Progressing  Intervention: Provide Person-Centered Care  Recent Flowsheet Documentation  Taken 1/30/2025 0830 by Pattie Preston RN  Trust Relationship/Rapport:   care explained   choices provided  Goal: Readiness for Transition of Care  1/30/2025 1817 by Pattie Preston RN  Outcome: Met  1/30/2025 1727 by Pattie Preston RN  Outcome: Progressing     Problem: Skin Injury Risk Increased  Goal: Skin Health and Integrity  1/30/2025 1817 by Pattie Preston RN  Outcome: Met  1/30/2025 1727 by Pattie Preston RN  Outcome: Progressing  Intervention: Optimize Skin Protection  Recent Flowsheet Documentation  Taken 1/30/2025 0830 by Pattie Preston RN  Activity Management: up ad minerva  Pressure Reduction Techniques: frequent weight shift encouraged  Head of Bed (HOB) Positioning: HOB elevated  Pressure Reduction Devices: pressure-redistributing mattress utilized  Skin Protection: incontinence pads utilized   Goal Outcome  Evaluation:   Pt is being transferred to [facility] for services we do not provide at this hospital. Discharge paperwork has been discussed with pt. Will leave IV in since pt is transferring via EMS to another hospital. His belongings will be taken with him. Pts wife to be notified when pt is leaving. Giving this information in report to the next nurse. Report called to [facility].

## 2025-01-30 NOTE — CONSULTS
Neurosurgery Consultation      Patient: Sumit Jules    Sex: male    YOB: 1945    Date of Admission: 1/29/2025 11:22 AM    Admitting Dx: Brain mass [G93.89]  Speech disturbance, unspecified type [R47.9]    Reason for Consult: Brain mass      Subjective     CC: Speech difficulties x 3 months    HPI:  Patient is a 79 y.o. 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 He primarily describes word finding difficulties that have made it difficult to communicate appropriately.  He has no significant mechanical speech issues.  Additionally, he reports some worsening balance issues and difficulty with fine motor coordination in his hands, evidenced by difficulty writing with a pen/pencil.    Patient denies a prior history of cancer, despite prostate cancer being listed in his past medical history.  He denies weakness in his extremities, visual disturbance, and speech difficulties.  During my evaluation he seems to communicate well.  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   Converted from [employer]:   Description - SEBORRHEIC KERATOSIS    Pain of foot 03/19/2013    Formatting of this note might be different from the original.   Converted from [employer]:   Description - HEEL PAIN    Prostate cancer     Thrombocytopenia 03/19/2013    Formatting of this note might be different from the original.   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   Converted from [employer]:   Description - VASCULAR DISORDER OF SKIN         Current Facility-Administered Medications:     acetaminophen (TYLENOL) tablet 650 mg, 650 mg, Oral, Q4H PRN **OR** acetaminophen (TYLENOL) 160 MG/5ML oral solution 650 mg, 650 mg, Oral, Q4H PRN **OR** acetaminophen (TYLENOL) suppository 650 mg, 650 mg, Rectal, Q4H PRN, Hollie Long PA-C    amLODIPine (NORVASC) tablet 10 mg, 10 mg, Oral, Q24H, Hollie Long PA-TIN    atorvastatin (LIPITOR) tablet 10 mg, 10 mg, Oral, Daily, Hollie Long PA-C    sennosides-docusate (PERICOLACE) 8.6-50 MG per tablet 2 tablet, 2 tablet, Oral, BID PRN **AND** polyethylene glycol (MIRALAX) packet 17 g, 17 g, Oral, Daily PRN **AND** bisacodyl (DULCOLAX) EC tablet 5 mg, 5 mg, Oral, Daily PRN **AND** bisacodyl (DULCOLAX) suppository 10 mg, 10 mg, Rectal, Daily PRN, Hollie Long PA-C    Calcium Replacement - Follow Nurse / BPA Driven Protocol, , Not Applicable, PRN, Hollie Long PA-C    dextrose (D50W) (25 g/50 mL) IV injection 25 g, 25 g, Intravenous, Q15 Min PRN, Hollie Long PA-TIN    dextrose (GLUTOSE) oral gel 15 g, 15 g, Oral, Q15 Min PRN, Hollie Long PA-C    glucagon (GLUCAGEN) injection 1 mg, 1 mg, Intramuscular, Q15 Min PRN, Hollie Long PA-C    insulin lispro (HUMALOG/ADMELOG) injection 2-7 Units, 2-7 Units, Subcutaneous, 4x Daily AC & at Bedtime, Mercedes  BRYANT Browne, 2 Units at 01/29/25 2120    losartan (COZAAR) tablet 25 mg, 25 mg, Oral, Daily, Hollie Long PA-C    Magnesium Standard Dose Replacement - Follow Nurse / BPA Driven Protocol, , Not Applicable, PRN, Hollie Long PA-C    ondansetron ODT (ZOFRAN-ODT) disintegrating tablet 4 mg, 4 mg, Oral, Q6H PRN **OR** ondansetron (ZOFRAN) injection 4 mg, 4 mg, Intravenous, Q6H PRN, Hollie Long PA-C    Phosphorus Replacement - Follow Nurse / BPA Driven Protocol, , Not Applicable, PRN, Hollie Long PA-C    Potassium Replacement - Follow Nurse / BPA Driven Protocol, , Not Applicable, PRN, Hollie Long PA-C    [Held by provider] rivaroxaban (XARELTO) tablet 20 mg, 20 mg, Oral, Daily, Hollie Long PA-C    sodium chloride 0.9 % flush 10 mL, 10 mL, Intravenous, PRN, Adan Espinal,     tamsulosin (FLOMAX) 24 hr capsule 0.4 mg, 0.4 mg, Oral, Daily, Hollie Long PA-C    No Known Allergies    Past Surgical History:   Procedure Laterality Date    CHOLECYSTECTOMY      COLONOSCOPY  10/2013    UPPER ENDOSCOPIC ULTRASOUND W/ FNA N/A 5/3/2024    Procedure: ENDOSCOPIC ULTRASOUND with fine needle aspiration x1 area;  Surgeon: Gifty Ribera MD;  Location: [facility] END[facility];  Service: Gastroenterology;  Laterality: N/A;  Post- pancreatic cyst       Social History     Socioeconomic History    Marital status:    Tobacco Use    Smoking status: Never     Passive exposure: Never    Smokeless tobacco: Never   Vaping Use    Vaping status: Never Used   Substance and Sexual Activity    Alcohol use: No    Drug use: Never    Sexual activity: Defer       Family History   Problem Relation Age of Onset    Breast cancer Other     Diabetes Other     Deep vein thrombosis Other     Diabetes type II Other          Current Medications:  Scheduled Meds:amLODIPine, 10 mg, Oral, Q24H  atorvastatin, 10 mg, Oral, Daily  insulin lispro, 2-7 Units, Subcutaneous, 4x Daily AC & at Bedtime  losartan, 25 mg, Oral,  Daily  [Held by provider] rivaroxaban, 20 mg, Oral, Daily  tamsulosin, 0.4 mg, Oral, Daily      Continuous Infusions:   PRN Meds:   acetaminophen **OR** acetaminophen **OR** acetaminophen    senna-docusate sodium **AND** polyethylene glycol **AND** bisacodyl **AND** bisacodyl    Calcium Replacement - Follow Nurse / BPA Driven Protocol    dextrose    dextrose    glucagon (human recombinant)    Magnesium Standard Dose Replacement - Follow Nurse / BPA Driven Protocol    ondansetron ODT **OR** ondansetron    Phosphorus Replacement - Follow Nurse / BPA Driven Protocol    Potassium Replacement - Follow Nurse / BPA Driven Protocol    sodium chloride    ROS: 14 point review of systems was completed and is negative except for what is noted in HPI      Objective     Vitals:    01/29/25 2030 01/29/25 2308 01/30/25 0233 01/30/25 0858   BP: 136/66 116/67 177/88 127/72   BP Location:  Left arm Left arm Left arm   Patient Position:  Lying Lying Lying   Pulse: 61 55 (!) 47 60   Resp: 18 18 16 13   Temp: 98.3 °F (36.8 °C) 98.7 °F (37.1 °C) 98.4 °F (36.9 °C) 97.8 °F (36.6 °C)   TempSrc: Oral Oral Oral Oral   SpO2: 91% 92% 91% 97%   Weight:       Height:           Physical exam  General  - awake, cooperative, in no acute distress  Cardiac  - RRR, no peripheral edema  Respiratory  - Normal respiratory rate and effort      NEUROLOGIC    MENTAL STATUS:  - A/O x3  CRANIAL NERVES:  II:      Pupils equal, round, and reactive to light  III, IV, VI: EOM intact, no gaze preference or deviation  V:      Normal sensation in all 3 segments bilaterally  VII:      No facial asymmetry  VIII:      Normal hearing to speech  IX, X:      Normal palatal elevation, no uvular deviation  XI:      Normal head turn and shoulder shrug bilaterally  XII:      Midline tongue protrusion  MOTOR:  - BRO symmetrically w/ 5/5 strength throughout  - Right-sided pronator drift present  REFLEXES:  - Brisk and symmetric throughout  - Negative clonus  SENSORY:  - Normal to  touch and pinprick throughout  COORDINATION:  - Very mild right-sided dysmetria on finger-nose testing  GAIT:  - Deferred            RESULTS REVIEW:  Results from last 7 days   Lab Units 01/30/25  0510 01/29/25  1302   WBC 10*3/mm3 6.00 6.84   HEMOGLOBIN g/dL 12.7* 13.9   HEMATOCRIT % 41.7 44.3   PLATELETS 10*3/mm3 110* 115*        Results from last 7 days   Lab Units 01/30/25  0510 01/29/25  1302   SODIUM mmol/L 139 141   POTASSIUM mmol/L 4.1 4.2   CHLORIDE mmol/L 105 104   CO2 mmol/L 23.2 27.3   BUN mg/dL 13 14   CREATININE mg/dL 0.97 1.08   CALCIUM mg/dL 8.8 9.7   BILIRUBIN mg/dL  --  0.7   ALK PHOS U/L  --  85   ALT (SGPT) U/L  --  13   AST (SGOT) U/L  --  20   GLUCOSE mg/dL 109* 134*           CT Cervical Spine Without Contrast    Result Date: 1/29/2025  CT CERVICAL SPINE WO CONTRAST Date of Exam: 1/29/2025 12:40 PM EST Indication: trauma. Comparison: None available. Technique: Axial CT images were obtained of the cervical spine without contrast administration.  Sagittal and coronal reconstructions were performed.  Automated exposure control and iterative reconstruction methods were used. Findings: Craniocervical alignment is normal. No cervical spine fracture or subluxation is seen. Multilevel degenerative changes are present. There is a continuation in the upper lobes may reflect changes of chronic small airways disease, and the remainder the paraspinal soft tissues are within normal limits. There are degenerative changes of the C2 dens/anterior C1 ring junction. At C2-3, advanced left facet arthropathy is present. No significant disc bulge or canal stenosis. Right neural foramen is patent. Mild to moderate left neural foraminal stenosis. At C3-4, severe left facet arthropathy and moderate right facet arthropathy is present. There is left greater than right uncovertebral spurring and moderate disc bulge. No significant central canal stenosis. Severe left neural foraminal stenosis. Mild to  moderate right  neural foraminal stenosis At C4-5, mild posterior disc osteophyte formation is present with bilateral uncovertebral spurring. Severe left and moderate right facet arthropathy is present. Mild central canal stenosis. Severe bilateral neural foraminal stenosis. At C5-6, posterior disc osteophyte formation is present with borderline to mild central canal stenosis. Severe left and moderate right facet arthropathy is present with bilateral uncovertebral spurring. Severe left neural foraminal stenosis. Moderate to severe right neural foraminal stenosis. At C6-7, mild posterior disc osteophyte formation is present. Borderline to mild central canal stenosis. Right greater than left uncovertebral spurring with mild to moderate facet arthropathy. Severe right neural foraminal stenosis. Moderate left neural foraminal stenosis At C7-T1, no significant disc bulge or canal stenosis. Mild bilateral facet arthropathy. No significant neural foraminal stenosis.     Impression: Impression: 1. Degenerative changes in the cervical spine. No acute cervical spine findings. Electronically Signed: Naty Keenan MD  1/29/2025 1:12 PM EST  Workstation ID: [id number]      CT Head Without Contrast Stroke Protocol    Result Date: 1/29/2025  CT HEAD WO CONTRAST STROKE PROTOCOL, CT FACIAL BONES WO CONTRAST Date of Exam: 1/29/2025 12:40 PM EST Indication: Stroke, follow up Neuro deficit, acute, stroke suspected. Comparison: None available. Technique: Axial CT images were obtained of the head and maxillofacial bones without contrast administration.  Reconstructed coronal and sagittal images were also obtained. Automated exposure control and iterative construction methods were used. Results discussed with Dr. Espinal at time of interpretation. FINDINGS: Brain/Ventricles: There is limitation secondary to streak artifact and motion. There is areas of low-attenuation surrounding the left basal ganglia involving the frontal temporal and to lesser the  parietal lobes. This appears to surround a relatively circumferential area of preserved or increased density within the left basal ganglia, subinsular cortex measuring approximately 2.5 cm. This could be artifactual from subacute infarct or related to an underlying lesion, mass with surrounding vasogenic edema. Changes also appear to extend into the left cerebral peduncle and to lesser extent the mony. There is very mild degree of mass effect on the lateral ventricle. No acute hemorrhage is noted. There is a mild degree of diffuse atrophy and white matter changes additionally noted not unexpected for patient stated age. Orbits: The visualized portion of the orbits demonstrate no acute abnormality. Sinuses and maxillofacial bones.: Paranasal sinuses appear to be clear with some mucosal thickening, possible mucous retention cyst inferiorly within the maxillary sinuses. No air-fluid levels noted. The maxillofacial bones appear to be grossly intact. There is some mild irregularity along the inferior aspect of the left side of the nasal bone which is age indeterminate. Nasal bone fracture is not excluded. Chronic left-sided mastoid effusion noted. Mandible and temporomandibular joints are grossly unremarkable in appearance Soft Tissues/Skull: No acute abnormality of the visualized skull or soft tissues.     Impression: 1.There is limitation secondary to motion and streak artifact. 2.There is a large area of low-attenuation surrounding the left basal ganglia and extending into the left cerebral peduncle and mony. Findings are concerning for underlying mass lesion with surrounding vasogenic edema. Changes from subacute infarct with areas of preserved density of brain parenchyma also a potential consideration. Recommend correlation with patient history particularly any known history of cancer.. Recommend MRI of the brain with and without contrast for further evaluation. 3.No acute intracranial hemorrhage. 4.Irregularity of  the inferior aspect of the left side of the nasal bone is age indeterminate. Please correlate for point tenderness. 5.Chronic left-sided mastoid effusion. Electronically Signed: Obey Burgos MD  1/29/2025 1:18 PM EST  Workstation ID: [id number]   MRI Brain With & Without Contrast    Result Date: 1/29/2025  MRI BRAIN W WO CONTRAST Date of Exam: 1/29/2025 1:56 PM EST Indication: speech abnormality confusion.  Comparison: Noncontrast head CT from today Technique:  Routine multiplanar/multisequence sequence images of the brain were obtained before and after the uneventful administration of Prohance. FINDINGS: Irregular enhancing masslike focus centered about the left basal ganglia and left anterior temporal lobe measuring 2.9 x 1.9 x 2.5 cm. Lesion demonstrates somewhat heterogeneous, intermediate DWI signal. 1.6 x 0.6 cm enhancing lesion involving the mid body of the corpus callosum (series 17, image 100). Mild patchy enhancement along the left lateral ventricle centered on series 17, image 108. 1 cm irregular enhancing focus within the right thalamus (series 17, image 85). There is edema about the left basal ganglia, left anterior temporal lobe, and extending into the left cerebral peduncle. Foci of T2/FLAIR signal hyperintensity are seen within the bilateral hemispheric white matter No midline shift or hydrocephalus is seen. No acute infarct is definitely identified. The visualized intracranial flow-voids appear unremarkable. Scattered paranasal sinus mucosal thickening is seen. Orbital structures are unremarkable. The visualized superficial soft tissues and cervical spine demonstrate no significant abnormality.     Impression: 1.Multiple enhancing brain lesions, largest involving the left basal ganglia/left anterior temporal lobe measuring 2.9 x 1.9 x 2.5 cm. Smaller lesions involving the mid body of the corpus callosum, left frontal lobe adjacent to the left lateral ventricle, and within the right thalamus.  Findings are concerning for underlying neoplastic process with considerations including infiltrating glial neoplasm, CNS lymphoma, or metastatic disease. Subacute ischemia, tumefactive demyelination, or infectious/inflammatory processes considered less likely but not excluded. 2.Edema about the left basal ganglia, left anterior temporal lobe, and left cerebral peduncle. No appreciable midline shift or hydrocephalus. 3.No acute infarct is definitely identified. 4.Additional findings compatible with chronic microvascular ischemic change. Electronically Signed: Rene Clifford MD  1/29/2025 3:00 PM EST  Workstation ID: [id number]     CT Chest With Contrast Diagnostic    Result Date: 1/29/2025  CT ABDOMEN PELVIS W CONTRAST, CT CHEST W CONTRAST DIAGNOSTIC Date of Exam: 1/29/2025 4:10 PM EST Indication: metastatic disease evaluation. Comparison: 2/12/2024 Technique: Axial CT images were obtained of the chest abdomen and pelvis following the uneventful intravenous administration of iodinated contrast. Sagittal and coronal reconstructions were performed.  Automated exposure control and iterative reconstruction methods were used. FINDINGS: Scattered atelectasis is noted. No well-defined consolidations or pleural effusions are observed. There is a tiny nodule in the left upper lobe measuring 4 mm on image #31 series 3. A calcified granuloma is noted in the left lower lobe. No suspicious pulmonary nodules or abnormal pulmonary masses are seen. There is a mildly prominent subcarinal lymph node measuring 2.2 cm on image #37 series 2. Otherwise nonspecific lymph nodes are seen throughout the hilar, mediastinal, and axillary regions. Mild atherosclerosis is noted. Coronary artery calcifications are observed. The thyroid gland is unremarkable. The esophagus is unremarkable. The liver and spleen are stable. The patient is status post cholecystectomy. There is atrophy throughout the pancreas. There is ill-defined fullness involving the  pancreatic head. There is a subtle area of apparent decreased attenuation which measures approximately 2 cm. The findings suggest a lesion within the pancreatic head. Multiple low-density foci are seen throughout the pancreatic head, body, and tail suggesting cysts. There is a prominent focus in the pancreatic body seen on the prior study measuring approximately 1.8 cm. There may be dilatation of the proximal pancreatic duct as well at the level of the pancreatic head and proximal body. The edema surrounding the pancreas has improved since the prior. There is a small hypodense nodule involving the left adrenal gland likely related to a small adenoma measuring 1.2 cm. The right adrenal gland is unremarkable. The bilateral kidneys are stable. The liver, spleen, and pancreas are stable. The gallbladder and bile ducts are unremarkable. The bilateral adrenal glands are stable. The bilateral kidneys are stable. There is no bowel dilatation or obstruction. There is no evidence for acute appendicitis. No significant free fluid is observed. No abnormal fluid collections are identified. No significant lymphadenopathy is seen throughout the abdomen or pelvis. The bladder is partially decompressed. The celiac and superior mesenteric arterial distributions are opacified without evidence for occlusion. Mild atherosclerosis is noted. No acute osseous abnormalities are observed. No new aggressive lytic or sclerotic lesions are seen.     Impression: 1.Evidence for an ill-defined lesion involving the pancreatic head measuring approximately 2 cm. The findings suggest possible pancreatic malignancy of the pancreatic head. Changes secondary to pancreatitis may also contribute to this appearance. Recommend correlation with MRI of the pancreas for better characterization. 2.Additional hypodense foci are seen throughout the pancreas suggesting cysts. There is mild prominence of the pancreatic duct at the level of proximal pancreatic  head/proximal body. 3.A mildly prominent subcarinal lymph node is noted in the chest. Recommend correlation with PET/CT imaging to exclude abnormal activity. No suspicious pulmonary nodules are identified. 4.No evidence for acute abnormality throughout the chest abdomen or pelvis. 5.No evidence for additional definitive metastatic disease throughout the abdomen or pelvis. 6.Evidence for small adenoma involving the left adrenal gland measuring 1.2 cm. Electronically Signed: Shiv Lozano MD  1/29/2025 4:45 PM EST  Workstation ID: [id number]     CT Abdomen Pelvis With Contrast    Result Date: 1/29/2025  CT ABDOMEN PELVIS W CONTRAST, CT CHEST W CONTRAST DIAGNOSTIC Date of Exam: 1/29/2025 4:10 PM EST Indication: metastatic disease evaluation. Comparison: 2/12/2024 Technique: Axial CT images were obtained of the chest abdomen and pelvis following the uneventful intravenous administration of iodinated contrast. Sagittal and coronal reconstructions were performed.  Automated exposure control and iterative reconstruction methods were used. FINDINGS: Scattered atelectasis is noted. No well-defined consolidations or pleural effusions are observed. There is a tiny nodule in the left upper lobe measuring 4 mm on image #31 series 3. A calcified granuloma is noted in the left lower lobe. No suspicious pulmonary nodules or abnormal pulmonary masses are seen. There is a mildly prominent subcarinal lymph node measuring 2.2 cm on image #37 series 2. Otherwise nonspecific lymph nodes are seen throughout the hilar, mediastinal, and axillary regions. Mild atherosclerosis is noted. Coronary artery calcifications are observed. The thyroid gland is unremarkable. The esophagus is unremarkable. The liver and spleen are stable. The patient is status post cholecystectomy. There is atrophy throughout the pancreas. There is ill-defined fullness involving the pancreatic head. There is a subtle area of apparent decreased attenuation which measures  approximately 2 cm. The findings suggest a lesion within the pancreatic head. Multiple low-density foci are seen throughout the pancreatic head, body, and tail suggesting cysts. There is a prominent focus in the pancreatic body seen on the prior study measuring approximately 1.8 cm. There may be dilatation of the proximal pancreatic duct as well at the level of the pancreatic head and proximal body. The edema surrounding the pancreas has improved since the prior. There is a small hypodense nodule involving the left adrenal gland likely related to a small adenoma measuring 1.2 cm. The right adrenal gland is unremarkable. The bilateral kidneys are stable. The liver, spleen, and pancreas are stable. The gallbladder and bile ducts are unremarkable. The bilateral adrenal glands are stable. The bilateral kidneys are stable. There is no bowel dilatation or obstruction. There is no evidence for acute appendicitis. No significant free fluid is observed. No abnormal fluid collections are identified. No significant lymphadenopathy is seen throughout the abdomen or pelvis. The bladder is partially decompressed. The celiac and superior mesenteric arterial distributions are opacified without evidence for occlusion. Mild atherosclerosis is noted. No acute osseous abnormalities are observed. No new aggressive lytic or sclerotic lesions are seen.     Impression: 1.Evidence for an ill-defined lesion involving the pancreatic head measuring approximately 2 cm. The findings suggest possible pancreatic malignancy of the pancreatic head. Changes secondary to pancreatitis may also contribute to this appearance. Recommend correlation with MRI of the pancreas for better characterization. 2.Additional hypodense foci are seen throughout the pancreas suggesting cysts. There is mild prominence of the pancreatic duct at the level of proximal pancreatic head/proximal body. 3.A mildly prominent subcarinal lymph node is noted in the chest. Recommend  correlation with PET/CT imaging to exclude abnormal activity. No suspicious pulmonary nodules are identified. 4.No evidence for acute abnormality throughout the chest abdomen or pelvis. 5.No evidence for additional definitive metastatic disease throughout the abdomen or pelvis. 6.Evidence for small adenoma involving the left adrenal gland measuring 1.2 cm. Electronically Signed: Shiv Lozano MD  1/29/2025 4:45 PM EST  Workstation ID: [id number]         Assessment     MDM: This is a 79 y.o. male with Antithrombin III deficiency and recurrent DVTs on Xarelto who presents with a 3-month history of word finding difficulties and imbalance/incoordination.     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 There is some surrounding edema but no significant shift and no evidence of hydrocephalus.  There is a 2 cm lesion seen in the pancreas on CT, likely reflective of prior pancreatitis, and was present on CT from 6 months ago.  Pancreatic cancer does not typically metastasize to the brain.    Given the absence of a remote lesion, patient will need a brain biopsy for histopathology evaluation.  Planning to complete this with Dr. Thakur at [facility] tomorrow.  As such patient will need to be transferred today so that they can be evaluated by Dr. Thakur this afternoon in anticipation of biopsy tomorrow.        Plan     Multiple enhancing brain masses  - Transfer patient to [facility] today under hospitalist service  - Planning for brain biopsy tomorrow with Dr. Thakur  - N.p.o. after midnight  - Hold all anticoagulation  - No steroids until after biopsy  - No prior seizure history, Keppra not indicated  - Medical management per primary team    Neurosurgery will continue to follow.  Call with any questions or concerns.    I discussed my assessment and recommendations with Dr. Ofe Whittington  BRYANT  1/30/2025  09:37 EST        Part of this note may be an electronic transcription/translation of spoken language to printed text using the Dragon Dictation System.

## 2025-01-30 NOTE — CASE MANAGEMENT/SOCIAL WORK
Discharge Planning Assessment   [place]     Patient Name: Sumit Jules  MRN: [id number]  Today's Date: 1/30/2025    Admit Date: 1/29/2025    Plan: From home with wife. Pending transfer to [facility].   Discharge Needs Assessment       Row Name 01/30/25 1401       Living Environment    People in Home spouse    Name(s) of People in Home wife Bina    Current Living Arrangements home    Potentially Unsafe Housing Conditions none    In the past 12 months has the electric, gas, oil, or water company threatened to shut off services in your home? No    Primary Care Provided by self    Provides Primary Care For no one    Family Caregiver if Needed spouse    Family Caregiver Names wife Bina    Quality of Family Relationships helpful;involved;supportive    Able to Return to Prior Arrangements yes       Resource/Environmental Concerns    Resource/Environmental Concerns none    Transportation Concerns none       Transportation Needs    In the past 12 months, has lack of transportation kept you from medical appointments or from getting medications? no    In the past 12 months, has lack of transportation kept you from meetings, work, or from getting things needed for daily living? No       Food Insecurity    Within the past 12 months, you worried that your food would run out before you got the money to buy more. Never true    Within the past 12 months, the food you bought just didn't last and you didn't have money to get more. Never true       Transition Planning    Patient/Family Anticipates Transition to home with family    Patient/Family Anticipated Services at Transition none    Transportation Anticipated car, drives self;family or friend will provide       Discharge Needs Assessment    Readmission Within the Last 30 Days no previous admission in last 30 days    Equipment Currently Used at Home none    Concerns to be Addressed discharge planning    Anticipated Changes Related to Illness none    Equipment Needed  After Discharge none    Discharge Facility/Level of Care Needs acute care [facility] transfer    Current Discharge Risk cognitively impaired                   Discharge Plan       Row Name 01/30/25 1403       Plan    Plan From home with wife. Pending transfer to [facility].    Patient/Family in Agreement with Plan yes    Plan Comments CM met with pt at bedside to discuss discharge needs, pt's wife and stepdaughter at bedside to assist with answering questions due to pt's limited ability to provide accurate information. Pt lives at home with wife Bina, normally drives and is normally IADLs- recent dysphagia and confusion have limited his abilities. PCP and pharmacy verified- pt declined enrollment in [facility]. No issues stated affording food/medications or transport, and home environment is safe. No current DME/HHC and none anticipated on discharge. Pending transfer to [facility] for brain biopsy. Will need EMS transport.                  Demographic Summary       Row Name 01/30/25 1400       General Information    Admission Type inpatient    Arrived From emergency department    Required Notices Provided Important Message from Medicare    Referral Source admission list    Reason for Consult discharge planning    Preferred Language English       Contact Information    Permission Granted to Share Info With                    Functional Status       Row Name 01/30/25 1401       Functional Status    Usual Activity Tolerance moderate    Current Activity Tolerance poor       Functional Status, IADL    Medications assistive person    Meal Preparation assistive person    Housekeeping assistive person    Laundry assistive person    Shopping assistive person       Mental Status    General Appearance WDL WDL       Mental Status Summary    Recent Changes in Mental Status/Cognitive Functioning no changes       Employment/    Current or Previous Occupation not applicable              Neva Tamayo RN     Office phone: [phone]  Office fax: [phone]

## 2025-01-30 NOTE — DISCHARGE SUMMARY
"[facility] Medicine Services  Discharge Summary    Date of Service: 24  Patient Name: Sumit Jules  : 1945  MRN: [id number]    Date of Admission: 2025  Discharge Diagnosis:     Slurred speech likely secondary to below  Brain lesion, left basal ganglia/left anterior temporal lobe measuring 2.9 x 1.9 x 2.5 cm     Date of Discharge:  24  Primary Care Physician: Carol Rios APRN      Presenting Problem:   Brain mass [G93.89]  Speech disturbance, unspecified type [R47.9]    Active and Resolved Hospital Problems:  Active Hospital Problems    Diagnosis POA    **Brain mass [G93.89] Yes      Resolved Hospital Problems   No resolved problems to display.         Hospital Course     HPI:  Per the H&P written by Hollie Long PA-C , dated 25:  \"Weakness, slurred speech \"    Hospital Course:  Sumit Jules is a 79 y.o. 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  With intermittent confusion and is unable to provide reliable history at this time.  Family is not at bedside, however she has obtained via phone call.  Family states that for the last 3 months, patient has had increasing bilateral lower extremity weakness, confusion, bizarre behavior, expressive aphasia as well as inability to write.  For patient HydroVal instructed states within the last week.  Patient was evaluated by PCP on 2024 for similar complaints which labs were ordered and MRI was scheduled for .     MRI obtained on admission showed: MRI brain: Multiple enhancing brain lesions, largest involving the left basal ganglia/left anterior temporal lobe measuring 2.9 x 1.9 x 2.5 cm. Smaller lesions involving the mid body of the corpus callosum, left frontal lobe adjacent to the left lateral. NSGY was consulted, recommended patient to transfer to [facility] " for possible biopsy. Transfer was initiated per NSGY recommendations, discussed with the patient. Patient Xarelto has been held, no steroids or keppra per NSGY recommendations until primary malignancy determined.     CTAP: also showed Evidence for an ill-defined lesion involving the pancreatic head measuring approximately 2 cm. The findings suggest possible pancreatic malignancy of the pancreatic head. Based on this patient brain lesion could be secondary pancreatic cancer, however will need further work up if pancreatic lesion is benign vs malignant.     Patient is currently aaox1, has expressive aphasia, moves all extremities. Patient is pending transfer to [facility] for further work up.     DISCHARGE Follow Up Recommendations for labs and diagnostics:     Transfer to [facility]       Reasons For Change In Medications and Indications for New Medications:      Day of Discharge     Vital Signs:  Temp:  [97.6 °F (36.4 °C)-98.7 °F (37.1 °C)] 97.6 °F (36.4 °C)  Heart Rate:  [47-80] 69  Resp:  [13-18] 16  BP: (113-177)/(61-91) 136/61    Physical Exam:  General Appearance:  Awake alert, disoriented   Head:  Atraumatic normocephalic   Eyes:        No sclera icterus   Neck: Non tender   Pulm: Clear to auscultation   Cardio: HR sounds normal   Extremities: Moves all extremities   Abdomen: Soft non tender                             Pertinent  and/or Most Recent Results     LAB RESULTS:      Lab 01/30/25  0510 01/29/25  1302   WBC 6.00 6.84   HEMOGLOBIN 12.7* 13.9   HEMATOCRIT 41.7 44.3   PLATELETS 110* 115*   NEUTROS ABS 3.65 4.73   IMMATURE GRANS (ABS) 0.01 0.01   LYMPHS ABS 1.66 1.40   MONOS ABS 0.54 0.63   EOS ABS 0.12 0.05   MCV 96.1 92.9   PROTIME  --  23.7*         Lab 01/30/25  0510 01/29/25  1302   SODIUM 139 141   POTASSIUM 4.1 4.2   CHLORIDE 105 104   CO2 23.2 27.3   ANION GAP 10.8 9.7   BUN 13 14   CREATININE 0.97 1.08   EGFR 79.9 70.2   GLUCOSE 109* 134*   CALCIUM 8.8 9.7   MAGNESIUM 2.3  --           Lab 01/29/25  1302   TOTAL PROTEIN 7.6   ALBUMIN 4.2   GLOBULIN 3.4   ALT (SGPT) 13   AST (SGOT) 20   BILIRUBIN 0.7   ALK PHOS 85         Lab 01/29/25  1302   PROTIME 23.7*   INR 2.11*             Lab 01/29/25  1302   ABO TYPING A   RH TYPING Negative   ANTIBODY SCREEN Negative         Brief Urine Lab Results  (Last result in the past 365 days)        Color   Clarity   Blood   Leuk Est   Nitrite   Protein   CREAT   Urine HCG        01/29/25 1531 Yellow   Clear   Large (3+)   Negative   Negative   Negative                 Microbiology Results (last 10 days)       ** No results found for the last 240 hours. **            CT Chest With Contrast Diagnostic    Result Date: 1/29/2025  Impression: 1.Evidence for an ill-defined lesion involving the pancreatic head measuring approximately 2 cm. The findings suggest possible pancreatic malignancy of the pancreatic head. Changes secondary to pancreatitis may also contribute to this appearance. Recommend correlation with MRI of the pancreas for better characterization. 2.Additional hypodense foci are seen throughout the pancreas suggesting cysts. There is mild prominence of the pancreatic duct at the level of proximal pancreatic head/proximal body. 3.A mildly prominent subcarinal lymph node is noted in the chest. Recommend correlation with PET/CT imaging to exclude abnormal activity. No suspicious pulmonary nodules are identified. 4.No evidence for acute abnormality throughout the chest abdomen or pelvis. 5.No evidence for additional definitive metastatic disease throughout the abdomen or pelvis. 6.Evidence for small adenoma involving the left adrenal gland measuring 1.2 cm. Electronically Signed: Shiv Lozano MD  1/29/2025 4:45 PM EST  Workstation ID: [id number]    CT Abdomen Pelvis With Contrast    Result Date: 1/29/2025  Impression: 1.Evidence for an ill-defined lesion involving the pancreatic head measuring approximately 2 cm. The findings suggest possible  pancreatic malignancy of the pancreatic head. Changes secondary to pancreatitis may also contribute to this appearance. Recommend correlation with MRI of the pancreas for better characterization. 2.Additional hypodense foci are seen throughout the pancreas suggesting cysts. There is mild prominence of the pancreatic duct at the level of proximal pancreatic head/proximal body. 3.A mildly prominent subcarinal lymph node is noted in the chest. Recommend correlation with PET/CT imaging to exclude abnormal activity. No suspicious pulmonary nodules are identified. 4.No evidence for acute abnormality throughout the chest abdomen or pelvis. 5.No evidence for additional definitive metastatic disease throughout the abdomen or pelvis. 6.Evidence for small adenoma involving the left adrenal gland measuring 1.2 cm. Electronically Signed: Shiv Lozano MD  1/29/2025 4:45 PM EST  Workstation ID: [id number]    MRI Brain With & Without Contrast    Result Date: 1/29/2025  Impression: 1.Multiple enhancing brain lesions, largest involving the left basal ganglia/left anterior temporal lobe measuring 2.9 x 1.9 x 2.5 cm. Smaller lesions involving the mid body of the corpus callosum, left frontal lobe adjacent to the left lateral ventricle, and within the right thalamus. Findings are concerning for underlying neoplastic process with considerations including infiltrating glial neoplasm, CNS lymphoma, or metastatic disease. Subacute ischemia, tumefactive demyelination, or infectious/inflammatory processes considered less likely but not excluded. 2.Edema about the left basal ganglia, left anterior temporal lobe, and left cerebral peduncle. No appreciable midline shift or hydrocephalus. 3.No acute infarct is definitely identified. 4.Additional findings compatible with chronic microvascular ischemic change. Electronically Signed: Rene Clifford MD  1/29/2025 3:00 PM EST  Workstation ID: [id number]    CT Head Without Contrast Stroke  Protocol    Result Date: 1/29/2025  Impression: 1.There is limitation secondary to motion and streak artifact. 2.There is a large area of low-attenuation surrounding the left basal ganglia and extending into the left cerebral peduncle and mony. Findings are concerning for underlying mass lesion with surrounding vasogenic edema. Changes from subacute infarct with areas of preserved density of brain parenchyma also a potential consideration. Recommend correlation with patient history particularly any known history of cancer.. Recommend MRI of the brain with and without contrast for further evaluation. 3.No acute intracranial hemorrhage. 4.Irregularity of the inferior aspect of the left side of the nasal bone is age indeterminate. Please correlate for point tenderness. 5.Chronic left-sided mastoid effusion. Electronically Signed: Obey Burgos MD  1/29/2025 1:18 PM EST  Workstation ID: [id number]    CT Facial Bones Without Contrast    Result Date: 1/29/2025  Impression: 1.There is limitation secondary to motion and streak artifact. 2.There is a large area of low-attenuation surrounding the left basal ganglia and extending into the left cerebral peduncle and mony. Findings are concerning for underlying mass lesion with surrounding vasogenic edema. Changes from subacute infarct with areas of preserved density of brain parenchyma also a potential consideration. Recommend correlation with patient history particularly any known history of cancer.. Recommend MRI of the brain with and without contrast for further evaluation. 3.No acute intracranial hemorrhage. 4.Irregularity of the inferior aspect of the left side of the nasal bone is age indeterminate. Please correlate for point tenderness. 5.Chronic left-sided mastoid effusion. Electronically Signed: Obey Burgos MD  1/29/2025 1:18 PM EST  Workstation ID: [id number]    CT Cervical Spine Without Contrast    Result Date: 1/29/2025  Impression: Impression: 1. Degenerative  changes in the cervical spine. No acute cervical spine findings. Electronically Signed: Naty Keenan MD  1/29/2025 1:12 PM EST  Workstation ID: [id number]    XR Chest 1 View    Result Date: 1/29/2025  Impression: Impression: No acute cardiopulmonary findings. Electronically Signed: Naty Keenan MD  1/29/2025 12:35 PM EST  Workstation ID: [id number]                 Labs Pending at Discharge:  Pending Results       None            Procedures Performed           Consults:   Consults       Date and Time Order Name Status Description    1/29/2025  4:57 PM Hematology & Oncology Inpatient Consult      1/29/2025  3:58 PM Hospitalist (on-call MD unless specified)      1/29/2025  3:04 PM Neurosurgery (on-call MD unless specified) Completed     1/29/2025 12:30 PM Inpatient Neurology Consult Stroke                Discharge Details        Discharge Medications        Continue These Medications        Instructions Start Date   acetaminophen 325 MG tablet  Commonly known as: TYLENOL   650 mg, Oral, Every 6 Hours PRN      amLODIPine 10 MG tablet  Commonly known as: NORVASC   10 mg, Oral, Every Night at Bedtime      atorvastatin 10 MG tablet  Commonly known as: LIPITOR   10 mg, Oral, Daily      dapagliflozin 5 MG tablet tablet  Commonly known as: Farxiga   5 mg, Oral, Daily      losartan 25 MG tablet  Commonly known as: COZAAR   25 mg, Oral, Daily      metFORMIN 500 MG tablet  Commonly known as: GLUCOPHAGE   500 mg, Oral, 2 Times Daily With Meals      tamsulosin 0.4 MG capsule 24 hr capsule  Commonly known as: FLOMAX   0.4 mg, Oral, Daily      Xarelto 20 MG tablet  Generic drug: rivaroxaban   20 mg, Oral, Daily               No Known Allergies      Discharge Disposition:       Diet:  Hospital:  Diet Order   Procedures    Diet: Regular/House; Fluid Consistency: Thin (IDDSI 0)         Discharge Activity:         CODE STATUS:  Code Status and Medical Interventions: CPR (Attempt to Resuscitate); Full Support   Ordered at: 01/29/25 4277      Code Status (Patient has no pulse and is not breathing):    CPR (Attempt to Resuscitate)     Medical Interventions (Patient has pulse or is breathing):    Full Support         Future Appointments   Date Time Provider Department Center   2/12/2025  2:30 PM [facility] MRI 2 [facility] MRI [facility]   3/31/2025 11:00 AM Carol Rios APRN [facility]   12/11/2025 11:00 AM Carol Rios APRN [facility]           Time spent on Discharge including face to face service:  >30 minutes    Part of this note may be an electronic transcription/translation of spoken language to printed text using the Dragon Dictation System.    Signature: Electronically signed by Charlie Burch MD, 01/30/25, 15:41 EST.  [facility] Hospitalist Team

## 2025-01-30 NOTE — CONSULTS
Hematology/Oncology Inpatient Consultation    Patient name: Sumit Jules  : 1945  MRN: [id number]  Referring Provider: Dr. Burch  Reason for Consultation: Brain mass    Chief complaint: Weakness, slurred speech    History of present illness:    Sumit Jules is a 79 y.o. male who presented to [facility] on 2025 with complaints of weakness and slurred speech.  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  Presented with slurred speech and intermittent confusion.  Was unable to provide a reliable history at the time of presentation.  Family was contacted upon admission and obtained history via phone call.  Family stated that for the past 3 months the patient had had increasing bilateral lower extremity weakness, confusion, bizarre behavior, expressive aphasia as well as the inability to write.    In the ED a chest x-ray showed no acute findings.  A CT of the head was concerning for mass in the left basal ganglia extending into the left cerebral peduncle and mony with surrounding vasogenic edema, suspicious for malignancy.  Neurosurgery was consulted and the patient was admitted for further evaluation and treatment.  A MRI of the brain revealed multiple by hemispheric cortical and subcortical enhancing lesions, more prominent on the left, with involvement of the corpus callosum.  Some surrounding edema but no significant shift and no evidence of hydrocephalus.  A CT chest abdomen and pelvis to evaluate for primary did show a 2 cm lesion in the pancreas on CT that was also present on the CT 6 months ago when it was 1.8 cm and felt to possibly be a pancreatic cyst or a sidebranch IPMN.  Patient has a history of pancreatitis.    5/3/2024 EGD/EUS and FNA  -Normal pancreatic parenchyma except minimal changes of chronic pancreatitis as well as a 1.8 to 1.9 cm cystic  lesion of the body of the pancreas with possible sidebranch communication likely representing an IPMN.  Cystic fluid was aspirated and sent for CEA, amylase as well as glucose.  -Pancreatic duct and common bile duct slightly prominent at the head of the pancreas with CBD close to 8.4 mm and pancreas duct 4.8 mm no obvious mass noticed  -Mild changes of chronic gastritis    5/3/2024 cytology: Pancreatic cyst, fine-needle aspiration no malignancy identified    01/30/25  Hematology/Oncology was consulted newly diagnosed brain mass.    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   Converted from [employer]:   Description - DEEP VENOUS THROMBOPHLEBITIS, RECURRENT    Congenital deficiency of clotting factors 08/01/2012    Diabetes mellitus     pre - no insulin    DVT (deep venous thrombosis)     History of complete eye exam SCHEDULED    Hyperlipidemia     Idiopathic acute  pancreatitis without infection or necrosis 02/12/2024    Impaired fasting glucose 12/13/2011    Obesity     Other seborrheic keratosis 05/13/2013    Formatting of this note might be different from the original.   Converted from [employer]:   Description - SEBORRHEIC KERATOSIS    Pain of foot 03/19/2013    Formatting of this note might be different from the original.   Converted from [employer]:   Description - HEEL PAIN    Prostate cancer     Thrombocytopenia 03/19/2013    Formatting of this note might be different from the original.   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   Converted from [employer]:   Description - VASCULAR DISORDER OF SKIN   ,   Past Surgical History:   Procedure Laterality Date    CHOLECYSTECTOMY      COLONOSCOPY  10/2013    UPPER ENDOSCOPIC ULTRASOUND W/ FNA N/A 5/3/2024    Procedure: ENDOSCOPIC ULTRASOUND with fine needle aspiration x1 area;  Surgeon: Gifty Ribera MD;  Location: [facility] ENDOSCOPY;  Service: Gastroenterology;  Laterality: N/A;  Post- pancreatic cyst   ,   Family History   Problem Relation Age of Onset    Breast cancer Other     Diabetes Other     Deep vein thrombosis Other     Diabetes type II Other    ,   Social History     Tobacco Use    Smoking status: Never     Passive exposure: Never    Smokeless tobacco: Never   Vaping Use    Vaping status: Never Used   Substance Use Topics    Alcohol use: No    Drug use: Never   ,   Medications Prior to Admission   Medication Sig Dispense Refill Last Dose/Taking    amLODIPine (NORVASC) 10 MG tablet TAKE 1 TABLET BY MOUTH EVERY DAY AT NIGHT 90 tablet 0 1/28/2025    atorvastatin (LIPITOR) 10 MG tablet TAKE 1 TABLET BY MOUTH EVERY DAY 90 tablet 1 1/28/2025    dapagliflozin (Farxiga) 5 MG tablet tablet Take 1 tablet by mouth Daily. 90 tablet 0 1/28/2025    losartan  "(COZAAR) 25 MG tablet TAKE 1 TABLET BY MOUTH EVERY DAY 90 tablet 0 1/28/2025    metFORMIN (GLUCOPHAGE) 500 MG tablet TAKE 1 TABLET BY MOUTH TWICE A DAY WITH FOOD 180 tablet 0 1/29/2025    tamsulosin (FLOMAX) 0.4 MG capsule 24 hr capsule Take 1 capsule by mouth Daily. 90 capsule 0 1/29/2025    Xarelto 20 MG tablet TAKE 1 TABLET BY MOUTH EVERY DAY 90 tablet 0 1/29/2025    acetaminophen (TYLENOL) 325 MG tablet Take 2 tablets by mouth Every 6 (Six) Hours As Needed for Mild Pain.      , Scheduled Meds:  amLODIPine, 10 mg, Oral, Q24H  atorvastatin, 10 mg, Oral, Daily  insulin lispro, 2-7 Units, Subcutaneous, 4x Daily AC & at Bedtime  losartan, 25 mg, Oral, Daily  [Held by provider] rivaroxaban, 20 mg, Oral, Daily  tamsulosin, 0.4 mg, Oral, Daily    , Continuous Infusions:   , PRN Meds:    acetaminophen **OR** acetaminophen **OR** acetaminophen    senna-docusate sodium **AND** polyethylene glycol **AND** bisacodyl **AND** bisacodyl    Calcium Replacement - Follow Nurse / BPA Driven Protocol    dextrose    dextrose    glucagon (human recombinant)    Magnesium Standard Dose Replacement - Follow Nurse / BPA Driven Protocol    ondansetron ODT **OR** ondansetron    Phosphorus Replacement - Follow Nurse / BPA Driven Protocol    Potassium Replacement - Follow Nurse / BPA Driven Protocol    sodium chloride   Allergies:  Patient has no known allergies.    Subjective     ROS:  Review of Systems     Objective   Vital Signs:   /72 (BP Location: Left arm, Patient Position: Sitting)   Pulse 80   Temp 98.2 °F (36.8 °C) (Oral)   Resp 14   Ht 182.9 cm (72\")   Wt 127 kg (279 lb 1.6 oz)   SpO2 92%   BMI 37.85 kg/m²     Physical Exam: (performed by MD)  Physical Exam    Results Review:  Lab Results (last 48 hours)       Procedure Component Value Units Date/Time    POC Glucose 4x Daily Before Meals & at Bedtime [[id number]]  (Abnormal) Collected: 01/30/25 0731    Specimen: Blood Updated: 01/30/25 0732     Glucose 139 mg/dL      " Comment: Serial Number: [id number]:  [id number]       CBC & Differential [[id number]]  (Abnormal) Collected: 01/30/25 0510    Specimen: Blood Updated: 01/30/25 0703    Narrative:      The following orders were created for panel order CBC & Differential.  Procedure                               Abnormality         Status                     ---------                               -----------         ------                     CBC Auto Differential[[id number]]        Abnormal            Final result               Scan Slide[[id number]]                                       Final result                 Please view results for these tests on the individual orders.    CBC Auto Differential [[id number]]  (Abnormal) Collected: 01/30/25 0510    Specimen: Blood Updated: 01/30/25 0703     WBC 6.00 10*3/mm3      RBC 4.34 10*6/mm3      Hemoglobin 12.7 g/dL      Hematocrit 41.7 %      MCV 96.1 fL      MCH 29.3 pg      MCHC 30.5 g/dL      RDW 13.1 %      RDW-SD 46.5 fl      MPV 12.8 fL      Platelets 110 10*3/mm3      Neutrophil % 60.8 %      Lymphocyte % 27.7 %      Monocyte % 9.0 %      Eosinophil % 2.0 %      Basophil % 0.3 %      Immature Grans % 0.2 %      Neutrophils, Absolute 3.65 10*3/mm3      Lymphocytes, Absolute 1.66 10*3/mm3      Monocytes, Absolute 0.54 10*3/mm3      Eosinophils, Absolute 0.12 10*3/mm3      Basophils, Absolute 0.02 10*3/mm3      Immature Grans, Absolute 0.01 10*3/mm3      nRBC 0.0 /100 WBC     Narrative:      Appended report. These results have been appended to a previously verified report.    Scan Slide [[id number]] Collected: 01/30/25 0510    Specimen: Blood Updated: 01/30/25 0703     RBC Morphology Normal     WBC Morphology Normal     Platelet Estimate Decreased     Large Platelets Mod/2+     Giant Platelets Slight/1+    Basic Metabolic Panel [[id number]]  (Abnormal) Collected: 01/30/25 0510    Specimen: Blood Updated: 01/30/25 0650     Glucose 109 mg/dL      BUN 13 mg/dL      Creatinine 0.97  mg/dL      Sodium 139 mmol/L      Potassium 4.1 mmol/L      Chloride 105 mmol/L      CO2 23.2 mmol/L      Calcium 8.8 mg/dL      BUN/Creatinine Ratio 13.4     Anion Gap 10.8 mmol/L      eGFR 79.9 mL/min/1.73     Narrative:      GFR Categories in Chronic Kidney Disease (CKD)      GFR Category          GFR (mL/min/1.73)    Interpretation  G1                     90 or greater         Normal or high (1)  G2                      60-89                Mild decrease (1)  G3a                   45-59                Mild to moderate decrease  G3b                   30-44                Moderate to severe decrease  G4                    15-29                Severe decrease  G5                    14 or less           Kidney failure          (1)In the absence of evidence of kidney disease, neither GFR category G1 or G2 fulfill the criteria for CKD.    eGFR calculation 2021 CKD-EPI creatinine equation, which does not include race as a factor    Magnesium [[id number]]  (Normal) Collected: 01/30/25 0510    Specimen: Blood Updated: 01/30/25 0650     Magnesium 2.3 mg/dL     POC Glucose Once [[id number]]  (Abnormal) Collected: 01/29/25 2058    Specimen: Blood Updated: 01/29/25 2059     Glucose 193 mg/dL      Comment: Serial Number: [id number]:  [id number]       Urinalysis With Culture If Indicated - Urine, Clean Catch [[id number]]  (Abnormal) Collected: 01/29/25 1531    Specimen: Urine, Clean Catch Updated: 01/29/25 1541     Color, UA Yellow     Appearance, UA Clear     pH, UA <=5.0     Specific Gravity, UA 1.036     Glucose, UA >=1000 mg/dL (3+)     Ketones, UA Trace     Bilirubin, UA Negative     Blood, UA Large (3+)     Protein, UA Negative     Leuk Esterase, UA Negative     Nitrite, UA Negative     Urobilinogen, UA 0.2 E.U./dL    Narrative:      In absence of clinical symptoms, the presence of pyuria, bacteria, and/or nitrites on the urinalysis result does not correlate with infection.    Urinalysis, Microscopic Only - Urine,  Clean Catch [[id number]]  (Abnormal) Collected: 01/29/25 1531    Specimen: Urine, Clean Catch Updated: 01/29/25 1541     RBC, UA Too Numerous to Count /HPF      WBC, UA 0-2 /HPF      Comment: Urine culture not indicated.        Bacteria, UA None Seen /HPF      Squamous Epithelial Cells, UA 0-2 /HPF      Hyaline Casts, UA None Seen /LPF      Methodology Automated Microscopy    [place] Draw [[id number]] Collected: 01/29/25 1302    Specimen: Blood from Arm, Left Updated: 01/29/25 1346    Narrative:      The following orders were created for panel order [place] Draw.  Procedure                               Abnormality         Status                     ---------                               -----------         ------                     Green Top (Gel)[[id number]]                                  Final result               Lavender Top[[id number]]                                     Final result               Gold Top - SST[[id number]]                                                              Light Blue Top[[id number]]                                   Final result                 Please view results for these tests on the individual orders.    Green Top (Gel) [[id number]] Collected: 01/29/25 1302    Specimen: Blood from Arm, Left Updated: 01/29/25 1345     Extra Tube Hold for add-ons.     Comment: Auto resulted.       Comprehensive Metabolic Panel [[id number]]  (Abnormal) Collected: 01/29/25 1302    Specimen: Blood from Arm, Left Updated: 01/29/25 1338     Glucose 134 mg/dL      BUN 14 mg/dL      Creatinine 1.08 mg/dL      Sodium 141 mmol/L      Potassium 4.2 mmol/L      Chloride 104 mmol/L      CO2 27.3 mmol/L      Calcium 9.7 mg/dL      Total Protein 7.6 g/dL      Albumin 4.2 g/dL      ALT (SGPT) 13 U/L      AST (SGOT) 20 U/L      Alkaline Phosphatase 85 U/L      Total Bilirubin 0.7 mg/dL      Globulin 3.4 gm/dL      A/G Ratio 1.2 g/dL      BUN/Creatinine Ratio 13.0     Anion Gap 9.7 mmol/L      eGFR 70.2 mL/min/1.73      Narrative:      GFR Categories in Chronic Kidney Disease (CKD)      GFR Category          GFR (mL/min/1.73)    Interpretation  G1                     90 or greater         Normal or high (1)  G2                      60-89                Mild decrease (1)  G3a                   45-59                Mild to moderate decrease  G3b                   30-44                Moderate to severe decrease  G4                    15-29                Severe decrease  G5                    14 or less           Kidney failure          (1)In the absence of evidence of kidney disease, neither GFR category G1 or G2 fulfill the criteria for CKD.    eGFR calculation 2021 CKD-EPI creatinine equation, which does not include race as a factor    CBC & Differential [[id number]]  (Abnormal) Collected: 01/29/25 1302    Specimen: Blood from Arm, Left Updated: 01/29/25 1326    Narrative:      The following orders were created for panel order CBC & Differential.  Procedure                               Abnormality         Status                     ---------                               -----------         ------                     CBC Auto Differential[[id number]]        Abnormal            Final result               Scan Slide[[id number]]                                       Final result                 Please view results for these tests on the individual orders.    Scan Slide [[id number]] Collected: 01/29/25 1302    Specimen: Blood from Arm, Left Updated: 01/29/25 1326     RBC Morphology Normal     WBC Morphology Normal     Platelet Estimate Decreased     Large Platelets Slight/1+    CBC Auto Differential [[id number]]  (Abnormal) Collected: 01/29/25 1302    Specimen: Blood from Arm, Left Updated: 01/29/25 1326     WBC 6.84 10*3/mm3      RBC 4.77 10*6/mm3      Hemoglobin 13.9 g/dL      Hematocrit 44.3 %      MCV 92.9 fL      MCH 29.1 pg      MCHC 31.4 g/dL      RDW 13.2 %      RDW-SD 45.1 fl      MPV 13.0 fL      Platelets 115 10*3/mm3       Neutrophil % 69.2 %      Lymphocyte % 20.5 %      Monocyte % 9.2 %      Eosinophil % 0.7 %      Basophil % 0.3 %      Immature Grans % 0.1 %      Neutrophils, Absolute 4.73 10*3/mm3      Lymphocytes, Absolute 1.40 10*3/mm3      Monocytes, Absolute 0.63 10*3/mm3      Eosinophils, Absolute 0.05 10*3/mm3      Basophils, Absolute 0.02 10*3/mm3      Immature Grans, Absolute 0.01 10*3/mm3      nRBC 0.0 /100 WBC     Protime-INR [[id number]]  (Abnormal) Collected: 01/29/25 1302    Specimen: Blood from Arm, Left Updated: 01/29/25 1321     Protime 23.7 Seconds      INR 2.11    Light Blue Top [[id number]] Collected: 01/29/25 1302    Specimen: Blood from Arm, Left Updated: 01/29/25 1315     Extra Tube Hold for add-ons.     Comment: Auto resulted       Lavender Top [[id number]] Collected: 01/29/25 1302    Specimen: Blood from Arm, Left Updated: 01/29/25 1315     Extra Tube hold for add-on     Comment: Auto resulted       POC Glucose Once [[id number]]  (Abnormal) Collected: 01/29/25 1308    Specimen: Blood Updated: 01/29/25 1310     Glucose 149 mg/dL      Comment: Serial Number: [id number]:  [id number]                Pending Results:     Imaging Reviewed:   CT Chest With Contrast Diagnostic    Result Date: 1/29/2025  1.Evidence for an ill-defined lesion involving the pancreatic head measuring approximately 2 cm. The findings suggest possible pancreatic malignancy of the pancreatic head. Changes secondary to pancreatitis may also contribute to this appearance. Recommend correlation with MRI of the pancreas for better characterization. 2.Additional hypodense foci are seen throughout the pancreas suggesting cysts. There is mild prominence of the pancreatic duct at the level of proximal pancreatic head/proximal body. 3.A mildly prominent subcarinal lymph node is noted in the chest. Recommend correlation with PET/CT imaging to exclude abnormal activity. No suspicious pulmonary nodules are identified. 4.No evidence for acute  abnormality throughout the chest abdomen or pelvis. 5.No evidence for additional definitive metastatic disease throughout the abdomen or pelvis. 6.Evidence for small adenoma involving the left adrenal gland measuring 1.2 cm. Electronically Signed: Shiv Lozano MD  1/29/2025 4:45 PM EST  Workstation ID: [id number]    CT Abdomen Pelvis With Contrast    Result Date: 1/29/2025  1.Evidence for an ill-defined lesion involving the pancreatic head measuring approximately 2 cm. The findings suggest possible pancreatic malignancy of the pancreatic head. Changes secondary to pancreatitis may also contribute to this appearance. Recommend correlation with MRI of the pancreas for better characterization. 2.Additional hypodense foci are seen throughout the pancreas suggesting cysts. There is mild prominence of the pancreatic duct at the level of proximal pancreatic head/proximal body. 3.A mildly prominent subcarinal lymph node is noted in the chest. Recommend correlation with PET/CT imaging to exclude abnormal activity. No suspicious pulmonary nodules are identified. 4.No evidence for acute abnormality throughout the chest abdomen or pelvis. 5.No evidence for additional definitive metastatic disease throughout the abdomen or pelvis. 6.Evidence for small adenoma involving the left adrenal gland measuring 1.2 cm. Electronically Signed: Shiv Lozano MD  1/29/2025 4:45 PM EST  Workstation ID: [id number]    MRI Brain With & Without Contrast    Result Date: 1/29/2025  1.Multiple enhancing brain lesions, largest involving the left basal ganglia/left anterior temporal lobe measuring 2.9 x 1.9 x 2.5 cm. Smaller lesions involving the mid body of the corpus callosum, left frontal lobe adjacent to the left lateral ventricle, and within the right thalamus. Findings are concerning for underlying neoplastic process with considerations including infiltrating glial neoplasm, CNS lymphoma, or metastatic disease. Subacute ischemia, tumefactive  demyelination, or infectious/inflammatory processes considered less likely but not excluded. 2.Edema about the left basal ganglia, left anterior temporal lobe, and left cerebral peduncle. No appreciable midline shift or hydrocephalus. 3.No acute infarct is definitely identified. 4.Additional findings compatible with chronic microvascular ischemic change. Electronically Signed: Rene Clifford MD  1/29/2025 3:00 PM EST  Workstation ID: [id number]    CT Head Without Contrast Stroke Protocol    Result Date: 1/29/2025  1.There is limitation secondary to motion and streak artifact. 2.There is a large area of low-attenuation surrounding the left basal ganglia and extending into the left cerebral peduncle and mony. Findings are concerning for underlying mass lesion with surrounding vasogenic edema. Changes from subacute infarct with areas of preserved density of brain parenchyma also a potential consideration. Recommend correlation with patient history particularly any known history of cancer.. Recommend MRI of the brain with and without contrast for further evaluation. 3.No acute intracranial hemorrhage. 4.Irregularity of the inferior aspect of the left side of the nasal bone is age indeterminate. Please correlate for point tenderness. 5.Chronic left-sided mastoid effusion. Electronically Signed: Obey uBrgos MD  1/29/2025 1:18 PM EST  Workstation ID: [id number]    CT Facial Bones Without Contrast    Result Date: 1/29/2025  1.There is limitation secondary to motion and streak artifact. 2.There is a large area of low-attenuation surrounding the left basal ganglia and extending into the left cerebral peduncle and mony. Findings are concerning for underlying mass lesion with surrounding vasogenic edema. Changes from subacute infarct with areas of preserved density of brain parenchyma also a potential consideration. Recommend correlation with patient history particularly any known history of cancer.. Recommend MRI of the  brain with and without contrast for further evaluation. 3.No acute intracranial hemorrhage. 4.Irregularity of the inferior aspect of the left side of the nasal bone is age indeterminate. Please correlate for point tenderness. 5.Chronic left-sided mastoid effusion. Electronically Signed: Obey Burgos MD  1/29/2025 1:18 PM EST  Workstation ID: [id number]    CT Cervical Spine Without Contrast    Result Date: 1/29/2025  Impression: 1. Degenerative changes in the cervical spine. No acute cervical spine findings. Electronically Signed: Naty Keenan MD  1/29/2025 1:12 PM EST  Workstation ID: [id number]    XR Chest 1 View    Result Date: 1/29/2025  Impression: No acute cardiopulmonary findings. Electronically Signed: Naty Keenan MD  1/29/2025 12:35 PM EST  Workstation ID: [id number]          Assessment & Plan   ASSESSMENT  Multiple enhancing brain masses: CT chest abdomen pelvis to evaluate for primary revealed no evidence of malignancy within the chest abdomen or pelvis.  Pancreatic lesion noted and patient had FNA biopsy in May 2024 that was negative for malignancy.  Patient to be transferred to [facility] today for a planned brain biopsy 1/31/2025.    PLAN  As above    Note prepared for Dr. Fish.  Further recommendations per MD.  Electronically signed by JARROD Hartmann, 01/30/25, 8:10 AM EST.    1/30/2025: I was asked to see Mr. Jules who was brought to the hospital with a history of confusion and progressively more bizarre behavior.  He had imaging of the brain at the time of admission that identified multiple enhancing brain lesions, the largest of which involve the basal ganglia on the left and that measure 2.9 x 1.9 x 2.5 cm.  Smaller lesions involving the corpus callosum, the left frontal lobe and the right thalamus were also present.  The possibility of a metastatic malignancy versus CNS lymphoma were brought up.  It was very difficult to obtain a history from him but I was able to  discussed with his spouse who reported that for at least 3 or 4 weeks he has behavior had become progressively more unusual and his speech less and less understandable.  He had a history of skin cancer and had undergone several surgical procedures but he had never been told that he had melanoma.  He was found to have a nodule on the pancreatic head that however, it did not seem to be malignant based on an FNA done in May 2024.  On exam he is alert, conversant and in no distress.  He is speech however is not understandable and often times his responses are not congruent with the question.  He is neither jaundiced nor pale.  The pupils are reactive to light and there is no facial asymmetry.  Muscle strength seems to be preserved.  The lungs are clear bilaterally and the heart is regular.  The abdomen is protuberant but soft and nontender.  There is no edema.  Reviewed all imaging studies.  No obvious source of a metastatic process is evident on the CT scans of the chest, abdomen and pelvis.  The nodule on the pancreas is again identified.  It would not appear to have changed much in shape or size.  Discussed with him and discussed that with his spouse whom I reached on the phone.  A biopsy is essential.  Given the history of skin cancer, could this be metastatic melanoma?.  I will remain vigilant to the results of the biopsies.  He will be transferred today to [facility] in [place] to facilitate the biopsy.  I reviewed the records with Ms. Favian GARAY and concur with her note.  I formulated the analysis and the plans.    Thanh Fish MD on 1/30/2025 at 1732.

## 2025-01-30 NOTE — PLAN OF CARE
Neurology team was initially consulted by Dr. Hunter in the emergency department for stroke.  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 Neurosurgery was consulted and planning to send patient to [facility] for biopsy. No witnessed seizures. IVANIA started steroids. There is nothing else to add from inpatient Neurology but we are available if anything is needed. Thank you for this consult.

## 2025-01-30 NOTE — CASE MANAGEMENT/SOCIAL WORK
Social Work Assessment  [facility]     Patient Name: Sumit Jules  MRN: [id number]  Today's Date: 1/30/2025    Admit Date: 1/29/2025     Discharge Plan       Row Name 01/30/25 1629       Plan    Plan Comments  went to meet with patient and family (spouse, 2 sisters, brother in law and step daughter).  Family wanting to discuss ACP planning documents.  Patient is unable to adequately verbalize his health care wishes.  Per House Bill 1119, Spouse will make healthcare decisions in the Healthcare Consent Hierarchy.  Spouse welcomes input from all family members.  In the event patient clears and is able to make decisions, family instructed to seek an  or get DPOA forms from [employer] or [employer] and have notary assist them.  Family verbalized understanding and appreciative of information.                                              ALAN Martinez MSW    Phone: [phone]  Cell: [phone]  Fax: [phone]  [email or web address]

## 2025-01-30 NOTE — CASE MANAGEMENT/SOCIAL WORK
"Physicians Statement of Medical Necessity for  Ambulance Transportation    GENERAL INFORMATION     Name: Sumit Jules  YOB: 1945  Medicare #: [id number]   Transport Date: 1/30/2025 (Valid for round trips this date, or for scheduled repetitive trips for 60 days from the date signed below.)  Origin: [facility]  Destination: [facility]  Is the Patient's stay covered under Medicare Part A (PPS/DRG?)Yes  Closest appropriate facility? Yes  If this a hosp-hosp transfer? Yes, describe services needed at 2nd facility not available at 1st facility brain biopsy  Is this a hospice patient? No    MEDICAL NECESSITY QUESTIONAIRE    Ambulance Transportation is medically necessary only if other means of transportation are contraindicated or would be potentially harmful to the patient.  To meet this requirement, the patient must be either \"bed confined\" or suffer from a condition such that transport by means other than an ambulance is contraindicated by the patient's condition.  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   Converted from [employer]:   Description - SEBORRHEIC KERATOSIS    Pain of foot 03/19/2013    Formatting of this note might be different from the original.   Converted from [employer]:   Description - HEEL PAIN    Prostate cancer     Thrombocytopenia 03/19/2013    Formatting of this note might be different from the original.   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   Converted from [employer]:   Description - VASCULAR DISORDER OF SKIN      Past Surgical History:   Procedure Laterality Date    CHOLECYSTECTOMY      COLONOSCOPY  10/2013    UPPER ENDOSCOPIC ULTRASOUND W/ FNA N/A 5/3/2024    Procedure: ENDOSCOPIC ULTRASOUND with fine needle aspiration x1 area;  Surgeon: Gifty Ribera MD;  Location: [facility] ENDOSCOPY;  Service: Gastroenterology;  Laterality: N/A;  Post- pancreatic cyst      2) Is this patient \"bed confined\" as defined below?No    To be \"bed confined\" the patient must satisfy all three of the following criteria:  (1) unable to get up from bed without assistance; AND (2) unable to ambulate;  AND (3) unable to sit in a chair or wheelchair.  3) Can this patient safely be transported by car or wheelchair van (I.e., may safely sit during transport, without an attendant or monitoring?)No   4. In addition to completing questions 1-3 above, please check any of the following conditions that apply*:          *Note: supporting documentation for any boxes checked must be maintained in the patient's medical records Patient is confused      SIGNATURE OF PHYSICIAN OR OTHER " AUTHORIZED HEALTHCARE PROFESSIONAL    I certify that the above information is true and correct based on my evaluation of this patient, and represent that the patient requires transport by ambulance and that other forms of transport are contraindicated.  I understand that this information will be used by the Centers for Medicare and Medicaid Services (CMS) to support the determiniation of medical necessity for ambulance services, and I represent that I have personal knowledge of the patient's condition at the time of transport.    x   If this box is checked, I also certify that the patient is physically or mentally incapable of signing the ambulance service's claim form and that the institution with which I am affiliated has furnished care, services or assistance to the patient.  My signature below is made on behalf of the patient pursuant to 42 .36(b)(4). In accordance with 42 .37, the specific reason(s) that the patient is physically or mentally incapable of signing the claim for is as follows: confusion    Signature of Physician or Healthcare Professional   Neva Tamayo RN Date/Time:   1/30/2025 1095     (For Scheduled repetitive transport, this form is not valid for transports performed more than 60 days after this date).                                                                                                                                            --------------------------------------------------------------------------------------------  Printed Name and Credentials of Physician or Authorized Healthcare Professional     *Form must be signed by patient's attending physician for scheduled, repetitive transports,.  For non-repetitive ambulance transports, if unable to obtain the signature of the attending physician, any of the following may sign (please select below):     Physician  Clinical Nurse Specialist  Registered Nurse x    Physician Assistant  Discharge Planner  Licensed  Practical Nurse     Nurse Practitioner   x

## 2025-01-30 NOTE — PLAN OF CARE
Problem: Violence Risk or Actual  Goal: Anger and Impulse Control  Outcome: Progressing  Intervention: Minimize Safety Risk  Recent Flowsheet Documentation  Taken 1/30/2025 0830 by Pattie Preston RN  Enhanced Safety Measures:   bed alarm refused   room near unit station  Intervention: Promote Self-Control  Recent Flowsheet Documentation  Taken 1/30/2025 0830 by Pattie Preston RN  Supportive Measures: active listening utilized  Environmental Support: calm environment promoted     Problem: Adult Inpatient Plan of Care  Goal: Plan of Care Review  Outcome: Progressing  Goal: Patient-Specific Goal (Individualized)  Outcome: Progressing  Goal: Absence of Hospital-Acquired Illness or Injury  Outcome: Progressing  Intervention: Identify and Manage Fall Risk  Recent Flowsheet Documentation  Taken 1/30/2025 1702 by Pattie Preston RN  Safety Promotion/Fall Prevention:   fall prevention program maintained   safety round/check completed  Taken 1/30/2025 1603 by Pattie Preston RN  Safety Promotion/Fall Prevention:   fall prevention program maintained   safety round/check completed  Taken 1/30/2025 1432 by Pattie Preston RN  Safety Promotion/Fall Prevention:   fall prevention program maintained   safety round/check completed  Taken 1/30/2025 1200 by Pattie Preston RN  Safety Promotion/Fall Prevention:   fall prevention program maintained   safety round/check completed  Taken 1/30/2025 1019 by Pattie Preston RN  Safety Promotion/Fall Prevention:   fall prevention program maintained   safety round/check completed  Taken 1/30/2025 0830 by Pattie Preston RN  Safety Promotion/Fall Prevention:   safety round/check completed   fall prevention program maintained  Intervention: Prevent Skin Injury  Recent Flowsheet Documentation  Taken 1/30/2025 0830 by Pattie Preston RN  Body Position: position changed independently  Skin Protection: incontinence pads utilized  Intervention: Prevent and Manage VTE  (Venous Thromboembolism) Risk  Recent Flowsheet Documentation  Taken 1/30/2025 0830 by Pattie Preston RN  VTE Prevention/Management: compression stockings on  Intervention: Prevent Infection  Recent Flowsheet Documentation  Taken 1/30/2025 0830 by Pattie Preston RN  Infection Prevention: single patient room provided  Goal: Optimal Comfort and Wellbeing  Outcome: Progressing  Intervention: Provide Person-Centered Care  Recent Flowsheet Documentation  Taken 1/30/2025 0830 by Pattie Preston RN  Trust Relationship/Rapport:   care explained   choices provided  Goal: Readiness for Transition of Care  Outcome: Progressing     Problem: Skin Injury Risk Increased  Goal: Skin Health and Integrity  Outcome: Progressing  Intervention: Optimize Skin Protection  Recent Flowsheet Documentation  Taken 1/30/2025 0830 by Pattie Preston RN  Activity Management: up ad minerva  Pressure Reduction Techniques: frequent weight shift encouraged  Head of Bed (HOB) Positioning: HOB elevated  Pressure Reduction Devices: pressure-redistributing mattress utilized  Skin Protection: incontinence pads utilized   Goal Outcome Evaluation:   Pt is waiting for a bed to be available at [facility]. He has been resting comfortably today. He resumed a regular diet but will need to be NPO at midnight. He most recently has been alert X1. Q4 neuro checks were completed. Call light within reach.

## 2025-01-30 NOTE — PLAN OF CARE
Goal Outcome Evaluation:            Patient admitted to 2a, patient admitted for brain mass, patient A+O x4 but does have periods of not being able to get words out, patient has no complaints at this time.

## 2025-01-31 PROBLEM — Z86.718 HISTORY OF DVT (DEEP VEIN THROMBOSIS): Status: ACTIVE | Noted: 2025-01-31

## 2025-01-31 PROBLEM — G93.6 VASOGENIC CEREBRAL EDEMA: Status: ACTIVE | Noted: 2025-01-31

## 2025-01-31 PROBLEM — R47.81 SLURRED SPEECH: Status: ACTIVE | Noted: 2025-01-31

## 2025-01-31 LAB
ANION GAP SERPL CALCULATED.3IONS-SCNC: 8.4 MMOL/L (ref 5–15)
BUN SERPL-MCNC: 14 MG/DL (ref 8–23)
BUN/CREAT SERPL: 16.3 (ref 7–25)
CALCIUM SPEC-SCNC: 8.6 MG/DL (ref 8.6–10.5)
CHLORIDE SERPL-SCNC: 105 MMOL/L (ref 98–107)
CO2 SERPL-SCNC: 22.6 MMOL/L (ref 22–29)
CREAT SERPL-MCNC: 0.86 MG/DL (ref 0.76–1.27)
EGFRCR SERPLBLD CKD-EPI 2021: 88.6 ML/MIN/1.73
GLUCOSE BLDC GLUCOMTR-MCNC: 165 MG/DL (ref 70–130)
GLUCOSE BLDC GLUCOMTR-MCNC: 234 MG/DL (ref 70–130)
GLUCOSE BLDC GLUCOMTR-MCNC: 249 MG/DL (ref 70–130)
GLUCOSE SERPL-MCNC: 202 MG/DL (ref 65–99)
POTASSIUM SERPL-SCNC: 4.7 MMOL/L (ref 3.5–5.2)
SODIUM SERPL-SCNC: 136 MMOL/L (ref 136–145)

## 2025-01-31 PROCEDURE — 92610 EVALUATE SWALLOWING FUNCTION: CPT

## 2025-01-31 PROCEDURE — 25010000002 ENOXAPARIN PER 10 MG: Performed by: INTERNAL MEDICINE

## 2025-01-31 PROCEDURE — 63710000001 INSULIN LISPRO (HUMAN) PER 5 UNITS: Performed by: HOSPITALIST

## 2025-01-31 PROCEDURE — 25810000003 SODIUM CHLORIDE 0.9 % SOLUTION: Performed by: INTERNAL MEDICINE

## 2025-01-31 PROCEDURE — 25010000002 DEXAMETHASONE PER 1 MG: Performed by: HOSPITALIST

## 2025-01-31 PROCEDURE — 92526 ORAL FUNCTION THERAPY: CPT

## 2025-01-31 PROCEDURE — 92523 SPEECH SOUND LANG COMPREHEN: CPT

## 2025-01-31 PROCEDURE — S0260 H&P FOR SURGERY: HCPCS | Performed by: NURSE PRACTITIONER

## 2025-01-31 PROCEDURE — 80048 BASIC METABOLIC PNL TOTAL CA: CPT | Performed by: HOSPITALIST

## 2025-01-31 PROCEDURE — 82948 REAGENT STRIP/BLOOD GLUCOSE: CPT

## 2025-01-31 RX ORDER — SODIUM CHLORIDE 9 MG/ML
50 INJECTION, SOLUTION INTRAVENOUS CONTINUOUS
Status: DISCONTINUED | OUTPATIENT
Start: 2025-01-31 | End: 2025-02-01

## 2025-01-31 RX ORDER — ENOXAPARIN SODIUM 100 MG/ML
40 INJECTION SUBCUTANEOUS DAILY
Status: COMPLETED | OUTPATIENT
Start: 2025-01-31 | End: 2025-02-02

## 2025-01-31 RX ORDER — AMLODIPINE BESYLATE 2.5 MG/1
2.5 TABLET ORAL
Status: DISCONTINUED | OUTPATIENT
Start: 2025-02-01 | End: 2025-02-04

## 2025-01-31 RX ADMIN — Medication 10 ML: at 20:47

## 2025-01-31 RX ADMIN — FAMOTIDINE 40 MG: 20 TABLET, FILM COATED ORAL at 08:52

## 2025-01-31 RX ADMIN — DEXAMETHASONE SODIUM PHOSPHATE 4 MG: 4 INJECTION, SOLUTION INTRA-ARTICULAR; INTRALESIONAL; INTRAMUSCULAR; INTRAVENOUS; SOFT TISSUE at 04:25

## 2025-01-31 RX ADMIN — Medication 10 ML: at 08:53

## 2025-01-31 RX ADMIN — ATORVASTATIN CALCIUM 10 MG: 20 TABLET, FILM COATED ORAL at 08:53

## 2025-01-31 RX ADMIN — ENOXAPARIN SODIUM 40 MG: 100 INJECTION SUBCUTANEOUS at 17:42

## 2025-01-31 RX ADMIN — Medication 2.5 MG: at 20:56

## 2025-01-31 RX ADMIN — INSULIN LISPRO 3 UNITS: 100 INJECTION, SOLUTION INTRAVENOUS; SUBCUTANEOUS at 17:43

## 2025-01-31 RX ADMIN — TAMSULOSIN HYDROCHLORIDE 0.4 MG: 0.4 CAPSULE ORAL at 08:52

## 2025-01-31 RX ADMIN — DEXAMETHASONE SODIUM PHOSPHATE 4 MG: 4 INJECTION, SOLUTION INTRA-ARTICULAR; INTRALESIONAL; INTRAMUSCULAR; INTRAVENOUS; SOFT TISSUE at 17:42

## 2025-01-31 RX ADMIN — DEXAMETHASONE SODIUM PHOSPHATE 4 MG: 4 INJECTION, SOLUTION INTRA-ARTICULAR; INTRALESIONAL; INTRAMUSCULAR; INTRAVENOUS; SOFT TISSUE at 23:35

## 2025-01-31 RX ADMIN — INSULIN LISPRO 2 UNITS: 100 INJECTION, SOLUTION INTRAVENOUS; SUBCUTANEOUS at 08:53

## 2025-01-31 RX ADMIN — SODIUM CHLORIDE 50 ML/HR: 9 INJECTION, SOLUTION INTRAVENOUS at 13:56

## 2025-01-31 RX ADMIN — INSULIN LISPRO 3 UNITS: 100 INJECTION, SOLUTION INTRAVENOUS; SUBCUTANEOUS at 20:56

## 2025-01-31 RX ADMIN — LOSARTAN POTASSIUM 25 MG: 25 TABLET, FILM COATED ORAL at 08:52

## 2025-01-31 NOTE — PAYOR COMM NOTE
"Minerva Jules (79 y.o. Male)          INPATIENT REQUEST FOR [id number]    CONTACT UR F# [phone]         Date of Birth   1945    Social Security Number       Address   [address] [place] [address] [place] [facility]    Home Phone   [phone]    MRN   [id number]       Mandaeism   None    Marital Status                               Admission Date   25    Admission Type   Urgent    Admitting Provider   Dusty Farrell MD    Attending Provider   Rogelio Ty MD    Department, Room/Bed   [address], P594/1       Discharge Date       Discharge Disposition       Discharge Destination                                 Attending Provider: Rogelio Ty MD    Allergies: No Known Allergies    Isolation: None   Infection: None   Code Status: CPR    Ht: 182.9 cm (72\")   Wt: 125 kg (275 lb)    Admission Cmt: None   Principal Problem: Brain mass [G93.89]                   Active Insurance as of 2025       Primary Coverage       Payor Plan Insurance Group Employer/Plan Group    AETNA MEDICARE [place]PLAC[id number]       Payor Plan Address Payor Plan Phone Number Payor Plan Fax Number Effective Dates    [address] [phone]  2024 - None Entered    [place] [address]         Subscriber Name Subscriber Birth Date Member ID       MINERVA JULES 1945 [id number]                     Emergency Contacts        (Rel.) Home Phone Work Phone Mobile Phone    Bina Jules (Spouse) -- -- [phone]    ApoloniaDianna keane (Sister) -- -- [phone]                 History & Physical        Dusty Farrell MD at 258          HISTORY AND PHYSICAL   [facility]        Patient Identification:  Name: Minerva Jules  Age: 79 y.o.  Sex: male  :  1945  MRN: [id number]                     Primary Care Physician: Carol Rios, APRN    Chief Complaint: Brain mass    History of Present Illness: "   Pleasant 79-year-old gentleman with multiple comorbidities presented to [facility] with progressive neurologic changes including slurred speech intermittent confusion expressive aphasia... Workup revealed multiple enhancing brain lesions on MRI.  Abdominal CT also reveals a pancreatic mass concerning for possible malignancy.  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   Converted from Centricity:   Description - SEBORRHEIC KERATOSIS    Pain of foot 03/19/2013    Formatting of this note might be different from the original.   Converted from Centricity:   Description - HEEL PAIN    Prostate cancer     Thrombocytopenia 03/19/2013    Formatting of this note might be different from the original.   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   Converted from Centricity:   Description - VASCULAR DISORDER OF SKIN     Past Surgical History:  Past Surgical History:   Procedure Laterality Date     CHOLECYSTECTOMY      COLONOSCOPY  10/2013    UPPER ENDOSCOPIC ULTRASOUND W/ FNA N/A 5/3/2024    Procedure: ENDOSCOPIC ULTRASOUND with fine needle aspiration x1 area;  Surgeon: Gifty Ribera MD;  Location: [facility] ENDOSCOPY;  Service: Gastroenterology;  Laterality: N/A;  Post- pancreatic cyst      Home Meds:  Medications Prior to Admission   Medication Sig Dispense Refill Last Dose/Taking    amLODIPine (NORVASC) 10 MG tablet TAKE 1 TABLET BY MOUTH EVERY DAY AT NIGHT 90 tablet 0 1/30/2025 Bedtime    atorvastatin (LIPITOR) 10 MG tablet TAKE 1 TABLET BY MOUTH EVERY DAY 90 tablet 1 1/30/2025 Morning    dapagliflozin (Farxiga) 5 MG tablet tablet Take 1 tablet by mouth Daily. 90 tablet 0 1/30/2025 Morning    metFORMIN (GLUCOPHAGE) 500 MG tablet TAKE 1 TABLET BY MOUTH TWICE A DAY WITH FOOD 180 tablet 0 1/30/2025 Evening    tamsulosin (FLOMAX) 0.4 MG capsule 24 hr capsule Take 1 capsule by mouth Daily. 90 capsule 0 1/30/2025 Morning    acetaminophen (TYLENOL) 325 MG tablet Take 2 tablets by mouth Every 6 (Six) Hours As Needed for Mild Pain.   Unknown    losartan (COZAAR) 25 MG tablet TAKE 1 TABLET BY MOUTH EVERY DAY 90 tablet 0 Morning    rivaroxaban (XARELTO) 20 MG tablet Take 1 tablet by mouth Daily.   Unknown       Allergies:  No Known Allergies  Immunizations:  Immunization History   Administered Date(s) Administered    Covid-19 (Pfizer) Gray Cap Monovalent 11/15/2023    FLUAD TRI 65YR+ 09/24/2024    FluMist 2-49yrs 09/24/2014    Fluzone High-Dose 65+YRS 11/08/2016    Influenza Seasonal Injectable 10/28/2024    Influenza, Unspecified 11/06/2012, 10/01/2013, 09/15/2017, 11/01/2018    Pneumococcal Conjugate 13-Valent (PCV13) 05/24/2019    Pneumococcal Polysaccharide (PPSV23) 02/19/2013, 05/14/2018    Td, Unspecified 02/12/2008    Zostavax 03/28/2017     Social History:   Social History     Social History Narrative    Not on file     Social History     Tobacco Use    Smoking status: Never     Passive exposure: Never     Smokeless tobacco: Never   Substance Use Topics    Alcohol use: No     Family History:  Family History   Problem Relation Age of Onset    Breast cancer Other     Diabetes Other     Deep vein thrombosis Other     Diabetes type II Other         Review of Systems  Review of Systems   Unable to perform ROS: Mental status change       Objective:  T Max 24 hrs: Temp (24hrs), Av.2 °F (36.8 °C), Min:97.6 °F (36.4 °C), Max:98.7 °F (37.1 °C)    Vitals Ranges:   Temp:  [97.6 °F (36.4 °C)-98.7 °F (37.1 °C)] 98.2 °F (36.8 °C)  Heart Rate:  [47-80] 69  Resp:  [13-18] 15  BP: (116-177)/(61-88) 116/69      Exam:  Physical Exam  Constitutional:       General: He is not in acute distress.     Appearance: Normal appearance. He is obese. He is not ill-appearing, toxic-appearing or diaphoretic.   HENT:      Head: Normocephalic and atraumatic.      Right Ear: External ear normal.      Left Ear: External ear normal.      Nose: Nose normal.      Mouth/Throat:      Mouth: Mucous membranes are moist.   Eyes:      General: No scleral icterus.        Right eye: No discharge.         Left eye: No discharge.      Conjunctiva/sclera: Conjunctivae normal.   Cardiovascular:      Rate and Rhythm: Normal rate and regular rhythm.      Heart sounds:      No friction rub. No gallop.   Pulmonary:      Effort: Pulmonary effort is normal.      Breath sounds: Normal breath sounds.   Abdominal:      General: Abdomen is flat. Bowel sounds are normal. There is no distension.      Palpations: Abdomen is soft. There is no mass.      Tenderness: There is no abdominal tenderness. There is no guarding or rebound.   Musculoskeletal:      Cervical back: Neck supple.      Right lower leg: No edema.      Left lower leg: No edema.   Skin:     General: Skin is warm and dry.   Neurological:      Mental Status: He is alert.      Comments: Patient lying in bed.  Unable to fully cooperate with exam.  Speech a little on the slurred side.  Oriented only to person.    Psychiatric:      Comments: See neurologic exam.         Data Review:  All labs and radiology reviewed.    Assessment:  Brain mass: Neurosurgery consulted.  Plans for biopsy tomorrow.  N.p.o. after midnight.  Evidence of vasogenic edema noted on at least 1 mass.  Initiate IV Decadron.  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  Resume when okay with neurosurgery.  Diabetes type 2: Monitor with sliding scale insulin.  Pancreatic mass: Further workup pending brain biopsy results.  Hypertension: Continue home meds.      Plan:  Please see above.  Discussed with referring provider.  Discussed with RN    Dusty Farrell MD  2025  21:18 EST    EMR Dragon/Transcription disclaimer:   Much of this encounter note is an electronic transcription/translation of spoken language to printed text. The electronic translation of spoken language may permit erroneous, or at times, nonsensical words or phrases to be inadvertently transcribed; Although I have reviewed the note for such errors, some may still exist.       Electronically signed by Dusty Farrell MD at 25       Emergency Department Notes    No notes of this type exist for this encounter.          Physician Progress Notes (last 72 hours)        Rogelio Ty MD at 25 0789              [facility] Medicine Services  PROGRESS NOTE    Patient Name: Sumit Jules  : 1945  MRN: [id number]    Date of Admission: 2025  Primary Care Physician: Carol Rios APRN    Subjective   Subjective     CC:  Follow-up for brain mass    Subjective:  Patient is resting comfortably in bed.  He has no acute complaints.  He is oriented and age to his name but otherwise confused.  He is very poor historian.    Review of Systems  No current fevers or chills  No current shortness of breath or cough  No current nausea, vomiting, or diarrhea  No current chest pain or palpitations       Objective   Objective     Vital Signs:   Temp:   [97.5 °F (36.4 °C)-98.2 °F (36.8 °C)] 97.5 °F (36.4 °C)  Heart Rate:  [58-80] 72  Resp:  [14-16] 16  BP: (107-129)/(54-72) 129/70        Physical Exam:  Constitutional: Awake, alert, elderly appearing  HENT: NCAT, mucous membranes moist, neck supple  Respiratory: No cough or wheezes, normal respirations, nonlabored breathing   Cardiovascular: Pulse rate is normal, palpable radial pulses  Gastrointestinal:  Soft, nontender, nondistended  Musculoskeletal: Obese and somewhat debilitated in appearance, no lower extremity edema, BMI 37  Psychiatric: Calm, appropriate affect, cooperative, conversational  Neurologic: Disoriented, no slurred speech or facial droop, follows commands  Skin: No rashes or jaundice, warm      Results Reviewed:  Results from last 7 days   Lab Units 01/30/25  0510 01/29/25  1302   WBC 10*3/mm3 6.00 6.84   HEMOGLOBIN g/dL 12.7* 13.9   HEMATOCRIT % 41.7 44.3   PLATELETS 10*3/mm3 110* 115*   INR   --  2.11*     Results from last 7 days   Lab Units 01/31/25  0721 01/30/25  0510 01/29/25  1302   SODIUM mmol/L 136 139 141   POTASSIUM mmol/L 4.7 4.1 4.2   CHLORIDE mmol/L 105 105 104   CO2 mmol/L 22.6 23.2 27.3   BUN mg/dL 14 13 14   CREATININE mg/dL 0.86 0.97 1.08   GLUCOSE mg/dL 202* 109* 134*   CALCIUM mg/dL 8.6 8.8 9.7   ALK PHOS U/L  --   --  85   ALT (SGPT) U/L  --   --  13   AST (SGOT) U/L  --   --  20     Estimated Creatinine Clearance: 96.7 mL/min (by C-G formula based on SCr of 0.86 mg/dL).    Microbiology Results Abnormal       None            Imaging Results (Last 24 Hours)       ** No results found for the last 24 hours. **                I have reviewed the medications:  Scheduled Meds:amLODIPine, 10 mg, Oral, Q24H  atorvastatin, 10 mg, Oral, Daily  dexAMETHasone, 4 mg, Intravenous, Q6H  famotidine, 40 mg, Oral, Daily  insulin lispro, 2-7 Units, Subcutaneous, 4x Daily AC & at Bedtime  losartan, 25 mg, Oral, Daily  sodium chloride, 10 mL, Intravenous, Q12H  tamsulosin, 0.4 mg, Oral,  Daily      Continuous Infusions:sodium chloride, 50 mL/hr      PRN Meds:.  acetaminophen **OR** acetaminophen **OR** acetaminophen    senna-docusate sodium **AND** polyethylene glycol **AND** bisacodyl **AND** bisacodyl    dextrose    dextrose    glucagon (human recombinant)    ondansetron ODT **OR** ondansetron    sodium chloride    sodium chloride    Assessment & Plan   Assessment & Plan     Active Hospital Problems    Diagnosis  POA    **Brain mass [G93.89]  Yes    Vasogenic cerebral edema [G93.6]  Yes    Slurred speech [R47.81]  Yes    History of DVT (deep vein thrombosis) [Z86.718]  Not Applicable    Type 2 diabetes mellitus with hyperglycemia, without long-term current use of insulin [E11.65]  Yes    Pancreatic lesion [K86.9]  Yes    Hyperlipidemia [E78.5]  Yes    Essential (primary) hypertension [I10]  Yes    Chronic coronary artery disease [I25.10]  Yes    Prostate cancer [C61]  Yes    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        Brief Hospital Course to date:  Sumit Jules is a 79 y.o. male presents the hospital with speech difficulties and confusion and was found to have brain mass with vasogenic edema.  He was transferred from [facility] for further medical management.    Discussion/plan: All medical problems are new under my management today.  Start IV fluid for hydration while awaiting speech.  IV steroids.  Outside hospital imaging reviewed.  Adjusting blood pressure medications.  Glucose reviewed.  Otherwise per below.    Brain mass with vasogenic edema with dysarthria:  Neurosurgery consult.  Supportive care and neurochecks.  IV steroids  Speech therapy consult to evaluate swallow  Supportive care and symptom treatment.    Pancreas mass:  Outside hospital CT scan reviewed and shows ill-defined lesion of the pancreas head  concerning for possible pancreatic malignancy.  Patient reportedly had FNA biopsy in May 2024 that was negative for malignancy.  Patient also with changes concerning for chronic pancreatitis.  Left adrenal adenoma also noted.  CA 19-9 notably elevated.    Diabetes:  Monitor glucose and adjust insulin as needed.    Essential hypertension: Continue appropriate home blood pressure medications.  Monitor blood pressure adjust as needed.    Hyperlipidemia: Continue statin.  Stable.    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  Restart once cleared by neurosurgery.    BPH: Flomax.  Monitor urine output.    Physical debility: PT     DVT Prophylaxis: Mechanical      Disposition pending    CODE STATUS:   Code Status and Medical Interventions: CPR (Attempt to Resuscitate); Full Support   Ordered at: 01/30/25 2118     Code Status (Patient has no pulse and is not breathing):    CPR (Attempt to Resuscitate)     Medical Interventions (Patient has pulse or is breathing):    Full Support       Rogelio Ty MD  01/31/25      Electronically signed by Rogelio Ty MD at 01/31/25 1300       Consult Notes (last 72 hours)  Notes from 01/28/25 1402 through 01/31/25 1402   No notes of this type exist for this encounter.

## 2025-01-31 NOTE — THERAPY EVALUATION
Acute Care - Speech Language Pathology   Swallow Initial Evaluation [facility]     Patient Name: Sumit Jules  : 1945  MRN: [id number]  Today's Date: 2025               Admit Date: 2025    Visit Dx:     ICD-10-CM ICD-9-CM   1. Brain mass  G93.89 348.89     Patient Active Problem List   Diagnosis    Antithrombin III deficiency    Atherosclerosis of aorta    Prostate cancer    Benign essential hypertension    Chronic coronary artery disease    Coagulation defect    Hyperlipidemia    Essential (primary) hypertension    Pancreatic lesion    Long term current use of anticoagulant    Asymptomatic varicose veins of lower extremity    Abnormal CT scan, gastrointestinal tract    Benign prostatic hyperplasia without lower urinary tract symptoms    Type 2 diabetes mellitus with hyperglycemia, without long-term current use of insulin    Ventral incisional hernia    Brain mass    Vasogenic cerebral edema    Slurred speech    History of DVT (deep vein thrombosis)     Past Medical History:   Diagnosis Date    Acute pancreatitis 2024    Antithrombin III deficiency     Atherosclerosis of aorta 2015    Benign essential hypertension 2014    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   Converted from Centricity:   Description - DEEP VENOUS THROMBOPHLEBITIS, RECURRENT    Congenital deficiency of clotting factors 2012    Diabetes mellitus     pre - no insulin    DVT (deep venous thrombosis)     History of complete eye exam SCHEDULED    Hyperlipidemia     Idiopathic acute pancreatitis without infection or necrosis 2024    Impaired fasting glucose 2011    Obesity     Other seborrheic keratosis 2013    Formatting of this note might be different from the original.   Converted from Centricity:   Description - SEBORRHEIC KERATOSIS    Pain of foot 2013    Formatting of this note might be  different from the original.   Converted from Centricity:   Description - HEEL PAIN    Prostate cancer     Thrombocytopenia 03/19/2013    Formatting of this note might be different from the original.   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   Converted from Centricity:   Description - VASCULAR DISORDER OF SKIN     Past Surgical History:   Procedure Laterality Date    CHOLECYSTECTOMY      COLONOSCOPY  10/2013    UPPER ENDOSCOPIC ULTRASOUND W/ FNA N/A 5/3/2024    Procedure: ENDOSCOPIC ULTRASOUND with fine needle aspiration x1 area;  Surgeon: Gifty Ribera MD;  Location: [facility] ENDOSCOPY;  Service: Gastroenterology;  Laterality: N/A;  Post- pancreatic cyst       SLP Recommendation and Plan  SLP Swallowing Diagnosis: R/O pharyngeal dysphagia (01/31/25 1500)  SLP Diet Recommendation: regular textures, thin liquids, no mixed consistencies (01/31/25 1500)  Recommended Precautions and Strategies: upright posture during/after eating, small bites of food and sips of liquid, no straw, general aspiration precautions, 1:1 supervision (01/31/25 1500)  SLP Rec. for Method of Medication Administration: meds whole, with puree, as tolerated (01/31/25 1500)     Monitor for Signs of Aspiration: yes, notify SLP if any concerns (01/31/25 1500)  Recommended Diagnostics: [facility] ([facility] [place]) (01/31/25 1500)     Anticipated Discharge Disposition (SLP): unknown (01/31/25 1300)     Therapy Frequency (Swallow): PRN (01/31/25 1500)  Predicted Duration Therapy Intervention (Days): until discharge (01/31/25 1500)  Oral Care Recommendations: Oral Care BID/PRN (01/31/25 1500)                                        Outcome Evaluation: Clinical swallow evaluation completed. Recommend regular diet with thin liquids, NO mixed consistencies. Meds whole in puree. Sitting upright, slow rate,  small bites/sips. Will follow with VFSS to further assess swallow function.      SWALLOW EVALUATION (Last 72 Hours)       SLP Adult Swallow Evaluation       Row Name 01/31/25 1500 01/31/25 1300                Rehab Evaluation    Document Type evaluation  -CR --       Subjective Information no complaints  -CR --       Patient Observations alert;cooperative  -CR --       Patient Effort good  -CR --       Symptoms Noted During/After Treatment none  -CR --          General Information    Patient Profile Reviewed yes  -CR --       Pertinent History Of Current Problem Brain and pancreatic mass; transferred from [facility]. Passed RN dysphagia screen; MD requesting swallow eval.  -CR --       Current Method of Nutrition regular textures;thin liquids  -CR --  -CR       Precautions/Limitations, Vision --  question right neglect  -CR --       Precautions/Limitations, Hearing WFL;for purposes of eval  -CR --       Prior Level of Function-Swallowing no diet consistency restrictions  -CR --       Plans/Goals Discussed with patient;agreed upon  -CR --       Barriers to Rehab medically complex  -CR --          Pain    Pretreatment Pain Rating 0/10 - no pain  -CR --       Posttreatment Pain Rating 0/10 - no pain  -CR --          Oral Motor Structure and Function    Dentition Assessment natural, present and adequate  -CR --       Secretion Management WNL/WFL  -CR --       Mucosal Quality moist, healthy  -CR --          Oral Musculature and Cranial Nerve Assessment    Oral Motor General Assessment WFL  -CR --          Clinical Swallow Eval    Clinical Swallow Evaluation Summary Clinical swallow evaluation completed. Pt aphasic but able to follow simple commands. Oral mech exam unremarkable. No overt s/s initially with thins via spoon/cup, puree, soft/mixed solids, or regular solids. Immediate cough with thins via straw. With further solid trials, pt demonstrated a subtle throat clear versus voiced exhale. Recommend regular  diet with thin liquids, NO mixed consistencies, NO straws. Meds whole in puree. Sitting upright, slow rate, small bites/sips, supervision during meals. Will follow with VFSS to further assess swallow function.  -CR --          SLP Evaluation Clinical Impression    SLP Swallowing Diagnosis R/O pharyngeal dysphagia  -CR --          Recommendations    Therapy Frequency (Swallow) PRN  -CR --       Predicted Duration Therapy Intervention (Days) until discharge  -CR --       SLP Diet Recommendation regular textures;thin liquids;no mixed consistencies  -CR --       Recommended Diagnostics VFSS ([facility])  -CR --       Recommended Precautions and Strategies upright posture during/after eating;small bites of food and sips of liquid;no straw;general aspiration precautions;1:1 supervision  -CR --       Oral Care Recommendations Oral Care BID/PRN  -CR --       SLP Rec. for Method of Medication Administration meds whole;with puree;as tolerated  -CR --       Monitor for Signs of Aspiration yes;notify SLP if any concerns  -CR --                 User Key  (r) = Recorded By, (t) = Taken By, (c) = Cosigned By      Initials Name Effective Dates    CR Matilda Bacon, FLOYD 12/03/24 -                     EDUCATION  The patient has been educated in the following areas:   Dysphagia (Swallowing Impairment).        SLP GOALS       Row Name 01/31/25 1300             Follow Directions Goal 2 (SLP)    Improve Ability to Follow Directions Goal 1 (SLP) 1 step direction with objects;1 step direction without objects;2 step commands;80%;with moderate cues (50-74%)  -CR      Time Frame (Follow Directions Goal 1, SLP) by discharge  -CR      Progress/Outcomes (Follow Directions Goal 1, SLP) new goal  -CR         Word Retrieval Skills Goal 1 (SLP)    Improve Word Retrieval Skills By Goal 1 (SLP) completing automatic speech task, days of the week;completing automatic speech task, months;confrontational naming task;responsive naming task;80%;with moderate  cues (50-74%)  -CR      Time Frame (Word Retrieval Goal 1, SLP) by discharge  -CR      Progress/Outcomes (Word Retrieval Goal 1, SLP) new goal  -CR                User Key  (r) = Recorded By, (t) = Taken By, (c) = Cosigned By      Initials Name Provider Type    Matilda Ackerman SLP Speech and Language Pathologist                         Time Calculation:    Time Calculation- SLP       Row Name 01/31/25 1537 01/31/25 1315          Time Calculation- SLP    SLP Start Time 0730  -CR 0730  -CR     SLP Received On 01/31/25  -CR 01/31/25  -CR        Untimed Charges    30486-AG Eval Speech and Production w/ Language Minutes -- 45  -CR     [id number] Eval Oral Pharyng Swallow Minutes 45  -CR --     97195-IS Treatment Swallow Minutes --  -CR --        Total Minutes    Untimed Charges Total Minutes 45  -CR 45  -CR      Total Minutes 45  -CR 45  -CR               User Key  (r) = Recorded By, (t) = Taken By, (c) = Cosigned By      Initials Name Provider Type    Matilda Ackerman SLP Speech and Language Pathologist                    Therapy Charges for Today       Code Description Service Date Service Provider Modifiers Qty    71288913448 HC ST EVAL SPEECH AND PROD W LANG  3 1/31/2025 Matilda Bacon SLP GN 1    [id number] HC ST EVAL ORAL PHARYNG SWALLOW 3 1/31/2025 Matilda Bacon SLP GN 1                 FLOYD Arroyo  1/31/2025

## 2025-01-31 NOTE — PLAN OF CARE
Goal Outcome Evaluation:              Outcome Evaluation: New orders received to assess communication disorder. Speech/language evaluation completed. Pt presents with suspected moderate-severe expressive aphasia, mild receptive aphasia. The Western Aphasia Battery Bedside screen was completed. Bedside aphasia score: 37/100. Adequate responses to simple Y/N questions and good repetition of words/short phrases. Intermittent phonemic and semantic paraphasias noted. Difficulties following 2-step directions. Perseveration impacted simple naming. Slow, halting speech with filler words. Difficulties forming complete sentences. Recommend ongoing speech therapy at next level of care to target functional communication.     Pt NPO for biopsy this date, per RN. No swallow orders have been placed at this time. Please place new orders to evaluate and treat dysphagia, if warranted.

## 2025-01-31 NOTE — H&P
HISTORY AND PHYSICAL   [facility]        Patient Identification:  Name: Sumit Jules  Age: 79 y.o.  Sex: male  :  1945  MRN: [id number]                     Primary Care Physician: Carol Rios APRN    Chief Complaint: Brain mass    History of Present Illness:   Pleasant 79-year-old gentleman with multiple comorbidities presented to [facility] with progressive neurologic changes including slurred speech intermittent confusion expressive aphasia... Workup revealed multiple enhancing brain lesions on MRI.  Abdominal CT also reveals a pancreatic mass concerning for possible malignancy.  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   Converted from [employer]:   Description - DEEP VENOUS THROMBOPHLEBITIS, RECURRENT    Congenital deficiency of clotting factors 2012    Diabetes mellitus     pre - no insulin    DVT (deep venous thrombosis)     History of complete eye exam SCHEDULED    Hyperlipidemia     Idiopathic acute pancreatitis without infection or necrosis 2024    Impaired fasting glucose 2011    Obesity     Other seborrheic keratosis 2013    Formatting of this note might be different from the original.   Converted from [employer]:   Description - SEBORRHEIC KERATOSIS    Pain of foot 2013    Formatting of this note might be different from the original.   Converted from [employer]:   Description - HEEL PAIN    Prostate cancer     Thrombocytopenia 2013    Formatting of this note might be different from the original.   Converted from [employer]:   Description - THROMBOCYTOPENIA    UTI  (urinary tract infection) 02/14/2024    Vasculitis limited to skin 02/23/2024    Formatting of this note might be different from the original.   Converted from [employer]:   Description - VASCULAR DISORDER OF SKIN     Past Surgical History:  Past Surgical History:   Procedure Laterality Date    CHOLECYSTECTOMY      COLONOSCOPY  10/2013    UPPER ENDOSCOPIC ULTRASOUND W/ FNA N/A 5/3/2024    Procedure: ENDOSCOPIC ULTRASOUND with fine needle aspiration x1 area;  Surgeon: Gifty Ribera MD;  Location: [facility] ENDOSCOPY;  Service: Gastroenterology;  Laterality: N/A;  Post- pancreatic cyst      Home Meds:  Medications Prior to Admission   Medication Sig Dispense Refill Last Dose/Taking    amLODIPine (NORVASC) 10 MG tablet TAKE 1 TABLET BY MOUTH EVERY DAY AT NIGHT 90 tablet 0 1/30/2025 Bedtime    atorvastatin (LIPITOR) 10 MG tablet TAKE 1 TABLET BY MOUTH EVERY DAY 90 tablet 1 1/30/2025 Morning    dapagliflozin (Farxiga) 5 MG tablet tablet Take 1 tablet by mouth Daily. 90 tablet 0 1/30/2025 Morning    metFORMIN (GLUCOPHAGE) 500 MG tablet TAKE 1 TABLET BY MOUTH TWICE A DAY WITH FOOD 180 tablet 0 1/30/2025 Evening    tamsulosin (FLOMAX) 0.4 MG capsule 24 hr capsule Take 1 capsule by mouth Daily. 90 capsule 0 1/30/2025 Morning    acetaminophen (TYLENOL) 325 MG tablet Take 2 tablets by mouth Every 6 (Six) Hours As Needed for Mild Pain.   Unknown    losartan (COZAAR) 25 MG tablet TAKE 1 TABLET BY MOUTH EVERY DAY 90 tablet 0 Morning    rivaroxaban (XARELTO) 20 MG tablet Take 1 tablet by mouth Daily.   Unknown       Allergies:  No Known Allergies  Immunizations:  Immunization History   Administered Date(s) Administered    Covid-19 (Pfizer) Gray Cap Monovalent 11/15/2023    FLUAD TRI 65YR+ 09/24/2024    FluMist 2-49yrs 09/24/2014    Fluzone High-Dose 65+YRS 11/08/2016    Influenza Seasonal Injectable 10/28/2024    Influenza, Unspecified 11/06/2012, 10/01/2013, 09/15/2017, 11/01/2018    Pneumococcal Conjugate 13-Valent (PCV13)  2019    Pneumococcal Polysaccharide (PPSV23) 2013, 2018    Td, Unspecified 2008    Zostavax 2017     Social History:   Social History     Social History Narrative    Not on file     Social History     Tobacco Use    Smoking status: Never     Passive exposure: Never    Smokeless tobacco: Never   Substance Use Topics    Alcohol use: No     Family History:  Family History   Problem Relation Age of Onset    Breast cancer Other     Diabetes Other     Deep vein thrombosis Other     Diabetes type II Other         Review of Systems  Review of Systems   Unable to perform ROS: Mental status change       Objective:  T Max 24 hrs: Temp (24hrs), Av.2 °F (36.8 °C), Min:97.6 °F (36.4 °C), Max:98.7 °F (37.1 °C)    Vitals Ranges:   Temp:  [97.6 °F (36.4 °C)-98.7 °F (37.1 °C)] 98.2 °F (36.8 °C)  Heart Rate:  [47-80] 69  Resp:  [13-18] 15  BP: (116-177)/(61-88) 116/69      Exam:  Physical Exam  Constitutional:       General: He is not in acute distress.     Appearance: Normal appearance. He is obese. He is not ill-appearing, toxic-appearing or diaphoretic.   HENT:      Head: Normocephalic and atraumatic.      Right Ear: External ear normal.      Left Ear: External ear normal.      Nose: Nose normal.      Mouth/Throat:      Mouth: Mucous membranes are moist.   Eyes:      General: No scleral icterus.        Right eye: No discharge.         Left eye: No discharge.      Conjunctiva/sclera: Conjunctivae normal.   Cardiovascular:      Rate and Rhythm: Normal rate and regular rhythm.      Heart sounds:      No friction rub. No gallop.   Pulmonary:      Effort: Pulmonary effort is normal.      Breath sounds: Normal breath sounds.   Abdominal:      General: Abdomen is flat. Bowel sounds are normal. There is no distension.      Palpations: Abdomen is soft. There is no mass.      Tenderness: There is no abdominal tenderness. There is no guarding or rebound.   Musculoskeletal:      Cervical back: Neck supple.       Right lower leg: No edema.      Left lower leg: No edema.   Skin:     General: Skin is warm and dry.   Neurological:      Mental Status: He is alert.      Comments: Patient lying in bed.  Unable to fully cooperate with exam.  Speech a little on the slurred side.  Oriented only to person.   Psychiatric:      Comments: See neurologic exam.         Data Review:  All labs and radiology reviewed.    Assessment:  Brain mass: Neurosurgery consulted.  Plans for biopsy tomorrow.  N.p.o. after midnight.  Evidence of vasogenic edema noted on at least 1 mass.  Initiate IV Decadron.  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  Resume when okay with neurosurgery.  Diabetes type 2: Monitor with sliding scale insulin.  Pancreatic mass: Further workup pending brain biopsy results.  Hypertension: Continue home meds.      Plan:  Please see above.  Discussed with referring provider.  Discussed with MAINE Farrell MD  1/30/2025  21:18 EST    EMR Dragon/Transcription disclaimer:   Much of this encounter note is an electronic transcription/translation of spoken language to printed text. The electronic translation of spoken language may permit erroneous, or at times, nonsensical words or phrases to be inadvertently transcribed; Although I have reviewed the note for such errors, some may still exist.

## 2025-01-31 NOTE — PLAN OF CARE
Goal Outcome Evaluation:              Outcome Evaluation: Clinical swallow evaluation completed. Recommend regular diet with thin liquids, NO straws, NO mixed consistencies. Meds whole in puree. Sitting upright, slow rate, small bites/sips. Will follow with [facility] to further assess swallow function.    Anticipated Discharge Disposition (SLP): unknown

## 2025-01-31 NOTE — PROGRESS NOTES
[facility] Medicine Services  PROGRESS NOTE    Patient Name: Sumit Jules  : 1945  MRN: [id number]    Date of Admission: 2025  Primary Care Physician: Carol Rios APRN    Subjective   Subjective     CC:  Follow-up for brain mass    Subjective:  Patient is resting comfortably in bed.  He has no acute complaints.  He is oriented and age to his name but otherwise confused.  He is very poor historian.    Review of Systems  No current fevers or chills  No current shortness of breath or cough  No current nausea, vomiting, or diarrhea  No current chest pain or palpitations       Objective   Objective     Vital Signs:   Temp:  [97.5 °F (36.4 °C)-98.2 °F (36.8 °C)] 97.5 °F (36.4 °C)  Heart Rate:  [58-80] 72  Resp:  [14-16] 16  BP: (107-129)/(54-72) 129/70        Physical Exam:  Constitutional: Awake, alert, elderly appearing  HENT: NCAT, mucous membranes moist, neck supple  Respiratory: No cough or wheezes, normal respirations, nonlabored breathing   Cardiovascular: Pulse rate is normal, palpable radial pulses  Gastrointestinal:  Soft, nontender, nondistended  Musculoskeletal: Obese and somewhat debilitated in appearance, no lower extremity edema, BMI 37  Psychiatric: Calm, appropriate affect, cooperative, conversational  Neurologic: Disoriented, no slurred speech or facial droop, follows commands  Skin: No rashes or jaundice, warm      Results Reviewed:  Results from last 7 days   Lab Units 25  0510 25  1302   WBC 10*3/mm3 6.00 6.84   HEMOGLOBIN g/dL 12.7* 13.9   HEMATOCRIT % 41.7 44.3   PLATELETS 10*3/mm3 110* 115*   INR   --  2.11*     Results from last 7 days   Lab Units 25  0721 25  0510 25  1302   SODIUM mmol/L 136 139 141   POTASSIUM mmol/L 4.7 4.1 4.2   CHLORIDE mmol/L 105 105 104   CO2 mmol/L 22.6 23.2 27.3   BUN mg/dL 14 13 14   CREATININE mg/dL 0.86 0.97 1.08   GLUCOSE mg/dL 202* 109* 134*   CALCIUM mg/dL 8.6 8.8 9.7   ALK PHOS U/L  --   --  85   ALT  (SGPT) U/L  --   --  13   AST (SGOT) U/L  --   --  20     Estimated Creatinine Clearance: 96.7 mL/min (by C-G formula based on SCr of 0.86 mg/dL).    Microbiology Results Abnormal       None            Imaging Results (Last 24 Hours)       ** No results found for the last 24 hours. **                I have reviewed the medications:  Scheduled Meds:amLODIPine, 10 mg, Oral, Q24H  atorvastatin, 10 mg, Oral, Daily  dexAMETHasone, 4 mg, Intravenous, Q6H  famotidine, 40 mg, Oral, Daily  insulin lispro, 2-7 Units, Subcutaneous, 4x Daily AC & at Bedtime  losartan, 25 mg, Oral, Daily  sodium chloride, 10 mL, Intravenous, Q12H  tamsulosin, 0.4 mg, Oral, Daily      Continuous Infusions:sodium chloride, 50 mL/hr      PRN Meds:.  acetaminophen **OR** acetaminophen **OR** acetaminophen    senna-docusate sodium **AND** polyethylene glycol **AND** bisacodyl **AND** bisacodyl    dextrose    dextrose    glucagon (human recombinant)    ondansetron ODT **OR** ondansetron    sodium chloride    sodium chloride    Assessment & Plan   Assessment & Plan     Active Hospital Problems    Diagnosis  POA    **Brain mass [G93.89]  Yes    Vasogenic cerebral edema [G93.6]  Yes    Slurred speech [R47.81]  Yes    History of DVT (deep vein thrombosis) [Z86.718]  Not Applicable    Type 2 diabetes mellitus with hyperglycemia, without long-term current use of insulin [E11.65]  Yes    Pancreatic lesion [K86.9]  Yes    Hyperlipidemia [E78.5]  Yes    Essential (primary) hypertension [I10]  Yes    Chronic coronary artery disease [I25.10]  Yes    Prostate cancer [C61]  Yes    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        Brief Hospital Course to date:  Sumit Jules is a 79 y.o. male presents the hospital with speech difficulties and confusion and was found to have brain mass with  vasogenic edema.  He was transferred from [facility] [place] operated by [facility] for further medical management.    Discussion/plan: All medical problems are new under my management today.  Start IV fluid for hydration while awaiting speech.  IV steroids.  Outside hospital imaging reviewed.  Adjusting blood pressure medications.  Glucose reviewed.  Otherwise per below.    Brain mass with vasogenic edema with dysarthria:  Neurosurgery consult.  Supportive care and neurochecks.  IV steroids  Speech therapy consult to evaluate swallow  Supportive care and symptom treatment.    Pancreas mass:  Outside hospital CT scan reviewed and shows ill-defined lesion of the pancreas head concerning for possible pancreatic malignancy.  Patient reportedly had FNA biopsy in May 2024 that was negative for malignancy.  Patient also with changes concerning for chronic pancreatitis.  Left adrenal adenoma also noted.  CA 19-9 notably elevated.    Diabetes:  Monitor glucose and adjust insulin as needed.    Essential hypertension: Continue appropriate home blood pressure medications.  Monitor blood pressure adjust as needed.    Hyperlipidemia: Continue statin.  Stable.    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  Restart once cleared by neurosurgery.    BPH: Flomax.  Monitor urine output.    Physical debility: PT     DVT Prophylaxis: Mechanical      Disposition pending    CODE STATUS:   Code Status and Medical Interventions: CPR (Attempt to Resuscitate); Full Support   Ordered at: 01/30/25 2118     Code Status (Patient has no pulse and is not breathing):    CPR (Attempt to Resuscitate)     Medical Interventions (Patient has pulse or is breathing):    Full Support       Rogelio Ty MD  01/31/25

## 2025-01-31 NOTE — PAYOR COMM NOTE
"This is discharge notification for Minerva Jules.   Admit on 1/29/25.   Transfer on 1/30/25 to [facility].       AUTHORIZATION: [id number]   THANK YOU.      Ekaterina Roman RN, [facility]  Utilization Nurse  [facility]   [address] [place] [address]   [phone]  Fx [phone]   Minerva Jules (79 y.o. Male)       Date of Birth   1945    Social Security Number       Address   [address] [place] [address] [address] [place] [address]    Home Phone   [phone]    MRN   [id number]       Mandaeism   None    Marital Status                               Admission Date   1/29/25    Admission Type   Emergency    Admitting Provider   Ulisses Ford MD    Attending Provider       Department, Room/Bed   [facility] MEDICAL INPATIENT, 201/1       Discharge Date   1/30/2025    Discharge Disposition   [facility] Hospital or Unit (DC - External or [facility])    Discharge Destination                                 Attending Provider: (none)   Allergies: No Known Allergies    Isolation: None   Infection: None   Code Status: CPR    Ht: 182.9 cm (72\")   Wt: 127 kg (279 lb 1.6 oz)    Admission Cmt: None   Principal Problem: Brain mass [G93.89]                   Active Insurance as of 1/29/2025       Primary Coverage       Payor Plan Insurance Group Employer/Plan Group    AETNA MEDICARE [place]PLAC[id number]       Payor Plan Address Payor Plan Phone Number Payor Plan Fax Number Effective Dates    [address] [phone]  2/1/2024 - None Entered    [facility] [address]         Subscriber Name Subscriber Birth Date Member ID       MINERVA JULES 1945 [id number]                     Emergency Contacts        (Rel.) Home Phone Work Phone Mobile Phone    Bina Jules (Spouse) -- -- [phone]    Dianna Zambrano (Sister) -- -- [phone]                 Discharge Summary        Charlie Burch MD at 01/30/25 [address] [place] [address] " "Medicine Services  Discharge Summary    Date of Service: 24  Patient Name: Sumit Jules  : 1945  MRN: [id number]    Date of Admission: 2025  Discharge Diagnosis:     Slurred speech likely secondary to below  Brain lesion, left basal ganglia/left anterior temporal lobe measuring 2.9 x 1.9 x 2.5 cm     Date of Discharge:  24  Primary Care Physician: Carol Rios APRN      Presenting Problem:   Brain mass [G93.89]  Speech disturbance, unspecified type [R47.9]    Active and Resolved Hospital Problems:  Active Hospital Problems    Diagnosis POA    **Brain mass [G93.89] Yes      Resolved Hospital Problems   No resolved problems to display.         Hospital Course     HPI:  Per the H&P written by Hollie Long PA-C , dated 25:  \"Weakness, slurred speech \"    Hospital Course:  Sumit Jules is a 79 y.o. 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  With intermittent confusion and is unable to provide reliable history at this time.  Family is not at bedside, however she has obtained via phone call.  Family states that for the last 3 months, patient has had increasing bilateral lower extremity weakness, confusion, bizarre behavior, expressive aphasia as well as inability to write.  For patient HydroVal instructed states within the last week.  Patient was evaluated by PCP on 2024 for similar complaints which labs were ordered and MRI was scheduled for .     MRI obtained on admission showed: MRI brain: Multiple enhancing brain lesions, largest involving the left basal ganglia/left anterior temporal lobe measuring 2.9 x 1.9 x 2.5 cm. Smaller lesions involving the mid body of the corpus callosum, left frontal lobe adjacent to the left lateral. NSGY was consulted, recommended patient to transfer to [facility] for possible biopsy. " Transfer was initiated per NSGY recommendations, discussed with the patient. Patient Xarelto has been held, no steroids or keppra per NSGY recommendations until primary malignancy determined.     CTAP: also showed Evidence for an ill-defined lesion involving the pancreatic head measuring approximately 2 cm. The findings suggest possible pancreatic malignancy of the pancreatic head. Based on this patient brain lesion could be secondary pancreatic cancer, however will need further work up if pancreatic lesion is benign vs malignant.     Patient is currently aaox1, has expressive aphasia, moves all extremities. Patient is pending transfer to [facility] for further work up.     DISCHARGE Follow Up Recommendations for labs and diagnostics:     Transfer to [facility]       Reasons For Change In Medications and Indications for New Medications:      Day of Discharge     Vital Signs:  Temp:  [97.6 °F (36.4 °C)-98.7 °F (37.1 °C)] 97.6 °F (36.4 °C)  Heart Rate:  [47-80] 69  Resp:  [13-18] 16  BP: (113-177)/(61-91) 136/61    Physical Exam:  General Appearance:  Awake alert, disoriented   Head:  Atraumatic normocephalic   Eyes:        No sclera icterus   Neck: Non tender   Pulm: Clear to auscultation   Cardio: HR sounds normal   Extremities: Moves all extremities   Abdomen: Soft non tender                             Pertinent  and/or Most Recent Results     LAB RESULTS:      Lab 01/30/25  0510 01/29/25  1302   WBC 6.00 6.84   HEMOGLOBIN 12.7* 13.9   HEMATOCRIT 41.7 44.3   PLATELETS 110* 115*   NEUTROS ABS 3.65 4.73   IMMATURE GRANS (ABS) 0.01 0.01   LYMPHS ABS 1.66 1.40   MONOS ABS 0.54 0.63   EOS ABS 0.12 0.05   MCV 96.1 92.9   PROTIME  --  23.7*         Lab 01/30/25  0510 01/29/25  1302   SODIUM 139 141   POTASSIUM 4.1 4.2   CHLORIDE 105 104   CO2 23.2 27.3   ANION GAP 10.8 9.7   BUN 13 14   CREATININE 0.97 1.08   EGFR 79.9 70.2   GLUCOSE 109* 134*   CALCIUM 8.8 9.7   MAGNESIUM 2.3  --          Lab  01/29/25  1302   TOTAL PROTEIN 7.6   ALBUMIN 4.2   GLOBULIN 3.4   ALT (SGPT) 13   AST (SGOT) 20   BILIRUBIN 0.7   ALK PHOS 85         Lab 01/29/25  1302   PROTIME 23.7*   INR 2.11*             Lab 01/29/25  1302   ABO TYPING A   RH TYPING Negative   ANTIBODY SCREEN Negative         Brief Urine Lab Results  (Last result in the past 365 days)        Color   Clarity   Blood   Leuk Est   Nitrite   Protein   CREAT   Urine HCG        01/29/25 1531 Yellow   Clear   Large (3+)   Negative   Negative   Negative                 Microbiology Results (last 10 days)       ** No results found for the last 240 hours. **            CT Chest With Contrast Diagnostic    Result Date: 1/29/2025  Impression: 1.Evidence for an ill-defined lesion involving the pancreatic head measuring approximately 2 cm. The findings suggest possible pancreatic malignancy of the pancreatic head. Changes secondary to pancreatitis may also contribute to this appearance. Recommend correlation with MRI of the pancreas for better characterization. 2.Additional hypodense foci are seen throughout the pancreas suggesting cysts. There is mild prominence of the pancreatic duct at the level of proximal pancreatic head/proximal body. 3.A mildly prominent subcarinal lymph node is noted in the chest. Recommend correlation with PET/CT imaging to exclude abnormal activity. No suspicious pulmonary nodules are identified. 4.No evidence for acute abnormality throughout the chest abdomen or pelvis. 5.No evidence for additional definitive metastatic disease throughout the abdomen or pelvis. 6.Evidence for small adenoma involving the left adrenal gland measuring 1.2 cm. Electronically Signed: Shiv Lozano MD  1/29/2025 4:45 PM EST  Workstation ID: [id number]    CT Abdomen Pelvis With Contrast    Result Date: 1/29/2025  Impression: 1.Evidence for an ill-defined lesion involving the pancreatic head measuring approximately 2 cm. The findings suggest possible pancreatic  malignancy of the pancreatic head. Changes secondary to pancreatitis may also contribute to this appearance. Recommend correlation with MRI of the pancreas for better characterization. 2.Additional hypodense foci are seen throughout the pancreas suggesting cysts. There is mild prominence of the pancreatic duct at the level of proximal pancreatic head/proximal body. 3.A mildly prominent subcarinal lymph node is noted in the chest. Recommend correlation with PET/CT imaging to exclude abnormal activity. No suspicious pulmonary nodules are identified. 4.No evidence for acute abnormality throughout the chest abdomen or pelvis. 5.No evidence for additional definitive metastatic disease throughout the abdomen or pelvis. 6.Evidence for small adenoma involving the left adrenal gland measuring 1.2 cm. Electronically Signed: Shiv Lozano MD  1/29/2025 4:45 PM EST  Workstation ID: [id number]    MRI Brain With & Without Contrast    Result Date: 1/29/2025  Impression: 1.Multiple enhancing brain lesions, largest involving the left basal ganglia/left anterior temporal lobe measuring 2.9 x 1.9 x 2.5 cm. Smaller lesions involving the mid body of the corpus callosum, left frontal lobe adjacent to the left lateral ventricle, and within the right thalamus. Findings are concerning for underlying neoplastic process with considerations including infiltrating glial neoplasm, CNS lymphoma, or metastatic disease. Subacute ischemia, tumefactive demyelination, or infectious/inflammatory processes considered less likely but not excluded. 2.Edema about the left basal ganglia, left anterior temporal lobe, and left cerebral peduncle. No appreciable midline shift or hydrocephalus. 3.No acute infarct is definitely identified. 4.Additional findings compatible with chronic microvascular ischemic change. Electronically Signed: Rene Clifford MD  1/29/2025 3:00 PM EST  Workstation ID: [id number]    CT Head Without Contrast Stroke Protocol    Result Date:  1/29/2025  Impression: 1.There is limitation secondary to motion and streak artifact. 2.There is a large area of low-attenuation surrounding the left basal ganglia and extending into the left cerebral peduncle and mony. Findings are concerning for underlying mass lesion with surrounding vasogenic edema. Changes from subacute infarct with areas of preserved density of brain parenchyma also a potential consideration. Recommend correlation with patient history particularly any known history of cancer.. Recommend MRI of the brain with and without contrast for further evaluation. 3.No acute intracranial hemorrhage. 4.Irregularity of the inferior aspect of the left side of the nasal bone is age indeterminate. Please correlate for point tenderness. 5.Chronic left-sided mastoid effusion. Electronically Signed: Obey Burgos MD  1/29/2025 1:18 PM EST  Workstation ID: [id number]    CT Facial Bones Without Contrast    Result Date: 1/29/2025  Impression: 1.There is limitation secondary to motion and streak artifact. 2.There is a large area of low-attenuation surrounding the left basal ganglia and extending into the left cerebral peduncle and mony. Findings are concerning for underlying mass lesion with surrounding vasogenic edema. Changes from subacute infarct with areas of preserved density of brain parenchyma also a potential consideration. Recommend correlation with patient history particularly any known history of cancer.. Recommend MRI of the brain with and without contrast for further evaluation. 3.No acute intracranial hemorrhage. 4.Irregularity of the inferior aspect of the left side of the nasal bone is age indeterminate. Please correlate for point tenderness. 5.Chronic left-sided mastoid effusion. Electronically Signed: Obey Burgos MD  1/29/2025 1:18 PM EST  Workstation ID: [id number]    CT Cervical Spine Without Contrast    Result Date: 1/29/2025  Impression: Impression: 1. Degenerative changes in the cervical  spine. No acute cervical spine findings. Electronically Signed: Naty Keenan MD  1/29/2025 1:12 PM EST  Workstation ID: [id number]    XR Chest 1 View    Result Date: 1/29/2025  Impression: Impression: No acute cardiopulmonary findings. Electronically Signed: Naty Keenan MD  1/29/2025 12:35 PM EST  Workstation ID: [id number]                 Labs Pending at Discharge:  Pending Results       None            Procedures Performed           Consults:   Consults       Date and Time Order Name Status Description    1/29/2025  4:57 PM Hematology & Oncology Inpatient Consult      1/29/2025  3:58 PM Hospitalist (on-call MD unless specified)      1/29/2025  3:04 PM Neurosurgery (on-call MD unless specified) Completed     1/29/2025 12:30 PM Inpatient Neurology Consult Stroke                Discharge Details        Discharge Medications        Continue These Medications        Instructions Start Date   acetaminophen 325 MG tablet  Commonly known as: TYLENOL   650 mg, Oral, Every 6 Hours PRN      amLODIPine 10 MG tablet  Commonly known as: NORVASC   10 mg, Oral, Every Night at Bedtime      atorvastatin 10 MG tablet  Commonly known as: LIPITOR   10 mg, Oral, Daily      dapagliflozin 5 MG tablet tablet  Commonly known as: Farxiga   5 mg, Oral, Daily      losartan 25 MG tablet  Commonly known as: COZAAR   25 mg, Oral, Daily      metFORMIN 500 MG tablet  Commonly known as: GLUCOPHAGE   500 mg, Oral, 2 Times Daily With Meals      tamsulosin 0.4 MG capsule 24 hr capsule  Commonly known as: FLOMAX   0.4 mg, Oral, Daily      Xarelto 20 MG tablet  Generic drug: rivaroxaban   20 mg, Oral, Daily               No Known Allergies      Discharge Disposition:       Diet:  Hospital:  Diet Order   Procedures    Diet: Regular/House; Fluid Consistency: Thin (IDDSI 0)         Discharge Activity:         CODE STATUS:  Code Status and Medical Interventions: CPR (Attempt to Resuscitate); Full Support   Ordered at: 01/29/25 1631     Code Status (Patient  has no pulse and is not breathing):    CPR (Attempt to Resuscitate)     Medical Interventions (Patient has pulse or is breathing):    Full Support         Future Appointments   Date Time Provider Department Center   2/12/2025  2:30 PM [facility] MRI 2 [facility] MRI [facility]   3/31/2025 11:00 AM Carol Rios APRN [facility] NGATE ALICJA   12/11/2025 11:00 AM Carol Rios APRN [facility] NGFRANCIS [facility]           Time spent on Discharge including face to face service:  >30 minutes    Part of this note may be an electronic transcription/translation of spoken language to printed text using the Dragon Dictation System.    Signature: Electronically signed by Charlie Burch MD, 01/30/25, 15:41 EST.  [facility] Hospitalist Team     Electronically signed by Charlie Burch MD at 01/30/25 1548

## 2025-01-31 NOTE — CASE MANAGEMENT/SOCIAL WORK
Case Management Discharge Note      Final Note: Transfer to [facility] for brain biopsy         Selected Continued Care - Discharged on 1/30/2025 Admission date: 1/29/2025 - Discharge disposition: Psychiatric Hospital or Unit (DC - External or [facility])         Transportation Services  Ambulance: [facility]    Final Discharge Disposition Code: 02 - short term hospital for  care

## 2025-01-31 NOTE — CONSULTS
[facility] NEUROSURGERY INTRACRANIAL PROGRESS NOTE    PATIENT IDENTIFICATION:   Name:  Sumit Jules      MRN:  [id number]     79 y.o.  male               Cc: Brain Mass      Subjective     Interval History: Patient transferred to this facility from [facility] for further treatment for multiple brain lesions.  Patient continues to complain of speech and word finding difficulties along with difficulty writing.    ROS:  Constitutional: No fever, chills  HEENT: No headache, no vision changes  GI: No nausea, vomiting, no swallow difficulties  Neuro: + Speech difficulty    Objective     Vital signs in last 24 hours:  Temp:  [97.5 °F (36.4 °C)-98.2 °F (36.8 °C)] 97.5 °F (36.4 °C)  Heart Rate:  [58-80] 72  Resp:  [14-16] 16  BP: (107-129)/(54-72) 129/70      Intake/Output this shift:  No intake/output data recorded.      Intake/Output last 3 shifts:  No intake/output data recorded.    LABS:  Results from last 7 days   Lab Units 01/30/25  0510 01/29/25  1302   WBC 10*3/mm3 6.00 6.84   HEMOGLOBIN g/dL 12.7* 13.9   HEMATOCRIT % 41.7 44.3   PLATELETS 10*3/mm3 110* 115*     Results from last 7 days   Lab Units 01/31/25  0721 01/30/25  0510 01/29/25  1302   SODIUM mmol/L 136 139 141   POTASSIUM mmol/L 4.7 4.1 4.2   CHLORIDE mmol/L 105 105 104   CO2 mmol/L 22.6 23.2 27.3   BUN mg/dL 14 13 14   CREATININE mg/dL 0.86 0.97 1.08   CALCIUM mg/dL 8.6 8.8 9.7   BILIRUBIN mg/dL  --   --  0.7   ALK PHOS U/L  --   --  85   ALT (SGPT) U/L  --   --  13   AST (SGOT) U/L  --   --  20   GLUCOSE mg/dL 202* 109* 134*          IMAGING STUDIES:  No new imaging to review    I personally viewed the patient's chart, it was also reviewed by and discussed with Dr Ofe Hernandez reviewed/changed: Yes  Norvasc 10 mg p.o. daily  Lipitor 10 mg p.o. daily  Decadron 4 mg IV every 6 hours  Pepcid 40 mg p.o. daily  Humalog insulin 2-7 units SQ 4 times daily  Cozaar 25 mg p.o. daily  Flomax 0.4 mg p.o. daily      Physical Exam:    General:  Awake, alert &  oriented x 3.  Patient has difficulty communicating what he wants to say  CN VII:  Facial movements are symmetric, no weakness  Motor: Moving all 4 extremities        Assessment & Plan     ASSESSMENT:      Brain mass    Antithrombin III deficiency    Prostate cancer    Chronic coronary artery disease    Coagulation defect    Hyperlipidemia    Essential (primary) hypertension    Pancreatic lesion    Long term current use of anticoagulant    Type 2 diabetes mellitus with hyperglycemia, without long-term current use of insulin    Vasogenic cerebral edema    Slurred speech    History of DVT (deep vein thrombosis)    79-year-old male anticoagulated on Xarelto for Antithrombin 3 deficiency and recurrent DVTs who presented with 3-month history of word finding difficulties, balance and coordination issues.  MRI brain showed multiple enhancing brain lesions, with the largest involving the left basal ganglia/left anterior temporal lobe.  Patient was transferred to this facility for further treatment.  Dr Thakur has spoken with patient and family(see his attached note) regarding MRI findings and plan for brain biopsy.  He is planning for OR on Monday, 2/3/2025.    PLAN:   -Dr Thakur has spoken with pt and family, plan for brain biopsy 2/3  -NPO p MN 2/3  -IV steroids  -Need surgery clearance from medicine team  -Hold Xarelto- Per Dr Thakur ok for Lovenox-can have dose today, 2/1 & 2/2 am- needs to held 24 hours prior to surgery    This part is from Dr. Thakur: I long discussion with the patient and his family about the situation.  Explained the findings on the MRI.  I told him that we can do 1 of 3 things.  An excisional biopsy, stereotactic biopsy or nothing.  I do not think nothing is a very good option since he is symptomatic.  I think an excisional biopsy will carry an extremely high risk of leaving him with a permanent hemiparesis and probably speech abnormalities.  Consequently I think the best thing to do is a stereotactic  "biopsy to at least see what we are dealing with.  Once we get that then we can make further decisions.  If that is something that is potentially surgically curable then we might want to take more risk.  If it is not surgically curable anyway then surgery would not be a great idea.  I explained the risk and complications of the stereotactic biopsy including lack of diagnosis, bleeding, infection, and increased neurologic deficits.  They seemed understand all this fairly well and do asked to proceed.  We also discussed the postoperative hospital and home course.      I discussed the patient's findings and my recommendations with patient, family, and Dr Thakur    During patient visit, I utilized appropriate personal protective equipment including  gloves.  Appropriate PPE was worn during the entire visit.  Hand hygiene was completed before and after.      LOS: 1 day       Mary Amor, APRN  1/31/2025  14:01 EST    \"Dictated utilizing Dragon dictation\".      "

## 2025-01-31 NOTE — PLAN OF CARE
Problem: Adult Inpatient Plan of Care  Goal: Plan of Care Review  Outcome: Progressing  Goal: Patient-Specific Goal (Individualized)  Outcome: Progressing  Goal: Absence of Hospital-Acquired Illness or Injury  Outcome: Progressing  Intervention: Identify and Manage Fall Risk  Recent Flowsheet Documentation  Taken 1/31/2025 1600 by Sallie Farr RN  Safety Promotion/Fall Prevention:   assistive device/personal items within reach   clutter free environment maintained   fall prevention program maintained   lighting adjusted   nonskid shoes/slippers when out of bed   safety round/check completed   room organization consistent  Taken 1/31/2025 1400 by Sallie Farr RN  Safety Promotion/Fall Prevention:   assistive device/personal items within reach   clutter free environment maintained   fall prevention program maintained   lighting adjusted   nonskid shoes/slippers when out of bed   room organization consistent   safety round/check completed  Taken 1/31/2025 1200 by Sallie Farr RN  Safety Promotion/Fall Prevention:   assistive device/personal items within reach   clutter free environment maintained   fall prevention program maintained   lighting adjusted   nonskid shoes/slippers when out of bed   safety round/check completed   room organization consistent  Taken 1/31/2025 1000 by Sallie Farr RN  Safety Promotion/Fall Prevention:   assistive device/personal items within reach   clutter free environment maintained   lighting adjusted   nonskid shoes/slippers when out of bed   safety round/check completed   room organization consistent  Taken 1/31/2025 0853 by Sallie Farr RN  Safety Promotion/Fall Prevention:   clutter free environment maintained   assistive device/personal items within reach   room organization consistent   safety round/check completed  Intervention: Prevent Skin Injury  Recent Flowsheet Documentation  Taken 1/31/2025 1600 by Sallie Farr RN  Body Position: position changed  independently  Taken 1/31/2025 1400 by Sallie Farr RN  Body Position: position changed independently  Taken 1/31/2025 1200 by Sallie Farr RN  Body Position: position changed independently  Taken 1/31/2025 1000 by Sallie Farr RN  Body Position: position changed independently  Taken 1/31/2025 0853 by Sallie Farr RN  Body Position: position changed independently  Intervention: Prevent and Manage VTE (Venous Thromboembolism) Risk  Recent Flowsheet Documentation  Taken 1/31/2025 0853 by Sallie Farr RN  VTE Prevention/Management:   bilateral   SCDs (sequential compression devices) on  Intervention: Prevent Infection  Recent Flowsheet Documentation  Taken 1/31/2025 1600 by Sallie Farr RN  Infection Prevention: single patient room provided  Taken 1/31/2025 1400 by Sallie Farr RN  Infection Prevention: single patient room provided  Taken 1/31/2025 1200 by Sallie Farr RN  Infection Prevention: single patient room provided  Taken 1/31/2025 0853 by Sallie Farr RN  Infection Prevention: single patient room provided  Goal: Optimal Comfort and Wellbeing  Outcome: Progressing  Intervention: Provide Person-Centered Care  Recent Flowsheet Documentation  Taken 1/31/2025 0853 by Sallie Farr RN  Trust Relationship/Rapport: care explained  Goal: Readiness for Transition of Care  Outcome: Progressing   Goal Outcome Evaluation:      Alert to self, no tele, RA, stand by to BR, regular diet no straws, no mixed consistencies, meds whole in puree, Iv steroids, Ct abdomen ordered, vfss tomorrow 2/1, brain biopsy Monday 2/3. Lovenox for now, hold Monday.

## 2025-01-31 NOTE — THERAPY EVALUATION
Acute Care - Speech Language Pathology Initial Evaluation  [facility]     Patient Name: Sumit Jules  : 1945  MRN: [id number]  Today's Date: 2025               Admit Date: 2025     Visit Dx:  No diagnosis found.  Patient Active Problem List   Diagnosis    Antithrombin III deficiency    Atherosclerosis of aorta    Prostate cancer    Benign essential hypertension    Chronic coronary artery disease    Coagulation defect    Hyperlipidemia    Essential (primary) hypertension    Pancreatic lesion    Long term current use of anticoagulant    Asymptomatic varicose veins of lower extremity    Abnormal CT scan, gastrointestinal tract    Benign prostatic hyperplasia without lower urinary tract symptoms    Type 2 diabetes mellitus with hyperglycemia, without long-term current use of insulin    Ventral incisional hernia    Brain mass    Vasogenic cerebral edema    Slurred speech    History of DVT (deep vein thrombosis)     Past Medical History:   Diagnosis Date    Acute pancreatitis 2024    Antithrombin III deficiency     Atherosclerosis of aorta 2015    Benign essential hypertension 2014    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   Converted from [employer]:   Description - DEEP VENOUS THROMBOPHLEBITIS, RECURRENT    Congenital deficiency of clotting factors 2012    Diabetes mellitus     pre - no insulin    DVT (deep venous thrombosis)     History of complete eye exam SCHEDULED    Hyperlipidemia     Idiopathic acute pancreatitis without infection or necrosis 2024    Impaired fasting glucose 2011    Obesity     Other seborrheic keratosis 2013    Formatting of this note might be different from the original.   Converted from [employer]:   Description - SEBORRHEIC KERATOSIS    Pain of foot 2013    Formatting of this note might be different from the original.   Converted  from [employer]:   Description - HEEL PAIN    Prostate cancer     Thrombocytopenia 03/19/2013    Formatting of this note might be different from the original.   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   Converted from [employer]:   Description - VASCULAR DISORDER OF SKIN     Past Surgical History:   Procedure Laterality Date    CHOLECYSTECTOMY      COLONOSCOPY  10/2013    UPPER ENDOSCOPIC ULTRASOUND W/ FNA N/A 5/3/2024    Procedure: ENDOSCOPIC ULTRASOUND with fine needle aspiration x1 area;  Surgeon: Gifty Ribera MD;  Location: [facility];  Service: Gastroenterology;  Laterality: N/A;  Post- pancreatic cyst       SLP Recommendation and Plan  SLP Diagnosis: moderate-severe, aphasia (01/31/25 1300)  SLP Diagnosis Comments: Speech/language evaluation completed. Pt presents with suspected moderate-severe expressive aphasia, mild receptive aphasia. The Western Aphasia Battery Bedside screen was completed. Bedside aphasia score: 37/100. Adequate responses to simple Y/N questions and good repetition of words/short phrases. Intermittent phonemic and semantic paraphasias noted. Difficulties following 2-step directions. Perseveration impacted simple naming. Slow, halting speech with filler words. Difficulties forming complete sentences. Recommend ongoing speech therapy at next level of care to target functional communication. (01/31/25 1300)           [facility] [place] for Skilled Therapy Interventions Met: yes (01/31/25 1300)  Anticipated Discharge Disposition (SLP): unknown (01/31/25 1300)        Therapy Frequency (SLP SLC): PRN (01/31/25 1300)  Predicted Duration Therapy Intervention (Days): until discharge (01/31/25 1300)                             Outcome Evaluation: Speech/language evaluation completed. Pt presents with suspected moderate-severe  expressive aphasia, mild receptive aphasia. The Western Aphasia Battery Bedsdie screen was completed. Bedside aphasia score: 37/100. Adequate responses to simple Y/N questions and good repetition of words/short phrases. Intermittent phonemic and semantic paraphasias noted. Difficulties following 2-step directions. Perseveration impacted simple naming. Recommend ongoing speech therapy to target functional communication. (01/31/25 1257)      SLP EVALUATION (Last 72 Hours)       SLP SLC Evaluation       Row Name 01/31/25 1300                   Communication Assessment/Intervention    Document Type evaluation  -CR        Subjective Information no complaints  -CR        Patient Observations alert;cooperative  -CR        Patient Effort good  -CR        Symptoms Noted During/After Treatment none  -CR           General Information    Patient Profile Reviewed yes  -CR        Pertinent History Of Current Problem Brain and pancreatic mass; transfered from [facility]. Awaiting biopsy 1/31.  -CR        Precautions/Limitations, Vision --  Question right neglect  -CR        Precautions/Limitations, Hearing WFL;for purposes of eval  -CR        Prior Level of Function-Communication other (see comments)  Cog and communication skills progressively declining, per chart review.  -CR        Plans/Goals Discussed with patient  -CR        Barriers to Rehab medically complex  -CR           Pain    Pretreatment Pain Rating 0/10 - no pain  -CR        Posttreatment Pain Rating 0/10 - no pain  -CR           SLP Evaluation Clinical Impressions    SLP Diagnosis moderate-severe;aphasia  -CR        SLP Diagnosis Comments Speech/language evaluation completed. Pt presents with suspected moderate-severe expressive aphasia, mild receptive aphasia. The Western Aphasia Battery Bedside screen was completed. Bedside aphasia score: 37/100. Adequate responses to simple Y/N questions and good repetition of words/short phrases. Intermittent phonemic  and semantic paraphasias noted. Difficulties following 2-step directions. Perseveration impacted simple naming. Slow, halting speech with filler words. Difficulties forming complete sentences. Recommend ongoing speech therapy at next level of care to target functional communication.  -CR        Rehab Potential/Prognosis fair  -CR        SLC Criteria for Skilled Therapy Interventions Met yes  -CR        Functional Impact difficulty communicating wants, needs;difficulty communicating in an emergency;difficulty in expressing complex messages  -CR           Recommendations    Therapy Frequency (SLP SLC) PRN  -CR        Predicted Duration Therapy Intervention (Days) until discharge  -CR        Anticipated Discharge Disposition (SLP) unknown  -CR           Communication Treatment Objective and Progress Goals (SLP)    Auditory Comprehension Treatment Objectives Auditory Comprehension Treatment Objectives (Group)  -CR        Verbal Expression Treatment Objectives Verbal Expression Treatment Objectives (Group)  -CR           Auditory Comprehension Treatment Objectives    Follow Directions Selection follow directions, SLP goal 1  -CR           Follow Directions Goal 2 (SLP)    Improve Ability to Follow Directions Goal 1 (SLP) 1 step direction with objects;1 step direction without objects;2 step commands;80%;with moderate cues (50-74%)  -CR        Time Frame (Follow Directions Goal 1, SLP) by discharge  -CR        Progress/Outcomes (Follow Directions Goal 1, SLP) new goal  -CR           Verbal Expression Treatment Objectives    Word Retrieval Skills Selection word retrieval, SLP goal 1  -CR           Word Retrieval Skills Goal 1 (SLP)    Improve Word Retrieval Skills By Goal 1 (SLP) completing automatic speech task, days of the week;completing automatic speech task, months;confrontational naming task;responsive naming task;80%;with moderate cues (50-74%)  -CR        Time Frame (Word Retrieval Goal 1, SLP) by discharge  -CR         Progress/Outcomes (Word Retrieval Goal 1, SLP) new goal  -CR                  User Key  (r) = Recorded By, (t) = Taken By, (c) = Cosigned By      Initials Name Effective Dates    Matilda Ackerman SLP 12/03/24 -                        EDUCATION  The patient has been educated in the following areas:     Communication Impairment.           SLP GOALS       Row Name 01/31/25 1300             Follow Directions Goal 2 (SLP)    Improve Ability to Follow Directions Goal 1 (SLP) 1 step direction with objects;1 step direction without objects;2 step commands;80%;with moderate cues (50-74%)  -CR      Time Frame (Follow Directions Goal 1, SLP) by discharge  -CR      Progress/Outcomes (Follow Directions Goal 1, SLP) new goal  -CR         Word Retrieval Skills Goal 1 (SLP)    Improve Word Retrieval Skills By Goal 1 (SLP) completing automatic speech task, days of the week;completing automatic speech task, months;confrontational naming task;responsive naming task;80%;with moderate cues (50-74%)  -CR      Time Frame (Word Retrieval Goal 1, SLP) by discharge  -CR      Progress/Outcomes (Word Retrieval Goal 1, SLP) new goal  -CR                User Key  (r) = Recorded By, (t) = Taken By, (c) = Cosigned By      Initials Name Provider Type    Matilda Ackerman SLP Speech and Language Pathologist                              Time Calculation:      Time Calculation- SLP       Row Name 01/31/25 1315             Time Calculation- SLP    SLP Start Time 0730  -CR      SLP Received On 01/31/25  -CR         Untimed Charges    91944-[id number] Eval Speech and Production w/ Language Minutes 45  -CR         Total Minutes    Untimed Charges Total Minutes 45  -CR       Total Minutes 45  -CR                User Key  (r) = Recorded By, (t) = Taken By, (c) = Cosigned By      Initials Name Provider Type    Matilda Ackerman SLP Speech and Language Pathologist                    Therapy Charges for Today       Code Description Service Date Service  Provider Modifiers Qty    [id number]  ST EVAL SPEECH AND PROD W LANG  3 1/31/2025 Matilda Bacon, SLP GN 1                       FLOYD Arroyo  1/31/2025

## 2025-02-01 ENCOUNTER — APPOINTMENT (OUTPATIENT)
Dept: CT IMAGING | Facility: HOSPITAL | Age: 80
DRG: 820 | End: 2025-02-01
Payer: MEDICARE

## 2025-02-01 ENCOUNTER — APPOINTMENT (OUTPATIENT)
Dept: GENERAL RADIOLOGY | Facility: HOSPITAL | Age: 80
DRG: 820 | End: 2025-02-01
Payer: MEDICARE

## 2025-02-01 PROBLEM — R13.13 PHARYNGEAL DYSPHAGIA: Status: ACTIVE | Noted: 2025-02-01

## 2025-02-01 LAB
GLUCOSE BLDC GLUCOMTR-MCNC: 202 MG/DL (ref 70–130)
GLUCOSE BLDC GLUCOMTR-MCNC: 222 MG/DL (ref 70–130)
GLUCOSE BLDC GLUCOMTR-MCNC: 238 MG/DL (ref 70–130)
GLUCOSE BLDC GLUCOMTR-MCNC: 312 MG/DL (ref 70–130)

## 2025-02-01 PROCEDURE — 25010000002 DEXAMETHASONE PER 1 MG: Performed by: HOSPITALIST

## 2025-02-01 PROCEDURE — 92611 MOTION FLUOROSCOPY/SWALLOW: CPT

## 2025-02-01 PROCEDURE — 63710000001 INSULIN LISPRO (HUMAN) PER 5 UNITS: Performed by: HOSPITALIST

## 2025-02-01 PROCEDURE — 63710000001 INSULIN GLARGINE PER 5 UNITS: Performed by: INTERNAL MEDICINE

## 2025-02-01 PROCEDURE — 97162 PT EVAL MOD COMPLEX 30 MIN: CPT

## 2025-02-01 PROCEDURE — 63710000001 INSULIN LISPRO (HUMAN) PER 5 UNITS: Performed by: INTERNAL MEDICINE

## 2025-02-01 PROCEDURE — 70470 CT HEAD/BRAIN W/O & W/DYE: CPT

## 2025-02-01 PROCEDURE — 25510000001 IOPAMIDOL PER 1 ML: Performed by: INTERNAL MEDICINE

## 2025-02-01 PROCEDURE — 25010000002 ENOXAPARIN PER 10 MG: Performed by: INTERNAL MEDICINE

## 2025-02-01 PROCEDURE — 25810000003 SODIUM CHLORIDE 0.9 % SOLUTION: Performed by: INTERNAL MEDICINE

## 2025-02-01 PROCEDURE — 82948 REAGENT STRIP/BLOOD GLUCOSE: CPT

## 2025-02-01 PROCEDURE — 74230 X-RAY XM SWLNG FUNCJ C+: CPT

## 2025-02-01 RX ORDER — IOPAMIDOL 755 MG/ML
100 INJECTION, SOLUTION INTRAVASCULAR
Status: COMPLETED | OUTPATIENT
Start: 2025-02-01 | End: 2025-02-01

## 2025-02-01 RX ORDER — INSULIN LISPRO 100 [IU]/ML
2 INJECTION, SOLUTION INTRAVENOUS; SUBCUTANEOUS
Status: DISCONTINUED | OUTPATIENT
Start: 2025-02-01 | End: 2025-02-05

## 2025-02-01 RX ADMIN — INSULIN LISPRO 2 UNITS: 100 INJECTION, SOLUTION INTRAVENOUS; SUBCUTANEOUS at 17:33

## 2025-02-01 RX ADMIN — DEXAMETHASONE SODIUM PHOSPHATE 4 MG: 4 INJECTION, SOLUTION INTRA-ARTICULAR; INTRALESIONAL; INTRAMUSCULAR; INTRAVENOUS; SOFT TISSUE at 17:33

## 2025-02-01 RX ADMIN — DEXAMETHASONE SODIUM PHOSPHATE 4 MG: 4 INJECTION, SOLUTION INTRA-ARTICULAR; INTRALESIONAL; INTRAMUSCULAR; INTRAVENOUS; SOFT TISSUE at 06:00

## 2025-02-01 RX ADMIN — DEXAMETHASONE SODIUM PHOSPHATE 4 MG: 4 INJECTION, SOLUTION INTRA-ARTICULAR; INTRALESIONAL; INTRAMUSCULAR; INTRAVENOUS; SOFT TISSUE at 11:42

## 2025-02-01 RX ADMIN — TAMSULOSIN HYDROCHLORIDE 0.4 MG: 0.4 CAPSULE ORAL at 08:49

## 2025-02-01 RX ADMIN — FAMOTIDINE 40 MG: 20 TABLET, FILM COATED ORAL at 08:49

## 2025-02-01 RX ADMIN — INSULIN GLARGINE 3 UNITS: 100 INJECTION, SOLUTION SUBCUTANEOUS at 20:32

## 2025-02-01 RX ADMIN — INSULIN LISPRO 3 UNITS: 100 INJECTION, SOLUTION INTRAVENOUS; SUBCUTANEOUS at 11:42

## 2025-02-01 RX ADMIN — INSULIN LISPRO 5 UNITS: 100 INJECTION, SOLUTION INTRAVENOUS; SUBCUTANEOUS at 20:32

## 2025-02-01 RX ADMIN — INSULIN LISPRO 2 UNITS: 100 INJECTION, SOLUTION INTRAVENOUS; SUBCUTANEOUS at 11:42

## 2025-02-01 RX ADMIN — IOPAMIDOL 95 ML: 755 INJECTION, SOLUTION INTRAVENOUS at 07:56

## 2025-02-01 RX ADMIN — Medication 2.5 MG: at 20:31

## 2025-02-01 RX ADMIN — ATORVASTATIN CALCIUM 10 MG: 20 TABLET, FILM COATED ORAL at 08:49

## 2025-02-01 RX ADMIN — BARIUM SULFATE 50 ML: 400 SUSPENSION ORAL at 09:35

## 2025-02-01 RX ADMIN — INSULIN LISPRO 3 UNITS: 100 INJECTION, SOLUTION INTRAVENOUS; SUBCUTANEOUS at 08:53

## 2025-02-01 RX ADMIN — Medication 10 ML: at 20:32

## 2025-02-01 RX ADMIN — SODIUM CHLORIDE 50 ML/HR: 9 INJECTION, SOLUTION INTRAVENOUS at 06:00

## 2025-02-01 RX ADMIN — BARIUM SULFATE 4 ML: 980 POWDER, FOR SUSPENSION ORAL at 09:35

## 2025-02-01 RX ADMIN — BARIUM SULFATE 55 ML: 0.81 POWDER, FOR SUSPENSION ORAL at 09:35

## 2025-02-01 RX ADMIN — AMLODIPINE BESYLATE 2.5 MG: 2.5 TABLET ORAL at 08:49

## 2025-02-01 RX ADMIN — ENOXAPARIN SODIUM 40 MG: 100 INJECTION SUBCUTANEOUS at 08:49

## 2025-02-01 RX ADMIN — BARIUM SULFATE 5 ML: 400 PASTE ORAL at 09:35

## 2025-02-01 RX ADMIN — INSULIN LISPRO 3 UNITS: 100 INJECTION, SOLUTION INTRAVENOUS; SUBCUTANEOUS at 17:33

## 2025-02-01 NOTE — PLAN OF CARE
Problem: Adult Inpatient Plan of Care  Goal: Plan of Care Review  Outcome: Not Progressing  Goal: Patient-Specific Goal (Individualized)  Outcome: Not Progressing  Goal: Absence of Hospital-Acquired Illness or Injury  Outcome: Not Progressing  Intervention: Identify and Manage Fall Risk  Recent Flowsheet Documentation  Taken 2/1/2025 0410 by Vladimir Ayers RN  Safety Promotion/Fall Prevention: safety round/check completed  Taken 2/1/2025 0205 by Vladimir Ayers RN  Safety Promotion/Fall Prevention: safety round/check completed  Taken 2/1/2025 0020 by Vladimir Ayers RN  Safety Promotion/Fall Prevention: safety round/check completed  Taken 1/31/2025 2205 by Vladimir Ayers RN  Safety Promotion/Fall Prevention:   activity supervised   clutter free environment maintained   fall prevention program maintained   lighting adjusted   nonskid shoes/slippers when out of bed   safety round/check completed  Taken 1/31/2025 2040 by Vladimir Ayers RN  Safety Promotion/Fall Prevention:   activity supervised   clutter free environment maintained   fall prevention program maintained  Intervention: Prevent Skin Injury  Recent Flowsheet Documentation  Taken 2/1/2025 0410 by Vladimir Ayers RN  Body Position: position changed independently  Taken 2/1/2025 0205 by Vladimir Ayers RN  Body Position: position changed independently  Taken 2/1/2025 0020 by Vladimir Ayers RN  Body Position: position changed independently  Skin Protection: incontinence pads utilized  Taken 1/31/2025 2205 by Vladimir Ayers RN  Body Position: position changed independently  Taken 1/31/2025 2040 by Vladimir Ayers RN  Body Position: position changed independently  Skin Protection: incontinence pads utilized  Intervention: Prevent and Manage VTE (Venous Thromboembolism) Risk  Recent Flowsheet Documentation  Taken 2/1/2025 0020 by Vladimir Ayers RN  VTE Prevention/Management:   bilateral   SCDs (sequential compression devices) on  Taken 1/31/2025 2040 by  Capili, Vladimir, RN  VTE Prevention/Management:   bilateral   SCDs (sequential compression devices) on  Intervention: Prevent Infection  Recent Flowsheet Documentation  Taken 2/1/2025 0410 by Vladimir Ayers RN  Infection Prevention: rest/sleep promoted  Taken 2/1/2025 0205 by Vladimir Ayers RN  Infection Prevention: rest/sleep promoted  Taken 2/1/2025 0020 by Vladimir Ayers RN  Infection Prevention: rest/sleep promoted  Taken 1/31/2025 2205 by Vladimir Ayers RN  Infection Prevention: rest/sleep promoted  Taken 1/31/2025 2040 by Vladimir Ayers RN  Infection Prevention: rest/sleep promoted  Goal: Optimal Comfort and Wellbeing  Outcome: Not Progressing  Intervention: Monitor Pain and Promote Comfort  Recent Flowsheet Documentation  Taken 2/1/2025 0020 by Vladimir Ayers RN  Pain Management Interventions:   care clustered   pillow support provided   position adjusted   no interventions per patient request  Taken 1/31/2025 2040 by Vladimir Ayers RN  Pain Management Interventions:   pillow support provided   position adjusted   no interventions per patient request   quiet environment facilitated  Intervention: Provide Person-Centered Care  Recent Flowsheet Documentation  Taken 2/1/2025 0020 by Vladimir Ayers RN  Trust Relationship/Rapport:   care explained   choices provided   questions answered   questions encouraged   thoughts/feelings acknowledged  Taken 1/31/2025 2040 by Vladimir Ayers RN  Trust Relationship/Rapport:   care explained   choices provided   questions answered   questions encouraged   thoughts/feelings acknowledged  Goal: Readiness for Transition of Care  Outcome: Not Progressing     Problem: Fall Injury Risk  Goal: Absence of Fall and Fall-Related Injury  Outcome: Not Progressing  Intervention: Identify and Manage Contributors  Recent Flowsheet Documentation  Taken 1/31/2025 2040 by Vladimir Ayers RN  Medication Review/Management: medications reviewed  Intervention: Promote Injury-Free  Environment  Recent Flowsheet Documentation  Taken 2/1/2025 0410 by Vladimir Ayers RN  Safety Promotion/Fall Prevention: safety round/check completed  Taken 2/1/2025 0205 by Vladimir Ayers RN  Safety Promotion/Fall Prevention: safety round/check completed  Taken 2/1/2025 0020 by Vladimir Ayers RN  Safety Promotion/Fall Prevention: safety round/check completed  Taken 1/31/2025 2205 by Vladimir Ayers RN  Safety Promotion/Fall Prevention:   activity supervised   clutter free environment maintained   fall prevention program maintained   lighting adjusted   nonskid shoes/slippers when out of bed   safety round/check completed  Taken 1/31/2025 2040 by Vladimir Ayers RN  Safety Promotion/Fall Prevention:   activity supervised   clutter free environment maintained   fall prevention program maintained     Problem: Skin Injury Risk Increased  Goal: Skin Health and Integrity  Outcome: Not Progressing  Intervention: Optimize Skin Protection  Recent Flowsheet Documentation  Taken 2/1/2025 0410 by Vladimir Ayers RN  Head of Bed (HOB) Positioning: HOB at 20 degrees  Taken 2/1/2025 0205 by Vladimir Ayers RN  Head of Bed (HOB) Positioning: HOB at 20 degrees  Taken 2/1/2025 0020 by Vladimir Ayers RN  Activity Management: activity encouraged  Pressure Reduction Techniques: frequent weight shift encouraged  Head of Bed (HOB) Positioning: HOB at 20-30 degrees  Pressure Reduction Devices: pressure-redistributing mattress utilized  Skin Protection: incontinence pads utilized  Taken 1/31/2025 2205 by Vladimir Ayers RN  Activity Management: activity encouraged  Head of Bed (HOB) Positioning: HOB at 20-30 degrees  Taken 1/31/2025 2040 by Vladimir Ayers RN  Activity Management: activity encouraged  Pressure Reduction Techniques: frequent weight shift encouraged  Head of Bed (HOB) Positioning: HOB at 20-30 degrees  Pressure Reduction Devices: pressure-redistributing mattress utilized  Skin Protection: incontinence pads utilized    Goal Outcome Evaluation:   Plan of Care Reviewed with: patient  Pt alert and oriented to self only. VSS throughout the shift. Room air. Up w/ SB assist. Denies pain/nausea. CT abdomen pending. VFSS today. Plan for Brain biopsy on Monday 02/03. No neuro changes. Bed alarm on. Call light within reach.

## 2025-02-01 NOTE — PLAN OF CARE
Goal Outcome Evaluation:              Outcome Evaluation: Pt is a 79 y.o. male admitted to [facility] with c/o aphasia on 1/30/2025. Work up reveals brain mass. Pt presents today with mild balance impairment and  decreased functional mobility. Pt required SBA for bed mobility, CGA for STS transfers, and CGA for ambulation with no AD for 30 ft. Pt may benefit from skilled PT to address the previous deficits and improve overall safety with functional mobility.  Anticipate pt will D/C to home with assist.    Anticipated Discharge Disposition (PT): home with assist

## 2025-02-01 NOTE — MBS/VFSS/FEES
Acute Care - Speech Language Pathology   Swallow Initial Evaluation [facility]     Patient Name: Sumit Jules  : 1945  MRN: [id number]  Today's Date: 2025               Admit Date: 2025    Visit Dx:     ICD-10-CM ICD-9-CM   1. Brain mass  G93.89 348.89     Patient Active Problem List   Diagnosis    Antithrombin III deficiency    Atherosclerosis of aorta    Prostate cancer    Benign essential hypertension    Chronic coronary artery disease    Coagulation defect    Hyperlipidemia    Essential (primary) hypertension    Pancreatic lesion    Long term current use of anticoagulant    Asymptomatic varicose veins of lower extremity    Abnormal CT scan, gastrointestinal tract    Benign prostatic hyperplasia without lower urinary tract symptoms    Type 2 diabetes mellitus with hyperglycemia, without long-term current use of insulin    Ventral incisional hernia    Brain mass    Vasogenic cerebral edema    Slurred speech    History of DVT (deep vein thrombosis)    Pharyngeal dysphagia     Past Medical History:   Diagnosis Date    Acute pancreatitis 2024    Antithrombin III deficiency     Atherosclerosis of aorta 2015    Benign essential hypertension 2014    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   Converted from Centricity:   Description - DEEP VENOUS THROMBOPHLEBITIS, RECURRENT    Congenital deficiency of clotting factors 2012    Diabetes mellitus     pre - no insulin    DVT (deep venous thrombosis)     History of complete eye exam SCHEDULED    Hyperlipidemia     Idiopathic acute pancreatitis without infection or necrosis 2024    Impaired fasting glucose 2011    Obesity     Other seborrheic keratosis 2013    Formatting of this note might be different from the original.   Converted from Centricity:   Description - SEBORRHEIC KERATOSIS    Pain of foot 2013    Formatting  of this note might be different from the original.   Converted from Centricity:   Description - HEEL PAIN    Prostate cancer     Thrombocytopenia 03/19/2013    Formatting of this note might be different from the original.   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   Converted from Centricity:   Description - VASCULAR DISORDER OF SKIN     Past Surgical History:   Procedure Laterality Date    CHOLECYSTECTOMY      COLONOSCOPY  10/2013    UPPER ENDOSCOPIC ULTRASOUND W/ FNA N/A 5/3/2024    Procedure: ENDOSCOPIC ULTRASOUND with fine needle aspiration x1 area;  Surgeon: Gifty Ribera MD;  Location: [facility] ENDOSCOPY;  Service: Gastroenterology;  Laterality: N/A;  Post- pancreatic cyst       SLP Recommendation and Plan  SLP Swallowing Diagnosis: mild, pharyngeal dysphagia (02/01/25 1400)  SLP Diet Recommendation: regular textures, thin liquids (02/01/25 1400)  Recommended Precautions and Strategies: upright posture during/after eating, small bites of food and sips of liquid, no straw, alternate between small bites of food and sips of liquid, multiple swallows per bite of food (02/01/25 1400)  SLP Rec. for Method of Medication Administration: meds whole, with thin liquids, with puree, as tolerated (02/01/25 1400)     Monitor for Signs of Aspiration: yes, notify SLP if any concerns (02/01/25 1400)  Recommended Diagnostics: reassess via clinical swallow evaluation (02/01/25 1400)  Swallow Criteria for Skilled Therapeutic Interventions Met: demonstrates skilled criteria (02/01/25 1400)  Anticipated Discharge Disposition (SLP): unknown (02/01/25 1400)  Rehab Potential/Prognosis, Swallowing: good, to achieve stated therapy goals (02/01/25 1400)  Therapy Frequency (Swallow): PRN (02/01/25 1400)  Predicted Duration Therapy Intervention (Days): until discharge  "(02/01/25 1400)  Oral Care Recommendations: Oral Care BID/PRN (02/01/25 1400)        Daily Summary of Progress (SLP): progress toward functional goals as expected (02/01/25 1400)               Treatment Assessment (SLP): continued (02/01/25 1400)     Plan for Continued Treatment (SLP): continue treatment per plan of care (02/01/25 1400)         Outcome Evaluation: VFSS completed this date Pt demonstrates trace transient penetration with thin liquid by cup. Increased amount of penetration with thin by straw. No aspiration. Recommend regular solids, thin liquids, meds whole as able. No straws. Double swallow with bites of food. ST to follow for diet tolerance as appropriate.      SWALLOW EVALUATION (Last 72 Hours)       SLP Adult Swallow Evaluation       Row Name 02/01/25 1400       Rehab Evaluation    Document Type evaluation  -HF    Subjective Information no complaints  -HF    Patient Observations alert;cooperative;agree to therapy  -HF    Patient Effort good  -HF    Symptoms Noted During/After Treatment none  -HF       General Information    Patient Profile Reviewed yes  -HF    Pertinent History Of Current Problem \"Brain and pancreatic mass; transferred from [facility]. Passed RN dysphagia screen; MD requesting swallow eval.\"  -HF    Current Method of Nutrition regular textures;thin liquids  -HF    Precautions/Limitations, Vision WFL;for purposes of eval  -HF    Precautions/Limitations, Hearing WFL;for purposes of eval  -HF    Prior Level of Function-Communication WFL  -HF    Prior Level of Function-Swallowing no diet consistency restrictions  -HF    Plans/Goals Discussed with patient;agreed upon  -HF    Barriers to Rehab medically complex  -HF          Oral Motor Structure and Function    Dentition Assessment natural, present and adequate  -HF    Secretion Management WNL/WFL  -HF    Mucosal Quality moist, healthy  -HF       Oral Musculature and Cranial Nerve Assessment    Oral Motor General Assessment " WFL  -HF          MBS/VFSS Interpretation    VFSS Summary VFSS completed this date. Pt was seated fully upright at 90 degree hip flexion in bed and viewed in the lateral projection. Pt accepted PO trials of nectar via tsp, thin liquid by tsp/cup/straw, puree, mixed, and regular solid. Pt presents with mild oropharyngeal dysphagia marked by reduced base of tongue retraction, premature spillage to the pyriforms with thin by straw, and reduced pharyngeal constriction. Mastication was functional. Good bolus control with thin by cup and all other consistencies. Adequate anterior hyoid excursion, functional laryngeal elevation, and complete epiglottic inversion. Trace transient penetration with thin liquid by cup. Increased amount of penetration that enters laryngeal vestibule with thin by straw and is mostly transient. No aspiration. Of note, pt spontaneously coughed after first trial of thin by cup however no aspiration visualized upon review of images. Mild vallecula and pyriform residue with thin liquid and solid, moderate with puree clears with cued re swallow or liquid wash. Recommend regular solids, thin liquids, meds whole as able. No straws. Aspiration precuations. Alternate solids and liquids. Double swallow with bites of food. ST to follow for diet tolerance  -HF       SLP Communication to Radiology    Summary Statement VFSS completed this date with Dr. Swanson present. Pt presents with mild oropharyngeal dysphagia. Trace transient penetration with thin liquid by cup. Increased amount of penetration that enters laryngeal vestibule with thin by straw and is mostly transient. No aspiration. Mild vallecula and pyriform residue with thin liquid and solid, moderate with puree clears with cued re swallow or liquid wash.  -HF       SLP Evaluation Clinical Impression    SLP Swallowing Diagnosis mild;pharyngeal dysphagia  -HF    Functional Impact risk of aspiration/pneumonia  -HF    Rehab Potential/Prognosis,  Swallowing good, to achieve stated therapy goals  -HF    Swallow Criteria for Skilled Therapeutic Interventions Met demonstrates skilled criteria  -HF       SLP Treatment Clinical Impressions    Treatment Assessment (SLP) continued  -HF    Daily Summary of Progress (SLP) progress toward functional goals as expected  -HF    Plan for Continued Treatment (SLP) continue treatment per plan of care  -HF    Care Plan Review evaluation/treatment results reviewed;care plan/treatment goals reviewed;patient/other agree to care plan  -HF       Recommendations    Therapy Frequency (Swallow) PRN  -HF    Predicted Duration Therapy Intervention (Days) until discharge  -HF    SLP Diet Recommendation regular textures;thin liquids  -HF    Recommended Diagnostics reassess via clinical swallow evaluation  -HF    Recommended Precautions and Strategies upright posture during/after eating;small bites of food and sips of liquid;no straw;alternate between small bites of food and sips of liquid;multiple swallows per bite of food  -HF    Oral Care Recommendations Oral Care BID/PRN  -HF    SLP Rec. for Method of Medication Administration meds whole;with thin liquids;with puree;as tolerated  -HF    Monitor for Signs of Aspiration yes;notify SLP if any concerns  -HF    Anticipated Discharge Disposition (SLP) unknown  -HF       Swallow Goals (SLP)    Swallow LTGs Patient will demonstrate functional swallow for  -HF       (LTG) Patient will demonstrate functional swallow for    Diet Texture (Demonstrate functional swallow) regular textures  -HF    Liquid viscosity (Demonstrate functional swallow) thin liquids  -HF    [place] (Demonstrate functional swallow) with minimal cues (75-90% accuracy)  -    Time Frame (Demonstrate functional swallow) by discharge  -              User Key  (r) = Recorded By, (t) = Taken By, (c) = Cosigned By      Initials Name Effective Dates    CR Matilda Bacon SLP 12/03/24 -     HF Bekah Johnson SLP 08/01/23  -                     EDUCATION  The patient has been educated in the following areas:   Dysphagia (Swallowing Impairment).        SLP GOALS       Row Name 02/01/25 1400 01/31/25 1300          (LTG) Patient will demonstrate functional swallow for    Diet Texture (Demonstrate functional swallow) regular textures  -HF --     Liquid viscosity (Demonstrate functional swallow) thin liquids  -HF --     [place] (Demonstrate functional swallow) with minimal cues (75-90% accuracy)  -HF --     Time Frame (Demonstrate functional swallow) by discharge  -HF --        Follow Directions Goal 2 (SLP)    Improve Ability to Follow Directions Goal 1 (SLP) -- 1 step direction with objects;1 step direction without objects;2 step commands;80%;with moderate cues (50-74%)  -CR     Time Frame (Follow Directions Goal 1, SLP) -- by discharge  -CR     Progress/Outcomes (Follow Directions Goal 1, SLP) -- new goal  -CR        Word Retrieval Skills Goal 1 (SLP)    Improve Word Retrieval Skills By Goal 1 (SLP) -- completing automatic speech task, days of the week;completing automatic speech task, months;confrontational naming task;responsive naming task;80%;with moderate cues (50-74%)  -CR     Time Frame (Word Retrieval Goal 1, SLP) -- by discharge  -CR     Progress/Outcomes (Word Retrieval Goal 1, SLP) -- new goal  -CR               User Key  (r) = Recorded By, (t) = Taken By, (c) = Cosigned By      Initials Name Provider Type    Matilda Ackerman, SLP Speech and Language Pathologist    Bekah Dan SLP Speech and Language Pathologist                         Time Calculation:    Time Calculation- SLP       Row Name 02/01/25 1430             Time Calculation- SLP    SLP Start Time 0845  -HF      SLP Received On 02/01/25  -HF         Untimed Charges    SLP Eval/Re-eval  ST Motion Fluoro Eval Swallow - 22550  -HF                User Key  (r) = Recorded By, (t) = Taken By, (c) = Cosigned By      Initials Name Provider Type    ENRIQUETA Johnson  FLOYD Godfrey Speech and Language Pathologist                    Therapy Charges for Today       Code Description Service Date Service Provider Modifiers Qty    95123186003 HC ST MOTION FLUORO EVAL SWALLOW 5 2/1/2025 Bekah Johnson SLP GN 1                 FLOYD [place]  2/1/2025

## 2025-02-01 NOTE — THERAPY EVALUATION
Patient Name: Sumit Jules  : 1945    MRN: [id number]                              Today's Date: 2025       Admit Date: 2025    Visit Dx:     ICD-10-CM ICD-9-CM   1. Brain mass  G93.89 348.89     Patient Active Problem List   Diagnosis    Antithrombin III deficiency    Atherosclerosis of aorta    Prostate cancer    Benign essential hypertension    Chronic coronary artery disease    Coagulation defect    Hyperlipidemia    Essential (primary) hypertension    Pancreatic lesion    Long term current use of anticoagulant    Asymptomatic varicose veins of lower extremity    Abnormal CT scan, gastrointestinal tract    Benign prostatic hyperplasia without lower urinary tract symptoms    Type 2 diabetes mellitus with hyperglycemia, without long-term current use of insulin    Ventral incisional hernia    Brain mass    Vasogenic cerebral edema    Slurred speech    History of DVT (deep vein thrombosis)    Pharyngeal dysphagia     Past Medical History:   Diagnosis Date    Acute pancreatitis 2024    Antithrombin III deficiency     Atherosclerosis of aorta 2015    Benign essential hypertension 2014    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   Converted from Centricity:   Description - DEEP VENOUS THROMBOPHLEBITIS, RECURRENT    Congenital deficiency of clotting factors 2012    Diabetes mellitus     pre - no insulin    DVT (deep venous thrombosis)     History of complete eye exam SCHEDULED    Hyperlipidemia     Idiopathic acute pancreatitis without infection or necrosis 2024    Impaired fasting glucose 2011    Obesity     Other seborrheic keratosis 2013    Formatting of this note might be different from the original.   Converted from Centricity:   Description - SEBORRHEIC KERATOSIS    Pain of foot 2013    Formatting of this note might be different from the original.   Converted  from Centricity:   Description - HEEL PAIN    Prostate cancer     Thrombocytopenia 03/19/2013    Formatting of this note might be different from the original.   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   Converted from Centricity:   Description - VASCULAR DISORDER OF SKIN     Past Surgical History:   Procedure Laterality Date    CHOLECYSTECTOMY      COLONOSCOPY  10/2013    UPPER ENDOSCOPIC ULTRASOUND W/ FNA N/A 5/3/2024    Procedure: ENDOSCOPIC ULTRASOUND with fine needle aspiration x1 area;  Surgeon: Gifty Ribera MD;  Location: [facility] ENDOSCOPY;  Service: Gastroenterology;  Laterality: N/A;  Post- pancreatic cyst      General Information       Row Name 02/01/25 1431          Physical Therapy Time and Intention    Document Type evaluation  -RS     Mode of Treatment individual therapy;physical therapy  -RS       Row Name 02/01/25 1431          General Information    Patient Profile Reviewed yes  -RS     Prior Level of Function independent:  pt poor historian  -RS     Existing Precautions/Restrictions fall  -RS     Barriers to Rehab medically complex  -RS       Row Name 02/01/25 1431          Living Environment    People in Home spouse  -RS       Row Name 02/01/25 1431          Home Main Entrance    Number of Stairs, Main Entrance none  -RS       Row Name 02/01/25 1431          Cognition    Orientation Status (Cognition) oriented to;person;place  -RS       Row Name 02/01/25 1431          Safety Issues/Impairments Affecting Functional Mobility    Impairments Affecting Function (Mobility) cognition;coordination  -RS               User Key  (r) = Recorded By, (t) = Taken By, (c) = Cosigned By      Initials Name Provider Type    RS Rosalie Canela PT Physical Therapist                   Mobility       Row Name 02/01/25 1432          Bed Mobility     Bed Mobility bed mobility (all) activities  -RS     All Activities, [place] (Bed Mobility) standby assist  -RS       Row Name 02/01/25 1432          Sit-Stand Transfer    Sit-Stand [place] (Transfers) contact guard  -RS     Assistive Device (Sit-Stand Transfers) other (see comments)  no AD  -RS       [facility] Name 02/01/25 1432          Gait/Stairs (Locomotion)    [place] Level (Gait) contact guard  -RS     Assistive Device (Gait) --  no AD  -RS     Distance in Feet (Gait) 30  -RS     Comment, (Gait/Stairs) pt mildly unsteady with ambulation however no LOB noted  -RS               User Key  (r) = Recorded By, (t) = Taken By, (c) = Cosigned By      Initials Name Provider Type    RS Rosalie Canela PT Physical Therapist                   Obj/Interventions       [facility] Name 02/01/25 1433          Range of Motion Comprehensive    General Range of Motion no range of motion deficits identified  -RS       Row Name 02/01/25 1433          Strength Comprehensive (MMT)    General Manual Muscle Testing (MMT) Assessment no strength deficits identified  -RS       [facility] Name 02/01/25 [facility]          Sensory Assessment (Somatosensory)    Sensory Assessment (Somatosensory) sensation intact  -RS               User Key  (r) = Recorded By, (t) = Taken By, (c) = Cosigned By      Initials Name Provider Type    RS Rosalie Canela PT Physical Therapist                   Goals/Plan       [facility] Name 02/01/25 1433          Transfer Goal 1 (PT)    Activity/Assistive Device (Transfer Goal 1, PT) sit-to-stand/stand-to-sit  -RS     [place] Level/Cues Needed (Transfer Goal 1, PT) supervision required  -RS     Time Frame (Transfer Goal 1, PT) short term goal (STG);2 days  -RS       [facility] Name 02/01/25 1433          Gait Training Goal 1 (PT)    Activity/Assistive Device (Gait Training Goal 1, PT) gait (walking locomotion)  -RS     [place] Level (Gait Training Goal 1, PT) supervision required  -RS     Distance (Gait Training Goal 1,  PT) 50  -RS     Time Frame (Gait Training Goal 1, PT) long term goal (LTG);by discharge  -RS       Row Name 02/01/25 1433          Therapy Assessment/Plan (PT)    Planned Therapy Interventions (PT) balance training;gait training;home exercise program;patient/family education;strengthening;transfer training  -RS               User Key  (r) = Recorded By, (t) = Taken By, (c) = Cosigned By      Initials Name Provider Type    RS Rosalie Canela PT Physical Therapist                   Clinical Impression       Row Name 02/01/25 1433          Pain    Pretreatment Pain Rating 0/10 - no pain  -RS     Posttreatment Pain Rating 0/10 - no pain  -RS       Row Name 02/01/25 1433          Plan of Care Review    Outcome Evaluation Pt is a 79 y.o. male admitted to [facility] with c/o aphasia on 1/30/2025. Work up reveals brain mass. Pt presents today with mild balance impairment and  decreased functional mobility. Pt required SBA for bed mobility, CGA for STS transfers, and CGA for ambulation with no AD for 30 ft. Pt may benefit from skilled PT to address the previous deficits and improve overall safety with functional mobility.  Anticipate pt will D/C to home with assist.  -RS       Row Name 02/01/25 1433          Therapy Assessment/Plan (PT)    Rehab Potential (PT) good  -RS     Criteria for Skilled Interventions Met (PT) yes  -RS     Therapy Frequency (PT) 3 times/wk  -RS     Predicted Duration of Therapy Intervention (PT) 1 week  -RS       Row Name 02/01/25 1433          Positioning and Restraints    Pre-Treatment Position in bed  -RS     Post Treatment Position bed  -RS     In Bed supine;call light within reach;encouraged to call for assist;exit alarm on  -RS               User Key  (r) = Recorded By, (t) = Taken By, (c) = Cosigned By      Initials Name Provider Type    RS Rosalie Canela PT Physical Therapist                   Outcome Measures       Row Name 02/01/25 1434 02/01/25 0934       How much help from another person  do you currently need...    Turning from your back to your side while in flat bed without using bedrails? 4  -RS 3  -CH    Moving from lying on back to sitting on the side of a flat bed without bedrails? 4  -RS 3  -CH    Moving to and from a bed to a chair (including a wheelchair)? 3  -RS 3  -CH    Standing up from a chair using your arms (e.g., wheelchair, bedside chair)? 4  -RS 3  -CH    Climbing 3-5 steps with a railing? 3  -RS 3  -CH    To walk in hospital room? 3  -RS 3  -CH    AM-PAC 6 Clicks Score (PT) 21  -RS 18  -CH    Highest Level of Mobility Goal 6 --> Walk 10 steps or more  -RS 6 --> Walk 10 steps or more  -      Row Name 02/01/25 [facility]          Functional Assessment    Outcome Measure Options AM-PAC 6 Clicks Basic Mobility (PT)  -               User Key  (r) = Recorded By, (t) = Taken By, (c) = Cosigned By      Initials Name Provider Type    RS Rosalie Canela, MANISH Physical Therapist    Rosie Valdez RN Registered Nurse                                 Physical Therapy Education       Title: PT OT SLP Therapies (Done)       Topic: Physical Therapy (Done)       Point: Mobility training (Done)       Learning Progress Summary            Patient Acceptance, E,D, VU by  at 2/1/2025 1434                      Point: Home exercise program (Done)       Learning Progress Summary            Patient Acceptance, E,D, VU by  at 2/1/2025 1434                      Point: Body mechanics (Done)       Learning Progress Summary            Patient Acceptance, E,D, VU by  at 2/1/2025 1434                      Point: Precautions (Done)       Learning Progress Summary            Patient Acceptance, E,D, VU by  at 2/1/2025 1434                                      User Key       Initials Effective Dates Name Provider Type [facility] 06/16/21 -  Rosalie Canela PT Physical Therapist PT                  PT Recommendation and Plan  Planned Therapy Interventions (PT): balance training, gait training,  home exercise program, patient/family education, strengthening, transfer training  Outcome Evaluation: Pt is a 79 y.o. male admitted to [facility] with c/o aphasia on 1/30/2025. Work up reveals brain mass. Pt presents today with mild balance impairment and  decreased functional mobility. Pt required SBA for bed mobility, CGA for STS transfers, and CGA for ambulation with no AD for 30 ft. Pt may benefit from skilled PT to address the previous deficits and improve overall safety with functional mobility.  Anticipate pt will D/C to home with assist.     Time Calculation:   PT Evaluation Complexity  History, PT Evaluation Complexity: 1-2 personal factors and/or comorbidities  Examination of Body Systems (PT Eval Complexity): total of 3 or more elements  Clinical Presentation (PT Evaluation Complexity): evolving  Clinical Decision Making (PT Evaluation Complexity): moderate complexity  Overall Complexity (PT Evaluation Complexity): moderate complexity     PT Charges       Row Name 02/01/25 1434             Time Calculation    Start Time 1207  -RS      Stop Time 1216  -RS      Time Calculation (min) 9 min  -RS      PT Received On 02/01/25  -RS      PT - Next Appointment 02/03/25  -RS      PT Goal Re-Cert Due Date 02/08/25  -RS                User Key  (r) = Recorded By, (t) = Taken By, (c) = Cosigned By      Initials Name Provider Type    Rosalie Rodriguez PT Physical Therapist                      PT G-Codes  Outcome Measure Options: AM-PAC 6 Clicks Basic Mobility (PT)  AM-PAC 6 Clicks Score (PT): 21  PT Discharge Summary  Anticipated Discharge Disposition (PT): home with assist    Rosalie Canela PT  2/1/2025

## 2025-02-01 NOTE — PROGRESS NOTES
[facility] Medicine Services  PROGRESS NOTE    Patient Name: Sumit Jules  : 1945  MRN: [id number]    Date of Admission: 2025  Primary Care Physician: Carol Rios APRN    Subjective   Subjective     CC:  Follow-up for brain mass    Subjective:  Patient is a very poor historian.  He is oriented to self.  He can answer simple questions.  No family currently at the bedside during my evaluation.    Review of Systems  No current fevers or chills  No current shortness of breath or cough  No current nausea, vomiting, or diarrhea  No current chest pain or palpitations       Objective   Objective     Vital Signs:   Temp:  [97.5 °F (36.4 °C)-98.6 °F (37 °C)] 97.5 °F (36.4 °C)  Heart Rate:  [67-77] 67  Resp:  [16-18] 18  BP: (119-129)/(58-74) 129/63        Physical Exam:  Constitutional: Awake, alert, elderly appearing  HENT: NCAT, mucous membranes moist, neck supple  Respiratory: No cough or wheezes, normal respirations, nonlabored breathing   Cardiovascular: Pulse rate is normal, palpable radial pulses  Gastrointestinal:  Soft, nontender, nondistended  Musculoskeletal: Obese and somewhat debilitated in appearance, no lower extremity edema, BMI 37  Psychiatric: Calm, appropriate affect, cooperative, conversational  Neurologic: Disoriented, no slurred speech or facial droop, follows commands  Skin: No rashes or jaundice, warm      Results Reviewed:  Results from last 7 days   Lab Units 25  0510 25  1302   WBC 10*3/mm3 6.00 6.84   HEMOGLOBIN g/dL 12.7* 13.9   HEMATOCRIT % 41.7 44.3   PLATELETS 10*3/mm3 110* 115*   INR   --  2.11*     Results from last 7 days   Lab Units 25  0721 25  0510 25  1302   SODIUM mmol/L 136 139 141   POTASSIUM mmol/L 4.7 4.1 4.2   CHLORIDE mmol/L 105 105 104   CO2 mmol/L 22.6 23.2 27.3   BUN mg/dL 14 13 14   CREATININE mg/dL 0.86 0.97 1.08   GLUCOSE mg/dL 202* 109* 134*   CALCIUM mg/dL 8.6 8.8 9.7   ALK PHOS U/L  --   --  85   ALT (SGPT) U/L   --   --  13   AST (SGOT) U/L  --   --  20     Estimated Creatinine Clearance: 96.7 mL/min (by C-G formula based on SCr of 0.86 mg/dL).    Microbiology Results Abnormal       None            Imaging Results (Last 24 Hours)       Procedure Component Value Units Date/Time    FL Video Swallow With Speech Single Contrast [[id number]] Resulted: 02/01/25 0936     Updated: 02/01/25 0937    CT Head With & Without Contrast [[id number]] Collected: 02/01/25 0817     Updated: 02/01/25 0827    Narrative:      CT SCAN OF THE BRAIN WITHOUT AND WITH CONTRAST     HISTORY: Preop for brain surgery. Multiple enhancing lesions noted on  outside MRI scan dated 1/29/2025 with the largest involving the left  basal ganglia.     The CT scan was performed through the brain without and with IV contrast  with thin 1 mm section imaging for stereotactic Stealth protocol. There  is a very vague area of decreased density in the left basal ganglia with  associated mass effect that effaces the left lateral ventricle. There is  some associated enhancement within this area anteriorly and this  corresponds to the largest enhancing mass noted on the MRI scan dated  1/29/2025. A smaller enhancing mass involves the region of the corpus  callosum at the midline between the lateral ventricles.                 Radiation dose reduction techniques were utilized, including automated  exposure control and exposure modulation based on body size.        This report was finalized on 2/1/2025 8:24 AM by Dr. David Swanson M.D  on Workstation: [id number]                   I have reviewed the medications:  Scheduled Meds:amLODIPine, 2.5 mg, Oral, Q24H  atorvastatin, 10 mg, Oral, Daily  dexAMETHasone, 4 mg, Intravenous, Q6H  enoxaparin, 40 mg, Subcutaneous, Daily  famotidine, 40 mg, Oral, Daily  insulin lispro, 2-7 Units, Subcutaneous, 4x Daily AC & at Bedtime  [Held by provider] losartan, 25 mg, Oral, Daily  melatonin, 2.5 mg, Oral, Nightly  sodium chloride, 10 mL,  Intravenous, Q12H  tamsulosin, 0.4 mg, Oral, Daily      Continuous Infusions:     PRN Meds:.  acetaminophen **OR** acetaminophen **OR** acetaminophen    senna-docusate sodium **AND** polyethylene glycol **AND** bisacodyl **AND** bisacodyl    dextrose    dextrose    glucagon (human recombinant)    ondansetron ODT **OR** ondansetron    sodium chloride    sodium chloride    Assessment & Plan   Assessment & Plan     Active Hospital Problems    Diagnosis  POA    **Brain mass [G93.89]  Yes    Vasogenic cerebral edema [G93.6]  Yes    Slurred speech [R47.81]  Yes    History of DVT (deep vein thrombosis) [Z86.718]  Not Applicable    Type 2 diabetes mellitus with hyperglycemia, without long-term current use of insulin [E11.65]  Yes    Pancreatic lesion [K86.9]  Yes    Hyperlipidemia [E78.5]  Yes    Essential (primary) hypertension [I10]  Yes    Chronic coronary artery disease [I25.10]  Yes    Prostate cancer [C61]  Yes    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        Brief Hospital Course to date:  Sumit Jules is a 79 y.o. male presents the hospital with speech difficulties and confusion and was found to have brain mass with vasogenic edema.  He was transferred from [facility] for further medical management.    Discussion/plan: CT reviewed today shows persistent brain mass.  Surgical planning underway for reportedly Monday.  Case discussed with neurosurgery APC over the phone regarding plans for prophylaxis.  They felt it was acceptable to start prophylactic dose Lovenox and give the last dose Sunday morning per our conversation.  Biopsy is currently planned for Monday tentatively.  Patient appears adequately hydrated.  Stop IV fluid.  Speech is evaluated and oral diet started with no mixed consistency.   IV steroids.  Placed amlodipine on hold today  and reevaluate tomorrow.  Continue to adjust blood pressure medications.  Glucose reviewed.  Otherwise per below.    Brain mass with vasogenic edema with dysarthria:  Neurosurgery consulted and plan is for brain biopsy after discussing options with family.  Supportive care and neurochecks.  IV steroids  Speech therapy consult to evaluate swallow  Supportive care and symptom treatment.    Pancreas mass:  Outside hospital CT scan reviewed and shows ill-defined lesion of the pancreas head concerning for possible pancreatic malignancy.  Patient reportedly had FNA biopsy in May 2024 that was negative for malignancy.  Patient also with changes concerning for chronic pancreatitis.  Left adrenal adenoma also noted.  CA 19-9 notably elevated.    Diabetes:  Monitor glucose and adjust insulin as needed.    Essential hypertension: Continue appropriate home blood pressure medications.  Monitor blood pressure adjust as needed.    Hyperlipidemia: Continue statin.  Stable.    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  Restart once cleared by neurosurgery.    BPH: Flomax.  Monitor urine output.    Physical debility: PT     DVT Prophylaxis: Mechanical      Disposition pending    CODE STATUS:   Code Status and Medical Interventions: CPR (Attempt to Resuscitate); Full Support   Ordered at: 01/30/25 2118     Code Status (Patient has no pulse and is not breathing):    CPR (Attempt to Resuscitate)     Medical Interventions (Patient has pulse or is breathing):    Full Support       Rogelio Ty MD  02/01/25

## 2025-02-01 NOTE — PLAN OF CARE
Goal Outcome Evaluation:  Plan of Care Reviewed With: patient           Outcome Evaluation: VFSS completed this date Pt demonstrates trace transient penetration with thin liquid by cup. Increased amount of penetration with thin by straw. No aspiration. Recommend regular solids, thin liquids, meds whole as able. No straws. Double swallow with bites of food. ST to follow for diet tolerance as appropriate.

## 2025-02-01 NOTE — PLAN OF CARE
Goal Outcome Evaluation: Patient alert to self only. VSS. Pt up w/ assist x1. Pt went down for a CT of his head this morning and did a video swallow study. Per speech pt is to remain on regular diet,thin liquids. Falls precautions maintained. IVF infusing. Family at bedside throughout the day. Plan of care will continue.

## 2025-02-01 NOTE — PROGRESS NOTES
Patient not in his room.  Will check in with him again tomorrow for final questions before surgery on Monday.

## 2025-02-02 LAB
GLUCOSE BLDC GLUCOMTR-MCNC: 204 MG/DL (ref 70–130)
GLUCOSE BLDC GLUCOMTR-MCNC: 223 MG/DL (ref 70–130)
GLUCOSE BLDC GLUCOMTR-MCNC: 248 MG/DL (ref 70–130)
GLUCOSE BLDC GLUCOMTR-MCNC: 259 MG/DL (ref 70–130)

## 2025-02-02 PROCEDURE — 25010000002 ENOXAPARIN PER 10 MG: Performed by: INTERNAL MEDICINE

## 2025-02-02 PROCEDURE — 25010000002 DEXAMETHASONE PER 1 MG: Performed by: HOSPITALIST

## 2025-02-02 PROCEDURE — 63710000001 INSULIN GLARGINE PER 5 UNITS: Performed by: INTERNAL MEDICINE

## 2025-02-02 PROCEDURE — 63710000001 INSULIN LISPRO (HUMAN) PER 5 UNITS: Performed by: HOSPITALIST

## 2025-02-02 PROCEDURE — 82948 REAGENT STRIP/BLOOD GLUCOSE: CPT

## 2025-02-02 PROCEDURE — 63710000001 INSULIN LISPRO (HUMAN) PER 5 UNITS: Performed by: INTERNAL MEDICINE

## 2025-02-02 RX ADMIN — DEXAMETHASONE SODIUM PHOSPHATE 4 MG: 4 INJECTION, SOLUTION INTRA-ARTICULAR; INTRALESIONAL; INTRAMUSCULAR; INTRAVENOUS; SOFT TISSUE at 12:10

## 2025-02-02 RX ADMIN — Medication 2.5 MG: at 21:52

## 2025-02-02 RX ADMIN — DEXAMETHASONE SODIUM PHOSPHATE 4 MG: 4 INJECTION, SOLUTION INTRA-ARTICULAR; INTRALESIONAL; INTRAMUSCULAR; INTRAVENOUS; SOFT TISSUE at 17:55

## 2025-02-02 RX ADMIN — INSULIN LISPRO 3 UNITS: 100 INJECTION, SOLUTION INTRAVENOUS; SUBCUTANEOUS at 08:55

## 2025-02-02 RX ADMIN — DEXAMETHASONE SODIUM PHOSPHATE 4 MG: 4 INJECTION, SOLUTION INTRA-ARTICULAR; INTRALESIONAL; INTRAMUSCULAR; INTRAVENOUS; SOFT TISSUE at 06:11

## 2025-02-02 RX ADMIN — Medication 10 ML: at 08:55

## 2025-02-02 RX ADMIN — INSULIN LISPRO 2 UNITS: 100 INJECTION, SOLUTION INTRAVENOUS; SUBCUTANEOUS at 12:11

## 2025-02-02 RX ADMIN — TAMSULOSIN HYDROCHLORIDE 0.4 MG: 0.4 CAPSULE ORAL at 08:54

## 2025-02-02 RX ADMIN — INSULIN GLARGINE 3 UNITS: 100 INJECTION, SOLUTION SUBCUTANEOUS at 21:54

## 2025-02-02 RX ADMIN — ATORVASTATIN CALCIUM 10 MG: 20 TABLET, FILM COATED ORAL at 08:54

## 2025-02-02 RX ADMIN — INSULIN LISPRO 2 UNITS: 100 INJECTION, SOLUTION INTRAVENOUS; SUBCUTANEOUS at 08:55

## 2025-02-02 RX ADMIN — FAMOTIDINE 40 MG: 20 TABLET, FILM COATED ORAL at 08:54

## 2025-02-02 RX ADMIN — INSULIN LISPRO 3 UNITS: 100 INJECTION, SOLUTION INTRAVENOUS; SUBCUTANEOUS at 17:54

## 2025-02-02 RX ADMIN — Medication 10 ML: at 21:52

## 2025-02-02 RX ADMIN — DEXAMETHASONE SODIUM PHOSPHATE 4 MG: 4 INJECTION, SOLUTION INTRA-ARTICULAR; INTRALESIONAL; INTRAMUSCULAR; INTRAVENOUS; SOFT TISSUE at 00:04

## 2025-02-02 RX ADMIN — INSULIN LISPRO 3 UNITS: 100 INJECTION, SOLUTION INTRAVENOUS; SUBCUTANEOUS at 21:54

## 2025-02-02 RX ADMIN — INSULIN LISPRO 4 UNITS: 100 INJECTION, SOLUTION INTRAVENOUS; SUBCUTANEOUS at 12:11

## 2025-02-02 RX ADMIN — ENOXAPARIN SODIUM 40 MG: 100 INJECTION SUBCUTANEOUS at 08:54

## 2025-02-02 RX ADMIN — INSULIN LISPRO 2 UNITS: 100 INJECTION, SOLUTION INTRAVENOUS; SUBCUTANEOUS at 17:55

## 2025-02-02 NOTE — PROGRESS NOTES
Checked in on patient today, sitting on side of the bed breakfast.  No family at bedside.  Patient had no questions regarding surgery tomorrow.  He will be n.p.o. after midnight 2/3 for left frontal brain biopsy tomorrow.

## 2025-02-02 NOTE — PROGRESS NOTES
[facility] Medicine Services  PROGRESS NOTE    Patient Name: Sumit Jules  : 1945  MRN: [id number]    Date of Admission: 2025  Primary Care Physician: Carol Rios APRN    Subjective   Subjective     CC:  Follow-up for brain mass    Subjective:  Patient remains poor historian.  He is sitting in bed comfortably and has no complaints.  No family at the bedside.    Review of Systems  No current fevers or chills  No current shortness of breath or cough  No current nausea, vomiting, or diarrhea  No current chest pain or palpitations       Objective   Objective     Vital Signs:   Temp:  [98.1 °F (36.7 °C)-98.5 °F (36.9 °C)] 98.1 °F (36.7 °C)  Heart Rate:  [50-64] 64  Resp:  [18] 18  BP: (118-138)/(50-74) 125/52        Physical Exam:  Constitutional: Awake, alert, elderly appearing  HENT: NCAT, mucous membranes moist, neck supple  Respiratory: No cough or wheezes, normal respirations, nonlabored breathing   Cardiovascular: Pulse rate is normal, palpable radial pulses  Gastrointestinal:  Soft, nontender, nondistended  Musculoskeletal: Obese and somewhat debilitated in appearance, no lower extremity edema, BMI 37  Psychiatric: Calm, appropriate affect, cooperative, conversational  Neurologic: Disoriented, no slurred speech or facial droop, follows commands  Skin: No rashes or jaundice, warm      Results Reviewed:  Results from last 7 days   Lab Units 25  0510 25  1302   WBC 10*3/mm3 6.00 6.84   HEMOGLOBIN g/dL 12.7* 13.9   HEMATOCRIT % 41.7 44.3   PLATELETS 10*3/mm3 110* 115*   INR   --  2.11*     Results from last 7 days   Lab Units 25  0721 25  0510 25  1302   SODIUM mmol/L 136 139 141   POTASSIUM mmol/L 4.7 4.1 4.2   CHLORIDE mmol/L 105 105 104   CO2 mmol/L 22.6 23.2 27.3   BUN mg/dL 14 13 14   CREATININE mg/dL 0.86 0.97 1.08   GLUCOSE mg/dL 202* 109* 134*   CALCIUM mg/dL 8.6 8.8 9.7   ALK PHOS U/L  --   --  85   ALT (SGPT) U/L  --   --  13   AST (SGOT) U/L  --    --  20     Estimated Creatinine Clearance: 96.7 mL/min (by C-G formula based on SCr of 0.86 mg/dL).    Microbiology Results Abnormal       None            Imaging Results (Last 24 Hours)       ** No results found for the last 24 hours. **                I have reviewed the medications:  Scheduled Meds:[Held by provider] amLODIPine, 2.5 mg, Oral, Q24H  atorvastatin, 10 mg, Oral, Daily  dexAMETHasone, 4 mg, Intravenous, Q6H  famotidine, 40 mg, Oral, Daily  insulin glargine, 3 Units, Subcutaneous, Nightly  insulin lispro, 2 Units, Subcutaneous, TID With Meals  insulin lispro, 2-7 Units, Subcutaneous, 4x Daily AC & at Bedtime  [Held by provider] losartan, 25 mg, Oral, Daily  melatonin, 2.5 mg, Oral, Nightly  sodium chloride, 10 mL, Intravenous, Q12H  tamsulosin, 0.4 mg, Oral, Daily      Continuous Infusions:     PRN Meds:.  acetaminophen **OR** acetaminophen **OR** acetaminophen    senna-docusate sodium **AND** polyethylene glycol **AND** bisacodyl **AND** bisacodyl    dextrose    dextrose    glucagon (human recombinant)    ondansetron ODT **OR** ondansetron    sodium chloride    sodium chloride    Assessment & Plan   Assessment & Plan     Active Hospital Problems    Diagnosis  POA    **Brain mass [G93.89]  Yes    Pharyngeal dysphagia [R13.13]  Yes    Vasogenic cerebral edema [G93.6]  Yes    Slurred speech [R47.81]  Yes    History of DVT (deep vein thrombosis) [Z86.718]  Not Applicable    Type 2 diabetes mellitus with hyperglycemia, without long-term current use of insulin [E11.65]  Yes    Pancreatic lesion [K86.9]  Yes    Hyperlipidemia [E78.5]  Yes    Essential (primary) hypertension [I10]  Yes    Chronic coronary artery disease [I25.10]  Yes    Prostate cancer [C61]  Yes    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        Brief Hospital Course to  date:  Sumit Jules is a 79 y.o. male presents the hospital with speech difficulties and confusion and was found to have brain mass with vasogenic edema.  He was transferred from [facility] for further medical management.    Discussion/plan: Speech therapy assess dysphagia and currently on regular and thin diet with upright posture and alternate with sips.  No issues reported with swallowing.  Neurosurgery is coordinating brain biopsy tomorrow.  N.p.o. after midnight.  Lovenox discontinued in preparation for surgery.  Restart prophylactic following surgery once deemed safe from neurosurgery.  Hold blood pressure medications for now, patient is currently normotensive.  IV steroids.  Placed amlodipine on hold today and reevaluate tomorrow.  Continue to adjust blood pressure medications.  Glucose reviewed.  Otherwise per below.    Brain mass with vasogenic edema with dysarthria:  Neurosurgery consulted and plan is for brain biopsy after discussing options with family.  Supportive care and neurochecks.  IV steroids  Speech therapy consult to evaluate swallow  Supportive care and symptom treatment.    Pancreas mass:  Outside hospital CT scan reviewed and shows ill-defined lesion of the pancreas head concerning for possible pancreatic malignancy.  Patient reportedly had FNA biopsy in May 2024 that was negative for malignancy.  Patient also with changes concerning for chronic pancreatitis.  Left adrenal adenoma also noted.  CA 19-9 notably elevated.    Diabetes:  Monitor glucose and adjust insulin as needed.    Essential hypertension: Continue appropriate home blood pressure medications.  Monitor blood pressure adjust as needed.    Hyperlipidemia: Continue statin.  Stable.    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  Restart once cleared by neurosurgery.    BPH: Flomax.  Monitor urine output.    Physical debility: PT     DVT Prophylaxis:  Mechanical      Disposition pending    CODE STATUS:   Code Status and Medical Interventions: CPR (Attempt to Resuscitate); Full Support   Ordered at: 01/30/25 2118     Code Status (Patient has no pulse and is not breathing):    CPR (Attempt to Resuscitate)     Medical Interventions (Patient has pulse or is breathing):    Full Support       Rogelio Ty MD  02/02/25

## 2025-02-02 NOTE — PLAN OF CARE
Problem: Adult Inpatient Plan of Care  Goal: Plan of Care Review  Outcome: Progressing  Goal: Patient-Specific Goal (Individualized)  Outcome: Progressing  Goal: Absence of Hospital-Acquired Illness or Injury  Outcome: Progressing  Intervention: Identify and Manage Fall Risk  Recent Flowsheet Documentation  Taken 2/2/2025 0420 by Vladimir Ayers RN  Safety Promotion/Fall Prevention: safety round/check completed  Taken 2/2/2025 0205 by Vladimir Ayers RN  Safety Promotion/Fall Prevention: safety round/check completed  Taken 2/2/2025 0010 by Vladimir Ayers RN  Safety Promotion/Fall Prevention: safety round/check completed  Taken 2/1/2025 2205 by Vladimir Ayers RN  Safety Promotion/Fall Prevention: safety round/check completed  Taken 2/1/2025 2018 by Vladimir Ayers RN  Safety Promotion/Fall Prevention:   activity supervised   clutter free environment maintained   fall prevention program maintained   lighting adjusted   nonskid shoes/slippers when out of bed   safety round/check completed  Intervention: Prevent Skin Injury  Recent Flowsheet Documentation  Taken 2/2/2025 0420 by Vladimir Ayers RN  Body Position: position changed independently  Taken 2/2/2025 0205 by Vladimir Ayers RN  Body Position: position changed independently  Taken 2/2/2025 0010 by Vladimir Ayers RN  Body Position: position changed independently  Taken 2/1/2025 2205 by Vladimir Ayers RN  Body Position: position changed independently  Taken 2/1/2025 2018 by Vladimir Ayers RN  Body Position:   supine   legs elevated   position changed independently  Skin Protection: incontinence pads utilized  Intervention: Prevent and Manage VTE (Venous Thromboembolism) Risk  Recent Flowsheet Documentation  Taken 2/2/2025 0010 by Vladimir Ayers RN  VTE Prevention/Management:   bilateral   SCDs (sequential compression devices) on  Taken 2/1/2025 2018 by Vladimir Ayers RN  VTE Prevention/Management:   bilateral   SCDs (sequential compression devices) off    patient refused intervention   compression stockings on  Intervention: Prevent Infection  Recent Flowsheet Documentation  Taken 2/2/2025 0420 by Vladimir Ayers RN  Infection Prevention: rest/sleep promoted  Taken 2/2/2025 0205 by Vladimir Ayers RN  Infection Prevention: rest/sleep promoted  Taken 2/2/2025 0010 by Vladimir Ayers RN  Infection Prevention: rest/sleep promoted  Taken 2/1/2025 2205 by Vladimir Ayers RN  Infection Prevention: rest/sleep promoted  Taken 2/1/2025 2018 by Vladimir Ayers RN  Infection Prevention: rest/sleep promoted  Goal: Optimal Comfort and Wellbeing  Outcome: Progressing  Intervention: Monitor Pain and Promote Comfort  Recent Flowsheet Documentation  Taken 2/2/2025 0010 by Vladimir Ayers RN  Pain Management Interventions:   care clustered   pillow support provided   position adjusted  Taken 2/1/2025 2018 by Vladimir Ayers RN  Pain Management Interventions:   care clustered   pillow support provided   position adjusted   no interventions per patient request  Intervention: Provide Person-Centered Care  Recent Flowsheet Documentation  Taken 2/2/2025 0010 by Vladimir Ayers RN  Trust Relationship/Rapport:   care explained   choices provided   questions answered   questions encouraged   thoughts/feelings acknowledged  Taken 2/1/2025 2018 by Vladimir Ayers RN  Trust Relationship/Rapport:   care explained   choices provided   thoughts/feelings acknowledged   questions encouraged  Goal: Readiness for Transition of Care  Outcome: Progressing     Problem: Fall Injury Risk  Goal: Absence of Fall and Fall-Related Injury  Outcome: Progressing  Intervention: Identify and Manage Contributors  Recent Flowsheet Documentation  Taken 2/2/2025 0010 by Vladimir Ayers RN  Medication Review/Management: medications reviewed  Taken 2/1/2025 2018 by Vladimir Ayers RN  Medication Review/Management: medications reviewed  Intervention: Promote Injury-Free Environment  Recent Flowsheet Documentation  Taken  2/2/2025 0420 by Vladimir Ayers RN  Safety Promotion/Fall Prevention: safety round/check completed  Taken 2/2/2025 0205 by Vladimir Ayers RN  Safety Promotion/Fall Prevention: safety round/check completed  Taken 2/2/2025 0010 by Vladimir Ayers RN  Safety Promotion/Fall Prevention: safety round/check completed  Taken 2/1/2025 2205 by Vladimir Ayers RN  Safety Promotion/Fall Prevention: safety round/check completed  Taken 2/1/2025 2018 by Vladimir Ayers RN  Safety Promotion/Fall Prevention:   activity supervised   clutter free environment maintained   fall prevention program maintained   lighting adjusted   nonskid shoes/slippers when out of bed   safety round/check completed     Problem: Skin Injury Risk Increased  Goal: Skin Health and Integrity  Outcome: Progressing  Intervention: Optimize Skin Protection  Recent Flowsheet Documentation  Taken 2/2/2025 0420 by Vladimir Ayers RN  Activity Management: activity encouraged  Head of Bed (HOB) Positioning: HOB at 20 degrees  Taken 2/2/2025 0205 by Vladimir Ayers RN  Activity Management: activity encouraged  Head of Bed (HOB) Positioning: HOB at 15 degrees  Taken 2/2/2025 0010 by Vladimir Ayers RN  Head of Bed (HOB) Positioning: HOB at 15 degrees  Taken 2/1/2025 2205 by Vladimir Ayers RN  Head of Bed (HOB) Positioning: HOB at 15 degrees  Taken 2/1/2025 2018 by Vladimir Ayers RN  Activity Management: activity encouraged  Pressure Reduction Techniques: frequent weight shift encouraged  Head of Bed (HOB) Positioning: HOB at 15 degrees  Pressure Reduction Devices: pressure-redistributing mattress utilized  Skin Protection: incontinence pads utilized   Goal Outcome Evaluation:   Plan of Care Reviewed with: patient  Pt alert and oriented to self only. VSS. Room air. IV fluids maintained. Up w/ SB assist. Slept most of the shift. Denies pain/nausea. Plan for Brain biopsy tomorrow. Bed alarm on. Call light with in reach.

## 2025-02-03 ENCOUNTER — ANESTHESIA EVENT (OUTPATIENT)
Dept: PERIOP | Facility: HOSPITAL | Age: 80
End: 2025-02-03
Payer: MEDICARE

## 2025-02-03 ENCOUNTER — TELEPHONE (OUTPATIENT)
Dept: FAMILY MEDICINE CLINIC | Facility: CLINIC | Age: 80
End: 2025-02-03

## 2025-02-03 ENCOUNTER — ANESTHESIA (OUTPATIENT)
Dept: PERIOP | Facility: HOSPITAL | Age: 80
End: 2025-02-03
Payer: MEDICARE

## 2025-02-03 LAB
ANION GAP SERPL CALCULATED.3IONS-SCNC: 11 MMOL/L (ref 5–15)
BASOPHILS # BLD AUTO: 0.02 10*3/MM3 (ref 0–0.2)
BASOPHILS NFR BLD AUTO: 0.1 % (ref 0–1.5)
BUN SERPL-MCNC: 18 MG/DL (ref 8–23)
BUN/CREAT SERPL: 18 (ref 7–25)
CALCIUM SPEC-SCNC: 8.5 MG/DL (ref 8.6–10.5)
CHLORIDE SERPL-SCNC: 101 MMOL/L (ref 98–107)
CO2 SERPL-SCNC: 24 MMOL/L (ref 22–29)
CREAT SERPL-MCNC: 1 MG/DL (ref 0.76–1.27)
DEPRECATED RDW RBC AUTO: 43.8 FL (ref 37–54)
EGFRCR SERPLBLD CKD-EPI 2021: 77 ML/MIN/1.73
EOSINOPHIL # BLD AUTO: 0.01 10*3/MM3 (ref 0–0.4)
EOSINOPHIL NFR BLD AUTO: 0.1 % (ref 0.3–6.2)
ERYTHROCYTE [DISTWIDTH] IN BLOOD BY AUTOMATED COUNT: 13.1 % (ref 12.3–15.4)
GLUCOSE BLDC GLUCOMTR-MCNC: 188 MG/DL (ref 70–130)
GLUCOSE BLDC GLUCOMTR-MCNC: 193 MG/DL (ref 70–130)
GLUCOSE BLDC GLUCOMTR-MCNC: 194 MG/DL (ref 70–130)
GLUCOSE BLDC GLUCOMTR-MCNC: 201 MG/DL (ref 70–130)
GLUCOSE BLDC GLUCOMTR-MCNC: 213 MG/DL (ref 70–130)
GLUCOSE SERPL-MCNC: 217 MG/DL (ref 65–99)
HCT VFR BLD AUTO: 45.8 % (ref 37.5–51)
HGB BLD-MCNC: 14.4 G/DL (ref 13–17.7)
IMM GRANULOCYTES # BLD AUTO: 0.12 10*3/MM3 (ref 0–0.05)
IMM GRANULOCYTES NFR BLD AUTO: 0.9 % (ref 0–0.5)
LYMPHOCYTES # BLD AUTO: 0.47 10*3/MM3 (ref 0.7–3.1)
LYMPHOCYTES NFR BLD AUTO: 3.5 % (ref 19.6–45.3)
MCH RBC QN AUTO: 28.6 PG (ref 26.6–33)
MCHC RBC AUTO-ENTMCNC: 31.4 G/DL (ref 31.5–35.7)
MCV RBC AUTO: 90.9 FL (ref 79–97)
MONOCYTES # BLD AUTO: 0.7 10*3/MM3 (ref 0.1–0.9)
MONOCYTES NFR BLD AUTO: 5.2 % (ref 5–12)
NEUTROPHILS NFR BLD AUTO: 12.18 10*3/MM3 (ref 1.7–7)
NEUTROPHILS NFR BLD AUTO: 90.2 % (ref 42.7–76)
PLATELET # BLD AUTO: 113 10*3/MM3 (ref 140–450)
PMV BLD AUTO: 14 FL (ref 6–12)
POTASSIUM SERPL-SCNC: 4.4 MMOL/L (ref 3.5–5.2)
RBC # BLD AUTO: 5.04 10*6/MM3 (ref 4.14–5.8)
SODIUM SERPL-SCNC: 136 MMOL/L (ref 136–145)
WBC NRBC COR # BLD AUTO: 13.5 10*3/MM3 (ref 3.4–10.8)

## 2025-02-03 PROCEDURE — 25810000003 LACTATED RINGERS PER 1000 ML: Performed by: ANESTHESIOLOGY

## 2025-02-03 PROCEDURE — 25010000002 LIDOCAINE 2% SOLUTION: Performed by: NURSE ANESTHETIST, CERTIFIED REGISTERED

## 2025-02-03 PROCEDURE — 25810000003 SODIUM CHLORIDE PER 500 ML: Performed by: NEUROLOGICAL SURGERY

## 2025-02-03 PROCEDURE — 61750 INCISE SKULL/BRAIN BIOPSY: CPT | Performed by: NEUROLOGICAL SURGERY

## 2025-02-03 PROCEDURE — C1713 ANCHOR/SCREW BN/BN,TIS/BN: HCPCS | Performed by: NEUROLOGICAL SURGERY

## 2025-02-03 PROCEDURE — 25010000002 FENTANYL CITRATE (PF) 50 MCG/ML SOLUTION

## 2025-02-03 PROCEDURE — 63710000001 INSULIN LISPRO (HUMAN) PER 5 UNITS: Performed by: INTERNAL MEDICINE

## 2025-02-03 PROCEDURE — 25010000002 NICARDIPINE 2.5 MG/ML SOLUTION 10 ML VIAL: Performed by: NEUROLOGICAL SURGERY

## 2025-02-03 PROCEDURE — 25010000002 FENTANYL CITRATE (PF) 50 MCG/ML SOLUTION: Performed by: NURSE ANESTHETIST, CERTIFIED REGISTERED

## 2025-02-03 PROCEDURE — 25010000002 DEXAMETHASONE PER 1 MG: Performed by: HOSPITALIST

## 2025-02-03 PROCEDURE — 80048 BASIC METABOLIC PNL TOTAL CA: CPT | Performed by: NEUROLOGICAL SURGERY

## 2025-02-03 PROCEDURE — 25010000002 ONDANSETRON PER 1 MG: Performed by: HOSPITALIST

## 2025-02-03 PROCEDURE — 88307 TISSUE EXAM BY PATHOLOGIST: CPT | Performed by: NEUROLOGICAL SURGERY

## 2025-02-03 PROCEDURE — 88341 IMHCHEM/IMCYTCHM EA ADD ANTB: CPT | Performed by: NEUROLOGICAL SURGERY

## 2025-02-03 PROCEDURE — 63710000001 INSULIN GLARGINE PER 5 UNITS: Performed by: NEUROLOGICAL SURGERY

## 2025-02-03 PROCEDURE — 61750 INCISE SKULL/BRAIN BIOPSY: CPT | Performed by: SPECIALIST/TECHNOLOGIST, OTHER

## 2025-02-03 PROCEDURE — 63710000001 INSULIN LISPRO (HUMAN) PER 5 UNITS: Performed by: HOSPITALIST

## 2025-02-03 PROCEDURE — 25010000002 DEXAMETHASONE PER 1 MG: Performed by: NEUROLOGICAL SURGERY

## 2025-02-03 PROCEDURE — 25010000002 VANCOMYCIN 1 G RECONSTITUTED SOLUTION 1 EACH VIAL: Performed by: NEUROLOGICAL SURGERY

## 2025-02-03 PROCEDURE — 88342 IMHCHEM/IMCYTCHM 1ST ANTB: CPT | Performed by: NEUROLOGICAL SURGERY

## 2025-02-03 PROCEDURE — 00B73ZX EXCISION OF CEREBRAL HEMISPHERE, PERCUTANEOUS APPROACH, DIAGNOSTIC: ICD-10-PCS | Performed by: NEUROLOGICAL SURGERY

## 2025-02-03 PROCEDURE — 25010000002 NICARDIPINE 2.5 MG/ML SOLUTION: Performed by: NURSE ANESTHETIST, CERTIFIED REGISTERED

## 2025-02-03 PROCEDURE — 82948 REAGENT STRIP/BLOOD GLUCOSE: CPT

## 2025-02-03 PROCEDURE — 25010000002 CEFAZOLIN PER 500 MG: Performed by: NEUROLOGICAL SURGERY

## 2025-02-03 PROCEDURE — 25010000002 SUGAMMADEX 200 MG/2ML SOLUTION: Performed by: NURSE ANESTHETIST, CERTIFIED REGISTERED

## 2025-02-03 PROCEDURE — 25010000002 GLYCOPYRROLATE 0.2 MG/ML SOLUTION: Performed by: NURSE ANESTHETIST, CERTIFIED REGISTERED

## 2025-02-03 PROCEDURE — 88331 PATH CONSLTJ SURG 1 BLK 1SPC: CPT | Performed by: NEUROLOGICAL SURGERY

## 2025-02-03 PROCEDURE — 8E09XBZ COMPUTER ASSISTED PROCEDURE OF HEAD AND NECK REGION: ICD-10-PCS | Performed by: NEUROLOGICAL SURGERY

## 2025-02-03 PROCEDURE — 85025 COMPLETE CBC W/AUTO DIFF WBC: CPT | Performed by: NEUROLOGICAL SURGERY

## 2025-02-03 PROCEDURE — 25010000002 SODIUM CHLORIDE 0.9 % WITH KCL 20 MEQ 20-0.9 MEQ/L-% SOLUTION: Performed by: NEUROLOGICAL SURGERY

## 2025-02-03 PROCEDURE — 88360 TUMOR IMMUNOHISTOCHEM/MANUAL: CPT | Performed by: NEUROLOGICAL SURGERY

## 2025-02-03 PROCEDURE — 25010000002 DEXAMETHASONE SODIUM PHOSPHATE 20 MG/5ML SOLUTION: Performed by: NURSE ANESTHETIST, CERTIFIED REGISTERED

## 2025-02-03 PROCEDURE — 25810000003 SODIUM CHLORIDE 0.9 % SOLUTION 250 ML FLEX CONT: Performed by: NEUROLOGICAL SURGERY

## 2025-02-03 PROCEDURE — 25010000002 PROPOFOL 200 MG/20ML EMULSION: Performed by: NURSE ANESTHETIST, CERTIFIED REGISTERED

## 2025-02-03 PROCEDURE — 63710000001 INSULIN LISPRO (HUMAN) PER 5 UNITS: Performed by: NEUROLOGICAL SURGERY

## 2025-02-03 DEVICE — SCRW CRS/DRV DRLFREE EMERG NEURO TI1.8X4: Type: IMPLANTABLE DEVICE | Site: CRANIAL | Status: FUNCTIONAL

## 2025-02-03 DEVICE — PLT CVR LEVELONE BURHL LP CONTRL .3X17X1.5MM: Type: IMPLANTABLE DEVICE | Site: CRANIAL | Status: FUNCTIONAL

## 2025-02-03 DEVICE — FLOSEAL WITH RECOTHROM - 5ML
Type: IMPLANTABLE DEVICE | Site: CRANIAL | Status: FUNCTIONAL
Brand: FLOSEAL HEMOSTATIC MATRIX

## 2025-02-03 DEVICE — SSC BONE WAX
Type: IMPLANTABLE DEVICE | Site: CRANIAL | Status: FUNCTIONAL
Brand: SSC BONE WAX

## 2025-02-03 DEVICE — HEMOST ABS SURGIFOAM SZ100 8X12 10MM: Type: IMPLANTABLE DEVICE | Site: CRANIAL | Status: FUNCTIONAL

## 2025-02-03 RX ORDER — SODIUM CHLORIDE 0.9 % (FLUSH) 0.9 %
3-10 SYRINGE (ML) INJECTION AS NEEDED
Status: DISCONTINUED | OUTPATIENT
Start: 2025-02-03 | End: 2025-02-03 | Stop reason: HOSPADM

## 2025-02-03 RX ORDER — BISACODYL 10 MG
10 SUPPOSITORY, RECTAL RECTAL DAILY PRN
Status: DISCONTINUED | OUTPATIENT
Start: 2025-02-03 | End: 2025-02-10 | Stop reason: HOSPADM

## 2025-02-03 RX ORDER — DIPHENHYDRAMINE HYDROCHLORIDE 50 MG/ML
12.5 INJECTION INTRAMUSCULAR; INTRAVENOUS
Status: DISCONTINUED | OUTPATIENT
Start: 2025-02-03 | End: 2025-02-03 | Stop reason: HOSPADM

## 2025-02-03 RX ORDER — ONDANSETRON 4 MG/1
4 TABLET, ORALLY DISINTEGRATING ORAL EVERY 6 HOURS PRN
Status: DISCONTINUED | OUTPATIENT
Start: 2025-02-03 | End: 2025-02-10 | Stop reason: HOSPADM

## 2025-02-03 RX ORDER — FLUMAZENIL 0.1 MG/ML
0.2 INJECTION INTRAVENOUS AS NEEDED
Status: DISCONTINUED | OUTPATIENT
Start: 2025-02-03 | End: 2025-02-03 | Stop reason: HOSPADM

## 2025-02-03 RX ORDER — FENTANYL CITRATE 50 UG/ML
INJECTION, SOLUTION INTRAMUSCULAR; INTRAVENOUS
Status: COMPLETED
Start: 2025-02-03 | End: 2025-02-03

## 2025-02-03 RX ORDER — HYDROMORPHONE HYDROCHLORIDE 1 MG/ML
0.25 INJECTION, SOLUTION INTRAMUSCULAR; INTRAVENOUS; SUBCUTANEOUS
Status: DISCONTINUED | OUTPATIENT
Start: 2025-02-03 | End: 2025-02-03 | Stop reason: HOSPADM

## 2025-02-03 RX ORDER — DEXMEDETOMIDINE HYDROCHLORIDE 4 UG/ML
.2-1.5 INJECTION, SOLUTION INTRAVENOUS
Status: DISCONTINUED | OUTPATIENT
Start: 2025-02-03 | End: 2025-02-04

## 2025-02-03 RX ORDER — PROMETHAZINE HYDROCHLORIDE 25 MG/1
25 TABLET ORAL ONCE AS NEEDED
Status: DISCONTINUED | OUTPATIENT
Start: 2025-02-03 | End: 2025-02-03 | Stop reason: HOSPADM

## 2025-02-03 RX ORDER — ONDANSETRON 2 MG/ML
4 INJECTION INTRAMUSCULAR; INTRAVENOUS ONCE AS NEEDED
Status: DISCONTINUED | OUTPATIENT
Start: 2025-02-03 | End: 2025-02-03 | Stop reason: HOSPADM

## 2025-02-03 RX ORDER — FENTANYL CITRATE 50 UG/ML
50 INJECTION, SOLUTION INTRAMUSCULAR; INTRAVENOUS ONCE AS NEEDED
Status: DISCONTINUED | OUTPATIENT
Start: 2025-02-03 | End: 2025-02-03 | Stop reason: HOSPADM

## 2025-02-03 RX ORDER — POLYETHYLENE GLYCOL 3350 17 G/17G
17 POWDER, FOR SOLUTION ORAL DAILY PRN
Status: DISCONTINUED | OUTPATIENT
Start: 2025-02-03 | End: 2025-02-10 | Stop reason: HOSPADM

## 2025-02-03 RX ORDER — SODIUM CHLORIDE, SODIUM LACTATE, POTASSIUM CHLORIDE, CALCIUM CHLORIDE 600; 310; 30; 20 MG/100ML; MG/100ML; MG/100ML; MG/100ML
9 INJECTION, SOLUTION INTRAVENOUS CONTINUOUS
Status: ACTIVE | OUTPATIENT
Start: 2025-02-03 | End: 2025-02-04

## 2025-02-03 RX ORDER — HYDROCODONE BITARTRATE AND ACETAMINOPHEN 5; 325 MG/1; MG/1
1 TABLET ORAL ONCE AS NEEDED
Status: DISCONTINUED | OUTPATIENT
Start: 2025-02-03 | End: 2025-02-03 | Stop reason: HOSPADM

## 2025-02-03 RX ORDER — FENTANYL CITRATE 50 UG/ML
INJECTION, SOLUTION INTRAMUSCULAR; INTRAVENOUS AS NEEDED
Status: DISCONTINUED | OUTPATIENT
Start: 2025-02-03 | End: 2025-02-03 | Stop reason: SURG

## 2025-02-03 RX ORDER — SODIUM CHLORIDE 9 MG/ML
INJECTION, SOLUTION INTRAVENOUS AS NEEDED
Status: DISCONTINUED | OUTPATIENT
Start: 2025-02-03 | End: 2025-02-03 | Stop reason: HOSPADM

## 2025-02-03 RX ORDER — NALOXONE HCL 0.4 MG/ML
0.2 VIAL (ML) INJECTION AS NEEDED
Status: DISCONTINUED | OUTPATIENT
Start: 2025-02-03 | End: 2025-02-03 | Stop reason: HOSPADM

## 2025-02-03 RX ORDER — ACETAMINOPHEN 160 MG/5ML
650 SOLUTION ORAL EVERY 4 HOURS PRN
Status: DISCONTINUED | OUTPATIENT
Start: 2025-02-03 | End: 2025-02-10 | Stop reason: HOSPADM

## 2025-02-03 RX ORDER — FENTANYL CITRATE 50 UG/ML
75 INJECTION, SOLUTION INTRAMUSCULAR; INTRAVENOUS ONCE
Status: COMPLETED | OUTPATIENT
Start: 2025-02-03 | End: 2025-02-03

## 2025-02-03 RX ORDER — HYDRALAZINE HYDROCHLORIDE 20 MG/ML
5 INJECTION INTRAMUSCULAR; INTRAVENOUS
Status: DISCONTINUED | OUTPATIENT
Start: 2025-02-03 | End: 2025-02-03 | Stop reason: HOSPADM

## 2025-02-03 RX ORDER — HYDROCODONE BITARTRATE AND ACETAMINOPHEN 5; 325 MG/1; MG/1
1 TABLET ORAL EVERY 4 HOURS PRN
Status: ACTIVE | OUTPATIENT
Start: 2025-02-03 | End: 2025-02-08

## 2025-02-03 RX ORDER — PROPOFOL 10 MG/ML
INJECTION, EMULSION INTRAVENOUS AS NEEDED
Status: DISCONTINUED | OUTPATIENT
Start: 2025-02-03 | End: 2025-02-03 | Stop reason: SURG

## 2025-02-03 RX ORDER — LABETALOL HYDROCHLORIDE 5 MG/ML
5 INJECTION, SOLUTION INTRAVENOUS
Status: DISCONTINUED | OUTPATIENT
Start: 2025-02-03 | End: 2025-02-03 | Stop reason: HOSPADM

## 2025-02-03 RX ORDER — SODIUM CHLORIDE 0.9 % (FLUSH) 0.9 %
3 SYRINGE (ML) INJECTION EVERY 12 HOURS SCHEDULED
Status: DISCONTINUED | OUTPATIENT
Start: 2025-02-03 | End: 2025-02-03 | Stop reason: HOSPADM

## 2025-02-03 RX ORDER — LIDOCAINE HYDROCHLORIDE 10 MG/ML
0.5 INJECTION, SOLUTION INFILTRATION; PERINEURAL ONCE AS NEEDED
Status: DISCONTINUED | OUTPATIENT
Start: 2025-02-03 | End: 2025-02-03 | Stop reason: HOSPADM

## 2025-02-03 RX ORDER — ROCURONIUM BROMIDE 10 MG/ML
INJECTION, SOLUTION INTRAVENOUS AS NEEDED
Status: DISCONTINUED | OUTPATIENT
Start: 2025-02-03 | End: 2025-02-03 | Stop reason: SURG

## 2025-02-03 RX ORDER — ONDANSETRON 2 MG/ML
4 INJECTION INTRAMUSCULAR; INTRAVENOUS EVERY 6 HOURS PRN
Status: DISCONTINUED | OUTPATIENT
Start: 2025-02-03 | End: 2025-02-10 | Stop reason: HOSPADM

## 2025-02-03 RX ORDER — BISACODYL 5 MG/1
5 TABLET, DELAYED RELEASE ORAL DAILY PRN
Status: DISCONTINUED | OUTPATIENT
Start: 2025-02-03 | End: 2025-02-10 | Stop reason: HOSPADM

## 2025-02-03 RX ORDER — ATROPINE SULFATE 0.4 MG/ML
0.4 INJECTION, SOLUTION INTRAMUSCULAR; INTRAVENOUS; SUBCUTANEOUS ONCE AS NEEDED
Status: DISCONTINUED | OUTPATIENT
Start: 2025-02-03 | End: 2025-02-03 | Stop reason: HOSPADM

## 2025-02-03 RX ORDER — EPHEDRINE SULFATE 50 MG/ML
5 INJECTION, SOLUTION INTRAVENOUS ONCE AS NEEDED
Status: DISCONTINUED | OUTPATIENT
Start: 2025-02-03 | End: 2025-02-03 | Stop reason: HOSPADM

## 2025-02-03 RX ORDER — LIDOCAINE HYDROCHLORIDE 20 MG/ML
INJECTION, SOLUTION INFILTRATION; PERINEURAL AS NEEDED
Status: DISCONTINUED | OUTPATIENT
Start: 2025-02-03 | End: 2025-02-03 | Stop reason: SURG

## 2025-02-03 RX ORDER — ACETAMINOPHEN 650 MG/1
650 SUPPOSITORY RECTAL EVERY 4 HOURS PRN
Status: DISCONTINUED | OUTPATIENT
Start: 2025-02-03 | End: 2025-02-10 | Stop reason: HOSPADM

## 2025-02-03 RX ORDER — AMOXICILLIN 250 MG
2 CAPSULE ORAL 2 TIMES DAILY PRN
Status: DISCONTINUED | OUTPATIENT
Start: 2025-02-03 | End: 2025-02-10 | Stop reason: HOSPADM

## 2025-02-03 RX ORDER — MAGNESIUM HYDROXIDE 1200 MG/15ML
LIQUID ORAL AS NEEDED
Status: DISCONTINUED | OUTPATIENT
Start: 2025-02-03 | End: 2025-02-03 | Stop reason: HOSPADM

## 2025-02-03 RX ORDER — MIDAZOLAM HYDROCHLORIDE 1 MG/ML
0.5 INJECTION, SOLUTION INTRAMUSCULAR; INTRAVENOUS
Status: DISCONTINUED | OUTPATIENT
Start: 2025-02-03 | End: 2025-02-03 | Stop reason: HOSPADM

## 2025-02-03 RX ORDER — DIAZEPAM 10 MG/2ML
5 INJECTION, SOLUTION INTRAMUSCULAR; INTRAVENOUS EVERY 4 HOURS PRN
Status: DISCONTINUED | OUTPATIENT
Start: 2025-02-03 | End: 2025-02-10 | Stop reason: HOSPADM

## 2025-02-03 RX ORDER — PROMETHAZINE HYDROCHLORIDE 25 MG/1
25 SUPPOSITORY RECTAL ONCE AS NEEDED
Status: DISCONTINUED | OUTPATIENT
Start: 2025-02-03 | End: 2025-02-03 | Stop reason: HOSPADM

## 2025-02-03 RX ORDER — DEXAMETHASONE SODIUM PHOSPHATE 4 MG/ML
INJECTION, SOLUTION INTRA-ARTICULAR; INTRALESIONAL; INTRAMUSCULAR; INTRAVENOUS; SOFT TISSUE AS NEEDED
Status: DISCONTINUED | OUTPATIENT
Start: 2025-02-03 | End: 2025-02-03 | Stop reason: SURG

## 2025-02-03 RX ORDER — SODIUM CHLORIDE AND POTASSIUM CHLORIDE 150; 900 MG/100ML; MG/100ML
100 INJECTION, SOLUTION INTRAVENOUS CONTINUOUS
Status: DISPENSED | OUTPATIENT
Start: 2025-02-03 | End: 2025-02-04

## 2025-02-03 RX ORDER — ACETAMINOPHEN 325 MG/1
650 TABLET ORAL EVERY 4 HOURS PRN
Status: DISCONTINUED | OUTPATIENT
Start: 2025-02-03 | End: 2025-02-10 | Stop reason: HOSPADM

## 2025-02-03 RX ORDER — FAMOTIDINE 10 MG/ML
20 INJECTION, SOLUTION INTRAVENOUS ONCE
Status: COMPLETED | OUTPATIENT
Start: 2025-02-03 | End: 2025-02-03

## 2025-02-03 RX ORDER — DEXMEDETOMIDINE HYDROCHLORIDE 4 UG/ML
INJECTION, SOLUTION INTRAVENOUS
Status: COMPLETED
Start: 2025-02-03 | End: 2025-02-03

## 2025-02-03 RX ORDER — IPRATROPIUM BROMIDE AND ALBUTEROL SULFATE 2.5; .5 MG/3ML; MG/3ML
3 SOLUTION RESPIRATORY (INHALATION) ONCE AS NEEDED
Status: DISCONTINUED | OUTPATIENT
Start: 2025-02-03 | End: 2025-02-03 | Stop reason: HOSPADM

## 2025-02-03 RX ORDER — NICARDIPINE HYDROCHLORIDE 2.5 MG/ML
INJECTION INTRAVENOUS CONTINUOUS PRN
Status: DISCONTINUED | OUTPATIENT
Start: 2025-02-03 | End: 2025-02-03 | Stop reason: SURG

## 2025-02-03 RX ORDER — GLYCOPYRROLATE 0.2 MG/ML
INJECTION INTRAMUSCULAR; INTRAVENOUS AS NEEDED
Status: DISCONTINUED | OUTPATIENT
Start: 2025-02-03 | End: 2025-02-03 | Stop reason: SURG

## 2025-02-03 RX ADMIN — SODIUM CHLORIDE, POTASSIUM CHLORIDE, SODIUM LACTATE AND CALCIUM CHLORIDE 9 ML/HR: 600; 310; 30; 20 INJECTION, SOLUTION INTRAVENOUS at 14:49

## 2025-02-03 RX ADMIN — DEXAMETHASONE SODIUM PHOSPHATE 4 MG: 4 INJECTION, SOLUTION INTRAMUSCULAR; INTRAVENOUS at 17:07

## 2025-02-03 RX ADMIN — ONDANSETRON 4 MG: 2 INJECTION INTRAMUSCULAR; INTRAVENOUS at 17:49

## 2025-02-03 RX ADMIN — Medication 10 ML: at 21:08

## 2025-02-03 RX ADMIN — SUGAMMADEX 400 MG: 100 INJECTION, SOLUTION INTRAVENOUS at 18:14

## 2025-02-03 RX ADMIN — FENTANYL CITRATE 50 MCG: 50 INJECTION, SOLUTION INTRAMUSCULAR; INTRAVENOUS at 16:53

## 2025-02-03 RX ADMIN — DEXAMETHASONE SODIUM PHOSPHATE 4 MG: 4 INJECTION, SOLUTION INTRA-ARTICULAR; INTRALESIONAL; INTRAMUSCULAR; INTRAVENOUS; SOFT TISSUE at 05:46

## 2025-02-03 RX ADMIN — SODIUM CHLORIDE AND POTASSIUM CHLORIDE 100 ML/HR: .9; .15 SOLUTION INTRAVENOUS at 23:32

## 2025-02-03 RX ADMIN — INSULIN LISPRO 3 UNITS: 100 INJECTION, SOLUTION INTRAVENOUS; SUBCUTANEOUS at 21:49

## 2025-02-03 RX ADMIN — NICARDIPINE HYDROCHLORIDE 10 MG/HR: 25 INJECTION, SOLUTION INTRAVENOUS at 19:40

## 2025-02-03 RX ADMIN — PROPOFOL 120 MG: 10 INJECTION, EMULSION INTRAVENOUS at 16:41

## 2025-02-03 RX ADMIN — DEXMEDETOMIDINE HYDROCHLORIDE 0.2 MCG/KG/HR: 4 INJECTION, SOLUTION INTRAVENOUS at 20:42

## 2025-02-03 RX ADMIN — Medication 10 ML: at 08:20

## 2025-02-03 RX ADMIN — FAMOTIDINE 20 MG: 10 INJECTION INTRAVENOUS at 14:49

## 2025-02-03 RX ADMIN — DEXAMETHASONE SODIUM PHOSPHATE 4 MG: 4 INJECTION, SOLUTION INTRA-ARTICULAR; INTRALESIONAL; INTRAMUSCULAR; INTRAVENOUS; SOFT TISSUE at 23:51

## 2025-02-03 RX ADMIN — DEXMEDETOMIDINE HYDROCHLORIDE 0.2 MCG/KG/HR: 400 INJECTION INTRAVENOUS at 20:42

## 2025-02-03 RX ADMIN — INSULIN LISPRO 2 UNITS: 100 INJECTION, SOLUTION INTRAVENOUS; SUBCUTANEOUS at 12:19

## 2025-02-03 RX ADMIN — INSULIN GLARGINE 3 UNITS: 100 INJECTION, SOLUTION SUBCUTANEOUS at 21:49

## 2025-02-03 RX ADMIN — INSULIN LISPRO 2 UNITS: 100 INJECTION, SOLUTION INTRAVENOUS; SUBCUTANEOUS at 12:20

## 2025-02-03 RX ADMIN — LIDOCAINE HYDROCHLORIDE 40 MG: 20 INJECTION, SOLUTION INFILTRATION; PERINEURAL at 16:41

## 2025-02-03 RX ADMIN — NICARDIPINE HYDROCHLORIDE 5 MG/HR: 25 INJECTION, SOLUTION INTRAVENOUS at 18:32

## 2025-02-03 RX ADMIN — INSULIN LISPRO 3 UNITS: 100 INJECTION, SOLUTION INTRAVENOUS; SUBCUTANEOUS at 08:19

## 2025-02-03 RX ADMIN — CEFAZOLIN 2 G: 2 INJECTION, POWDER, FOR SOLUTION INTRAMUSCULAR; INTRAVENOUS at 16:30

## 2025-02-03 RX ADMIN — DEXAMETHASONE SODIUM PHOSPHATE 4 MG: 4 INJECTION, SOLUTION INTRA-ARTICULAR; INTRALESIONAL; INTRAMUSCULAR; INTRAVENOUS; SOFT TISSUE at 00:16

## 2025-02-03 RX ADMIN — ROCURONIUM BROMIDE 50 MG: 10 INJECTION, SOLUTION INTRAVENOUS at 16:42

## 2025-02-03 RX ADMIN — FENTANYL CITRATE 75 MCG: 50 INJECTION, SOLUTION INTRAMUSCULAR; INTRAVENOUS at 20:41

## 2025-02-03 RX ADMIN — PROPOFOL 30 MG: 10 INJECTION, EMULSION INTRAVENOUS at 16:43

## 2025-02-03 RX ADMIN — CEFAZOLIN 2000 MG: 2 INJECTION, POWDER, FOR SOLUTION INTRAMUSCULAR; INTRAVENOUS at 23:51

## 2025-02-03 RX ADMIN — FENTANYL CITRATE 50 MCG: 50 INJECTION, SOLUTION INTRAMUSCULAR; INTRAVENOUS at 18:12

## 2025-02-03 RX ADMIN — INSULIN LISPRO 2 UNITS: 100 INJECTION, SOLUTION INTRAVENOUS; SUBCUTANEOUS at 08:20

## 2025-02-03 RX ADMIN — GLYCOPYRROLATE 0.1 MG: 0.2 INJECTION INTRAMUSCULAR; INTRAVENOUS at 17:02

## 2025-02-03 RX ADMIN — PROPOFOL 50 MG: 10 INJECTION, EMULSION INTRAVENOUS at 16:53

## 2025-02-03 RX ADMIN — ROCURONIUM BROMIDE 20 MG: 10 INJECTION, SOLUTION INTRAVENOUS at 17:48

## 2025-02-03 RX ADMIN — DEXAMETHASONE SODIUM PHOSPHATE 4 MG: 4 INJECTION, SOLUTION INTRA-ARTICULAR; INTRALESIONAL; INTRAMUSCULAR; INTRAVENOUS; SOFT TISSUE at 12:20

## 2025-02-03 NOTE — SIGNIFICANT NOTE
02/03/25 1431   OTHER   Discipline physical therapist   Rehab Time/Intention   Session Not Performed patient unavailable for treatment  (Pt in OR; will follow up post operatively as appropriate.)   Recommendation   PT - Next Appointment 02/04/25

## 2025-02-03 NOTE — BRIEF OP NOTE
BRAIN BIOPSY FRAMELESS STERIOTACTIC  Progress Note    Sumit Jules  2/3/2025    Pre-op Diagnosis:   Brain mass [G93.89]       Post-Op Diagnosis Codes:     * Brain mass [G93.89]    Procedure/CPT® Codes:  NV STEREOTACTIC BX ASPIR/EXC MARIA C INTRACRANIAL LES [35985]      Procedure(s):  Left frontal stereotactic brain biopsy with Stealth              Surgeon(s):  Reyes Thakur MD    Anesthesia: General    Staff:   Circulator: Carolyn Sloan RN  Scrub Person: Nimisha Horner  Vendor Representative: Sami Joy  Assistant: Shobha Casiano CSA  Assistant: Shobha Casiano CSA      Estimated Blood Loss: minimal    Urine Voided: * No values recorded between 2/3/2025  4:34 PM and 2/3/2025  6:10 PM *    Specimens:                Specimens       ID Source Type Tests Collected By Collected At Frozen?    A Brain Tissue TISSUE PATHOLOGY EXAM   Reyes Thakur MD 2/3/25 [id number] Yes    Description: Left sided brain tissue    Comment: Call OR 21 with results ([id number])    B Brain Tissue TISSUE PATHOLOGY EXAM   Reyes Thakur MD 2/3/25 1757 No    Description: Left sided brain tissue                  Drains: * No LDAs found *    Findings: Abnormal tissue        Complications: None      Reyes Thakur MD     Date: 2/3/2025  Time: 18:10 EST

## 2025-02-03 NOTE — OP NOTE
Preoperative diagnosis: Mass in the left centrum semiovale    Postoperative diagnosis: Same    Procedure performed: Left frontal stereotactic biopsy using Stealth guidance    Surgeon: Reyes Thakur M.D.    Asst.: Shobha Casiano CFA who was instrumental in helping with hemostasis, visualization of neural structures and retraction of neural structures.  Her skilled assistance was necessary for the success of this case    Estimated blood loss, crystalloid, colloid, blood: Please see anesthesia record    Material to lab:   Multiple biopsies were sent the first 2 for frozen and the remainder were sent fresh at the request of the pathologist    Drains: None    Complications: None    Indications for the procedure: This is a patient who presented with confusion.  Imaging showed an enhancing mass in the left centrum semiovale.    Operative summary:  The patient was brought into the operating room and placed under general endotracheal anesthesia using intravenous and inhalational agents.  The patient was then positioned on the operating table in the supine position.  All pressure points were padded including peripheral points of entrapment.  The head was secured in the Solis headrest and then turned s right side.  The left frontal region was shaved free of hair.  He was then reference to the Stealth navigation system which was used to plan out the incision.  I ended up having to make an incision more in the forehead that I wanted to in order to come in superiorly and anteriorly so as not to disturb any motor fibers.  Once this was planned the area was prepped with ChloraPrep and allowed to dry.  He was then draped with Ioban, towels, half sheets and a cranial drape.  An incision was then made in the above location.  This carried down to the outer table of the skull.  A bur hole was placed in the outer table of the skull and then the dura was coagulated.  The bipolar was used to coagulate the arachnoid over the brain so as  to not cause pressure on the brain when I passed the needle through.    The biopsy base was then secured to the skull and then we then reference the target area that I had said earlier which was in the back lower portion of the enhancing tissue so as to be sure we are in the middle of the tissue but somewhat near the border so as to get a good diagnosis.  I then passed the needle through into the abnormal area.  A 2 quarter biopsy was harvested and sent for frozen section.  I then proceeded with the other two quarters without target.  I retargeted a little more toward the surface and did another four-quadrant biopsy.  By that time the  pathologist had called and said the tissue was abnormal.  He did feel he would need more tissue which I already had.  He thought it might be a lymphoma and so requested that it be sent fresh.  This was done.  The biopsy base was removed as well as the needle.  Because it was in the frontal region I placed a bur hole cover after closing the bur hole with thrombin and Gelfoam and Floseal.  The incision was then closed in layers dressed and the patient taken the recovery room in stable condition.  There were no apparent complications and the sponge, instrument and needle counts were correct at the end of the procedure.

## 2025-02-03 NOTE — PROGRESS NOTES
"Nutrition Services    Patient Name:  Sumit Jules  YOB: 1945  MRN: [id number]  Admit Date:  1/30/2025    Assessment Date:  02/03/25    NUTRITION SCREENING      Reason for Encounter MST score 2+   Diagnosis/Problem Brain mass, pancreas mass, diabetes, HLD, HTN       PO Diet NPO Diet NPO Type: Strict NPO   Supplements n/a   PO Intake % 100% one recorded meal while admitted. Pt reported he normally eats well at home and has three meals per day.        Medications MAR reviewed by RD   Labs  Listed below, reviewed   Physical Findings No visual muscle wasting or fat loss   GI Function Last BM 2/2   Skin Status Intact       Height  Weight  BMI  Weight Trend     Height: 182.9 cm (72\")  Weight: 125 kg (275 lb) (01/30/25 2114)  Body mass index is 37.3 kg/m².  Loss Insignificant weight loss noted over past year       Nutrition Problem (PES) No nutrition diagnosis at this time.       Intervention/Plan RD to follow up per protocol.     Results from last 7 days   Lab Units 01/31/25  0721 01/30/25  0510 01/29/25  1302   SODIUM mmol/L 136 139 141   POTASSIUM mmol/L 4.7 4.1 4.2   CHLORIDE mmol/L 105 105 104   CO2 mmol/L 22.6 23.2 27.3   BUN mg/dL 14 13 14   CREATININE mg/dL 0.86 0.97 1.08   CALCIUM mg/dL 8.6 8.8 9.7   BILIRUBIN mg/dL  --   --  0.7   ALK PHOS U/L  --   --  85   ALT (SGPT) U/L  --   --  13   AST (SGOT) U/L  --   --  20   GLUCOSE mg/dL 202* 109* 134*     Results from last 7 days   Lab Units 01/30/25  0510   MAGNESIUM mg/dL 2.3   HEMOGLOBIN g/dL 12.7*   HEMATOCRIT % 41.7     Lab Results   Component Value Date    HGBA1C 7.40 (H) 12/03/2024     Electronically signed by:  Sathish Ortez RDN, LD  02/03/25 11:32 EST    "

## 2025-02-03 NOTE — ANESTHESIA PROCEDURE NOTES
Airway  Date/Time: 2/3/2025 4:47 PM    General Information and Staff    CRNA/CAA: Ray, Rosie, CRNA    Indications and Patient Condition  Indications for airway management: airway protection    Preoxygenated: yes  MILS maintained throughout  Mask difficulty assessment: 1 - vent by mask    Final Airway Details  Final airway type: endotracheal airway      Successful airway: ETT  Cuffed: yes   Successful intubation technique: direct laryngoscopy  Facilitating devices/methods: cricoid pressure and Bougie  Endotracheal tube insertion site: oral  Blade: Moris  Blade size: 4  ETT size (mm): 7.5  Cormack-Lehane Classification: grade I - full view of glottis  Placement verified by: chest auscultation and capnometry   Measured from: lips  ETT/EBT  to lips (cm): 22  Number of attempts at approach: 1  Assessment: lips, teeth, and gum same as pre-op and atraumatic intubation

## 2025-02-03 NOTE — ANESTHESIA PREPROCEDURE EVALUATION
Anesthesia Evaluation     NPO Solid Status: > 8 hours             Airway   Mallampati: II  TM distance: >3 FB  Neck ROM: full  no difficulty expected  Dental - normal exam     Pulmonary - normal exam   Cardiovascular - normal exam    (+) hypertension, CAD, DVT, hyperlipidemia      Neuro/Psych  GI/Hepatic/Renal/Endo    (+) obesity, diabetes mellitus    Musculoskeletal     Abdominal    Substance History      OB/GYN          Other      history of cancer                Anesthesia Plan    ASA 3     general     (---------------------------               02/03/25                      1416         ---------------------------   BP:          155/61         Pulse:       (!) 44         Resp:          16           Temp:   36.8 °C (98.2 °F)   SpO2:          95%         ---------------------------)  intravenous induction     Anesthetic plan, risks, benefits, and alternatives have been provided, discussed and informed consent has been obtained with: patient.    CODE STATUS:    Code Status (Patient has no pulse and is not breathing): CPR (Attempt to Resuscitate)  Medical Interventions (Patient has pulse or is breathing): Full Support

## 2025-02-03 NOTE — PROGRESS NOTES
Name: Sumit Jules ADMIT: 2025   : 1945  PCP: Carol Rios LEONARDO, APRN    MRN: [id number] LOS: 4 days   AGE/SEX: 79 y.o. male  ROOM: [facility] Main OR/MAIN OR     Subjective   Subjective   No new issues today, eager for bx.     Objective   Objective   Vital Signs  Temp:  [97.7 °F (36.5 °C)-98.6 °F (37 °C)] 98.2 °F (36.8 °C)  Heart Rate:  [44-54] 44  Resp:  [16-18] 16  BP: (127-155)/(55-81) 155/61  SpO2:  [95 %-99 %] 95 %  on   ;   Device (Oxygen Therapy): room air  Body mass index is 37.3 kg/m².  Physical Exam  Constitutional:       Appearance: He is ill-appearing.   Pulmonary:      Effort: Pulmonary effort is normal. No respiratory distress.      Breath sounds: No stridor.   Skin:     Coloration: Skin is not pale.   Neurological:      Mental Status: He is alert.         Results Review     I reviewed the patient's new clinical results.  Results from last 7 days   Lab Units 25  0510 25  1302   WBC 10*3/mm3 6.00 6.84   HEMOGLOBIN g/dL 12.7* 13.9   PLATELETS 10*3/mm3 110* 115*     Results from last 7 days   Lab Units 25  0721 25  0510 25  1302   SODIUM mmol/L 136 139 141   POTASSIUM mmol/L 4.7 4.1 4.2   CHLORIDE mmol/L 105 105 104   CO2 mmol/L 22.6 23.2 27.3   BUN mg/dL 14 13 14   CREATININE mg/dL 0.86 0.97 1.08   GLUCOSE mg/dL 202* 109* 134*   EGFR mL/min/1.73 88.6 79.9 70.2     Results from last 7 days   Lab Units 25  1302   ALBUMIN g/dL 4.2   BILIRUBIN mg/dL 0.7   ALK PHOS U/L 85   AST (SGOT) U/L 20   ALT (SGPT) U/L 13     Results from last 7 days   Lab Units 25  0721 25  0510 25  1302   CALCIUM mg/dL 8.6 8.8 9.7   ALBUMIN g/dL  --   --  4.2   MAGNESIUM mg/dL  --  2.3  --        Glucose   Date/Time Value Ref Range Status   2025 1412 188 (H) 70 - 130 mg/dL Final   2025 1149 193 (H) 70 - 130 mg/dL Final   2025 0746 201 (H) 70 - 130 mg/dL Final   2025 1931 223 (H) 70 - 130 mg/dL Final   2025 1647 204 (H) 70 - 130 mg/dL  Final   02/02/2025 1135 259 (H) 70 - 130 mg/dL Final   02/02/2025 0801 248 (H) 70 - 130 mg/dL Final       No radiology results for the last day  Scheduled Medications  [Held by provider] amLODIPine, 2.5 mg, Oral, Q24H  [Transfer Hold] atorvastatin, 10 mg, Oral, Daily  ceFAZolin, 2 g, Intravenous, Once  [Transfer Hold] dexAMETHasone, 4 mg, Intravenous, Q6H  famotidine, 20 mg, Intravenous, Once  famotidine, 40 mg, Oral, Daily  [Transfer Hold] insulin glargine, 3 Units, Subcutaneous, Nightly  [Transfer Hold] insulin lispro, 2 Units, Subcutaneous, TID With Meals  [Transfer Hold] insulin lispro, 2-7 Units, Subcutaneous, 4x Daily AC & at Bedtime  [Held by provider] losartan, 25 mg, Oral, Daily  [Transfer Hold] melatonin, 2.5 mg, Oral, Nightly  [Transfer Hold] sodium chloride, 10 mL, Intravenous, Q12H  sodium chloride, 3 mL, Intravenous, Q12H  [Transfer Hold] tamsulosin, 0.4 mg, Oral, Daily    Infusions  lactated ringers, 9 mL/hr    Diet  NPO Diet NPO Type: Strict NPO       Assessment/Plan     Active Hospital Problems    Diagnosis  POA    **Brain mass [G93.89]  Yes    Pharyngeal dysphagia [R13.13]  Yes    Vasogenic cerebral edema [G93.6]  Yes    Slurred speech [R47.81]  Yes    History of DVT (deep vein thrombosis) [Z86.718]  Not Applicable    Type 2 diabetes mellitus with hyperglycemia, without long-term current use of insulin [E11.65]  Yes    Pancreatic lesion [K86.9]  Yes    Hyperlipidemia [E78.5]  Yes    Essential (primary) hypertension [I10]  Yes    Chronic coronary artery disease [I25.10]  Yes    Prostate cancer [C61]  Yes    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       79 y.o. male admitted with Brain mass.      02/03/25  Brain bx today.    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  Patient reportedly had FNA biopsy in May 2024 that was negative for malignancy.  Patient also with changes concerning for chronic pancreatitis.  Left adrenal adenoma also noted.  CA 19-9 elevated.     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    BPH  -Flomax         DVT prophylaxis: Xarelto (home med) ON HOLD  Discussed with patient.  Anticipated discharge Pending PT and/or OT eval., when cleared by consultants            Rayray Bell MD  [facility]  02/03/25  14:35 EST

## 2025-02-03 NOTE — TELEPHONE ENCOUNTER
Caller: Shaun Jules    Relationship to patient: Emergency Contact    Patient is needing: SHAUN STATES THAT HER  IS IN THE HOSPITAL AND WILL BE HAVING A BIOPSY OF HIS BRAIN TO DETERMINE WHAT TYPE OF CANCER HE HAS.

## 2025-02-04 ENCOUNTER — APPOINTMENT (OUTPATIENT)
Dept: CT IMAGING | Facility: HOSPITAL | Age: 80
DRG: 820 | End: 2025-02-04
Payer: MEDICARE

## 2025-02-04 LAB
ANION GAP SERPL CALCULATED.3IONS-SCNC: 9.1 MMOL/L (ref 5–15)
APTT PPP: 22.7 SECONDS (ref 22.7–35.4)
BASOPHILS # BLD AUTO: 0.01 10*3/MM3 (ref 0–0.2)
BASOPHILS NFR BLD AUTO: 0.1 % (ref 0–1.5)
BUN SERPL-MCNC: 17 MG/DL (ref 8–23)
BUN/CREAT SERPL: 18.7 (ref 7–25)
CALCIUM SPEC-SCNC: 7.9 MG/DL (ref 8.6–10.5)
CHLORIDE SERPL-SCNC: 103 MMOL/L (ref 98–107)
CO2 SERPL-SCNC: 24.9 MMOL/L (ref 22–29)
CREAT SERPL-MCNC: 0.91 MG/DL (ref 0.76–1.27)
DEPRECATED RDW RBC AUTO: 42.8 FL (ref 37–54)
EGFRCR SERPLBLD CKD-EPI 2021: 86.3 ML/MIN/1.73
EOSINOPHIL # BLD AUTO: 0 10*3/MM3 (ref 0–0.4)
EOSINOPHIL NFR BLD AUTO: 0 % (ref 0.3–6.2)
ERYTHROCYTE [DISTWIDTH] IN BLOOD BY AUTOMATED COUNT: 13.2 % (ref 12.3–15.4)
GLUCOSE BLDC GLUCOMTR-MCNC: 173 MG/DL (ref 70–130)
GLUCOSE BLDC GLUCOMTR-MCNC: 179 MG/DL (ref 70–130)
GLUCOSE BLDC GLUCOMTR-MCNC: 183 MG/DL (ref 70–130)
GLUCOSE BLDC GLUCOMTR-MCNC: 223 MG/DL (ref 70–130)
GLUCOSE BLDC GLUCOMTR-MCNC: 261 MG/DL (ref 70–130)
GLUCOSE SERPL-MCNC: 172 MG/DL (ref 65–99)
HCT VFR BLD AUTO: 41.1 % (ref 37.5–51)
HGB BLD-MCNC: 14 G/DL (ref 13–17.7)
IMM GRANULOCYTES # BLD AUTO: 0.09 10*3/MM3 (ref 0–0.05)
IMM GRANULOCYTES NFR BLD AUTO: 0.7 % (ref 0–0.5)
INR PPP: 1.15 (ref 0.9–1.1)
LYMPHOCYTES # BLD AUTO: 0.39 10*3/MM3 (ref 0.7–3.1)
LYMPHOCYTES NFR BLD AUTO: 3.1 % (ref 19.6–45.3)
MCH RBC QN AUTO: 30.1 PG (ref 26.6–33)
MCHC RBC AUTO-ENTMCNC: 34.1 G/DL (ref 31.5–35.7)
MCV RBC AUTO: 88.4 FL (ref 79–97)
MONOCYTES # BLD AUTO: 0.87 10*3/MM3 (ref 0.1–0.9)
MONOCYTES NFR BLD AUTO: 6.8 % (ref 5–12)
NEUTROPHILS NFR BLD AUTO: 11.41 10*3/MM3 (ref 1.7–7)
NEUTROPHILS NFR BLD AUTO: 89.3 % (ref 42.7–76)
PLATELET # BLD AUTO: 120 10*3/MM3 (ref 140–450)
PMV BLD AUTO: 13.4 FL (ref 6–12)
POTASSIUM SERPL-SCNC: 4.1 MMOL/L (ref 3.5–5.2)
PROTHROMBIN TIME: 14.9 SECONDS (ref 11.7–14.2)
QT INTERVAL: 533 MS
QTC INTERVAL: 467 MS
RBC # BLD AUTO: 4.65 10*6/MM3 (ref 4.14–5.8)
SODIUM SERPL-SCNC: 137 MMOL/L (ref 136–145)
WBC NRBC COR # BLD AUTO: 12.77 10*3/MM3 (ref 3.4–10.8)

## 2025-02-04 PROCEDURE — 63710000001 INSULIN LISPRO (HUMAN) PER 5 UNITS: Performed by: NEUROLOGICAL SURGERY

## 2025-02-04 PROCEDURE — 25810000003 SODIUM CHLORIDE 0.9 % SOLUTION 250 ML FLEX CONT: Performed by: NEUROLOGICAL SURGERY

## 2025-02-04 PROCEDURE — 85025 COMPLETE CBC W/AUTO DIFF WBC: CPT | Performed by: NEUROLOGICAL SURGERY

## 2025-02-04 PROCEDURE — 99024 POSTOP FOLLOW-UP VISIT: CPT | Performed by: NURSE PRACTITIONER

## 2025-02-04 PROCEDURE — 25010000002 DEXAMETHASONE PER 1 MG: Performed by: NEUROLOGICAL SURGERY

## 2025-02-04 PROCEDURE — 97530 THERAPEUTIC ACTIVITIES: CPT

## 2025-02-04 PROCEDURE — 63710000001 INSULIN LISPRO (HUMAN) PER 5 UNITS: Performed by: INTERNAL MEDICINE

## 2025-02-04 PROCEDURE — 93005 ELECTROCARDIOGRAM TRACING: CPT | Performed by: INTERNAL MEDICINE

## 2025-02-04 PROCEDURE — 25010000002 NICARDIPINE HCL IN NACL 20-0.9 MG/200ML-% SOLUTION: Performed by: NURSE PRACTITIONER

## 2025-02-04 PROCEDURE — 93010 ELECTROCARDIOGRAM REPORT: CPT | Performed by: INTERNAL MEDICINE

## 2025-02-04 PROCEDURE — 63710000001 INSULIN GLARGINE PER 5 UNITS: Performed by: NEUROLOGICAL SURGERY

## 2025-02-04 PROCEDURE — 25010000002 NICARDIPINE HCL IN NACL 20-0.9 MG/200ML-% SOLUTION

## 2025-02-04 PROCEDURE — 25010000002 CEFAZOLIN PER 500 MG: Performed by: NEUROLOGICAL SURGERY

## 2025-02-04 PROCEDURE — 80048 BASIC METABOLIC PNL TOTAL CA: CPT | Performed by: NEUROLOGICAL SURGERY

## 2025-02-04 PROCEDURE — 25010000002 NICARDIPINE 2.5 MG/ML SOLUTION 10 ML VIAL: Performed by: NEUROLOGICAL SURGERY

## 2025-02-04 PROCEDURE — 70450 CT HEAD/BRAIN W/O DYE: CPT

## 2025-02-04 PROCEDURE — 97164 PT RE-EVAL EST PLAN CARE: CPT

## 2025-02-04 PROCEDURE — 82948 REAGENT STRIP/BLOOD GLUCOSE: CPT

## 2025-02-04 PROCEDURE — 85730 THROMBOPLASTIN TIME PARTIAL: CPT | Performed by: NEUROLOGICAL SURGERY

## 2025-02-04 PROCEDURE — 25010000002 HYDRALAZINE PER 20 MG: Performed by: INTERNAL MEDICINE

## 2025-02-04 PROCEDURE — 85610 PROTHROMBIN TIME: CPT | Performed by: NEUROLOGICAL SURGERY

## 2025-02-04 RX ORDER — LOSARTAN POTASSIUM 25 MG/1
25 TABLET ORAL
Status: DISCONTINUED | OUTPATIENT
Start: 2025-02-04 | End: 2025-02-10 | Stop reason: HOSPADM

## 2025-02-04 RX ORDER — HYDRALAZINE HYDROCHLORIDE 20 MG/ML
10 INJECTION INTRAMUSCULAR; INTRAVENOUS EVERY 4 HOURS PRN
Status: DISCONTINUED | OUTPATIENT
Start: 2025-02-04 | End: 2025-02-10 | Stop reason: HOSPADM

## 2025-02-04 RX ORDER — INSULIN LISPRO 100 [IU]/ML
2-7 INJECTION, SOLUTION INTRAVENOUS; SUBCUTANEOUS EVERY 6 HOURS SCHEDULED
Status: DISCONTINUED | OUTPATIENT
Start: 2025-02-04 | End: 2025-02-07

## 2025-02-04 RX ORDER — AMLODIPINE BESYLATE 2.5 MG/1
2.5 TABLET ORAL
Status: CANCELLED | OUTPATIENT
Start: 2025-02-05

## 2025-02-04 RX ORDER — AMLODIPINE BESYLATE 5 MG/1
5 TABLET ORAL
Status: DISCONTINUED | OUTPATIENT
Start: 2025-02-04 | End: 2025-02-10 | Stop reason: HOSPADM

## 2025-02-04 RX ORDER — LOSARTAN POTASSIUM 50 MG/1
25 TABLET ORAL DAILY
Status: CANCELLED | OUTPATIENT
Start: 2025-02-05

## 2025-02-04 RX ADMIN — INSULIN GLARGINE 3 UNITS: 100 INJECTION, SOLUTION SUBCUTANEOUS at 20:56

## 2025-02-04 RX ADMIN — MUPIROCIN 1 APPLICATION: 20 OINTMENT TOPICAL at 20:57

## 2025-02-04 RX ADMIN — NICARDIPINE HYDROCHLORIDE 12.5 MG/HR: 25 INJECTION, SOLUTION INTRAVENOUS at 04:54

## 2025-02-04 RX ADMIN — INSULIN LISPRO 2 UNITS: 100 INJECTION, SOLUTION INTRAVENOUS; SUBCUTANEOUS at 06:31

## 2025-02-04 RX ADMIN — ACETAMINOPHEN 650 MG: 325 TABLET, FILM COATED ORAL at 20:56

## 2025-02-04 RX ADMIN — AMLODIPINE BESYLATE 5 MG: 5 TABLET ORAL at 13:00

## 2025-02-04 RX ADMIN — NICARDIPINE HYDROCHLORIDE 15 MG/HR: 0.1 INJECTION INTRAVENOUS at 11:45

## 2025-02-04 RX ADMIN — LOSARTAN POTASSIUM 25 MG: 25 TABLET, FILM COATED ORAL at 11:57

## 2025-02-04 RX ADMIN — NICARDIPINE HYDROCHLORIDE 15 MG: 0.1 INJECTION INTRAVENOUS at 10:28

## 2025-02-04 RX ADMIN — DEXAMETHASONE SODIUM PHOSPHATE 4 MG: 4 INJECTION, SOLUTION INTRA-ARTICULAR; INTRALESIONAL; INTRAMUSCULAR; INTRAVENOUS; SOFT TISSUE at 18:02

## 2025-02-04 RX ADMIN — INSULIN LISPRO 2 UNITS: 100 INJECTION, SOLUTION INTRAVENOUS; SUBCUTANEOUS at 18:03

## 2025-02-04 RX ADMIN — FAMOTIDINE 40 MG: 20 TABLET, FILM COATED ORAL at 08:04

## 2025-02-04 RX ADMIN — INSULIN LISPRO 4 UNITS: 100 INJECTION, SOLUTION INTRAVENOUS; SUBCUTANEOUS at 18:02

## 2025-02-04 RX ADMIN — INSULIN LISPRO 3 UNITS: 100 INJECTION, SOLUTION INTRAVENOUS; SUBCUTANEOUS at 11:56

## 2025-02-04 RX ADMIN — Medication 10 ML: at 20:59

## 2025-02-04 RX ADMIN — CEFAZOLIN 2000 MG: 2 INJECTION, POWDER, FOR SOLUTION INTRAMUSCULAR; INTRAVENOUS at 08:03

## 2025-02-04 RX ADMIN — MUPIROCIN 1 APPLICATION: 20 OINTMENT TOPICAL at 11:57

## 2025-02-04 RX ADMIN — TAMSULOSIN HYDROCHLORIDE 0.4 MG: 0.4 CAPSULE ORAL at 08:04

## 2025-02-04 RX ADMIN — INSULIN LISPRO 2 UNITS: 100 INJECTION, SOLUTION INTRAVENOUS; SUBCUTANEOUS at 08:02

## 2025-02-04 RX ADMIN — INSULIN LISPRO 2 UNITS: 100 INJECTION, SOLUTION INTRAVENOUS; SUBCUTANEOUS at 11:56

## 2025-02-04 RX ADMIN — HYDRALAZINE HYDROCHLORIDE 10 MG: 20 INJECTION INTRAMUSCULAR; INTRAVENOUS at 07:52

## 2025-02-04 RX ADMIN — DEXAMETHASONE SODIUM PHOSPHATE 4 MG: 4 INJECTION, SOLUTION INTRA-ARTICULAR; INTRALESIONAL; INTRAMUSCULAR; INTRAVENOUS; SOFT TISSUE at 11:57

## 2025-02-04 RX ADMIN — ATORVASTATIN CALCIUM 10 MG: 20 TABLET, FILM COATED ORAL at 08:04

## 2025-02-04 RX ADMIN — DEXAMETHASONE SODIUM PHOSPHATE 4 MG: 4 INJECTION, SOLUTION INTRA-ARTICULAR; INTRALESIONAL; INTRAMUSCULAR; INTRAVENOUS; SOFT TISSUE at 05:42

## 2025-02-04 RX ADMIN — Medication 10 ML: at 08:34

## 2025-02-04 NOTE — ANESTHESIA POSTPROCEDURE EVALUATION
Patient: Sumit Jules    Procedure Summary       Date: 02/03/25 Room / Location: [facility] OR [address] [place] [address] MAIN OR    Anesthesia Start: 1636 Anesthesia Stop: 1849    Procedure: Left frontal stereotactic brain biopsy with Stealth (Left: Head) Diagnosis:       Brain mass      (Brain mass [G93.89])    Surgeons: Reyes Thakur MD Provider: David Aguirre MD    Anesthesia Type: general ASA Status: 3            Anesthesia Type: general    Vitals  Vitals Value Taken Time   /79 02/03/25 1945   Temp 36.4 °C (97.5 °F) 02/03/25 1845   Pulse 81 02/03/25 1956   Resp 18 02/03/25 1945   SpO2 97 % 02/03/25 1956   Vitals shown include unfiled device data.        Post Anesthesia Care and Evaluation    Level of consciousness: awake and alert  Pain management: adequate    Airway patency: patent  Anesthetic complications: No anesthetic complications  PONV Status: controlled  Cardiovascular status: acceptable  Respiratory status: acceptable  Hydration status: acceptable    Comments: Deemed appropriate for discharge from post anesthetic care

## 2025-02-04 NOTE — PROGRESS NOTES
[facility] NEUROSURGERY INTRACRANIAL PROGRESS NOTE    PATIENT IDENTIFICATION:   Name:  Sumit Jules      MRN:  [id number]     79 y.o.  male               Cc:POD # 1 s/p Left frontal stereotactic biopsy using Stealth guidance      Subjective     Interval History: Patient had confusion during the night and did require Precedex, currently off.  Remains on Cardene drip.  Denies headache this morning.        Objective     Vital signs in last 24 hours:  Temp:  [97 °F (36.1 °C)-98.2 °F (36.8 °C)] 97 °F (36.1 °C)  Heart Rate:  [] 70  Resp:  [14-18] 18  BP: (102-155)/() 133/72      Intake/Output this shift:  No intake/output data recorded.      Intake/Output last 3 shifts:  I/O last 3 completed shifts:  In: 1766.9 [I.V.:1666.9; IV Piggyback:100]  Out: 1030 [Urine:1030]    LABS:  Results from last 7 days   Lab Units 02/04/25 0330 02/03/25 2248 01/30/25  0510   WBC 10*3/mm3 12.77* 13.50* 6.00   HEMOGLOBIN g/dL 14.0 14.4 12.7*   HEMATOCRIT % 41.1 45.8 41.7   PLATELETS 10*3/mm3 120* 113* 110*     Results from last 7 days   Lab Units 02/04/25 0330 02/03/25 2248 01/31/25  0721 01/30/25  0510 01/29/25  1302   SODIUM mmol/L 137 136 136   < > 141   POTASSIUM mmol/L 4.1 4.4 4.7   < > 4.2   CHLORIDE mmol/L 103 101 105   < > 104   CO2 mmol/L 24.9 24.0 22.6   < > 27.3   BUN mg/dL 17 18 14   < > 14   CREATININE mg/dL 0.91 1.00 0.86   < > 1.08   CALCIUM mg/dL 7.9* 8.5* 8.6   < > 9.7   BILIRUBIN mg/dL  --   --   --   --  0.7   ALK PHOS U/L  --   --   --   --  85   ALT (SGPT) U/L  --   --   --   --  13   AST (SGOT) U/L  --   --   --   --  20   GLUCOSE mg/dL 172* 217* 202*   < > 134*    < > = values in this interval not displayed.      2/3/25- Tissue pathology- Pending    IMAGING STUDIES:    CT head:  Patient is status post left frontal kyle hole for brain biopsy.  Punctate focus of hemorrhage at the biopsy site.  Stable vasogenic edema in the left basal ganglia region and medial left temporal lobe that was present on preop  MRI 1/29/2025.    I personally viewed the patient's chart/CT head, it was also reviewed by and discussed with Dr Ofe Hernandez reviewed/changed: Yes  Norvasc 5 mg p.o. daily  Lipitor 10 mg p.o. daily  Decadron 4 mg IV every 6 hours  Pepcid 40 mg p.o. daily  Lantus 3 units SQ nightly  Humalog insulin 2 units SQ 3 times daily  Cozaar 25 mg p.o. daily  Flomax 0.4 mg p.o. daily  Valium 5 mg IV every 4 hours as needed      Physical Exam:    General:  Awake, alert & oriented to person only, having difficulty with word finding. Somewhat repetitive at times.  Follows most simple commands  CN III, IV, VI:  EOM's intact, PERRL.  CN VII:  Facial movements are symmetric, no weakness  Motor: Moving all 4 extremities  Station & Gait: Bedrest  Coordination:  Finger to nose intact bilaterally.    Extremities:  Wearing SCD's  Skin:  Left frontal craniotomy incision well appearing, well approximated with glue.  No surrounding erythema, swelling or drainage      Assessment & Plan     ASSESSMENT:      Brain mass    Antithrombin III deficiency    Prostate cancer    Chronic coronary artery disease    Coagulation defect    Hyperlipidemia    Essential (primary) hypertension    Pancreatic lesion    Long term current use of anticoagulant    Type 2 diabetes mellitus with hyperglycemia, without long-term current use of insulin    Vasogenic cerebral edema    Slurred speech    History of DVT (deep vein thrombosis)    Pharyngeal dysphagia    POD # 1 s/p Left frontal stereotactic biopsy using Stealth guidance     79-year-old male anticoagulated on Xarelto for Antithrombin 3 deficiency and recurrent DVTs who presented with 3-month history of word finding difficulties, balance and coordination issues.  MRI brain showed multiple enhancing brain lesions, with the largest involving the left basal ganglia/left anterior temporal lobe.    Patient did have some confusion through the night and required Precedex, that is currently off.  He is oriented to  "himself, is following most simple commands but does get repetitive at times.  His left frontal crani incision looks good. Tissue pathology pending, preliminary report leaning towards lymphoma, Dr Thakur will determine follow up once it has resulted.  CT head shows expected post-op findings.  In regards to pancreatic mass seen on CT scan, per [facility] note, pt had FNA in May 2024 that was negative for malignancy.  I spoke with his wife Bina, who states that she is able to care for him at home, however if he does need outpatient PT it would need to be in [place].  Pt can be discharged at any time from [facility] standpoint when he is medically stable.    PLAN:   -Ok for DC from [facility] standpoint when medically stable  -PT   -OOB as tolerated  -Can resume Xarelto 2/5/25  -Follow Tissue pathology  -Office follow up to be determine once tissue pathology finalized    I discussed the patient's findings and my recommendations with patient, family, nursing staff, and Dr Thakur    During patient visit, I utilized appropriate personal protective equipment including  gloves.  Appropriate PPE was worn during the entire visit.  Hand hygiene was completed before and after.      LOS: 5 days       Mary Amor, APRN  2/4/2025  11:55 EST    \"Dictated utilizing Dragon dictation\".      "

## 2025-02-04 NOTE — CONSULTS
Group: [facility]         CONSULT NOTE    Patient Identification:  Sumit Jules  79 y.o.  male  1945  [id number]            Requesting physician: Dr. 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  This was several days ago, at outside facility, [facility].  He was transferred to this facility for further care.  Here I have reviewed the notes of all medical consultants involved.  He was found to have a brain mass on CT imaging as well as brain MRI.  He was taken to the operating room earlier today by Dr. Rogelio Thakur and underwent stereotactic brain biopsy of the left frontal centrum semiovale mass.  The patient is currently agitated, delirious, combative, requiring physical restraints as well as intravenous Precedex drip.  He was becoming physical with the staff and interfering in his care.  Chart reviewed.  Patient is confused, cannot provide me any history.  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   Converted from [employer]:   Description - SEBORRHEIC KERATOSIS    Pain of foot 03/19/2013    Formatting of this note might be different from the original.   Converted from [employer]:   Description - HEEL PAIN    Prostate cancer     Thrombocytopenia 03/19/2013    Formatting of this note might be different from the original.   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   Converted from [employer]:   Description - VASCULAR DISORDER OF SKIN       Past Surgical History:  Past Surgical History:   Procedure Laterality Date    CHOLECYSTECTOMY      COLONOSCOPY  10/2013    UPPER ENDOSCOPIC ULTRASOUND W/ FNA N/A 5/3/2024    Procedure: ENDOSCOPIC ULTRASOUND with fine needle aspiration x1 area;  Surgeon: Gifty Ribera MD;  Location: [facility] ENDOSCOPY;  Service: Gastroenterology;  Laterality: N/A;  Post- pancreatic cyst        Home Meds:  Medications Prior to Admission   Medication Sig Dispense Refill Last Dose/Taking    amLODIPine (NORVASC) 10 MG tablet TAKE 1 TABLET BY MOUTH EVERY DAY AT NIGHT 90 tablet 0 1/30/2025 Bedtime    atorvastatin (LIPITOR) 10 MG tablet TAKE 1 TABLET BY MOUTH EVERY DAY 90 tablet 1 1/30/2025 Morning    dapagliflozin (Farxiga) 5 MG tablet tablet Take 1 tablet by mouth Daily. 90 tablet 0 1/30/2025 Morning    metFORMIN (GLUCOPHAGE) 500 MG tablet TAKE 1 TABLET BY MOUTH TWICE A DAY WITH FOOD 180 tablet 0 1/30/2025 Evening    tamsulosin (FLOMAX) 0.4 MG capsule 24 hr capsule Take 1 capsule by mouth Daily. 90 capsule 0 1/30/2025 Morning    acetaminophen (TYLENOL) 325 MG tablet Take 2  "tablets by mouth Every 6 (Six) Hours As Needed for Mild Pain.   Unknown    losartan (COZAAR) 25 MG tablet TAKE 1 TABLET BY MOUTH EVERY DAY 90 tablet 0 Morning    rivaroxaban (XARELTO) 20 MG tablet Take 1 tablet by mouth Daily.   Unknown       Allergies:  No Known Allergies    Social History:   Social History     Socioeconomic History    Marital status:    Tobacco Use    Smoking status: Never     Passive exposure: Never    Smokeless tobacco: Never   Vaping Use    Vaping status: Never Used   Substance and Sexual Activity    Alcohol use: No    Drug use: Never    Sexual activity: Defer       Family History:  Family History   Problem Relation Age of Onset    Breast cancer Other     Diabetes Other     Deep vein thrombosis Other     Diabetes type II Other        Physical Exam:  /94   Pulse 79   Temp 98.1 °F (36.7 °C) (Axillary)   Resp 18   Ht 182.9 cm (72\")   Wt 125 kg (275 lb)   SpO2 94%   BMI 37.30 kg/m²  Body mass index is 37.3 kg/m². 94% 125 kg (275 lb)  Physical Exam  Constitutional:       Appearance: He is well-developed.      Comments: Restless, agitated   HENT:      Right Ear: External ear normal.      Left Ear: External ear normal.      Nose: Nose normal.   Eyes:      General: No scleral icterus.     Conjunctiva/sclera: Conjunctivae normal.      Pupils: Pupils are equal, round, and reactive to light.   Neck:      Thyroid: No thyromegaly.      Vascular: No JVD.   Cardiovascular:      Rate and Rhythm: Normal rate and regular rhythm.      Heart sounds: No murmur heard.     Comments: No edema  Pulmonary:      Effort: Pulmonary effort is normal.      Breath sounds: No wheezing or rales.   Abdominal:      General: There is no distension.      Palpations: Abdomen is soft.      Comments: Obesity hampers rest of the exam   Musculoskeletal:         General: No deformity.      Comments: No deformity in all 4 extrem   Skin:     Findings: No erythema or rash.      Comments: No palpable nodules "   Neurological:      Comments: He is restrained, restless, impulsive and agitated, only oriented to self   Psychiatric:      Comments: The patient is agitated, restless, poor insight and judgment         LABS:  COVID19   Date Value Ref Range Status   02/12/2024 Not Detected Not Detected - Ref. Range Final       Lab Results   Component Value Date    CALCIUM 8.6 01/31/2025     Results from last 7 days   Lab Units 01/31/25  0721 01/30/25  0510 01/29/25  1302   MAGNESIUM mg/dL  --  2.3  --    SODIUM mmol/L 136 139 141   POTASSIUM mmol/L 4.7 4.1 4.2   CHLORIDE mmol/L 105 105 104   CO2 mmol/L 22.6 23.2 27.3   BUN mg/dL 14 13 14   CREATININE mg/dL 0.86 0.97 1.08   GLUCOSE mg/dL 202* 109* 134*   CALCIUM mg/dL 8.6 8.8 9.7   WBC 10*3/mm3  --  6.00 6.84   HEMOGLOBIN g/dL  --  12.7* 13.9   PLATELETS 10*3/mm3  --  110* 115*   ALT (SGPT) U/L  --   --  13   AST (SGOT) U/L  --   --  20     Lab Results   Component Value Date    CKTOTAL 71 01/03/2025                         Results from last 7 days   Lab Units 01/29/25  1302   INR  2.11*         Lab Results   Component Value Date    TSH 3.380 01/03/2025     Estimated Creatinine Clearance: 96.7 mL/min (by C-G formula based on SCr of 0.86 mg/dL).  Results from last 7 days   Lab Units 01/29/25  1531   NITRITE UA  Negative   WBC UA /HPF 0-2   BACTERIA UA /HPF None Seen   SQUAM EPITHEL UA /HPF 0-2        Imaging: I personally visualized the images of scans/x-rays performed within last 3 days.  I visualized MRI of the brain images.  Radiologist interpretation is as follows:  IMPRESSION:     1.Multiple enhancing brain lesions, largest involving the left basal ganglia/left anterior temporal lobe measuring 2.9 x 1.9 x 2.5 cm. Smaller lesions involving the mid body of the corpus callosum, left frontal lobe adjacent to the left lateral   ventricle, and within the right thalamus. Findings are concerning for underlying neoplastic process with considerations including infiltrating glial neoplasm,  CNS lymphoma, or metastatic disease. Subacute ischemia, tumefactive demyelination, or   infectious/inflammatory processes considered less likely but not excluded.   2.Edema about the left basal ganglia, left anterior temporal lobe, and left cerebral peduncle. No appreciable midline shift or hydrocephalus.  3.No acute infarct is definitely identified.  4.Additional findings compatible with chronic microvascular ischemic change.      Assessment:  Brain mass, multiple  Brain edema  Status post stereotactic left frontal brain biopsy  Agitation/delirium      Recommendations:  Patient quite agitated and delirious.  Start IV Precedex to obtain some control over the patient.  Start soft wrist restraints.  Give some IV pain medication, fentanyl.  The patient is a danger to himself and the staff.  He has been trying to pull out his lines and catheters and interfering with his medical care.  He is disoriented, agitated and trying to get out of bed.  Give intravenous nicardipine drip to maintain systolic blood pressure below 130 mmHg.  Patient has been started on dexamethasone by neurosurgery for the newly found brain lesions.  Check blood sugar every 6 hours and treat any hyperglycemia with insulin sliding scale.  Await final pathology of his brain tumor.  Unfortunately he has a poor prognosis, MRI shows multiple brain lesions  Compression device to the legs for DVT prophylaxis    Pascual Doty MD  2/3/2025  21:26 EST      Much of this encounter note is an electronic transcription/translation of spoken language to printed text using Dragon Software.

## 2025-02-04 NOTE — PLAN OF CARE
Goal Outcome Evaluation:  Plan of Care Reviewed With: patient           Outcome Evaluation: Pt is a 79 YOM w/ c/o aphasia on 1/30/2025. Work up reveals brain mass. Pt is POD # 1 s/p Left frontal stereotactic biopsy using Stealth guidance. Before admission pt was independent at home living w/ spouse. Spouse unable to provide assistance at home for mobility. Pt states he did not use DME prior to incident. Pt states he has no steps to enter. Today, pt performed bed mobility to sitting EOB w/ CGA/SBA. Pt then performed 15ft of gait w/o AD. Pt demo decrease theresa, step length, stride length, heel strike, and scissor gait and seemed very unsteady. Pt then performed an additional 10ft of gait w/ RW and showed improvements of theresa but still demo decrease heel strike and scissor gait. Pt also demo speech impairments demo expressive/receptive aphasia and requires simple cues to perform task. Pt would benefit from skilled PT in order to address above impairments for d/c environment. Pt would benefit from IPR to address above impairments to return to PLOF.    Anticipated Discharge Disposition (PT): inpatient rehabilitation facility

## 2025-02-04 NOTE — PROGRESS NOTES
[facility]  [phone]  Dr. Catracho Curiel    Subjective:  LOS: 5    Chief Complaint: ICU management postoperatively    Awake alert and denies any complaints.  Last night had some confusion requiring Precedex but now off.  However needing Cardene for blood pressure control at this time.    Objective   Vital Signs past 24hrs  Temp range: Temp (24hrs), Av.7 °F (36.5 °C), Min:97.1 °F (36.2 °C), Max:98.2 °F (36.8 °C)    BP range: BP: (102-155)/() 125/54  Pulse range: Heart Rate:  [] 70  Resp rate range: Resp:  [14-18] 18  Device (Oxygen Therapy): nasal cannulaFlow (L/min) (Oxygen Therapy):  [2-4] 2  Oxygen range:SpO2:  [90 %-100 %] 100 %   Mechanical Ventilator:     Physical Exam  Constitutional:       Appearance: He is obese.   Eyes:      Pupils: Pupils are equal, round, and reactive to light.   Cardiovascular:      Rate and Rhythm: Normal rate and regular rhythm.      Heart sounds: No murmur heard.  Pulmonary:      Effort: Pulmonary effort is normal.      Breath sounds: Normal breath sounds.   Abdominal:      General: Bowel sounds are normal.      Palpations: Abdomen is soft. There is no mass.      Tenderness: There is no abdominal tenderness.   Musculoskeletal:         General: No swelling.   Neurological:      Mental Status: He is alert.       Results Review:    I have reviewed the laboratory and imaging data since the last note by [facility] physician.  My annotations are noted in assessment and plan.      Result Review:  I have personally reviewed the results from last note by [facility] physician to 2025 07:49 EST and agree with these findings:  [x]  Laboratory list / accordion  [x]  Microbiology  [x]  Radiology  []  EKG/Telemetry   []  Cardiology/Vascular   []  Pathology  []  Old records  []  Other:      Medication Review:  I have reviewed the current MAR.  My annotations are noted in assessment and plan.    [Held by provider] amLODIPine, 2.5 mg, Oral, Q24H  atorvastatin, 10 mg, Oral,  Daily  ceFAZolin, 2,000 mg, Intravenous, Q8H  dexAMETHasone, 4 mg, Intravenous, Q6H  famotidine, 40 mg, Oral, Daily  insulin glargine, 3 Units, Subcutaneous, Nightly  insulin lispro, 2 Units, Subcutaneous, TID With Meals  insulin lispro, 2-7 Units, Subcutaneous, Q6H  [Held by provider] losartan, 25 mg, Oral, Daily  sodium chloride, 10 mL, Intravenous, Q12H  tamsulosin, 0.4 mg, Oral, Daily        dexmedetomidine, 0.2-1.5 mcg/kg/hr, Last Rate: Stopped (02/03/25 2245)  lactated ringers, 9 mL/hr, Last Rate: 9 mL/hr (02/03/25 1636)  niCARdipine, 5-15 mg/hr, Last Rate: 15 mg/hr (02/04/25 0515)  sodium chloride 0.9 % with KCl 20 mEq, 100 mL/hr, Last Rate: 100 mL/hr (02/03/25 2332)      Lines, Drains & Airways       Active LDAs       Name Placement date Placement time Site Days    Peripheral IV 01/31/25 1359 Anterior;Right Forearm 01/31/25  1359  Forearm  3    Peripheral IV 02/03/25 2343 Anterior;Proximal;Right Forearm 02/03/25  2343  Forearm  less than 1    Urethral Catheter Temperature probe 02/03/25  2217  -- less than 1                  Isolation status: No active isolations    Dietary Orders (From admission, onward)       Start     Ordered    02/03/25 2024  Diet: Liquid; Clear Liquid; Fluid Consistency: Thin (IDDSI 0)  Diet Effective Now        References:    Diet Order Definitions   Question Answer Comment   Diets: Liquid    Liquid Diet: Clear Liquid    Fluid Consistency: Thin (IDDSI 0)        02/03/25 2023                    PCCM Problems  Brain mass, multiple  Brain edema  Status post stereotactic left frontal brain biopsy  Agitation/delirium  Mass of the pancreatic head  CAD  Type 2 diabetes mellitus  Underlying hypertension  Antithrombin III deficiency on anticoagulation, currently on hold        THESE ARE NEW MEDICAL PROBLEMS TO ME.    Plan of Treatment    Multiple brain masses and status post biopsy.  Pending path report.    Vasogenic cerebral edema and on dexamethasone.    Delirium likely due to hospital  delirium.  Currently without Precedex.  Appears to be doing better.    Hypertension and currently requiring Cardene drip.  Will readd home meds and try and titrate off his Cardene.    Anticoagulation on hold.    Plan transfer out of ICU later.    Catracho Curiel MD  02/04/25  07:49 EST      Part of this note may be an electronic transcription/translation of spoken language to printed text using the Dragon Dictation System.

## 2025-02-04 NOTE — THERAPY RE-EVALUATION
Patient Name: Sumit Jules  : 1945    MRN: [id number]                              Today's Date: 2025       Admit Date: 2025    Visit Dx:     ICD-10-CM ICD-9-CM   1. Brain mass  G93.89 348.89     Patient Active Problem List   Diagnosis    Antithrombin III deficiency    Atherosclerosis of aorta    Prostate cancer    Benign essential hypertension    Chronic coronary artery disease    Coagulation defect    Hyperlipidemia    Essential (primary) hypertension    Pancreatic lesion    Long term current use of anticoagulant    Asymptomatic varicose veins of lower extremity    Abnormal CT scan, gastrointestinal tract    Benign prostatic hyperplasia without lower urinary tract symptoms    Type 2 diabetes mellitus with hyperglycemia, without long-term current use of insulin    Ventral incisional hernia    Brain mass    Vasogenic cerebral edema    Slurred speech    History of DVT (deep vein thrombosis)    Pharyngeal dysphagia     Past Medical History:   Diagnosis Date    Acute pancreatitis 2024    Antithrombin III deficiency     Atherosclerosis of aorta 2015    Benign essential hypertension 2014    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   Converted from [employer]:   Description - DEEP VENOUS THROMBOPHLEBITIS, RECURRENT    Congenital deficiency of clotting factors 2012    Diabetes mellitus     pre - no insulin    DVT (deep venous thrombosis)     History of complete eye exam SCHEDULED    Hyperlipidemia     Idiopathic acute pancreatitis without infection or necrosis 2024    Impaired fasting glucose 2011    Obesity     Other seborrheic keratosis 2013    Formatting of this note might be different from the original.   Converted from [employer]:   Description - SEBORRHEIC KERATOSIS    Pain of foot 2013    Formatting of this note might be different from the original.   Converted  from [employer]:   Description - HEEL PAIN    Prostate cancer     Thrombocytopenia 03/19/2013    Formatting of this note might be different from the original.   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   Converted from [employer]:   Description - VASCULAR DISORDER OF SKIN     Past Surgical History:   Procedure Laterality Date    CHOLECYSTECTOMY      COLONOSCOPY  10/2013    UPPER ENDOSCOPIC ULTRASOUND W/ FNA N/A 5/3/2024    Procedure: ENDOSCOPIC ULTRASOUND with fine needle aspiration x1 area;  Surgeon: Gifty Ribera MD;  Location: [facility] ENDOSCOPY;  Service: Gastroenterology;  Laterality: N/A;  Post- pancreatic cyst      General Information       Row Name 02/04/25 1420          Physical Therapy Time and Intention    Document Type re-evaluation  -EF (r) CH (t) EF (c)     Mode of Treatment individual therapy;physical therapy  -EF (r) CH (t) EF (c)       Row Name 02/04/25 1420          General Information    Existing Precautions/Restrictions fall  -EF (r) CH (t) EF (c)     Barriers to Rehab medically complex  -EF (r) CH (t) EF (c)       Row Name 02/04/25 1420          Cognition    Orientation Status (Cognition) oriented to;person;place  -EF (r) CH (t) EF (c)       Row Name 02/04/25 1420          Safety Issues/Impairments Affecting Functional Mobility    Impairments Affecting Function (Mobility) cognition;coordination;endurance/activity tolerance;motor planning;postural/trunk control  -EF (r) CH (t) EF (c)     Comment, Safety Issues/Impairments (Mobility) Pt demo expressive/ receptive aphasia  -EF (r) CH (t) EF (c)               User Key  (r) = Recorded By, (t) = Taken By, (c) = Cosigned By      Initials Name Provider Type    EF Rena Pace, PT Physical Therapist    CH Deuce Piña, PT Student PT Student                   Mobility       Row Name  02/04/25 1422          Bed Mobility    Bed Mobility supine-sit;sit-supine  -EF (r) CH (t) EF (c)     Supine-Sit [place] (Bed Mobility) standby assist;contact guard  -EF (r) CH (t) EF (c)     Sit-Supine [place] (Bed Mobility) standby assist;contact guard  -EF (r) CH (t) EF (c)     Comment, (Bed Mobility) HOB was flat during transfer w/ no bed rails uses  -EF (r) CH (t) EF (c)       Row Name 02/04/25 1422          Transfers    Comment, (Transfers) Pt performed trial of gait training w/ RW and w/o RW. Gait limited due to increase fatigue  -EF (r) CH (t) EF (c)       Row Name 02/04/25 1422          Bed-Chair Transfer    Bed-Chair [place] (Transfers) not tested  -EF (r) CH (t) EF (c)       Row Name 02/04/25 1422          Sit-Stand Transfer    Sit-Stand [place] (Transfers) contact guard;standby assist  -EF (r) CH (t) EF (c)     Comment, (Sit-Stand Transfer) x1 STS from sitting EOB  -EF (r) CH (t) EF (c)       Row Name 02/04/25 1422          Gait/Stairs (Locomotion)    [place] Level (Gait) minimum assist (75% patient effort);contact guard  -EF (r) CH (t) EF (c)     Assistive Device (Gait) walker, front-wheeled;other (see comments)  Pt did a trial of gait w/ RW and w/o RW  -EF (r) CH (t) EF (c)     Distance in Feet (Gait) 25  -EF (r) CH (t) EF (c)     Right Sided Gait Deviations heel strike decreased  -EF (r) CH (t) EF (c)     Comment, (Gait/Stairs) Pt performed 15ft of gait w/o RW pt demo increase scissor gait, narrow [place], decrease heel strike, decrease theresa, and decrease step length. Pt did an additional 10ft of gait w/ RW and showed improvement in step length but still had decrease heel strike and narrow [place].  -EF (r) CH (t) EF (c)               User Key  (r) = Recorded By, (t) = Taken By, (c) = Cosigned By      Initials Name Provider Type    EF Fussenegger, Rena, PT Physical Therapist    CH Deuce Piña, PT Student PT Student                   Obj/Interventions       Row Name 02/04/25 9584           Range of Motion Comprehensive    General Range of Motion no range of motion deficits identified  -EF (r) CH (t) EF (c)       Row Name 02/04/25 1428          Strength Comprehensive (MMT)    General Manual Muscle Testing (MMT) Assessment lower extremity strength deficits identified  -EF (r) CH (t) EF (c)     Comment, General Manual Muscle Testing (MMT) Assessment Unable to fully assess strength due to expressive/receptive aphasia when asked to perform testing.  -EF (r) CH (t) EF (c)       Row Name 02/04/25 1428          Balance    Balance Assessment sitting static balance  -EF (r) CH (t) EF (c)     Static Sitting Balance standby assist  -EF (r) CH (t) EF (c)     Position, Sitting Balance unsupported  -EF (r) CH (t) EF (c)               User Key  (r) = Recorded By, (t) = Taken By, (c) = Cosigned By      Initials Name Provider Type    EF Rena Pace, PT Physical Therapist    Deuce Bose, PT Student PT Student                   Goals/Plan       Row Name 02/04/25 5332          Transfer Goal 1 (PT)    Activity/Assistive Device (Transfer Goal 1, PT) transfers, all  -EF (r) CH (t) EF (c)     [place] Level/Cues Needed (Transfer Goal 1, PT) supervision required  -EF (r) CH (t) EF (c)     Time Frame (Transfer Goal 1, PT) long term goal (LTG);2 weeks  -EF (r) CH (t) EF (c)       [facility] Name 02/04/25 1438          Gait Training Goal 1 (PT)    Activity/Assistive Device (Gait Training Goal 1, PT) gait (walking locomotion);walker, rolling  -EF (r) CH (t) EF (c)     [place] Level (Gait Training Goal 1, PT) supervision required  -EF (r) CH (t) EF (c)     Distance (Gait Training Goal 1, PT) 50  -EF (r) CH (t) EF (c)     Time Frame (Gait Training Goal 1, PT) long term goal (LTG);2 weeks  -EF (r) CH (t) EF (c)       [facility] Name 02/04/25 1435          Therapy Assessment/Plan (PT)    Planned Therapy Interventions (PT) balance training;bed mobility training;gait training;home exercise program;transfer  training;strengthening;patient/family education;postural re-education  -EF (r) CH (t) EF (c)               User Key  (r) = Recorded By, (t) = Taken By, (c) = Cosigned By      Initials Name Provider Type    EF Rena Pace, PT Physical Therapist    Deuce Bose, PT Student PT Student                   Clinical Impression       Row Name 02/04/25 1429          Pain    Pretreatment Pain Rating 0/10 - no pain  -EF (r) CH (t) EF (c)     Posttreatment Pain Rating 0/10 - no pain  -EF (r) CH (t) EF (c)     Pre/Posttreatment Pain Comment pt states no pain this session  -EF (r) CH (t) EF (c)       Row Name 02/04/25 1420          Plan of Care Review    Plan of Care Reviewed With patient  -EF (r) CH (t) EF (c)     Outcome Evaluation Pt is a 79 YOM w/ c/o aphasia on 1/30/2025. Work up reveals brain mass. Pt is POD # 1 s/p Left frontal stereotactic biopsy using Stealth guidance. Before admission pt was independent at home living w/ spouse. Spouse unable to provide assistance at home for mobility. Pt states he did not use DME prior to incident. Pt states he has no steps to enter. Today, pt performed bed mobility to sitting EOB w/ CGA/SBA. Pt then performed 15ft of gait w/o AD. Pt demo decrease theresa, step length, stride length, heel strike, and scissor gait and seemed very unsteady. Pt then performed an additional 10ft of gait w/ RW and showed improvements of theresa but still demo decrease heel strike and scissor gait. Pt also demo speech impairments demo expressive/receptive aphasia and requires simple cues to perform task. Pt would benefit from skilled PT in order to address above impairments for d/c environment. Pt would benefit from IPR to address above impairments to return to PLOF.  -EF (r) CH (t) EF (c)       Row Name 02/04/25 1429          Therapy Assessment/Plan (PT)    Rehab Potential (PT) good  -EF (r) CH (t) EF (c)     Criteria for Skilled Interventions Met (PT) yes  -EF (r) CH (t) EF (c)     Therapy  Frequency (PT) 6 times/wk (P)   -CH       Row Name 02/04/25 1429          Positioning and Restraints    Pre-Treatment Position in bed  -EF (r) CH (t) EF (c)     Post Treatment Position bed  -EF (r) CH (t) EF (c)     In Bed notified nsg;call light within reach;encouraged to call for assist;exit alarm on;SCD pump applied  -EF (r) CH (t) EF (c)               User Key  (r) = Recorded By, (t) = Taken By, (c) = Cosigned By      Initials Name Provider Type    Rena Gabriel, PT Physical Therapist    Deuce Bose, PT Student PT Student                   Outcome Measures       Row Name 02/04/25 1438 02/04/25 0800       How much help from another person do you currently need...    Turning from your back to your side while in flat bed without using bedrails? 3  -EF (r) CH (t) EF (c) 3  -WP    Moving from lying on back to sitting on the side of a flat bed without bedrails? 3  -EF (r) CH (t) EF (c) 3  -WP    Moving to and from a bed to a chair (including a wheelchair)? 3  -EF (r) CH (t) EF (c) 2  -WP    Standing up from a chair using your arms (e.g., wheelchair, bedside chair)? 3  -EF (r) CH (t) EF (c) 2  -WP    Climbing 3-5 steps with a railing? 2  -EF (r) CH (t) EF (c) 2  -WP    To walk in hospital room? 3  -EF (r) CH (t) EF (c) 2  -WP    AM-PAC 6 Clicks Score (PT) 17  -EF (r) CH (t) 14  -WP    Highest Level of Mobility Goal 5 --> Static standing  -EF (r) CH (t) 4 --> Transfer to chair/commode  -WP              User Key  (r) = Recorded By, (t) = Taken By, (c) = Cosigned By      Initials Name Provider Type    Rena Gabriel, PT Physical Therapist    Paul Mclean Jr. RN Registered Nurse    Deuce Bose, PT Student PT Student                                 Physical Therapy Education       Title: PT OT SLP Therapies (In Progress)       Topic: Physical Therapy (In Progress)       Point: Mobility training (In Progress)       Learning Progress Summary            Patient Acceptance, E, NR by  at 2/4/2025  1438    Acceptance, E,D, VU by  at 2/1/2025 1434                      Point: Home exercise program (In Progress)       Learning Progress Summary            Patient Acceptance, E, NR by  at 2/4/2025 1438    Acceptance, E,D, VU by  at 2/1/2025 1434                      Point: Body mechanics (In Progress)       Learning Progress Summary            Patient Acceptance, E, NR by  at 2/4/2025 1438    Acceptance, E,D, VU by  at 2/1/2025 1434                      Point: Precautions (In Progress)       Learning Progress Summary            Patient Acceptance, E, NR by  at 2/4/2025 1438    Acceptance, E,D, VU by  at 2/1/2025 1434                                      User Key       Initials Effective Dates Name Provider Type Discipline     06/16/21 -  Rosalie Canela, PT Physical Therapist PT     01/10/25 -  Deuce Piña PT Student PT Student PT                  PT Recommendation and Plan  Planned Therapy Interventions (PT): balance training, bed mobility training, gait training, home exercise program, transfer training, strengthening, patient/family education, postural re-education  Outcome Evaluation: Pt is a 79 YOM w/ c/o aphasia on 1/30/2025. Work up reveals brain mass. Pt is POD # 1 s/p Left frontal stereotactic biopsy using Stealth guidance. Before admission pt was independent at home living w/ spouse. Spouse unable to provide assistance at home for mobility. Pt states he did not use DME prior to incident. Pt states he has no steps to enter. Today, pt performed bed mobility to sitting EOB w/ CGA/SBA. Pt then performed 15ft of gait w/o AD. Pt demo decrease theresa, step length, stride length, heel strike, and scissor gait and seemed very unsteady. Pt then performed an additional 10ft of gait w/ RW and showed improvements of theresa but still demo decrease heel strike and scissor gait. Pt also demo speech impairments demo expressive/receptive aphasia and requires simple cues to perform task. Pt would  benefit from skilled PT in order to address above impairments for d/c environment. Pt would benefit from IPR to address above impairments to return to PLOF.     Time Calculation:         PT Charges       Row Name 02/04/25 1439             Time Calculation    Start Time 1355  -EF (r) CH (t) EF (c)      Stop Time 1412  -EF (r) CH (t) EF (c)      Time Calculation (min) 17 min  -EF (r) CH (t)      PT Received On 02/04/25  -EF (r) CH (t) EF (c)      PT - Next Appointment 02/05/25  -EF (r) CH (t) EF (c)      PT Goal Re-Cert Due Date 02/18/25  -EF (r) CH (t) EF (c)         Time Calculation- PT    Total Timed Code Minutes- PT 12 minute(s)  -EF (r) CH (t) EF (c)         Timed Charges    41429 - PT Therapeutic Activity Minutes 12  -EF (r) CH (t) EF (c)         Total Minutes    Timed Charges Total Minutes 12  -EF (r) CH (t)       Total Minutes 12  -EF (r) CH (t)                User Key  (r) = Recorded By, (t) = Taken By, (c) = Cosigned By      Initials Name Provider Type    EF Rena Pace, PT Physical Therapist    Deuce Bose, PT Student PT Student                      PT G-Codes  Outcome Measure Options: AM-PAC 6 Clicks Basic Mobility (PT)  AM-PAC 6 Clicks Score (PT): 17  PT Discharge Summary  Anticipated Discharge Disposition (PT): inpatient rehabilitation facility    Deuce Piña PT Student  2/4/2025

## 2025-02-04 NOTE — CASE MANAGEMENT/SOCIAL WORK
Discharge Planning Assessment  [facility]     Patient Name: Sumit Jules  MRN: [id number]  Today's Date: 2/4/2025    Admit Date: 1/30/2025    Plan: Pending therapy evaluations likely will need SNF vs home health   Discharge Needs Assessment       Row Name 02/04/25 1423       Living Environment    People in Home spouse    Name(s) of People in Home Bina Jules/spouse [phone] Dianna Zambrano/sister [phone]    Current Living Arrangements home    Potentially Unsafe Housing Conditions none    In the past 12 months has the electric, gas, oil, or water company threatened to shut off services in your home? No    Primary Care Provided by self    Provides Primary Care For no one    Family Caregiver if Needed spouse    Family Caregiver Names Bina Jules/spouse [phone] Dianna Zambrano/sister [phone]    Quality of Family Relationships unable to assess    Able to Return to Prior Arrangements no       Resource/Environmental Concerns    Resource/Environmental Concerns none    Transportation Concerns none       Transportation Needs    In the past 12 months, has lack of transportation kept you from medical appointments or from getting medications? no    In the past 12 months, has lack of transportation kept you from meetings, work, or from getting things needed for daily living? No       Food Insecurity    Within the past 12 months, you worried that your food would run out before you got the money to buy more. Never true       Transition Planning    Patient/Family Anticipates Transition to home with family    Patient/Family Anticipated Services at Transition none    Transportation Anticipated family or friend will provide       Discharge Needs Assessment    Readmission Within the Last 30 Days no previous admission in last 30 days    Equipment Currently Used at Home glucometer    Concerns to be Addressed discharge planning    Do you want help finding or keeping work or a job? Patient unable to answer    Do you want help  with school or training? For example, starting or completing job training or getting a high school diploma, GED or equivalent Patient unable to answer    Anticipated Changes Related to Illness inability to care for self    Discharge Facility/Level of Care Needs rehabilitation facility;home with home health    Provided Post Acute Provider List? N/A    N/A Provider List Comment Will discuss with family once therapy evals done    Provided Post Acute Provider Quality & Resource List? N/A    Offered/Gave Vendor List no    Current Discharge Risk chronically ill                   Discharge Plan       Row Name 02/04/25 1428       Plan    Plan Pending therapy evaluations likely will need SNF vs home health    Patient/Family in Agreement with Plan yes    Plan Comments IMM 1/30/2025. Met with patient at bedside. Patient was able to answer questions but did have some troubles getting his words out. Face sheet verified. Prior to admission patient was living with his wife, he denies using any special or adaptive equipment. Patient uses the [facility] in [place], denies any issues affording or remembering to take his medications. Patient declined Meds to Beds. Patient does not have POA or living will on file. Discharge plans will be re-evaluated once the therapies have completed their evaluations. Will continue to follow for new or changing discharge needs. Hope MILLIGAN RN CCP                  Continued Care and Services - Admitted Since 1/30/2025    No active coordination exists for this encounter.       Expected Discharge Date and Time       Expected Discharge Date Expected Discharge Time    Feb 8, 2025            Demographic Summary       Row Name 02/04/25 1422       General Information    Admission Type inpatient    Arrived From emergency department    Required Notices Provided Important Message from Medicare    Referral Source admission list    Reason for Consult discharge planning    Preferred Language English       Contact  Information    Permission Granted to Share Info With family/designee  Bina Jules/spouse [phone] Dianna Zambrano/sister [phone]                   Functional Status       Row Name 02/04/25 1423       Functional Status    Usual Activity Tolerance moderate    Current Activity Tolerance moderate       Functional Status, IADL    Medications completely dependent    Meal Preparation completely dependent    Housekeeping completely dependent    Laundry completely dependent    Shopping completely dependent    If for any reason you need help with day-to-day activities such as bathing, preparing meals, shopping, managing finances, etc., do you get the help you need? Patient declined       Mental Status    General Appearance WDL WDL       Mental Status Summary    Recent Changes in Mental Status/Cognitive Functioning ability to speak clearly       Employment/    Employment Status retired                   Psychosocial    No documentation.                  Abuse/Neglect    No documentation.                  Legal    No documentation.                  Substance Abuse    No documentation.                  Patient Forms    No documentation.                     Hope Baird RN

## 2025-02-05 LAB
ANION GAP SERPL CALCULATED.3IONS-SCNC: 9.5 MMOL/L (ref 5–15)
BASOPHILS # BLD AUTO: 0.02 10*3/MM3 (ref 0–0.2)
BASOPHILS NFR BLD AUTO: 0.2 % (ref 0–1.5)
BUN SERPL-MCNC: 18 MG/DL (ref 8–23)
BUN/CREAT SERPL: 24.3 (ref 7–25)
CALCIUM SPEC-SCNC: 8 MG/DL (ref 8.6–10.5)
CHLORIDE SERPL-SCNC: 99 MMOL/L (ref 98–107)
CO2 SERPL-SCNC: 24.5 MMOL/L (ref 22–29)
CREAT SERPL-MCNC: 0.74 MG/DL (ref 0.76–1.27)
DEPRECATED RDW RBC AUTO: 42.2 FL (ref 37–54)
EGFRCR SERPLBLD CKD-EPI 2021: 92.7 ML/MIN/1.73
EOSINOPHIL # BLD AUTO: 0 10*3/MM3 (ref 0–0.4)
EOSINOPHIL NFR BLD AUTO: 0 % (ref 0.3–6.2)
ERYTHROCYTE [DISTWIDTH] IN BLOOD BY AUTOMATED COUNT: 13 % (ref 12.3–15.4)
GLUCOSE BLDC GLUCOMTR-MCNC: 179 MG/DL (ref 70–130)
GLUCOSE BLDC GLUCOMTR-MCNC: 186 MG/DL (ref 70–130)
GLUCOSE BLDC GLUCOMTR-MCNC: 204 MG/DL (ref 70–130)
GLUCOSE BLDC GLUCOMTR-MCNC: 233 MG/DL (ref 70–130)
GLUCOSE BLDC GLUCOMTR-MCNC: 373 MG/DL (ref 70–130)
GLUCOSE SERPL-MCNC: 198 MG/DL (ref 65–99)
HCT VFR BLD AUTO: 42.6 % (ref 37.5–51)
HGB BLD-MCNC: 13.8 G/DL (ref 13–17.7)
IMM GRANULOCYTES # BLD AUTO: 0.09 10*3/MM3 (ref 0–0.05)
IMM GRANULOCYTES NFR BLD AUTO: 0.7 % (ref 0–0.5)
LYMPHOCYTES # BLD AUTO: 0.35 10*3/MM3 (ref 0.7–3.1)
LYMPHOCYTES NFR BLD AUTO: 2.9 % (ref 19.6–45.3)
MAGNESIUM SERPL-MCNC: 2.1 MG/DL (ref 1.6–2.4)
MCH RBC QN AUTO: 28.8 PG (ref 26.6–33)
MCHC RBC AUTO-ENTMCNC: 32.4 G/DL (ref 31.5–35.7)
MCV RBC AUTO: 88.9 FL (ref 79–97)
MONOCYTES # BLD AUTO: 0.51 10*3/MM3 (ref 0.1–0.9)
MONOCYTES NFR BLD AUTO: 4.2 % (ref 5–12)
NEUTROPHILS NFR BLD AUTO: 11.16 10*3/MM3 (ref 1.7–7)
NEUTROPHILS NFR BLD AUTO: 92 % (ref 42.7–76)
PHOSPHATE SERPL-MCNC: 3.4 MG/DL (ref 2.5–4.5)
PLATELET # BLD AUTO: 125 10*3/MM3 (ref 140–450)
PMV BLD AUTO: 13.2 FL (ref 6–12)
POTASSIUM SERPL-SCNC: 4.4 MMOL/L (ref 3.5–5.2)
RBC # BLD AUTO: 4.79 10*6/MM3 (ref 4.14–5.8)
SODIUM SERPL-SCNC: 133 MMOL/L (ref 136–145)
WBC NRBC COR # BLD AUTO: 12.13 10*3/MM3 (ref 3.4–10.8)

## 2025-02-05 PROCEDURE — 85025 COMPLETE CBC W/AUTO DIFF WBC: CPT | Performed by: NEUROLOGICAL SURGERY

## 2025-02-05 PROCEDURE — 97530 THERAPEUTIC ACTIVITIES: CPT

## 2025-02-05 PROCEDURE — 63710000001 INSULIN LISPRO (HUMAN) PER 5 UNITS: Performed by: INTERNAL MEDICINE

## 2025-02-05 PROCEDURE — 63710000001 INSULIN GLARGINE PER 5 UNITS: Performed by: STUDENT IN AN ORGANIZED HEALTH CARE EDUCATION/TRAINING PROGRAM

## 2025-02-05 PROCEDURE — 83735 ASSAY OF MAGNESIUM: CPT | Performed by: INTERNAL MEDICINE

## 2025-02-05 PROCEDURE — 99222 1ST HOSP IP/OBS MODERATE 55: CPT | Performed by: INTERNAL MEDICINE

## 2025-02-05 PROCEDURE — 80048 BASIC METABOLIC PNL TOTAL CA: CPT | Performed by: NEUROLOGICAL SURGERY

## 2025-02-05 PROCEDURE — 84100 ASSAY OF PHOSPHORUS: CPT | Performed by: INTERNAL MEDICINE

## 2025-02-05 PROCEDURE — 25010000002 DEXAMETHASONE PER 1 MG: Performed by: NEUROLOGICAL SURGERY

## 2025-02-05 PROCEDURE — 82948 REAGENT STRIP/BLOOD GLUCOSE: CPT

## 2025-02-05 PROCEDURE — 63710000001 INSULIN LISPRO (HUMAN) PER 5 UNITS: Performed by: NEUROLOGICAL SURGERY

## 2025-02-05 RX ADMIN — INSULIN LISPRO 6 UNITS: 100 INJECTION, SOLUTION INTRAVENOUS; SUBCUTANEOUS at 12:04

## 2025-02-05 RX ADMIN — LOSARTAN POTASSIUM 25 MG: 25 TABLET, FILM COATED ORAL at 08:35

## 2025-02-05 RX ADMIN — MUPIROCIN 1 APPLICATION: 20 OINTMENT TOPICAL at 08:35

## 2025-02-05 RX ADMIN — INSULIN LISPRO 3 UNITS: 100 INJECTION, SOLUTION INTRAVENOUS; SUBCUTANEOUS at 17:23

## 2025-02-05 RX ADMIN — TAMSULOSIN HYDROCHLORIDE 0.4 MG: 0.4 CAPSULE ORAL at 08:35

## 2025-02-05 RX ADMIN — INSULIN LISPRO 2 UNITS: 100 INJECTION, SOLUTION INTRAVENOUS; SUBCUTANEOUS at 08:51

## 2025-02-05 RX ADMIN — MUPIROCIN 1 APPLICATION: 20 OINTMENT TOPICAL at 21:43

## 2025-02-05 RX ADMIN — DEXAMETHASONE SODIUM PHOSPHATE 4 MG: 4 INJECTION, SOLUTION INTRA-ARTICULAR; INTRALESIONAL; INTRAMUSCULAR; INTRAVENOUS; SOFT TISSUE at 00:55

## 2025-02-05 RX ADMIN — DEXAMETHASONE SODIUM PHOSPHATE 4 MG: 4 INJECTION, SOLUTION INTRA-ARTICULAR; INTRALESIONAL; INTRAMUSCULAR; INTRAVENOUS; SOFT TISSUE at 12:05

## 2025-02-05 RX ADMIN — ATORVASTATIN CALCIUM 10 MG: 20 TABLET, FILM COATED ORAL at 08:35

## 2025-02-05 RX ADMIN — DEXAMETHASONE SODIUM PHOSPHATE 4 MG: 4 INJECTION, SOLUTION INTRA-ARTICULAR; INTRALESIONAL; INTRAMUSCULAR; INTRAVENOUS; SOFT TISSUE at 17:23

## 2025-02-05 RX ADMIN — INSULIN LISPRO 2 UNITS: 100 INJECTION, SOLUTION INTRAVENOUS; SUBCUTANEOUS at 12:04

## 2025-02-05 RX ADMIN — INSULIN LISPRO 2 UNITS: 100 INJECTION, SOLUTION INTRAVENOUS; SUBCUTANEOUS at 00:55

## 2025-02-05 RX ADMIN — RIVAROXABAN 20 MG: 20 TABLET, FILM COATED ORAL at 17:23

## 2025-02-05 RX ADMIN — Medication 10 ML: at 08:47

## 2025-02-05 RX ADMIN — AMLODIPINE BESYLATE 5 MG: 5 TABLET ORAL at 08:35

## 2025-02-05 RX ADMIN — FAMOTIDINE 40 MG: 20 TABLET, FILM COATED ORAL at 08:35

## 2025-02-05 RX ADMIN — INSULIN GLARGINE 10 UNITS: 100 INJECTION, SOLUTION SUBCUTANEOUS at 21:42

## 2025-02-05 RX ADMIN — Medication 10 ML: at 21:44

## 2025-02-05 RX ADMIN — DEXAMETHASONE SODIUM PHOSPHATE 4 MG: 4 INJECTION, SOLUTION INTRA-ARTICULAR; INTRALESIONAL; INTRAMUSCULAR; INTRAVENOUS; SOFT TISSUE at 06:09

## 2025-02-05 NOTE — PLAN OF CARE
Problem: Adult Inpatient Plan of Care  Goal: Absence of Hospital-Acquired Illness or Injury  Intervention: Identify and Manage Fall Risk  Recent Flowsheet Documentation  Taken 2/5/2025 1802 by Yu Thomas RN  Safety Promotion/Fall Prevention:   safety round/check completed   nonskid shoes/slippers when out of bed   mobility aid in reach   lighting adjusted   gait belt   fall prevention program maintained   clutter free environment maintained   assistive device/personal items within reach   activity supervised  Taken 2/5/2025 1633 by Yu Thomas RN  Safety Promotion/Fall Prevention:   safety round/check completed   nonskid shoes/slippers when out of bed   mobility aid in reach   lighting adjusted   gait belt   fall prevention program maintained   clutter free environment maintained   assistive device/personal items within reach   activity supervised  Taken 2/5/2025 1420 by Yu Thomas RN  Safety Promotion/Fall Prevention:   safety round/check completed   nonskid shoes/slippers when out of bed   mobility aid in reach   lighting adjusted   gait belt   fall prevention program maintained   clutter free environment maintained   assistive device/personal items within reach   activity supervised  Taken 2/5/2025 1200 by Yu Thomas RN  Safety Promotion/Fall Prevention:   safety round/check completed   nonskid shoes/slippers when out of bed   mobility aid in reach   lighting adjusted   gait belt   fall prevention program maintained   clutter free environment maintained   assistive device/personal items within reach   activity supervised  Taken 2/5/2025 1020 by Yu Thomas RN  Safety Promotion/Fall Prevention:   safety round/check completed   nonskid shoes/slippers when out of bed   mobility aid in reach   lighting adjusted   gait belt   fall prevention program maintained   clutter free environment maintained   assistive device/personal items within reach   activity supervised  Taken 2/5/2025 0835 by Yu Thomas  RN  Safety Promotion/Fall Prevention:   safety round/check completed   nonskid shoes/slippers when out of bed   mobility aid in reach   lighting adjusted   gait belt   fall prevention program maintained   clutter free environment maintained   assistive device/personal items within reach   activity supervised  Intervention: Prevent Skin Injury  Recent Flowsheet Documentation  Taken 2/5/2025 0835 by Yu Thomas RN  Body Position: sitting up in bed  Skin Protection:   incontinence pads utilized   protective footwear used   transparent dressing maintained  Intervention: Prevent and Manage VTE (Venous Thromboembolism) Risk  Recent Flowsheet Documentation  Taken 2/5/2025 0835 by Yu Thomas RN  VTE Prevention/Management: (kimmy hose) other (see comments)  Intervention: Prevent Infection  Recent Flowsheet Documentation  Taken 2/5/2025 1802 by Yu Thomas RN  Infection Prevention: single patient room provided  Taken 2/5/2025 1633 by Yu Thomas RN  Infection Prevention: single patient room provided  Taken 2/5/2025 1420 by Yu Thomas RN  Infection Prevention: single patient room provided  Taken 2/5/2025 1200 by Yu Thomas RN  Infection Prevention: single patient room provided  Taken 2/5/2025 1020 by Yu Thomas RN  Infection Prevention: single patient room provided  Taken 2/5/2025 0835 by Yu Thomas RN  Infection Prevention: single patient room provided  Goal: Optimal Comfort and Wellbeing  Intervention: Provide Person-Centered Care  Recent Flowsheet Documentation  Taken 2/5/2025 0835 by Yu Thomas RN  Trust Relationship/Rapport:   care explained   reassurance provided   thoughts/feelings acknowledged     Problem: Fall Injury Risk  Goal: Absence of Fall and Fall-Related Injury  Intervention: Identify and Manage Contributors  Recent Flowsheet Documentation  Taken 2/5/2025 0835 by Yu Thomas RN  Medication Review/Management: medications reviewed  Intervention: Promote Injury-Free Environment  Recent Flowsheet  Documentation  Taken 2/5/2025 1802 by Yu Thomas RN  Safety Promotion/Fall Prevention:   safety round/check completed   nonskid shoes/slippers when out of bed   mobility aid in reach   lighting adjusted   gait belt   fall prevention program maintained   clutter free environment maintained   assistive device/personal items within reach   activity supervised  Taken 2/5/2025 1633 by Yu Thomas RN  Safety Promotion/Fall Prevention:   safety round/check completed   nonskid shoes/slippers when out of bed   mobility aid in reach   lighting adjusted   gait belt   fall prevention program maintained   clutter free environment maintained   assistive device/personal items within reach   activity supervised  Taken 2/5/2025 1420 by Yu Thomas RN  Safety Promotion/Fall Prevention:   safety round/check completed   nonskid shoes/slippers when out of bed   mobility aid in reach   lighting adjusted   gait belt   fall prevention program maintained   clutter free environment maintained   assistive device/personal items within reach   activity supervised  Taken 2/5/2025 1200 by Yu Thomas RN  Safety Promotion/Fall Prevention:   safety round/check completed   nonskid shoes/slippers when out of bed   mobility aid in reach   lighting adjusted   gait belt   fall prevention program maintained   clutter free environment maintained   assistive device/personal items within reach   activity supervised  Taken 2/5/2025 1020 by Yu Thomas RN  Safety Promotion/Fall Prevention:   safety round/check completed   nonskid shoes/slippers when out of bed   mobility aid in reach   lighting adjusted   gait belt   fall prevention program maintained   clutter free environment maintained   assistive device/personal items within reach   activity supervised  Taken 2/5/2025 0835 by Yu Thomas RN  Safety Promotion/Fall Prevention:   safety round/check completed   nonskid shoes/slippers when out of bed   mobility aid in reach   lighting adjusted   gait  belt   fall prevention program maintained   clutter free environment maintained   assistive device/personal items within reach   activity supervised     Problem: Skin Injury Risk Increased  Goal: Skin Health and Integrity  Intervention: Optimize Skin Protection  Recent Flowsheet Documentation  Taken 2/5/2025 0835 by Yu Thomas RN  Activity Management: activity minimized  Pressure Reduction Techniques:   heels elevated off bed   weight shift assistance provided  Head of Bed (HOB) Positioning: HOB elevated  Pressure Reduction Devices: positioning supports utilized  Skin Protection:   incontinence pads utilized   protective footwear used   transparent dressing maintained  Intervention: Promote and Optimize Oral Intake  Recent Flowsheet Documentation  Taken 2/5/2025 0835 by Yu Thomas, RN  Nutrition Interventions: meal set-up provided   Goal Outcome Evaluation:   No acute changes noted this shift.

## 2025-02-05 NOTE — PLAN OF CARE
Goal Outcome Evaluation:  Plan of Care Reviewed With: patient, family           Outcome Evaluation: PT resting in bed in NAD, family present and helpful. he is able to answer simple yes/no questions and able to follow simple commands, appears frustrated regarding expressive aphasia. Pt sat EOB with CGA and vc, unsteady sitting balance initialy with R side lean. He stood from EOB with CGA using r wx. Pt amb 90' with r wx and CGA - min A; pt dem R side neglect when turning as well as very slight R foot drag. Balance is unsteady with turning, endurance is improving, posture is flexed. Pt returned supine in bed with SBA. Pt dem increased amb distance; however, he is impulsive at times and unsteady. Cont PT to address functional deficits and progress as toleraetd to prepare for d/c.

## 2025-02-05 NOTE — PROGRESS NOTES
Name: Sumit Jules ADMIT: 2025   : 1945  PCP: Carol Rios LEONARDO, APRN    MRN: [id number] LOS: 6 days   AGE/SEX: 79 y.o. male  ROOM: [facility]     Subjective   Subjective   Sitting in bed.  Continues to have severe expressive aphasia.  Spouse at bedside.    Objective   Objective   Vital Signs  Temp:  [97.5 °F (36.4 °C)-97.9 °F (36.6 °C)] 97.9 °F (36.6 °C)  Heart Rate:  [52-77] 64  Resp:  [16-20] 16  BP: (104-142)/() 113/54  SpO2:  [94 %-100 %] 96 %  on   ;   Device (Oxygen Therapy): room air  Body mass index is 37.61 kg/m².  Physical Exam  Constitutional:       Appearance: He is ill-appearing.   Pulmonary:      Effort: Pulmonary effort is normal. No respiratory distress.      Breath sounds: No stridor.   Skin:     Coloration: Skin is not pale.   Neurological:      Mental Status: He is alert.      Comments: Expressive aphasia         Results Review     I reviewed the patient's new clinical results.  Results from last 7 days   Lab Units 25  0725  0510   WBC 10*3/mm3 12.13* 12.77* 13.50* 6.00   HEMOGLOBIN g/dL 13.8 14.0 14.4 12.7*   PLATELETS 10*3/mm3 125* 120* 113* 110*     Results from last 7 days   Lab Units 25  0741 250 25  0721   SODIUM mmol/L 133* 137 136 136   POTASSIUM mmol/L 4.4 4.1 4.4 4.7   CHLORIDE mmol/L 99 103 101 105   CO2 mmol/L 24.5 24.9 24.0 22.6   BUN mg/dL 18 17 18 14   CREATININE mg/dL 0.74* 0.91 1.00 0.86   GLUCOSE mg/dL 198* 172* 217* 202*   EGFR mL/min/1.73 92.7 86.3 77.0 88.6           Results from last 7 days   Lab Units 25  0741 2525  2248 25  0721 25  0510   CALCIUM mg/dL 8.0* 7.9* 8.5* 8.6 8.8   MAGNESIUM mg/dL 2.1  --   --   --  2.3   PHOSPHORUS mg/dL 3.4  --   --   --   --        Glucose   Date/Time Value Ref Range Status   2025 1130 373 (H) 70 - 130 mg/dL Final   2025 0520 186 (H) 70 - 130 mg/dL Final   2025 0024 179 (H) 70 - 130  mg/dL Final   02/04/2025 2015 173 (H) 70 - 130 mg/dL Final   02/04/2025 1731 261 (H) 70 - 130 mg/dL Final   02/04/2025 1129 223 (H) 70 - 130 mg/dL Final   02/04/2025 0624 183 (H) 70 - 130 mg/dL Final       CT Head Without Contrast    Result Date: 2/5/2025   1. Yesterday on 02/03/2025, the patient underwent kyle hole through the superior lateral left frontal bone, and there is a tubular tract that has some air within it extending through the left frontal lobe parenchyma down to the lesion in the left basal ganglia region extending into the medial left temporal lobe that was biopsied and there is a punctate 3 mm focus of hemorrhage at the biopsy site. 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 Expected postbiopsy changes are present with no complicating features.  There is stable vasogenic edema in the left basal ganglia region and medial left temporal lobe that were present on preoperative MRI 01/29/2025. Correlation with biopsy results is suggested.  The remainder of the head CT is normal.  Radiation dose reduction techniques were utilized, including automated exposure control and exposure modulation based on body size.   This report was finalized on 2/5/2025 7:06 AM by Dr. Pranay Burton M.D on Workstation: [id number]     Scheduled Medications  amLODIPine, 5 mg, Oral, Q24H  atorvastatin, 10 mg, Oral, Daily  dexAMETHasone, 4 mg, Intravenous, Q6H  famotidine, 40 mg, Oral, Daily  insulin glargine, 10 Units, Subcutaneous, Nightly  insulin lispro, 2-7 Units, Subcutaneous, Q6H  losartan, 25 mg, Oral, Q24H  mupirocin, 1  Application, Each Nare, BID  rivaroxaban, 20 mg, Oral, Daily With Dinner  sodium chloride, 10 mL, Intravenous, Q12H  tamsulosin, 0.4 mg, Oral, Daily    Infusions     Diet  Diet: Regular/House; Fluid Consistency: Thin (IDDSI 0)       Assessment/Plan     Active Hospital Problems    Diagnosis  POA    **Brain mass [G93.89]  Yes    Pharyngeal dysphagia [R13.13]  Yes    Vasogenic cerebral edema [G93.6]  Yes    Slurred speech [R47.81]  Yes    History of DVT (deep vein thrombosis) [Z86.718]  Not Applicable    Type 2 diabetes mellitus with hyperglycemia, without long-term current use of insulin [E11.65]  Yes    Pancreatic lesion [K86.9]  Yes    Hyperlipidemia [E78.5]  Yes    Essential (primary) hypertension [I10]  Yes    Chronic coronary artery disease [I25.10]  Yes    Prostate cancer [C61]  Yes    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       79 y.o. male admitted with Brain mass.      02/05/25  Consult Onc. 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  Patient reportedly had FNA biopsy in May 2024 that was negative for malignancy.  Patient also with changes concerning for chronic pancreatitis.  Left adrenal adenoma also noted.  CA 19-9 elevated.     DM2 with hyperglycemia  -Glargine to 10 units nightly; SSI; monitor for hypoglycemia     Essential hypertension  -Amlodipine, losartan     Hyperlipidemia  -statin     Antithrombin III deficiency on long-term anticoagulation:  -Xarelto     BPH  -Flomax         DVT prophylaxis: Xarelto (home med)    Discussed with patient.  Anticipated discharge Pending PT and/or OT eval., when cleared by consultants            Rayray Bell MD  [facility]  02/05/25  13:26 EST

## 2025-02-05 NOTE — PLAN OF CARE
Problem: Adult Inpatient Plan of Care  Goal: Plan of Care Review  Outcome: Progressing     Problem: Adult Inpatient Plan of Care  Goal: Absence of Hospital-Acquired Illness or Injury  Intervention: Identify and Manage Fall Risk  Recent Flowsheet Documentation  Taken 2/5/2025 0600 by Albina Rios RN  Safety Promotion/Fall Prevention: safety round/check completed  Taken 2/5/2025 0400 by Albina Rios RN  Safety Promotion/Fall Prevention:   safety round/check completed   room organization consistent  Taken 2/5/2025 0200 by Albina Rios RN  Safety Promotion/Fall Prevention: safety round/check completed  Taken 2/5/2025 0020 by Albina Rios RN  Safety Promotion/Fall Prevention:   safety round/check completed   room organization consistent  Taken 2/4/2025 2259 by Albina Rios RN  Safety Promotion/Fall Prevention:   safety round/check completed   room organization consistent  Taken 2/4/2025 2000 by Albina Rios RN  Safety Promotion/Fall Prevention:   safety round/check completed   room organization consistent  Intervention: Prevent Skin Injury  Recent Flowsheet Documentation  Taken 2/5/2025 0600 by Albina Rios RN  Body Position:   turned   weight shifting  Taken 2/5/2025 0400 by Albina Rios RN  Body Position: weight shifting  Taken 2/5/2025 0200 by Albina Rios RN  Body Position: weight shifting  Taken 2/5/2025 0020 by Albina Rios RN  Body Position: supine  Taken 2/4/2025 2259 by Albina Rios RN  Body Position: weight shifting  Taken 2/4/2025 2000 by Albina Rios RN  Body Position: supine  Intervention: Prevent and Manage VTE (Venous Thromboembolism) Risk  Recent Flowsheet Documentation  Taken 2/5/2025 0020 by Albina Rios RN  VTE Prevention/Management: other (see comments)  Taken 2/4/2025 2000 by Albina Rios RN  VTE Prevention/Management: (kimmy hose) other (see comments)  Intervention: Prevent Infection  Recent Flowsheet Documentation  Taken 2/5/2025 0600 by  Albina Rios RN  Infection Prevention: single patient room provided  Taken 2/5/2025 0400 by Albina Rios RN  Infection Prevention: rest/sleep promoted  Taken 2/5/2025 0200 by Albina Rios RN  Infection Prevention:   rest/sleep promoted   personal protective equipment utilized  Taken 2/4/2025 2259 by Albina Rios RN  Infection Prevention:   single patient room provided   rest/sleep promoted  Goal: Optimal Comfort and Wellbeing  Intervention: Provide Person-Centered Care  Recent Flowsheet Documentation  Taken 2/5/2025 0020 by Albina Rios RN  Trust Relationship/Rapport:   care explained   empathic listening provided   emotional support provided  Taken 2/4/2025 2000 by Albina Rios RN  Trust Relationship/Rapport:   care explained   empathic listening provided   emotional support provided     Problem: Fall Injury Risk  Goal: Absence of Fall and Fall-Related Injury  Intervention: Identify and Manage Contributors  Recent Flowsheet Documentation  Taken 2/5/2025 0200 by Albina Rios RN  Medication Review/Management: medications reviewed  Taken 2/5/2025 0020 by Albina Rios RN  Medication Review/Management: medications reviewed  Taken 2/4/2025 2000 by Albina Rios RN  Medication Review/Management: medications reviewed  Intervention: Promote Injury-Free Environment  Recent Flowsheet Documentation  Taken 2/5/2025 0600 by Albina Rios RN  Safety Promotion/Fall Prevention: safety round/check completed  Taken 2/5/2025 0400 by Albina Rios RN  Safety Promotion/Fall Prevention:   safety round/check completed   room organization consistent  Taken 2/5/2025 0200 by Albina Rios RN  Safety Promotion/Fall Prevention: safety round/check completed  Taken 2/5/2025 0020 by Albina Rios RN  Safety Promotion/Fall Prevention:   safety round/check completed   room organization consistent  Taken 2/4/2025 2259 by Albina Rios RN  Safety Promotion/Fall Prevention:   safety round/check  completed   room organization consistent  Taken 2/4/2025 2000 by Albina Rios RN  Safety Promotion/Fall Prevention:   safety round/check completed   room organization consistent     Problem: Skin Injury Risk Increased  Goal: Skin Health and Integrity  Intervention: Optimize Skin Protection  Recent Flowsheet Documentation  Taken 2/5/2025 0600 by Albina Rios RN  Activity Management: activity minimized  Head of Bed (HOB) Positioning: HOB elevated  Taken 2/5/2025 0400 by Albina Rios RN  Activity Management: activity minimized  Head of Bed (HOB) Positioning: HOB at 20-30 degrees  Taken 2/5/2025 0200 by Albina Rios RN  Activity Management: activity encouraged  Head of Bed (HOB) Positioning: HOB at 20-30 degrees  Taken 2/5/2025 0020 by Albina Rios RN  Activity Management: activity encouraged  Pressure Reduction Techniques:   heels elevated off bed   weight shift assistance provided  Head of Bed (HOB) Positioning: HOB at 20-30 degrees  Pressure Reduction Devices: other (see comments)  Taken 2/4/2025 2259 by Albina Rios RN  Activity Management: activity minimized  Head of Bed (HOB) Positioning: HOB at 20-30 degrees  Taken 2/4/2025 2000 by Albina Rios RN  Activity Management: activity encouraged  Pressure Reduction Techniques:   heels elevated off bed   weight shift assistance provided  Head of Bed (HOB) Positioning: HOB at 20-30 degrees  Pressure Reduction Devices: (pillows) other (see comments)   Goal Outcome Evaluation:         VSS, pt alert but has expressive aphasia, attempted to straight cath but pt was able to void on his own around 400 ml.

## 2025-02-05 NOTE — THERAPY TREATMENT NOTE
Patient Name: Sumit Jules  : 1945    MRN: [id number]                              Today's Date: 2025       Admit Date: 2025    Visit Dx:     ICD-10-CM ICD-9-CM   1. Brain mass  G93.89 348.89     Patient Active Problem List   Diagnosis    Antithrombin III deficiency    Atherosclerosis of aorta    Prostate cancer    Benign essential hypertension    Chronic coronary artery disease    Coagulation defect    Hyperlipidemia    Essential (primary) hypertension    Pancreatic lesion    Long term current use of anticoagulant    Asymptomatic varicose veins of lower extremity    Abnormal CT scan, gastrointestinal tract    Benign prostatic hyperplasia without lower urinary tract symptoms    Type 2 diabetes mellitus with hyperglycemia, without long-term current use of insulin    Ventral incisional hernia    Brain mass    Vasogenic cerebral edema    Slurred speech    History of DVT (deep vein thrombosis)    Pharyngeal dysphagia     Past Medical History:   Diagnosis Date    Acute pancreatitis 2024    Antithrombin III deficiency     Atherosclerosis of aorta 2015    Benign essential hypertension 2014    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   Converted from [employer]:   Description - DEEP VENOUS THROMBOPHLEBITIS, RECURRENT    Congenital deficiency of clotting factors 2012    Diabetes mellitus     pre - no insulin    DVT (deep venous thrombosis)     History of complete eye exam SCHEDULED    Hyperlipidemia     Idiopathic acute pancreatitis without infection or necrosis 2024    Impaired fasting glucose 2011    Obesity     Other seborrheic keratosis 2013    Formatting of this note might be different from the original.   Converted from [employer]:   Description - SEBORRHEIC KERATOSIS    Pain of foot 2013    Formatting of this note might be different from the original.   Converted  from [employer]:   Description - HEEL PAIN    Prostate cancer     Thrombocytopenia 03/19/2013    Formatting of this note might be different from the original.   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   Converted from [employer]:   Description - VASCULAR DISORDER OF SKIN     Past Surgical History:   Procedure Laterality Date    BRAIN BIOPSY Left 2/3/2025    Procedure: Left frontal stereotactic brain biopsy with Stealth;  Surgeon: Reyes Thakur MD;  Location: [facility] OR;  Service: Neurosurgery;  Laterality: Left;    CHOLECYSTECTOMY      COLONOSCOPY  10/2013    UPPER ENDOSCOPIC ULTRASOUND W/ FNA N/A 5/3/2024    Procedure: ENDOSCOPIC ULTRASOUND with fine needle aspiration x1 area;  Surgeon: Gifty Ribera MD;  Location: [facility] ENDOSCOPY;  Service: Gastroenterology;  Laterality: N/A;  Post- pancreatic cyst      General Information       Row Name 02/05/25 1503          Physical Therapy Time and Intention    Document Type therapy note (daily note)  -DJ     Mode of Treatment individual therapy;physical therapy  -DJ       Row Name 02/05/25 1503          General Information    Patient Profile Reviewed yes  -DJ     Existing Precautions/Restrictions fall  -DJ       Row Name 02/05/25 1503          Cognition    Orientation Status (Cognition) unable/difficult to assess;other (see comments)  expressive aphasia, pt frustrated  -DJ       Row Name 02/05/25 1503          Safety Issues/Impairments Affecting Functional Mobility    Safety Issues Affecting Function (Mobility) judgment;safety precaution awareness;impulsivity  -DJ     Cognitive Impairments, Mobility Safety/Performance insight into deficits/self-awareness;judgment;safety precaution awareness  -DJ     Comment, Safety Issues/Impairments (Mobility) gt belt, nonskid socks  -DJ               User Key  (r)  = Recorded By, (t) = Taken By, (c) = Cosigned By      Initials Name Provider Type    Shanthi Mckeon, MANISH Physical Therapist                   Mobility       Row Name 02/05/25 1504          Bed Mobility    Bed Mobility supine-sit;sit-supine  -DJ     Supine-Sit [place] (Bed Mobility) contact guard;verbal cues  -DJ     Sit-Supine [place] (Bed Mobility) standby assist;verbal cues  -DJ     Comment, (Bed Mobility) pt unbalanced initially when sitting EOB, leans R  -DJ       Row Name 02/05/25 1504          Transfers    Comment, (Transfers) sit/stand from EOB  -DJ       Row Name 02/05/25 1504          Bed-Chair Transfer    Bed-Chair [place] (Transfers) not tested  -DJ       Row Name 02/05/25 1504          Sit-Stand Transfer    Sit-Stand [place] (Transfers) contact guard;verbal cues  -DJ     Assistive Device (Sit-Stand Transfers) walker, front-wheeled  -DJ       Row Name 02/05/25 1504          Gait/Stairs (Locomotion)    [place] Level (Gait) contact guard;minimum assist (75% patient effort);verbal cues  -DJ     Assistive Device (Gait) walker, front-wheeled  -DJ     Distance in Feet (Gait) 90  -DJ     Deviations/Abnormal Patterns (Gait) base of support, wide;festinating/shuffling;stride length decreased  -DJ     Bilateral Gait Deviations forward flexed posture  -DJ     [place] Level (Stairs) not tested  -DJ     Comment, (Gait/Stairs) Pt amb 90' with r wx and CGA - min A; pt dem R side neglect when turning as well as very slight R foot drag. Balance is unsteady with turning, endurance is improving, posture is flexed.  -DJ               User Key  (r) = Recorded By, (t) = Taken By, (c) = Cosigned By      Initials Name Provider Type    Shanthi Mckeon, PT Physical Therapist                   Obj/Interventions       Row Name 02/05/25 1508          Motor Skills    Motor Skills functional endurance  -DJ     Functional Endurance improving but still limited  -DJ     Therapeutic Exercise other (see  comments)  AP  -DJ       Row Name 02/05/25 1508          Balance    Balance Assessment standing static balance;standing dynamic balance;sitting dynamic balance  -DJ     Dynamic Sitting Balance contact guard;verbal cues  -DJ     Position, Sitting Balance supported;sitting edge of mat;sitting edge of bed  -DJ     Static Standing Balance contact guard;verbal cues  -DJ     Dynamic Standing Balance minimal assist;verbal cues  -DJ     Position/Device Used, Standing Balance walker, front-wheeled;supported  -DJ     Balance Interventions sitting;standing;sit to stand;supported;weight shifting activity  -DJ     Comment, Balance unsteady  -DJ               User Key  (r) = Recorded By, (t) = Taken By, (c) = Cosigned By      Initials Name Provider Type    Shanthi Mckeon, PT Physical Therapist                   Goals/Plan    No documentation.                  Clinical Impression       Row Name 02/05/25 1509          Pain    Pretreatment Pain Rating 0/10 - no pain  -DJ       Row Name 02/05/25 1507          Plan of Care Review    Plan of Care Reviewed With patient;family  -DJ     Outcome Evaluation PT resting in bed in NAD, family present and helpful. he is able to answer simple yes/no questions and able to follow simple commands, appears frustrated regarding expressive aphasia. Pt sat EOB with CGA and vc, unsteady sitting balance initialy with R side lean. He stood from EOB with CGA using r wx. Pt amb 90' with r wx and CGA - min A; pt dem R side neglect when turning as well as very slight R foot drag. Balance is unsteady with turning, endurance is improving, posture is flexed. Pt returned supine in bed with SBA. Pt dem increased amb distance; however, he is impulsive at times and unsteady. Cont PT to address functional deficits and progress as toleraetd to prepare for d/c.  -DJ       Row Name 02/05/25 1504          Therapy Assessment/Plan (PT)    Criteria for Skilled Interventions Met (PT) skilled treatment is necessary  -DJ        Row Name 02/05/25 1509          Vital Signs    O2 Delivery Pre Treatment room air  -DJ     O2 Delivery Intra Treatment room air  -DJ     O2 Delivery Post Treatment room air  -DJ     Pre Patient Position Supine  -DJ     Intra Patient Position Standing  -DJ     Post Patient Position Supine  -DJ       Row Name 02/05/25 1509          Positioning and Restraints    Pre-Treatment Position in bed  -DJ     Post Treatment Position bed  -DJ     In Bed supine;call light within reach;encouraged to call for assist;exit alarm on;with family/caregiver  -DJ               User Key  (r) = Recorded By, (t) = Taken By, (c) = Cosigned By      Initials Name Provider Type    Shanthi Mckeon, MANISH Physical Therapist                   Outcome Measures       Row Name 02/05/25 1517 02/05/25 0835       How much help from another person do you currently need...    Turning from your back to your side while in flat bed without using bedrails? 3  -DJ 3  -RD    Moving from lying on back to sitting on the side of a flat bed without bedrails? 3  -DJ 3  -RD    Moving to and from a bed to a chair (including a wheelchair)? 3  -DJ 3  -RD    Standing up from a chair using your arms (e.g., wheelchair, bedside chair)? 3  -DJ 3  -RD    Climbing 3-5 steps with a railing? 2  -DJ 2  -RD    To walk in hospital room? 3  -DJ 3  -RD    AM-PAC 6 Clicks Score (PT) 17  -DJ 17  -RD    Highest Level of Mobility Goal 5 --> Static standing  -DJ 5 --> Static standing  -RD      Row Name 02/05/25 1517          Functional Assessment    Outcome Measure Options AM-PAC 6 Clicks Basic Mobility (PT)  -DJ               User Key  (r) = Recorded By, (t) = Taken By, (c) = Cosigned By      Initials Name Provider Type    Shanthi Mckeon, PT Physical Therapist    Yu Escobar, RN Registered Nurse                                 Physical Therapy Education       Title: PT OT SLP Therapies (In Progress)       Topic: Physical Therapy (In Progress)       Point: Mobility training (In  Progress)       Learning Progress Summary            Patient Acceptance, E, NR by DJ at 2/5/2025 1517    Acceptance, E, NR by  at 2/4/2025 1438    Acceptance, E,D, VU by RS at 2/1/2025 1434   Family Acceptance, E, NR by DJ at 2/5/2025 1517                      Point: Home exercise program (In Progress)       Learning Progress Summary            Patient Acceptance, E, NR by DJ at 2/5/2025 1517    Acceptance, E, NR by CH at 2/4/2025 1438    Acceptance, E,D, VU by RS at 2/1/2025 1434   Family Acceptance, E, NR by DJ at 2/5/2025 1517                      Point: Body mechanics (In Progress)       Learning Progress Summary            Patient Acceptance, E, NR by DJ at 2/5/2025 1517    Acceptance, E, NR by  at 2/4/2025 1438    Acceptance, E,D, VU by RS at 2/1/2025 1434   Family Acceptance, E, NR by DJ at 2/5/2025 1517                      Point: Precautions (In Progress)       Learning Progress Summary            Patient Acceptance, E, NR by DJ at 2/5/2025 1517    Acceptance, E, NR by  at 2/4/2025 1438    Acceptance, E,D, VU by RS at 2/1/2025 1434   Family Acceptance, E, NR by  at 2/5/2025 1517                                      User Key       Initials Effective Dates Name Provider Type Discipline     06/16/21 -  Rosalie Canela, PT Physical Therapist PT     10/25/19 -  Shanthi Hill, PT Physical Therapist PT     01/10/25 -  Deuce Piña, PT Student PT Student PT                  PT Recommendation and Plan     Outcome Evaluation: PT resting in bed in NAD, family present and helpful. he is able to answer simple yes/no questions and able to follow simple commands, appears frustrated regarding expressive aphasia. Pt sat EOB with CGA and vc, unsteady sitting balance initialy with R side lean. He stood from EOB with CGA using r wx. Pt amb 90' with r wx and CGA - min A; pt dem R side neglect when turning as well as very slight R foot drag. Balance is unsteady with turning, endurance is improving, posture is  flexed. Pt returned supine in bed with SBA. Pt dem increased amb distance; however, he is impulsive at times and unsteady. Cont PT to address functional deficits and progress as toleraetd to prepare for d/c.     Time Calculation:         PT Charges       Row Name 02/05/25 1518             Time Calculation    Start Time 1447  -DJ      Stop Time 1503  -DJ      Time Calculation (min) 16 min  -DJ      PT Non-Billable Time (min) 10 min  -DJ      PT Received On 02/05/25  -DJ      PT - Next Appointment 02/06/25  -DJ                User Key  (r) = Recorded By, (t) = Taken By, (c) = Cosigned By      Initials Name Provider Type    Shanthi Mckeon, MANISH Physical Therapist                  Therapy Charges for Today       Code Description Service Date Service Provider Modifiers Qty    01273362496 HC PT THERAPEUTIC ACT EA 15 MIN 2/5/2025 Shanthi Hill PT GP 1            PT G-Codes  Outcome Measure Options: AM-PAC 6 Clicks Basic Mobility (PT)  AM-PAC 6 Clicks Score (PT): 17       Shanthi Hill PT  2/5/2025

## 2025-02-05 NOTE — CONSULTS
.     REASON FOR CONSULTATION:     Provide an opinion on any further workup or treatment lymphoma from brain tissue biopsy.                             REQUESTING PHYSICIAN: Rayray Bell M.D.     RECORDS OBTAINED:  Records of the patient's history including those obtained from the referring provider were reviewed and summarized in detail.    HISTORY OF PRESENT ILLNESS:  The patient is a 79 y.o. 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     History was taken by review of medical records, and only limitedly provided by his nephew. Patient, himself, is not really able to give detailed information.     This patient originally presented to [facility] in [place] because of altered mental status. The patient was noticed having somnolence, weakness and slurred speech. He presented to the ER initially on 01/29/2025. 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 It seems that the patient is having increasing bilateral lower extremity weakness, confusion, bizarre behavior, expressive aphasia and inability to write for about 3 months prior to the ER visit. A CT of the head showed concerning mass in the left basal ganglia and mony with vasogenic edema suspicious for malignancy. Neurosurgery was consulted and had brain MRI examination that reported multiple cortical and subcortical enhancing lesions more prominent on the left side with involvement of the callosum. There was associated surrounding edema. Chest CT scan for abdomen and pelvis showed 2 cm lesion in the pancreas that was also present in the CT over 6 months ago. This patient previously had EUS EGD and fine needle aspiration of the pancreatic lesion in 05/2024 which reported chronic pancreatitis and cystic lesions with possible side-branch communication representing IPMN. Pancreatic duct and common bile duct slightly  prominent by the EUS examination. The fine needle aspiration of the cyst showed no malignant cells.     This patient was transferred to [facility] for further neurosurgical evaluation on 01/30/2025. The patient had left frontal stereotactic biopsy by neurosurgeon, Dr. Thakur, on 02/03/2025. Frozen section reported suspicious for lymphoma and fresh tissue was sent out for flow cytometry study. The flow cytometry study reported a surface kappa-restricted B-cell population comprising 2.6% of total events in a background of polyclonal B cells. Pathologist further commented that those cells were small to intermediate-sized B cells with kappa light chain restriction. The significance of this population including whether it represents a true neoplastic population must be correlated with surgical biopsy. However, that morphology evaluation from the surgical biopsy is not available to me for review.       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   Converted from [employer]:   Description - SEBORRHEIC KERATOSIS    Pain of foot 03/19/2013    Formatting of this note might be different from the original.   Converted from [employer]:    Description - HEEL PAIN    Prostate cancer     Thrombocytopenia 03/19/2013    Formatting of this note might be different from the original.   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   Converted from [employer]:   Description - VASCULAR DISORDER OF SKIN     Past Surgical History:   Procedure Laterality Date    BRAIN BIOPSY Left 2/3/2025    Procedure: Left frontal stereotactic brain biopsy with Stealth;  Surgeon: Reyes Thakur MD;  Location: [facility] OR;  Service: Neurosurgery;  Laterality: Left;    CHOLECYSTECTOMY      COLONOSCOPY  10/2013    UPPER ENDOSCOPIC ULTRASOUND W/ FNA N/A 5/3/2024    Procedure: ENDOSCOPIC ULTRASOUND with fine needle aspiration x1 area;  Surgeon: Gifty Ribera MD;  Location: [facility] ENDOSCOPY;  Service: Gastroenterology;  Laterality: N/A;  Post- pancreatic cyst       MEDICATIONS    Current Facility-Administered Medications:     acetaminophen (TYLENOL) tablet 650 mg, 650 mg, Oral, Q4H PRN, 650 mg at 02/04/25 2056 **OR** acetaminophen (TYLENOL) 160 MG/5ML oral solution 650 mg, 650 mg, Oral, Q4H PRN **OR** acetaminophen (TYLENOL) suppository 650 mg, 650 mg, Rectal, Q4H PRN, Reyes Thakur MD    amLODIPine (NORVASC) tablet 5 mg, 5 mg, Oral, Q24H, Catracho Curiel MD, 5 mg at 02/05/25 0835    atorvastatin (LIPITOR) tablet 10 mg, 10 mg, Oral, Daily, Reyes Thakur MD, 10 mg at 02/05/25 0835    sennosides-docusate (PERICOLACE) 8.6-50 MG per tablet 2 tablet, 2 tablet, Oral, BID PRN **AND** polyethylene glycol (MIRALAX) packet 17 g, 17 g, Oral, Daily PRN **AND** bisacodyl (DULCOLAX) EC tablet 5 mg, 5 mg, Oral, Daily PRN **AND** bisacodyl (DULCOLAX) suppository 10 mg, 10 mg, Rectal, Daily PRN, Reyes Thakur MD    dexAMETHasone (DECADRON) injection 4 mg, 4 mg, Intravenous, Q6H, Reyes Thakur MD, 4 mg at 02/05/25 1208     dextrose (D50W) (25 g/50 mL) IV injection 25 g, 25 g, Intravenous, Q15 Min PRN, Reyes Thakur MD    dextrose (GLUTOSE) oral gel 15 g, 15 g, Oral, Q15 Min PRN, Reyes Thakur MD    diazePAM (VALIUM) injection 5 mg, 5 mg, Intravenous, Q4H PRN, Pascual Doty MD    famotidine (PEPCID) tablet 40 mg, 40 mg, Oral, Daily, Reyes Thakur MD, 40 mg at 02/05/25 0835    glucagon (GLUCAGEN) injection 1 mg, 1 mg, Intramuscular, Q15 Min PRN, Reyes Thakur MD    hydrALAZINE (APRESOLINE) injection 10 mg, 10 mg, Intravenous, Q4H PRN, Pascual Doty MD, 10 mg at 02/04/25 0752    HYDROcodone-acetaminophen (NORCO) 5-325 MG per tablet 1 tablet, 1 tablet, Oral, Q4H PRN, Reyes Thakur MD    insulin glargine (LANTUS, SEMGLEE) injection 10 Units, 10 Units, Subcutaneous, Nightly, HosRayray ashford MD    insulin lispro (HUMALOG/ADMELOG) injection 2-7 Units, 2-7 Units, Subcutaneous, Q6H, Pascual Doty MD, 6 Units at 02/05/25 1204    losartan (COZAAR) tablet 25 mg, 25 mg, Oral, Q24H, Catracho Curiel MD, 25 mg at 02/05/25 0835    mupirocin (BACTROBAN) 2 % nasal ointment 1 Application, 1 Application, Each Nare, BID, Catracho Curiel MD, 1 Application at 02/05/25 0835    ondansetron ODT (ZOFRAN-ODT) disintegrating tablet 4 mg, 4 mg, Oral, Q6H PRN **OR** ondansetron (ZOFRAN) injection 4 mg, 4 mg, Intravenous, Q6H PRN, Reyes Thakur MD    rivaroxaban (XARELTO) tablet 20 mg, 20 mg, Oral, Daily With Dinner, Rayray Bell MD    sodium chloride 0.9 % flush 10 mL, 10 mL, Intravenous, Q12H, Reyes Thakur MD, 10 mL at 02/05/25 0847    sodium chloride 0.9 % flush 10 mL, 10 mL, Intravenous, PRN, Reyes Thakur MD    sodium chloride 0.9 % infusion 40 mL, 40 mL, Intravenous, PRN, Reyes Thakur MD    tamsulosin (FLOMAX) 24 hr capsule 0.4 mg, 0.4 mg, Oral, Daily, Reyes Thakur MD, 0.4 mg at 02/05/25 0835    ALLERGIES:   No Known Allergies    SOCIAL HISTORY:       Social History     Socioeconomic History    Marital status:     Tobacco Use    Smoking status: Never     Passive exposure: Never    Smokeless tobacco: Never   Vaping Use    Vaping status: Never Used   Substance and Sexual Activity    Alcohol use: No    Drug use: Never    Sexual activity: Defer         FAMILY HISTORY:  Family History   Problem Relation Age of Onset    Breast cancer Other     Diabetes Other     Deep vein thrombosis Other     Diabetes type II Other        REVIEW OF SYSTEMS:  Review of Systems  See HPI           Vitals:    02/04/25 2340 02/05/25 0520 02/05/25 0835 02/05/25 0900   BP: 108/47 119/46 142/60 130/56   BP Location: Right arm Left arm Left arm    Patient Position: Lying Lying Sitting    Pulse: 61 55 60 65   Resp: 16 16 16    Temp: 97.7 °F (36.5 °C) 97.9 °F (36.6 °C) 97.9 °F (36.6 °C)    TempSrc: Oral Oral Oral    SpO2: 96% 96%  98%   Weight:       Height:              No data to display               PHYSICAL EXAM:      CONSTITUTIONAL:  Vital signs reviewed.  No distress, looks comfortable.  EYES:  Conjunctivae and lids unremarkable.    EARS,NOSE,MOUTH,THROAT:  Ears and nose appear unremarkable.  Lips, teeth, gums appear unremarkable.  RESPIRATORY:  Normal respiratory effort.  Lungs clear to auscultation bilaterally.  CARDIOVASCULAR:  Normal S1, S2.  No murmurs rubs or gallops.  No significant lower extremity edema.  GASTROINTESTINAL: Abdomen appears unremarkable.  Nontender.  No hepatomegaly.  No splenomegaly.  LYMPHATIC:  No cervical, supraclavicular, axillary lymphadenopathy.  MUSCULOSKELETAL:  Unremarkable gait and station.  Unremarkable digits/nails.  No cyanosis or clubbing.  SKIN:  Warm.  No rashes.  PSYCHIATRIC:  Normal judgment and insight.  Normal mood and affect.      RECENT LABS:  CBC:      Lab 02/05/25  0741 02/04/25  0330 02/03/25  2248 01/30/25  0510   WBC 12.13* 12.77* 13.50* 6.00   HEMOGLOBIN 13.8 14.0 14.4 12.7*   HEMATOCRIT 42.6 41.1 45.8 41.7   PLATELETS 125* 120* 113* 110*   NEUTROS ABS 11.16* 11.41* 12.18* 3.65   IMMATURE  GRANS (ABS) 0.09* 0.09* 0.12* 0.01   LYMPHS ABS 0.35* 0.39* 0.47* 1.66   MONOS ABS 0.51 0.87 0.70 0.54   EOS ABS 0.00 0.00 0.01 0.12   MCV 88.9 88.4 90.9 96.1     CMP:        Lab 02/05/25  0741 02/04/25  0330 02/03/25  2248 01/31/25  0721 01/30/25  0510   SODIUM 133* 137 136 136 139   POTASSIUM 4.4 4.1 4.4 4.7 4.1   CHLORIDE 99 103 101 105 105   CO2 24.5 24.9 24.0 22.6 23.2   ANION GAP 9.5 9.1 11.0 8.4 10.8   BUN 18 17 18 14 13   CREATININE 0.74* 0.91 1.00 0.86 0.97   EGFR 92.7 86.3 77.0 88.6 79.9   GLUCOSE 198* 172* 217* 202* 109*   CALCIUM 8.0* 7.9* 8.5* 8.6 8.8   MAGNESIUM 2.1  --   --   --  2.3   PHOSPHORUS 3.4  --   --   --   --      PATHOLOGY:  [facility] Hematopathology Report                            Case: [id number]                                  Authorizing Provider:  Pranay Hernandes MD         Collected:           02/03/2025 8708              Ordering Location:     [facility]             Received:            02/04/2025 1317              Pathologist:           Lauren Marie MD                                                    Specimen:    Brain Tissue, LEFT BRAIN ([id number])                                                 Diagnosis    LEFT BRAIN (LEUKEMIA/LYMPHOMA PANEL):     INTERPRETATION     IMMUNOPHENOTYPING REVEALS A SURFACE KAPPA-RESTRICTED B-CELL POPULATION COMPRISING 2.6% OF TOTAL EVENTS  IN A BACKGROUND OF POLYCLONAL B-CELLS, SEE COMMENT FOR DISCUSSION.         Electronically signed by Lauren Marie MD on 2/5/2025 at 10:39 AM   Comment    B cells in this sample overall show a surface kappa light chain-predominance.  No aberrant antigen expression or immunophenotypically discrete B-cell populations are identified.  However, when gating by size (forward versus side scatter), a population of small to intermediate-sized B cells comprising approximately 2.6% of total events shows surface kappa light chain restriction within a background of small polyclonal B-cells.  The  significance of this population, including whether it represents a true neoplastic population, must be correlated with the surgical biopsy [id number].  An intradepartmental review is performed for  ([facility]).         Assessment & Plan     ASSESSMENT:  Multiple brain lesions. Preliminary report as B-cell lymphoma based on the frozen section and flow cytometry study results. However, no detailed pathology information available at this point. I discussed with the patient and mostly with his nephew at bedside that it is hard to characterize the lymphoma feature at this point. 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     More importantly the patient lives in [place] and nephew reports that the patient and family would prefer the patient go back to [facility] to see Dr. Fish for ongoing treatment because of convenience. I think that is reasonable. I do not expect the full report will be back for another several days. If patient is being discharged to home, he will follow up with Dr. Fish as outpatient for that.     I reviewed medical records and also reviewed flow cytometry study report and also imaging studies including brain MRI examination.        I spent 62 minutes caring for Dhara on this date of service. This time includes time spent by me in the following activities: preparing for the visit, reviewing tests, obtaining and/or reviewing a separately obtained history, performing a medically appropriate examination and/or evaluation, counseling and educating the patient/family/caregiver, ordering medications, tests, or procedures, referring and communicating with other health care professionals, documenting information in the medical record, independently interpreting results and communicating that information with the patient/family/caregiver and care coordination       I discussed with the patient about laboratory results and  further management plan.  Patient voiced understanding and agreeable.      Will follow remotely.     SHARON MONTE M.D., Ph.D.

## 2025-02-06 LAB
ANION GAP SERPL CALCULATED.3IONS-SCNC: 8 MMOL/L (ref 5–15)
BASOPHILS # BLD AUTO: 0.01 10*3/MM3 (ref 0–0.2)
BASOPHILS NFR BLD AUTO: 0.1 % (ref 0–1.5)
BUN SERPL-MCNC: 20 MG/DL (ref 8–23)
BUN/CREAT SERPL: 24.1 (ref 7–25)
CALCIUM SPEC-SCNC: 7.8 MG/DL (ref 8.6–10.5)
CHLORIDE SERPL-SCNC: 95 MMOL/L (ref 98–107)
CO2 SERPL-SCNC: 27 MMOL/L (ref 22–29)
CREAT SERPL-MCNC: 0.83 MG/DL (ref 0.76–1.27)
DEPRECATED RDW RBC AUTO: 41.4 FL (ref 37–54)
EGFRCR SERPLBLD CKD-EPI 2021: 89.6 ML/MIN/1.73
EOSINOPHIL # BLD AUTO: 0 10*3/MM3 (ref 0–0.4)
EOSINOPHIL NFR BLD AUTO: 0 % (ref 0.3–6.2)
ERYTHROCYTE [DISTWIDTH] IN BLOOD BY AUTOMATED COUNT: 13.1 % (ref 12.3–15.4)
GLUCOSE BLDC GLUCOMTR-MCNC: 185 MG/DL (ref 70–130)
GLUCOSE BLDC GLUCOMTR-MCNC: 190 MG/DL (ref 70–130)
GLUCOSE BLDC GLUCOMTR-MCNC: 212 MG/DL (ref 70–130)
GLUCOSE BLDC GLUCOMTR-MCNC: 233 MG/DL (ref 70–130)
GLUCOSE BLDC GLUCOMTR-MCNC: 246 MG/DL (ref 70–130)
GLUCOSE SERPL-MCNC: 221 MG/DL (ref 65–99)
HCT VFR BLD AUTO: 41.4 % (ref 37.5–51)
HGB BLD-MCNC: 14.2 G/DL (ref 13–17.7)
IMM GRANULOCYTES # BLD AUTO: 0.09 10*3/MM3 (ref 0–0.05)
IMM GRANULOCYTES NFR BLD AUTO: 0.8 % (ref 0–0.5)
LYMPHOCYTES # BLD AUTO: 0.35 10*3/MM3 (ref 0.7–3.1)
LYMPHOCYTES NFR BLD AUTO: 3 % (ref 19.6–45.3)
MAGNESIUM SERPL-MCNC: 2.2 MG/DL (ref 1.6–2.4)
MCH RBC QN AUTO: 29.9 PG (ref 26.6–33)
MCHC RBC AUTO-ENTMCNC: 34.3 G/DL (ref 31.5–35.7)
MCV RBC AUTO: 87.2 FL (ref 79–97)
MONOCYTES # BLD AUTO: 0.77 10*3/MM3 (ref 0.1–0.9)
MONOCYTES NFR BLD AUTO: 6.5 % (ref 5–12)
NEUTROPHILS NFR BLD AUTO: 10.63 10*3/MM3 (ref 1.7–7)
NEUTROPHILS NFR BLD AUTO: 89.6 % (ref 42.7–76)
PHOSPHATE SERPL-MCNC: 3.1 MG/DL (ref 2.5–4.5)
PLATELET # BLD AUTO: 129 10*3/MM3 (ref 140–450)
PMV BLD AUTO: 13.3 FL (ref 6–12)
POTASSIUM SERPL-SCNC: 4.5 MMOL/L (ref 3.5–5.2)
RBC # BLD AUTO: 4.75 10*6/MM3 (ref 4.14–5.8)
SODIUM SERPL-SCNC: 130 MMOL/L (ref 136–145)
WBC NRBC COR # BLD AUTO: 11.85 10*3/MM3 (ref 3.4–10.8)

## 2025-02-06 PROCEDURE — 83735 ASSAY OF MAGNESIUM: CPT | Performed by: INTERNAL MEDICINE

## 2025-02-06 PROCEDURE — 85025 COMPLETE CBC W/AUTO DIFF WBC: CPT | Performed by: NEUROLOGICAL SURGERY

## 2025-02-06 PROCEDURE — 63710000001 INSULIN GLARGINE PER 5 UNITS: Performed by: STUDENT IN AN ORGANIZED HEALTH CARE EDUCATION/TRAINING PROGRAM

## 2025-02-06 PROCEDURE — 84100 ASSAY OF PHOSPHORUS: CPT | Performed by: INTERNAL MEDICINE

## 2025-02-06 PROCEDURE — 25010000002 DEXAMETHASONE PER 1 MG: Performed by: NEUROLOGICAL SURGERY

## 2025-02-06 PROCEDURE — 80048 BASIC METABOLIC PNL TOTAL CA: CPT | Performed by: NEUROLOGICAL SURGERY

## 2025-02-06 PROCEDURE — 63710000001 DEXAMETHASONE PER 0.25 MG: Performed by: STUDENT IN AN ORGANIZED HEALTH CARE EDUCATION/TRAINING PROGRAM

## 2025-02-06 PROCEDURE — 82948 REAGENT STRIP/BLOOD GLUCOSE: CPT

## 2025-02-06 PROCEDURE — 63710000001 INSULIN LISPRO (HUMAN) PER 5 UNITS: Performed by: INTERNAL MEDICINE

## 2025-02-06 RX ORDER — DEXAMETHASONE 1.5 MG/1
3 TABLET ORAL 2 TIMES DAILY WITH MEALS
Status: DISCONTINUED | OUTPATIENT
Start: 2025-02-13 | End: 2025-02-10 | Stop reason: HOSPADM

## 2025-02-06 RX ORDER — DEXAMETHASONE 4 MG/1
4 TABLET ORAL 2 TIMES DAILY WITH MEALS
Status: DISCONTINUED | OUTPATIENT
Start: 2025-02-06 | End: 2025-02-10 | Stop reason: HOSPADM

## 2025-02-06 RX ORDER — DEXAMETHASONE 0.5 MG/1
1 TABLET ORAL 2 TIMES DAILY WITH MEALS
Status: DISCONTINUED | OUTPATIENT
Start: 2025-02-27 | End: 2025-02-06

## 2025-02-06 RX ORDER — DEXAMETHASONE 1.5 MG/1
3 TABLET ORAL 2 TIMES DAILY WITH MEALS
Status: DISCONTINUED | OUTPATIENT
Start: 2025-02-13 | End: 2025-02-06

## 2025-02-06 RX ORDER — DEXAMETHASONE 4 MG/1
4 TABLET ORAL 2 TIMES DAILY WITH MEALS
Status: DISCONTINUED | OUTPATIENT
Start: 2025-02-06 | End: 2025-02-06

## 2025-02-06 RX ORDER — DEXAMETHASONE 0.5 MG/1
2 TABLET ORAL 2 TIMES DAILY WITH MEALS
Status: DISCONTINUED | OUTPATIENT
Start: 2025-02-20 | End: 2025-02-10 | Stop reason: HOSPADM

## 2025-02-06 RX ORDER — DEXAMETHASONE 0.5 MG/1
1 TABLET ORAL DAILY
Status: DISCONTINUED | OUTPATIENT
Start: 2025-02-27 | End: 2025-02-10 | Stop reason: HOSPADM

## 2025-02-06 RX ORDER — DEXAMETHASONE 0.5 MG/1
2 TABLET ORAL 2 TIMES DAILY WITH MEALS
Status: DISCONTINUED | OUTPATIENT
Start: 2025-02-20 | End: 2025-02-06

## 2025-02-06 RX ADMIN — INSULIN LISPRO 3 UNITS: 100 INJECTION, SOLUTION INTRAVENOUS; SUBCUTANEOUS at 12:24

## 2025-02-06 RX ADMIN — INSULIN GLARGINE 10 UNITS: 100 INJECTION, SOLUTION SUBCUTANEOUS at 21:40

## 2025-02-06 RX ADMIN — INSULIN LISPRO 3 UNITS: 100 INJECTION, SOLUTION INTRAVENOUS; SUBCUTANEOUS at 01:54

## 2025-02-06 RX ADMIN — INSULIN LISPRO 2 UNITS: 100 INJECTION, SOLUTION INTRAVENOUS; SUBCUTANEOUS at 08:58

## 2025-02-06 RX ADMIN — FAMOTIDINE 40 MG: 20 TABLET, FILM COATED ORAL at 08:59

## 2025-02-06 RX ADMIN — TAMSULOSIN HYDROCHLORIDE 0.4 MG: 0.4 CAPSULE ORAL at 08:59

## 2025-02-06 RX ADMIN — Medication 10 ML: at 09:00

## 2025-02-06 RX ADMIN — RIVAROXABAN 20 MG: 20 TABLET, FILM COATED ORAL at 17:14

## 2025-02-06 RX ADMIN — INSULIN LISPRO 2 UNITS: 100 INJECTION, SOLUTION INTRAVENOUS; SUBCUTANEOUS at 17:13

## 2025-02-06 RX ADMIN — ATORVASTATIN CALCIUM 10 MG: 20 TABLET, FILM COATED ORAL at 08:59

## 2025-02-06 RX ADMIN — DEXAMETHASONE SODIUM PHOSPHATE 4 MG: 4 INJECTION, SOLUTION INTRA-ARTICULAR; INTRALESIONAL; INTRAMUSCULAR; INTRAVENOUS; SOFT TISSUE at 01:54

## 2025-02-06 RX ADMIN — MUPIROCIN 1 APPLICATION: 20 OINTMENT TOPICAL at 09:00

## 2025-02-06 RX ADMIN — Medication 10 ML: at 21:43

## 2025-02-06 RX ADMIN — AMLODIPINE BESYLATE 5 MG: 5 TABLET ORAL at 08:59

## 2025-02-06 RX ADMIN — INSULIN LISPRO 3 UNITS: 100 INJECTION, SOLUTION INTRAVENOUS; SUBCUTANEOUS at 21:40

## 2025-02-06 RX ADMIN — DEXAMETHASONE SODIUM PHOSPHATE 4 MG: 4 INJECTION, SOLUTION INTRA-ARTICULAR; INTRALESIONAL; INTRAMUSCULAR; INTRAVENOUS; SOFT TISSUE at 12:24

## 2025-02-06 RX ADMIN — DEXAMETHASONE SODIUM PHOSPHATE 4 MG: 4 INJECTION, SOLUTION INTRA-ARTICULAR; INTRALESIONAL; INTRAMUSCULAR; INTRAVENOUS; SOFT TISSUE at 05:48

## 2025-02-06 RX ADMIN — MUPIROCIN 1 APPLICATION: 20 OINTMENT TOPICAL at 21:40

## 2025-02-06 RX ADMIN — DEXAMETHASONE 4 MG: 4 TABLET ORAL at 17:14

## 2025-02-06 RX ADMIN — LOSARTAN POTASSIUM 25 MG: 25 TABLET, FILM COATED ORAL at 08:59

## 2025-02-06 NOTE — PLAN OF CARE
Problem: Adult Inpatient Plan of Care  Goal: Plan of Care Review  Outcome: Progressing  Flowsheets (Taken 2/6/2025 1846)  Progress: improving  Plan of Care Reviewed With: patient  Goal: Patient-Specific Goal (Individualized)  Outcome: Progressing  Goal: Absence of Hospital-Acquired Illness or Injury  Outcome: Progressing  Intervention: Identify and Manage Fall Risk  Recent Flowsheet Documentation  Taken 2/6/2025 1815 by Fareed Browne RN  Safety Promotion/Fall Prevention:   activity supervised   assistive device/personal items within reach   clutter free environment maintained   fall prevention program maintained   nonskid shoes/slippers when out of bed   safety round/check completed   room organization consistent  Taken 2/6/2025 1615 by Fraeed Browne RN  Safety Promotion/Fall Prevention:   activity supervised   assistive device/personal items within reach   clutter free environment maintained   fall prevention program maintained   nonskid shoes/slippers when out of bed   safety round/check completed   room organization consistent  Taken 2/6/2025 1415 by Fareed Browne RN  Safety Promotion/Fall Prevention:   activity supervised   assistive device/personal items within reach   clutter free environment maintained   fall prevention program maintained   nonskid shoes/slippers when out of bed   safety round/check completed   room organization consistent  Taken 2/6/2025 1215 by Fareed Browne RN  Safety Promotion/Fall Prevention:   activity supervised   assistive device/personal items within reach   clutter free environment maintained   fall prevention program maintained   nonskid shoes/slippers when out of bed   safety round/check completed   room organization consistent  Taken 2/6/2025 1015 by Fareed Browne RN  Safety Promotion/Fall Prevention:   activity supervised   assistive device/personal items within reach   clutter free environment maintained   fall prevention program maintained   nonskid  shoes/slippers when out of bed   safety round/check completed   room organization consistent  Taken 2/6/2025 0815 by Fareed Browne RN  Safety Promotion/Fall Prevention:   activity supervised   assistive device/personal items within reach   clutter free environment maintained   fall prevention program maintained   nonskid shoes/slippers when out of bed   safety round/check completed   room organization consistent  Intervention: Prevent Skin Injury  Recent Flowsheet Documentation  Taken 2/6/2025 0815 by Fareed Browne RN  Body Position: position changed independently  Skin Protection: incontinence pads utilized  Intervention: Prevent Infection  Recent Flowsheet Documentation  Taken 2/6/2025 1815 by Fareed Browne RN  Infection Prevention:   single patient room provided   hand hygiene promoted  Taken 2/6/2025 1615 by Fareed Browne RN  Infection Prevention:   single patient room provided   hand hygiene promoted  Taken 2/6/2025 1415 by Fareed Browne RN  Infection Prevention:   single patient room provided   hand hygiene promoted  Taken 2/6/2025 1215 by Fareed Browne RN  Infection Prevention:   single patient room provided   hand hygiene promoted  Taken 2/6/2025 1015 by Fareed Browne RN  Infection Prevention:   single patient room provided   hand hygiene promoted  Taken 2/6/2025 0815 by Fareed Browne RN  Infection Prevention:   single patient room provided   hand hygiene promoted  Goal: Optimal Comfort and Wellbeing  Outcome: Progressing  Intervention: Provide Person-Centered Care  Recent Flowsheet Documentation  Taken 2/6/2025 0815 by Fareed Browne RN  Trust Relationship/Rapport:   thoughts/feelings acknowledged   care explained  Goal: Readiness for Transition of Care  Outcome: Progressing     Problem: Fall Injury Risk  Goal: Absence of Fall and Fall-Related Injury  Outcome: Progressing  Intervention: Identify and Manage Contributors  Recent Flowsheet Documentation  Taken  2/6/2025 0815 by Fareed Browne RN  Medication Review/Management: medications reviewed  Intervention: Promote Injury-Free Environment  Recent Flowsheet Documentation  Taken 2/6/2025 1815 by Fareed Browne RN  Safety Promotion/Fall Prevention:   activity supervised   assistive device/personal items within reach   clutter free environment maintained   fall prevention program maintained   nonskid shoes/slippers when out of bed   safety round/check completed   room organization consistent  Taken 2/6/2025 1615 by Fareed Browne RN  Safety Promotion/Fall Prevention:   activity supervised   assistive device/personal items within reach   clutter free environment maintained   fall prevention program maintained   nonskid shoes/slippers when out of bed   safety round/check completed   room organization consistent  Taken 2/6/2025 1415 by Fareed Browne RN  Safety Promotion/Fall Prevention:   activity supervised   assistive device/personal items within reach   clutter free environment maintained   fall prevention program maintained   nonskid shoes/slippers when out of bed   safety round/check completed   room organization consistent  Taken 2/6/2025 1215 by Fareed Browne RN  Safety Promotion/Fall Prevention:   activity supervised   assistive device/personal items within reach   clutter free environment maintained   fall prevention program maintained   nonskid shoes/slippers when out of bed   safety round/check completed   room organization consistent  Taken 2/6/2025 1015 by Fareed Browne RN  Safety Promotion/Fall Prevention:   activity supervised   assistive device/personal items within reach   clutter free environment maintained   fall prevention program maintained   nonskid shoes/slippers when out of bed   safety round/check completed   room organization consistent  Taken 2/6/2025 0815 by Fareed Browne RN  Safety Promotion/Fall Prevention:   activity supervised   assistive device/personal items  within reach   clutter free environment maintained   fall prevention program maintained   nonskid shoes/slippers when out of bed   safety round/check completed   room organization consistent     Problem: Skin Injury Risk Increased  Goal: Skin Health and Integrity  Outcome: Progressing  Intervention: Optimize Skin Protection  Recent Flowsheet Documentation  Taken 2/6/2025 0815 by Fareed Browne, RN  Activity Management: activity encouraged  Pressure Reduction Techniques: frequent weight shift encouraged  Head of Bed (HOB) Positioning: HOB at 20-30 degrees  Skin Protection: incontinence pads utilized   Goal Outcome Evaluation:  Plan of Care Reviewed With: patient        Progress: improving

## 2025-02-06 NOTE — PROGRESS NOTES
Name: Sumit Jules ADMIT: 2025   : 1945  PCP: Carol Rios LEONARDO, APRN    MRN: [id number] LOS: 7 days   AGE/SEX: 79 y.o. male  ROOM: [facility]     Subjective   Subjective   No new issues today.  Resting in bed, tolerating p.o.    Objective   Objective   Vital Signs  Temp:  [97.5 °F (36.4 °C)-98.1 °F (36.7 °C)] 97.7 °F (36.5 °C)  Heart Rate:  [46-60] 52  Resp:  [18] 18  BP: ()/(40-84) 129/62  SpO2:  [89 %-99 %] 99 %  on   ;   Device (Oxygen Therapy): room air  Body mass index is 37.61 kg/m².  Physical Exam  Constitutional:       Appearance: He is ill-appearing.   Pulmonary:      Effort: Pulmonary effort is normal. No respiratory distress.      Breath sounds: No stridor.   Skin:     Coloration: Skin is not pale.   Neurological:      Mental Status: He is alert.      Comments: Expressive aphasia         Results Review     I reviewed the patient's new clinical results.  Results from last 7 days   Lab Units 25  0649 25  0741 25  0330 25  2248   WBC 10*3/mm3 11.85* 12.13* 12.77* 13.50*   HEMOGLOBIN g/dL 14.2 13.8 14.0 14.4   PLATELETS 10*3/mm3 129* 125* 120* 113*     Results from last 7 days   Lab Units 25  1216 25  0741 25  0330 25  2248   SODIUM mmol/L 130* 133* 137 136   POTASSIUM mmol/L 4.5 4.4 4.1 4.4   CHLORIDE mmol/L 95* 99 103 101   CO2 mmol/L 27.0 24.5 24.9 24.0   BUN mg/dL 20 18 17 18   CREATININE mg/dL 0.83 0.74* 0.91 1.00   GLUCOSE mg/dL 221* 198* 172* 217*   EGFR mL/min/1.73 89.6 92.7 86.3 77.0           Results from last 7 days   Lab Units 25  1216 25  0649 25  0741 25  0330 25  2248   CALCIUM mg/dL 7.8*  --  8.0* 7.9* 8.5*   MAGNESIUM mg/dL 2.2  --  2.1  --   --    PHOSPHORUS mg/dL  --  3.1 3.4  --   --        Glucose   Date/Time Value Ref Range Status   2025 1125 233 (H) 70 - 130 mg/dL Final   2025 0657 185 (H) 70 - 130 mg/dL Final   2025 0141 212 (H) 70 - 130 mg/dL Final   2025 2014  233 (H) 70 - 130 mg/dL Final   02/05/2025 1629 204 (H) 70 - 130 mg/dL Final   02/05/2025 1130 373 (H) 70 - 130 mg/dL Final   02/05/2025 0520 186 (H) 70 - 130 mg/dL Final       No radiology results for the last day  Scheduled Medications  amLODIPine, 5 mg, Oral, Q24H  atorvastatin, 10 mg, Oral, Daily  dexAMETHasone, 4 mg, Oral, BID With Meals   Followed by  [START ON 2/13/2025] dexAMETHasone, 3 mg, Oral, BID With Meals   Followed by  [START ON 2/20/2025] dexAMETHasone, 2 mg, Oral, BID With Meals   Followed by  [START ON 2/27/2025] dexAMETHasone, 1 mg, Oral, Daily  famotidine, 40 mg, Oral, Daily  insulin glargine, 10 Units, Subcutaneous, Nightly  insulin lispro, 2-7 Units, Subcutaneous, Q6H  losartan, 25 mg, Oral, Q24H  mupirocin, 1 Application, Each Nare, BID  rivaroxaban, 20 mg, Oral, Daily With Dinner  sodium chloride, 10 mL, Intravenous, Q12H  tamsulosin, 0.4 mg, Oral, Daily    Infusions     Diet  Diet: Regular/House; Fluid Consistency: Thin (IDDSI 0)       Assessment/Plan     Active Hospital Problems    Diagnosis  POA    **Brain mass [G93.89]  Yes    Pharyngeal dysphagia [R13.13]  Yes    Vasogenic cerebral edema [G93.6]  Yes    Slurred speech [R47.81]  Yes    History of DVT (deep vein thrombosis) [Z86.718]  Not Applicable    Type 2 diabetes mellitus with hyperglycemia, without long-term current use of insulin [E11.65]  Yes    Pancreatic lesion [K86.9]  Yes    Hyperlipidemia [E78.5]  Yes    Essential (primary) hypertension [I10]  Yes    Chronic coronary artery disease [I25.10]  Yes    Prostate cancer [C61]  Yes    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       79 y.o. male admitted with Brain mass.      02/06/25  Transition dexamethasone to p.o., and will begin taper.  IVANIA to decide taper length at follow-up.  Increase glargine.     Brain mass  with vasogenic edema with dysarthria  Concern for lymphoma  -Neurosurgery followed  -brain bx 2/3, intra-op path c/w lymphoma; final path pending  -transition IV to PO steroids 2/6  -Onc eval'd-plan to follow-up with Dr. Fish ([facility])      Pancreas mass  -Outside hospital CT scan shows ill-defined lesion of the pancreas head concerning for possible pancreatic malignancy.  Patient reportedly had FNA biopsy in May 2024 that was negative for malignancy.  Patient also with changes concerning for chronic pancreatitis.  Left adrenal adenoma also noted.  CA 19-9 elevated.     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  Anticipated discharge SNF vs [facility], once arrangements made            Rayray Bell MD  [facility]  02/06/25  15:02 EST

## 2025-02-07 LAB
ANION GAP SERPL CALCULATED.3IONS-SCNC: 5.1 MMOL/L (ref 5–15)
BASOPHILS # BLD AUTO: 0 10*3/MM3 (ref 0–0.2)
BASOPHILS NFR BLD AUTO: 0 % (ref 0–1.5)
BUN SERPL-MCNC: 19 MG/DL (ref 8–23)
BUN/CREAT SERPL: 20.7 (ref 7–25)
CALCIUM SPEC-SCNC: 8 MG/DL (ref 8.6–10.5)
CHLORIDE SERPL-SCNC: 97 MMOL/L (ref 98–107)
CO2 SERPL-SCNC: 27.9 MMOL/L (ref 22–29)
CREAT SERPL-MCNC: 0.92 MG/DL (ref 0.76–1.27)
DEPRECATED RDW RBC AUTO: 42.5 FL (ref 37–54)
EGFRCR SERPLBLD CKD-EPI 2021: 85.1 ML/MIN/1.73
EOSINOPHIL # BLD AUTO: 0 10*3/MM3 (ref 0–0.4)
EOSINOPHIL NFR BLD AUTO: 0 % (ref 0.3–6.2)
ERYTHROCYTE [DISTWIDTH] IN BLOOD BY AUTOMATED COUNT: 13.3 % (ref 12.3–15.4)
GLUCOSE BLDC GLUCOMTR-MCNC: 193 MG/DL (ref 70–130)
GLUCOSE BLDC GLUCOMTR-MCNC: 194 MG/DL (ref 70–130)
GLUCOSE BLDC GLUCOMTR-MCNC: 232 MG/DL (ref 70–130)
GLUCOSE BLDC GLUCOMTR-MCNC: 291 MG/DL (ref 70–130)
GLUCOSE SERPL-MCNC: 266 MG/DL (ref 65–99)
HCT VFR BLD AUTO: 40.7 % (ref 37.5–51)
HGB BLD-MCNC: 13.8 G/DL (ref 13–17.7)
IMM GRANULOCYTES # BLD AUTO: 0.08 10*3/MM3 (ref 0–0.05)
IMM GRANULOCYTES NFR BLD AUTO: 0.9 % (ref 0–0.5)
LYMPHOCYTES # BLD AUTO: 0.44 10*3/MM3 (ref 0.7–3.1)
LYMPHOCYTES NFR BLD AUTO: 4.7 % (ref 19.6–45.3)
MAGNESIUM SERPL-MCNC: 2.1 MG/DL (ref 1.6–2.4)
MCH RBC QN AUTO: 30.2 PG (ref 26.6–33)
MCHC RBC AUTO-ENTMCNC: 33.9 G/DL (ref 31.5–35.7)
MCV RBC AUTO: 89.1 FL (ref 79–97)
MONOCYTES # BLD AUTO: 0.77 10*3/MM3 (ref 0.1–0.9)
MONOCYTES NFR BLD AUTO: 8.3 % (ref 5–12)
NEUTROPHILS NFR BLD AUTO: 7.98 10*3/MM3 (ref 1.7–7)
NEUTROPHILS NFR BLD AUTO: 86.1 % (ref 42.7–76)
PHOSPHATE SERPL-MCNC: 3.4 MG/DL (ref 2.5–4.5)
PLATELET # BLD AUTO: 118 10*3/MM3 (ref 140–450)
PMV BLD AUTO: 13.3 FL (ref 6–12)
POTASSIUM SERPL-SCNC: 4.5 MMOL/L (ref 3.5–5.2)
RBC # BLD AUTO: 4.57 10*6/MM3 (ref 4.14–5.8)
SODIUM SERPL-SCNC: 130 MMOL/L (ref 136–145)
WBC NRBC COR # BLD AUTO: 9.27 10*3/MM3 (ref 3.4–10.8)

## 2025-02-07 PROCEDURE — 97530 THERAPEUTIC ACTIVITIES: CPT

## 2025-02-07 PROCEDURE — 63710000001 DEXAMETHASONE PER 0.25 MG: Performed by: STUDENT IN AN ORGANIZED HEALTH CARE EDUCATION/TRAINING PROGRAM

## 2025-02-07 PROCEDURE — 25010000002 DIAZEPAM PER 5 MG: Performed by: INTERNAL MEDICINE

## 2025-02-07 PROCEDURE — 84100 ASSAY OF PHOSPHORUS: CPT | Performed by: INTERNAL MEDICINE

## 2025-02-07 PROCEDURE — 63710000001 INSULIN LISPRO (HUMAN) PER 5 UNITS: Performed by: STUDENT IN AN ORGANIZED HEALTH CARE EDUCATION/TRAINING PROGRAM

## 2025-02-07 PROCEDURE — 92526 ORAL FUNCTION THERAPY: CPT

## 2025-02-07 PROCEDURE — 80048 BASIC METABOLIC PNL TOTAL CA: CPT | Performed by: NEUROLOGICAL SURGERY

## 2025-02-07 PROCEDURE — 63710000001 INSULIN GLARGINE PER 5 UNITS: Performed by: STUDENT IN AN ORGANIZED HEALTH CARE EDUCATION/TRAINING PROGRAM

## 2025-02-07 PROCEDURE — 85025 COMPLETE CBC W/AUTO DIFF WBC: CPT | Performed by: NEUROLOGICAL SURGERY

## 2025-02-07 PROCEDURE — 83735 ASSAY OF MAGNESIUM: CPT | Performed by: INTERNAL MEDICINE

## 2025-02-07 PROCEDURE — 82948 REAGENT STRIP/BLOOD GLUCOSE: CPT

## 2025-02-07 RX ORDER — INSULIN LISPRO 100 [IU]/ML
2-7 INJECTION, SOLUTION INTRAVENOUS; SUBCUTANEOUS
Status: DISCONTINUED | OUTPATIENT
Start: 2025-02-07 | End: 2025-02-10 | Stop reason: HOSPADM

## 2025-02-07 RX ADMIN — ATORVASTATIN CALCIUM 10 MG: 20 TABLET, FILM COATED ORAL at 08:35

## 2025-02-07 RX ADMIN — RIVAROXABAN 20 MG: 20 TABLET, FILM COATED ORAL at 17:44

## 2025-02-07 RX ADMIN — AMLODIPINE BESYLATE 5 MG: 5 TABLET ORAL at 08:36

## 2025-02-07 RX ADMIN — MUPIROCIN 1 APPLICATION: 20 OINTMENT TOPICAL at 08:37

## 2025-02-07 RX ADMIN — Medication 10 ML: at 08:37

## 2025-02-07 RX ADMIN — DEXAMETHASONE 4 MG: 4 TABLET ORAL at 08:35

## 2025-02-07 RX ADMIN — INSULIN LISPRO 4 UNITS: 100 INJECTION, SOLUTION INTRAVENOUS; SUBCUTANEOUS at 12:17

## 2025-02-07 RX ADMIN — TAMSULOSIN HYDROCHLORIDE 0.4 MG: 0.4 CAPSULE ORAL at 08:36

## 2025-02-07 RX ADMIN — DIAZEPAM 5 MG: 5 INJECTION INTRAMUSCULAR; INTRAVENOUS at 20:41

## 2025-02-07 RX ADMIN — DEXAMETHASONE 4 MG: 4 TABLET ORAL at 17:44

## 2025-02-07 RX ADMIN — INSULIN LISPRO 2 UNITS: 100 INJECTION, SOLUTION INTRAVENOUS; SUBCUTANEOUS at 20:41

## 2025-02-07 RX ADMIN — Medication 10 ML: at 20:41

## 2025-02-07 RX ADMIN — INSULIN LISPRO 3 UNITS: 100 INJECTION, SOLUTION INTRAVENOUS; SUBCUTANEOUS at 17:44

## 2025-02-07 RX ADMIN — FAMOTIDINE 40 MG: 20 TABLET, FILM COATED ORAL at 08:35

## 2025-02-07 RX ADMIN — INSULIN GLARGINE 10 UNITS: 100 INJECTION, SOLUTION SUBCUTANEOUS at 20:41

## 2025-02-07 RX ADMIN — MUPIROCIN 1 APPLICATION: 20 OINTMENT TOPICAL at 20:41

## 2025-02-07 NOTE — DISCHARGE PLACEMENT REQUEST
"Minerva Jules (79 y.o. Male)       Date of Birth   1945    Social Security Number       Address   [address] [place] [address] [address] [place] [facility]    Home Phone   [phone]    MRN   [id number]       Muslim   None    Marital Status                               Admission Date   1/30/25    Admission Type   Urgent    Admitting Provider   Dusty Farrell MD    Attending Provider   Rayray Bell MD    Department, Room/Bed   [address], P597/1       Discharge Date       Discharge Disposition       Discharge Destination                                 Attending Provider: Rayray Bell MD    Allergies: No Known Allergies    Isolation: None   Infection: None   Code Status: CPR    Ht: 182.9 cm (72\")   Wt: 126 kg (277 lb 5.4 oz)    Admission Cmt: None   Principal Problem: Brain mass [G93.89]                   Active Insurance as of 1/30/2025       Primary Coverage       Payor Plan Insurance Group Employer/Plan Group    AETNA MEDICARE REPLACEMENT [place]ET[place] [id number]       Payor Plan Address Payor Plan Phone Number Payor Plan Fax Number Effective Dates    [address] [phone]  2/1/2024 - None Entered    [place] [address]         Subscriber Name Subscriber Birth Date Member ID       MINERVA JULES 1945 [id number]                     Emergency Contacts        (Rel.) Home Phone Work Phone Mobile Phone    Bina Jules (Spouse) -- -- [phone]    VanessaDianna berger (Sister) -- -- [phone]                "

## 2025-02-07 NOTE — PLAN OF CARE
Problem: Adult Inpatient Plan of Care  Goal: Plan of Care Review  Outcome: Progressing  Goal: Patient-Specific Goal (Individualized)  Outcome: Progressing  Goal: Absence of Hospital-Acquired Illness or Injury  Outcome: Progressing  Intervention: Identify and Manage Fall Risk  Recent Flowsheet Documentation  Taken 2/7/2025 0400 by Caroline Monaco RN  Safety Promotion/Fall Prevention:   fall prevention program maintained   clutter free environment maintained   lighting adjusted   nonskid shoes/slippers when out of bed   activity supervised  Taken 2/7/2025 0200 by Caroline Monaco RN  Safety Promotion/Fall Prevention:   fall prevention program maintained   clutter free environment maintained   lighting adjusted   nonskid shoes/slippers when out of bed   activity supervised  Taken 2/7/2025 0000 by Caroline Monaco RN  Safety Promotion/Fall Prevention:   fall prevention program maintained   clutter free environment maintained   lighting adjusted   nonskid shoes/slippers when out of bed   activity supervised  Taken 2/6/2025 2200 by Caroline oMnaco RN  Safety Promotion/Fall Prevention:   fall prevention program maintained   clutter free environment maintained   lighting adjusted   nonskid shoes/slippers when out of bed   activity supervised  Taken 2/6/2025 2000 by Caroline Monaco RN  Safety Promotion/Fall Prevention:   fall prevention program maintained   clutter free environment maintained   nonskid shoes/slippers when out of bed   safety round/check completed  Intervention: Prevent Skin Injury  Recent Flowsheet Documentation  Taken 2/7/2025 0400 by Caroline Monaco RN  Body Position:   turned   weight shifting  Taken 2/7/2025 0200 by Caroline Monaco RN  Body Position:   turned   weight shifting  Taken 2/7/2025 0000 by Caroline Monaco RN  Body Position:   turned   weight shifting  Taken 2/6/2025 2200 by Caroline Monaco RN  Body Position:   turned   weight shifting  Taken 2/6/2025 2000 by Caroline Monaco RN  Body Position:   turned   weight  shifting  Intervention: Prevent and Manage VTE (Venous Thromboembolism) Risk  Recent Flowsheet Documentation  Taken 2/6/2025 2000 by Caroline Monaco RN  VTE Prevention/Management: compression stockings off  Intervention: Prevent Infection  Recent Flowsheet Documentation  Taken 2/7/2025 0400 by Caroline Monaco RN  Infection Prevention:   cohorting utilized   environmental surveillance performed   hand hygiene promoted   single patient room provided  Taken 2/7/2025 0200 by Caroline Monaco RN  Infection Prevention:   cohorting utilized   environmental surveillance performed   hand hygiene promoted   single patient room provided  Taken 2/7/2025 0000 by Caroline Monaco RN  Infection Prevention:   cohorting utilized   environmental surveillance performed   hand hygiene promoted   single patient room provided  Taken 2/6/2025 2200 by Caroline Monaco RN  Infection Prevention:   cohorting utilized   environmental surveillance performed   hand hygiene promoted   single patient room provided  Taken 2/6/2025 2000 by Caroline Monaco RN  Infection Prevention:   cohorting utilized   environmental surveillance performed   hand hygiene promoted   single patient room provided  Goal: Optimal Comfort and Wellbeing  Outcome: Progressing  Intervention: Provide Person-Centered Care  Recent Flowsheet Documentation  Taken 2/6/2025 2000 by Caroline Monaco RN  Trust Relationship/Rapport: care explained  Goal: Readiness for Transition of Care  Outcome: Progressing     Problem: Fall Injury Risk  Goal: Absence of Fall and Fall-Related Injury  Outcome: Progressing  Intervention: Promote Injury-Free Environment  Recent Flowsheet Documentation  Taken 2/7/2025 0400 by Caroline Monaco RN  Safety Promotion/Fall Prevention:   fall prevention program maintained   clutter free environment maintained   lighting adjusted   nonskid shoes/slippers when out of bed   activity supervised  Taken 2/7/2025 0200 by Caroline Monaco RN  Safety Promotion/Fall Prevention:   fall  prevention program maintained   clutter free environment maintained   lighting adjusted   nonskid shoes/slippers when out of bed   activity supervised  Taken 2/7/2025 0000 by Caroline Monaco RN  Safety Promotion/Fall Prevention:   fall prevention program maintained   clutter free environment maintained   lighting adjusted   nonskid shoes/slippers when out of bed   activity supervised  Taken 2/6/2025 2200 by Caroline Monaco RN  Safety Promotion/Fall Prevention:   fall prevention program maintained   clutter free environment maintained   lighting adjusted   nonskid shoes/slippers when out of bed   activity supervised  Taken 2/6/2025 2000 by Caroline Monaco RN  Safety Promotion/Fall Prevention:   fall prevention program maintained   clutter free environment maintained   nonskid shoes/slippers when out of bed   safety round/check completed     Problem: Skin Injury Risk Increased  Goal: Skin Health and Integrity  Outcome: Progressing  Intervention: Optimize Skin Protection  Recent Flowsheet Documentation  Taken 2/7/2025 0400 by Caroline Monaco RN  Head of Bed (HOB) Positioning: HOB at 20-30 degrees  Taken 2/7/2025 0200 by Caroline Monaco RN  Head of Bed (HOB) Positioning: HOB at 20-30 degrees  Taken 2/7/2025 0000 by Caroline Monaco RN  Head of Bed (HOB) Positioning: HOB at 20-30 degrees  Taken 2/6/2025 2200 by Caroline Monaco RN  Head of Bed (HOB) Positioning: HOB at 20-30 degrees  Taken 2/6/2025 2000 by Caroline Monaco RN  Activity Management: activity encouraged  Head of Bed (HOB) Positioning: HOB at 20-30 degrees   Goal Outcome Evaluation:               Patient remained aphasic but able to give simple answers. He voiced no complaints of pain or discomfort throughout shift.

## 2025-02-07 NOTE — DISCHARGE PLACEMENT REQUEST
"Minerva Jules (79 y.o. Male)       Date of Birth   1945    Social Security Number       Address   [address] [place] [address] [address] [place] [facility]    Home Phone   [phone]    MRN   [id number]       Jain   None    Marital Status                               Admission Date   1/30/25    Admission Type   Urgent    Admitting Provider   Dusty Farrell MD    Attending Provider   Rayray Bell MD    Department, Room/Bed   [address], P597/1       Discharge Date       Discharge Disposition       Discharge Destination                                 Attending Provider: Rayray Bell MD    Allergies: No Known Allergies    Isolation: None   Infection: None   Code Status: CPR    Ht: 182.9 cm (72\")   Wt: 126 kg (277 lb 5.4 oz)    Admission Cmt: None   Principal Problem: Brain mass [G93.89]                   Active Insurance as of 1/30/2025       Primary Coverage       Payor Plan Insurance Group Employer/Plan Group    AETNA MEDICARE REPLACEMENT AETNA MED [place] [id number]       Payor Plan Address Payor Plan Phone Number Payor Plan Fax Number Effective Dates    [address] [phone]  2/1/2024 - None Entered    [place] [address]         Subscriber Name Subscriber Birth Date Member ID       MINERVA JULES 1945 [id number]                     Emergency Contacts        (Rel.) Home Phone Work Phone Mobile Phone    Bina Jules (Spouse) -- -- [phone]    VanessaDianna berger (Sister) -- -- [phone]                "

## 2025-02-07 NOTE — THERAPY TREATMENT NOTE
Acute Care - Speech Language Pathology   Swallow Treatment Note [facility]     Patient Name: Sumit Jules  : 1945  MRN: [id number]  Today's Date: 2025               Admit Date: 2025    Visit Dx:     ICD-10-CM ICD-9-CM   1. Brain mass  G93.89 348.89     Patient Active Problem List   Diagnosis    Antithrombin III deficiency    Atherosclerosis of aorta    Prostate cancer    Benign essential hypertension    Chronic coronary artery disease    Coagulation defect    Hyperlipidemia    Essential (primary) hypertension    Pancreatic lesion    Long term current use of anticoagulant    Asymptomatic varicose veins of lower extremity    Abnormal CT scan, gastrointestinal tract    Benign prostatic hyperplasia without lower urinary tract symptoms    Type 2 diabetes mellitus with hyperglycemia, without long-term current use of insulin    Ventral incisional hernia    Brain mass    Vasogenic cerebral edema    Slurred speech    History of DVT (deep vein thrombosis)    Pharyngeal dysphagia     Past Medical History:   Diagnosis Date    Acute pancreatitis 2024    Antithrombin III deficiency     Atherosclerosis of aorta 2015    Benign essential hypertension 2014    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   Converted from [employer]:   Description - DEEP VENOUS THROMBOPHLEBITIS, RECURRENT    Congenital deficiency of clotting factors 2012    Diabetes mellitus     pre - no insulin    DVT (deep venous thrombosis)     History of complete eye exam SCHEDULED    Hyperlipidemia     Idiopathic acute pancreatitis without infection or necrosis 2024    Impaired fasting glucose 2011    Obesity     Other seborrheic keratosis 2013    Formatting of this note might be different from the original.   Converted from [employer]:   Description - SEBORRHEIC KERATOSIS    Pain of foot 2013    Formatting of  this note might be different from the original.   Converted from [employer]:   Description - HEEL PAIN    Prostate cancer     Thrombocytopenia 03/19/2013    Formatting of this note might be different from the original.   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   Converted from [employer]:   Description - VASCULAR DISORDER OF SKIN     Past Surgical History:   Procedure Laterality Date    BRAIN BIOPSY Left 2/3/2025    Procedure: Left frontal stereotactic brain biopsy with Stealth;  Surgeon: Reyes Thakur MD;  Location: [facility] OR;  Service: Neurosurgery;  Laterality: Left;    CHOLECYSTECTOMY      COLONOSCOPY  10/2013    UPPER ENDOSCOPIC ULTRASOUND W/ FNA N/A 5/3/2024    Procedure: ENDOSCOPIC ULTRASOUND with fine needle aspiration x1 area;  Surgeon: Gifty Ribera MD;  Location: [facility] ENDOSCOPY;  Service: Gastroenterology;  Laterality: N/A;  Post- pancreatic cyst       SLP Recommendation and Plan        Outcome Evaluation: Pt seen for diet tolerance this date. Recommend continue regular solids and thin liquids. Aspiration precuations. Will sign off for swallowing, please re consult as indicated. Attempted aphasia tx however pt denied having word finding difficulty, reported he feels like aphasia has resolved, and declined tx this date. However, it was documented in chart that pt was having difficulty with word finding as of previous date. ST will continue to follow for aphasia treatment as appropriate. Continue to recommend ST at next level of care.       SWALLOW EVALUATION (Last 72 Hours)       SLP Adult Swallow Evaluation       Row Name 02/07/25 1100                   Rehab Evaluation    Document Type therapy note (daily note)  -HF        Subjective Information no complaints  -HF        Patient Observations alert;agree to therapy  -HF         Patient Effort good  -HF        Symptoms Noted During/After Treatment none  -HF           General Information    Patient Profile Reviewed yes  -HF        Pertinent History Of Current Problem Pt is a 78 yo M who presented to [facility] on 1/31/25 with c/o aphasia. W/u reveals brain mass. Pt is POD # 4 s/p Left frontal stereotactic biopsy using Stealth guidance.  -HF                  User Key  (r) = Recorded By, (t) = Taken By, (c) = Cosigned By      Initials Name Effective Dates    HF Bekah Johnson, FLOYD 08/01/23 -                     EDUCATION  The patient has been educated in the following areas:   Dysphagia (Swallowing Impairment).        SLP GOALS       Row Name 02/07/25 1100       (LTG) Patient will demonstrate functional swallow for    Diet Texture (Demonstrate functional swallow) regular textures  -HF    Liquid viscosity (Demonstrate functional swallow) thin liquids  -HF    [place] (Demonstrate functional swallow) with minimal cues (75-90% accuracy)  -HF    Time Frame (Demonstrate functional swallow) by discharge  -HF    Progress/Outcomes (Demonstrate functional swallow) good progress toward goal  -HF    Comment (Demonstrate functional swallow) Pt seen for diet tolerance this date. He denied trouble swallowing. Pt self fed without difficulty 2 whole viviane crackers and approx 3-4 oz of water by straw. Timely and functional mastication, no oral residue. No overt s/s of aspiration. Recommend continue regular solids and thin liquids. Aspiration precuations. Will sign off for swallowing, please re consult as indicated.     Attempted aphasia tx however pt denied having word finding difficulty, reported he feels like aphasia has resolved, and declined tx this date. However, it was documented in chart that pt was having difficulty with word finding as of previous date. ST will continue to follow for aphasia treatment as appropriate. Continue to recommend ST at next level of care. Of note, no significant instances of  anomia or paraphasias this date. However, was having some difficulty with topic maintenance. Could potentially benfit from cog eval in the future.  -HF              User Key  (r) = Recorded By, (t) = Taken By, (c) = Cosigned By      Initials Name Provider Type    Bekah Dan SLP Speech and Language Pathologist                         Time Calculation:    Time Calculation- SLP       Row Name 02/07/25 1210             Time Calculation- SLP    SLP Start Time 1100  -HF      SLP Received On 02/07/25  -HF                User Key  (r) = Recorded By, (t) = Taken By, (c) = Cosigned By      Initials Name Provider Type    Bekah Dan SLP Speech and Language Pathologist                    Therapy Charges for Today       Code Description Service Date Service Provider Modifiers Qty    [id number] HC ST TREATMENT SWALLOW 2 2/7/2025 Bekah Johnson SLP GN 1                 FLOYD [place]  2/7/2025

## 2025-02-07 NOTE — PLAN OF CARE
Goal Outcome Evaluation:  Plan of Care Reviewed With: patient           Outcome Evaluation: Pt seen for diet tolerance this date. Recommend continue regular solids and thin liquids. Aspiration precuations. Will sign off for swallowing, please re consult as indicated. Attempted aphasia tx however pt denied having word finding difficulty, reported he feels like aphasia has resolved, and declined tx this date. However, it was documented in chart that pt was having difficulty with word finding as of previous date. ST will continue to follow for aphasia treatment as appropriate. Continue to recommend ST at next level of care.    Anticipated Discharge Disposition (SLP): unknown

## 2025-02-07 NOTE — PROGRESS NOTES
Name: Sumit Jules ADMIT: 2025   : 1945  PCP: Carol Rios LEONARDO, APRN    MRN: [id number] LOS: 8 days   AGE/SEX: 79 y.o. male  ROOM: [facility]     Subjective   Subjective   Sitting up in bed.  His speech is much improved today.    Objective   Objective   Vital Signs  Temp:  [97.3 °F (36.3 °C)-97.7 °F (36.5 °C)] 97.3 °F (36.3 °C)  Heart Rate:  [44-56] 54  Resp:  [16-18] 16  BP: (119-168)/(54-89) 168/70  SpO2:  [94 %-98 %] 96 %  on   ;   Device (Oxygen Therapy): room air  Body mass index is 37.61 kg/m².  Physical Exam  Constitutional:       Appearance: He is ill-appearing.   Pulmonary:      Effort: Pulmonary effort is normal. No respiratory distress.      Breath sounds: No stridor.   Skin:     Coloration: Skin is not pale.   Neurological:      Mental Status: He is alert.      Comments: Expressive aphasia, improved from          Results Review     I reviewed the patient's new clinical results.  Results from last 7 days   Lab Units 25  0711 25  0649 25  0741 25  0330   WBC 10*3/mm3 9.27 11.85* 12.13* 12.77*   HEMOGLOBIN g/dL 13.8 14.2 13.8 14.0   PLATELETS 10*3/mm3 118* 129* 125* 120*     Results from last 7 days   Lab Units 25  0919 25  1216 25  0741 25  0330   SODIUM mmol/L 130* 130* 133* 137   POTASSIUM mmol/L 4.5 4.5 4.4 4.1   CHLORIDE mmol/L 97* 95* 99 103   CO2 mmol/L 27.9 27.0 24.5 24.9   BUN mg/dL 19 20 18 17   CREATININE mg/dL 0.92 0.83 0.74* 0.91   GLUCOSE mg/dL 266* 221* 198* 172*   EGFR mL/min/1.73 85.1 89.6 92.7 86.3           Results from last 7 days   Lab Units 25  0919 25  0711 25  1216 25  0649 25  0741 25  0330   CALCIUM mg/dL 8.0*  --  7.8*  --  8.0* 7.9*   MAGNESIUM mg/dL 2.1  --  2.2  --  2.1  --    PHOSPHORUS mg/dL  --  3.4  --  3.1 3.4  --        Glucose   Date/Time Value Ref Range Status   2025 1131 291 (H) 70 - 130 mg/dL Final   2025 0713 193 (H) 70 - 130 mg/dL Final   2025  2000 246 (H) 70 - 130 mg/dL Final   02/06/2025 1614 190 (H) 70 - 130 mg/dL Final   02/06/2025 1125 233 (H) 70 - 130 mg/dL Final   02/06/2025 0657 185 (H) 70 - 130 mg/dL Final   02/06/2025 0141 212 (H) 70 - 130 mg/dL Final       No radiology results for the last day  Scheduled Medications  amLODIPine, 5 mg, Oral, Q24H  atorvastatin, 10 mg, Oral, Daily  dexAMETHasone, 4 mg, Oral, BID With Meals   Followed by  [START ON 2/13/2025] dexAMETHasone, 3 mg, Oral, BID With Meals   Followed by  [START ON 2/20/2025] dexAMETHasone, 2 mg, Oral, BID With Meals   Followed by  [START ON 2/27/2025] dexAMETHasone, 1 mg, Oral, Daily  famotidine, 40 mg, Oral, Daily  insulin glargine, 10 Units, Subcutaneous, Nightly  insulin lispro, 2-7 Units, Subcutaneous, 4x Daily With Meals & Nightly  losartan, 25 mg, Oral, Q24H  mupirocin, 1 Application, Each Nare, BID  rivaroxaban, 20 mg, Oral, Daily With Dinner  sodium chloride, 10 mL, Intravenous, Q12H  tamsulosin, 0.4 mg, Oral, Daily    Infusions     Diet  Diet: Diabetic; Consistent Carbohydrate; Fluid Consistency: Thin (IDDSI 0)       Assessment/Plan     Active Hospital Problems    Diagnosis  POA    **Brain mass [G93.89]  Yes    Pharyngeal dysphagia [R13.13]  Yes    Vasogenic cerebral edema [G93.6]  Yes    Slurred speech [R47.81]  Yes    History of DVT (deep vein thrombosis) [Z86.718]  Not Applicable    Type 2 diabetes mellitus with hyperglycemia, without long-term current use of insulin [E11.65]  Yes    Pancreatic lesion [K86.9]  Yes    Hyperlipidemia [E78.5]  Yes    Essential (primary) hypertension [I10]  Yes    Chronic coronary artery disease [I25.10]  Yes    Prostate cancer [C61]  Yes    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       79 y.o. male admitted with Brain mass.      02/07/25  Pending acute rehab referrals.   Diabetic diet.     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 Fish ([facility])      Pancreas mass  -Outside hospital CT scan shows ill-defined lesion of the pancreas head concerning for possible pancreatic malignancy.  Patient reportedly had FNA biopsy in May 2024 that was negative for malignancy.  Patient also with changes concerning for chronic pancreatitis.  Left adrenal adenoma also noted.  CA 19-9 elevated.     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  Anticipated discharge acute rehab, once arrangements made            Rayray Bell MD  [facility]  02/07/25  15:42 EST

## 2025-02-07 NOTE — PLAN OF CARE
Problem: Adult Inpatient Plan of Care  Goal: Plan of Care Review  Outcome: Progressing  Flowsheets (Taken 2/7/2025 1539)  Progress: improving  Plan of Care Reviewed With: patient  Goal: Patient-Specific Goal (Individualized)  Outcome: Progressing  Goal: Absence of Hospital-Acquired Illness or Injury  Outcome: Progressing  Intervention: Identify and Manage Fall Risk  Recent Flowsheet Documentation  Taken 2/7/2025 1200 by Mary Martinez RN  Safety Promotion/Fall Prevention:   nonskid shoes/slippers when out of bed   fall prevention program maintained   assistive device/personal items within reach   activity supervised   safety round/check completed  Taken 2/7/2025 0835 by Mary Martinez RN  Safety Promotion/Fall Prevention:   nonskid shoes/slippers when out of bed   fall prevention program maintained   assistive device/personal items within reach   activity supervised   safety round/check completed  Intervention: Prevent Skin Injury  Recent Flowsheet Documentation  Taken 2/7/2025 1200 by Mary Martinez RN  Body Position:   weight shifting   position changed independently  Taken 2/7/2025 0835 by Mary Martinez RN  Body Position:   weight shifting   turned  Intervention: Prevent Infection  Recent Flowsheet Documentation  Taken 2/7/2025 1200 by Mary Martinez RN  Infection Prevention:   single patient room provided   rest/sleep promoted  Taken 2/7/2025 0835 by Mary Martinez RN  Infection Prevention:   single patient room provided   rest/sleep promoted  Goal: Optimal Comfort and Wellbeing  Outcome: Progressing  Intervention: Provide Person-Centered Care  Recent Flowsheet Documentation  Taken 2/7/2025 0835 by Mary Martinez RN  Trust Relationship/Rapport: care explained  Goal: Readiness for Transition of Care  Outcome: Progressing   Goal Outcome Evaluation:  Plan of Care Reviewed With: patient        Progress: improving

## 2025-02-07 NOTE — THERAPY TREATMENT NOTE
Patient Name: Sumit Jules  : 1945    MRN: [id number]                              Today's Date: 2025       Admit Date: 2025    Visit Dx:     ICD-10-CM ICD-9-CM   1. Brain mass  G93.89 348.89     Patient Active Problem List   Diagnosis    Antithrombin III deficiency    Atherosclerosis of aorta    Prostate cancer    Benign essential hypertension    Chronic coronary artery disease    Coagulation defect    Hyperlipidemia    Essential (primary) hypertension    Pancreatic lesion    Long term current use of anticoagulant    Asymptomatic varicose veins of lower extremity    Abnormal CT scan, gastrointestinal tract    Benign prostatic hyperplasia without lower urinary tract symptoms    Type 2 diabetes mellitus with hyperglycemia, without long-term current use of insulin    Ventral incisional hernia    Brain mass    Vasogenic cerebral edema    Slurred speech    History of DVT (deep vein thrombosis)    Pharyngeal dysphagia     Past Medical History:   Diagnosis Date    Acute pancreatitis 2024    Antithrombin III deficiency     Atherosclerosis of aorta 2015    Benign essential hypertension 2014    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   Converted from [employer]:   Description - DEEP VENOUS THROMBOPHLEBITIS, RECURRENT    Congenital deficiency of clotting factors 2012    Diabetes mellitus     pre - no insulin    DVT (deep venous thrombosis)     History of complete eye exam SCHEDULED    Hyperlipidemia     Idiopathic acute pancreatitis without infection or necrosis 2024    Impaired fasting glucose 2011    Obesity     Other seborrheic keratosis 2013    Formatting of this note might be different from the original.   Converted from [employer]:   Description - SEBORRHEIC KERATOSIS    Pain of foot 2013    Formatting of this note might be different from the original.   Converted  from [employer]:   Description - HEEL PAIN    Prostate cancer     Thrombocytopenia 03/19/2013    Formatting of this note might be different from the original.   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   Converted from [employer]:   Description - VASCULAR DISORDER OF SKIN     Past Surgical History:   Procedure Laterality Date    BRAIN BIOPSY Left 2/3/2025    Procedure: Left frontal stereotactic brain biopsy with Stealth;  Surgeon: Reyes Thakur MD;  Location: [facility] OR;  Service: Neurosurgery;  Laterality: Left;    CHOLECYSTECTOMY      COLONOSCOPY  10/2013    UPPER ENDOSCOPIC ULTRASOUND W/ FNA N/A 5/3/2024    Procedure: ENDOSCOPIC ULTRASOUND with fine needle aspiration x1 area;  Surgeon: Gifty Ribera MD;  Location: [facility] ENDOSCOPY;  Service: Gastroenterology;  Laterality: N/A;  Post- pancreatic cyst      General Information       Row Name 02/07/25 1551          Physical Therapy Time and Intention    Document Type therapy note (daily note)  -DJ     Mode of Treatment individual therapy;physical therapy  -DJ       Row Name 02/07/25 1551          General Information    Patient Profile Reviewed yes  -DJ     Existing Precautions/Restrictions fall  -DJ       Row Name 02/07/25 1551          Cognition    Orientation Status (Cognition) oriented to;person;other (see comments)  pt able to speack a few words and answer ?s with short responses more acurately  -DJ       Row Name 02/07/25 1551          Safety Issues/Impairments Affecting Functional Mobility    Comment, Safety Issues/Impairments (Mobility) gt belt, nonskid socks  -DJ               User Key  (r) = Recorded By, (t) = Taken By, (c) = Cosigned By      Initials Name Provider Type    DJ Shanthi Hill, PT Physical Therapist                   Mobility       Row Name 02/07/25 1552          Bed  Mobility    Bed Mobility supine-sit;sit-supine  -DJ     Supine-Sit [place] (Bed Mobility) standby assist;verbal cues  -DJ     Sit-Supine [place] (Bed Mobility) standby assist;verbal cues  -DJ     Comment, (Bed Mobility) impulsive  -DJ       Row Name 02/07/25 1552          Transfers    Comment, (Transfers) sit/stand from EOB  -DJ       Row Name 02/07/25 1552          Bed-Chair Transfer    Bed-Chair [place] (Transfers) not tested  -DJ       Row Name 02/07/25 1552          Sit-Stand Transfer    Sit-Stand [place] (Transfers) standby assist;verbal cues  -DJ     Assistive Device (Sit-Stand Transfers) other (see comments)  without AD  -DJ     Comment, (Sit-Stand Transfer) impulsive  -DJ       Row Name 02/07/25 1552          Gait/Stairs (Locomotion)    [place] Level (Gait) contact guard;minimum assist (75% patient effort);verbal cues  -DJ     Assistive Device (Gait) walker, front-wheeled  -DJ     Distance in Feet (Gait) 160  -DJ     Deviations/Abnormal Patterns (Gait) base of support, wide;festinating/shuffling;stride length decreased  -DJ     Bilateral Gait Deviations forward flexed posture  -DJ     Comment, (Gait/Stairs) Pt amb 20' without AD, 160' with r wx with CGA - min A; shuffled steps, unsteady balance.  -DJ               User Key  (r) = Recorded By, (t) = Taken By, (c) = Cosigned By      Initials Name Provider Type    Shanthi Mckeon, PT Physical Therapist                   Obj/Interventions       Row Name 02/07/25 1555          Motor Skills    Motor Skills functional endurance  -DJ     Functional Endurance improving but still limited  -DJ     Therapeutic Exercise other (see comments)  AP, LAQ, seated hip flex; pt dem poor technique thru limited ROM and a very fast pace despite freq instruction to slow down  -DJ       Row Name 02/07/25 1555          Balance    Balance Assessment standing static balance;standing dynamic balance  -DJ     Static Standing Balance contact guard;verbal cues  -DJ      Dynamic Standing Balance contact guard;minimal assist  -DJ     Position/Device Used, Standing Balance walker, front-wheeled;supported  -DJ     Balance Interventions sitting;standing;sit to stand;supported;weight shifting activity  -DJ     Comment, Balance unstedy, definitey improved with r wx  -DJ               User Key  (r) = Recorded By, (t) = Taken By, (c) = Cosigned By      Initials Name Provider Type    Shanthi Mckeon, PT Physical Therapist                   Goals/Plan    No documentation.                  Clinical Impression       Row Name 02/07/25 1557          Pain    Pretreatment Pain Rating 0/10 - no pain  -DJ       Row Name 02/07/25 1557          Plan of Care Review    Plan of Care Reviewed With patient  -DJ     Progress improving  -DJ     Outcome Evaluation Pt resting in bed in NAD, family present, MD present; pt able to speak in short sentences when prompted. He is eager to move and impulsive. He sat EOB with SBA and impulsively stood from EOB without AD. He performed seated LE ther ex but thru a self limited ROM and at a very fast pace despite instruction to slow down. Pt amb 20' without AD, 160' with r wx with CGA - min A; shuffled steps, unsteady balance. He returned supien in bed with SBA. He dem increased amb distance but is unsteady and impulsive which increases his falls risk. Recommend rehab prior to returning home for improved safety  -DJ       Row Name 02/07/25 3731          Therapy Assessment/Plan (PT)    Patient/Family Therapy Goals Statement (PT) rehab  -DJ     Criteria for Skilled Interventions Met (PT) skilled treatment is necessary  -DJ       Row Name 02/07/25 1810          Vital Signs    O2 Delivery Pre Treatment room air  -DJ     O2 Delivery Intra Treatment room air  -DJ     O2 Delivery Post Treatment room air  -DJ     Pre Patient Position Supine  -DJ     Intra Patient Position Standing  -DJ     Post Patient Position Supine  -DJ       Row Name 02/07/25 7372          Positioning and  Restraints    Pre-Treatment Position in bed  -DJ     Post Treatment Position bed  -DJ     In Bed notified nsg;supine;call light within reach;encouraged to call for assist;exit alarm on;with family/caregiver  -POLO               User Key  (r) = Recorded By, (t) = Taken By, (c) = Cosigned By      Initials Name Provider Type    Shanthi Mckeon, PT Physical Therapist                   Outcome Measures       Row Name 02/07/25 1601 02/07/25 0835       How much help from another person do you currently need...    Turning from your back to your side while in flat bed without using bedrails? 4  -DJ 3  -MM    Moving from lying on back to sitting on the side of a flat bed without bedrails? 3  -DJ 3  -MM    Moving to and from a bed to a chair (including a wheelchair)? 3  -DJ 3  -MM    Standing up from a chair using your arms (e.g., wheelchair, bedside chair)? 3  -DJ 3  -MM    Climbing 3-5 steps with a railing? 2  -DJ 2  -MM    To walk in hospital room? 3  -DJ 3  -MM    AM-PAC 6 Clicks Score (PT) 18  -DJ 17  -MM    Highest Level of Mobility Goal 6 --> Walk 10 steps or more  -DJ 5 --> Static standing  -MM      Row Name 02/07/25 1601          Functional Assessment    Outcome Measure Options AM-PAC 6 Clicks Basic Mobility (PT)  -DJ               User Key  (r) = Recorded By, (t) = Taken By, (c) = Cosigned By      Initials Name Provider Type    Shanthi Mckeon, PT Physical Therapist    Mary Poole, RN Registered Nurse                                 Physical Therapy Education       Title: PT OT SLP Therapies (Done)       Topic: Physical Therapy (Done)       Point: Mobility training (Done)       Learning Progress Summary            Patient Acceptance, E, VU,NR by POLO at 2/7/2025 1601    Acceptance, E, NR by POLO at 2/5/2025 1517    Acceptance, E, NR by  at 2/4/2025 1438    Acceptance, E,D, VU by RS at 2/1/2025 1434   Family Acceptance, E, VU,NR by POLO at 2/7/2025 1601    Acceptance, E, NR by POLO at 2/5/2025 1517                       Point: Home exercise program (Done)       Learning Progress Summary            Patient Acceptance, E, VU,NR by DJ at 2/7/2025 1601    Acceptance, E, NR by DJ at 2/5/2025 1517    Acceptance, E, NR by CH at 2/4/2025 1438    Acceptance, E,D, VU by RS at 2/1/2025 1434   Family Acceptance, E, VU,NR by DJ at 2/7/2025 1601    Acceptance, E, NR by DJ at 2/5/2025 1517                      Point: Body mechanics (Done)       Learning Progress Summary            Patient Acceptance, E, VU,NR by DJ at 2/7/2025 1601    Acceptance, E, NR by DJ at 2/5/2025 1517    Acceptance, E, NR by  at 2/4/2025 1438    Acceptance, E,D, VU by RS at 2/1/2025 1434   Family Acceptance, E, VU,NR by DJ at 2/7/2025 1601    Acceptance, E, NR by DJ at 2/5/2025 1517                      Point: Precautions (Done)       Learning Progress Summary            Patient Acceptance, E, VU,NR by DJ at 2/7/2025 1601    Acceptance, E, NR by DJ at 2/5/2025 1517    Acceptance, E, NR by  at 2/4/2025 1438    Acceptance, E,D, VU by RS at 2/1/2025 1434   Family Acceptance, E, VU,NR by DJ at 2/7/2025 1601    Acceptance, E, NR by DJ at 2/5/2025 1517                                      User Key       Initials Effective Dates Name Provider Type Discipline     06/16/21 -  Rosalie Canela, PT Physical Therapist PT     10/25/19 -  Shanthi Hill, PT Physical Therapist PT     01/10/25 -  Deuce Piña, PT Student PT Student PT                  PT Recommendation and Plan     Progress: improving  Outcome Evaluation: Pt resting in bed in NAD, family present, MD present; pt able to speak in short sentences when prompted. He is eager to move and impulsive. He sat EOB with SBA and impulsively stood from EOB without AD. He performed seated LE ther ex but thru a self limited ROM and at a very fast pace despite instruction to slow down. Pt amb 20' without AD, 160' with r wx with CGA - min A; shuffled steps, unsteady balance. He returned supien in bed with SBA. He dem  increased amb distance but is unsteady and impulsive which increases his falls risk. Recommend rehab prior to returning home for improved safety     Time Calculation:         PT Charges       Row Name 02/07/25 1602             Time Calculation    Start Time 1516  -DJ      Stop Time 1531  -DJ      Time Calculation (min) 15 min  -DJ      PT Non-Billable Time (min) 10 min  -DJ      PT Received On 02/07/25  -DJ      PT - Next Appointment 02/08/25  -DJ                User Key  (r) = Recorded By, (t) = Taken By, (c) = Cosigned By      Initials Name Provider Type    Shanthi Mckeon, PT Physical Therapist                  Therapy Charges for Today       Code Description Service Date Service Provider Modifiers Qty    41031724062 HC PT THERAPEUTIC ACT EA 15 MIN 2/7/2025 Shanthi Hill, PT GP 1            PT G-Codes  Outcome Measure Options: AM-PAC 6 Clicks Basic Mobility (PT)  AM-PAC 6 Clicks Score (PT): 18       Shanthi Hill PT  2/7/2025

## 2025-02-07 NOTE — PLAN OF CARE
Goal Outcome Evaluation:  Plan of Care Reviewed With: patient        Progress: improving  Outcome Evaluation: Pt resting in bed in NAD, family present, MD present; pt able to speak in short sentences when prompted. He is eager to move and impulsive. He sat EOB with SBA and impulsively stood from EOB without AD. He performed seated LE ther ex but thru a self limited ROM and at a very fast pace despite instruction to slow down. Pt amb 20' without AD, 160' with r wx with CGA - min A; shuffled steps, unsteady balance. He returned supien in bed with SBA. He dem increased amb distance but is unsteady and impulsive which increases his falls risk. Recommend rehab prior to returning home for improved safety

## 2025-02-07 NOTE — CASE MANAGEMENT/SOCIAL WORK
Continued Stay Note  [facility]     Patient Name: Sumit Jules  MRN: [id number]  Today's Date: 2/7/2025    Admit Date: 1/30/2025    Plan: Referrals placed to [facility] and [facility] .   Discharge Plan       Row Name 02/07/25 1804       Plan    Plan Referrals placed to [facility] and [facility] .    Patient/Family in Agreement with Plan yes    Plan Comments Spoke with patient, spouse, son and daughter at bedside regarding discharge plans/needs. Provided patient choice lists for ARF, SNF and HH. Family not interested in SNF at all. Agreeable with referrals to [facility] and [facility] . Await call back regarding acceptance/availability. Will need pre-cert for ARF. [facility]                   Discharge Codes    No documentation.                 Expected Discharge Date and Time       Expected Discharge Date Expected Discharge Time    Feb 11, 2025               Katia Cameron RN

## 2025-02-08 LAB
ANION GAP SERPL CALCULATED.3IONS-SCNC: 8 MMOL/L (ref 5–15)
BASOPHILS # BLD AUTO: 0.01 10*3/MM3 (ref 0–0.2)
BASOPHILS NFR BLD AUTO: 0.1 % (ref 0–1.5)
BUN SERPL-MCNC: 19 MG/DL (ref 8–23)
BUN/CREAT SERPL: 26.8 (ref 7–25)
CALCIUM SPEC-SCNC: 7.7 MG/DL (ref 8.6–10.5)
CHLORIDE SERPL-SCNC: 95 MMOL/L (ref 98–107)
CO2 SERPL-SCNC: 27 MMOL/L (ref 22–29)
CREAT SERPL-MCNC: 0.71 MG/DL (ref 0.76–1.27)
DEPRECATED RDW RBC AUTO: 41.8 FL (ref 37–54)
EGFRCR SERPLBLD CKD-EPI 2021: 93.9 ML/MIN/1.73
EOSINOPHIL # BLD AUTO: 0 10*3/MM3 (ref 0–0.4)
EOSINOPHIL NFR BLD AUTO: 0 % (ref 0.3–6.2)
ERYTHROCYTE [DISTWIDTH] IN BLOOD BY AUTOMATED COUNT: 13.2 % (ref 12.3–15.4)
GLUCOSE BLDC GLUCOMTR-MCNC: 172 MG/DL (ref 70–130)
GLUCOSE BLDC GLUCOMTR-MCNC: 179 MG/DL (ref 70–130)
GLUCOSE BLDC GLUCOMTR-MCNC: 216 MG/DL (ref 70–130)
GLUCOSE BLDC GLUCOMTR-MCNC: 228 MG/DL (ref 70–130)
GLUCOSE SERPL-MCNC: 174 MG/DL (ref 65–99)
HCT VFR BLD AUTO: 40.4 % (ref 37.5–51)
HGB BLD-MCNC: 14 G/DL (ref 13–17.7)
IMM GRANULOCYTES # BLD AUTO: 0.09 10*3/MM3 (ref 0–0.05)
IMM GRANULOCYTES NFR BLD AUTO: 1 % (ref 0–0.5)
LYMPHOCYTES # BLD AUTO: 0.5 10*3/MM3 (ref 0.7–3.1)
LYMPHOCYTES NFR BLD AUTO: 5.7 % (ref 19.6–45.3)
MAGNESIUM SERPL-MCNC: 2.1 MG/DL (ref 1.6–2.4)
MCH RBC QN AUTO: 30.2 PG (ref 26.6–33)
MCHC RBC AUTO-ENTMCNC: 34.7 G/DL (ref 31.5–35.7)
MCV RBC AUTO: 87.3 FL (ref 79–97)
MONOCYTES # BLD AUTO: 0.74 10*3/MM3 (ref 0.1–0.9)
MONOCYTES NFR BLD AUTO: 8.5 % (ref 5–12)
NEUTROPHILS NFR BLD AUTO: 7.39 10*3/MM3 (ref 1.7–7)
NEUTROPHILS NFR BLD AUTO: 84.7 % (ref 42.7–76)
PHOSPHATE SERPL-MCNC: 3.1 MG/DL (ref 2.5–4.5)
PLATELET # BLD AUTO: 113 10*3/MM3 (ref 140–450)
PMV BLD AUTO: 13.6 FL (ref 6–12)
POTASSIUM SERPL-SCNC: 4.3 MMOL/L (ref 3.5–5.2)
RBC # BLD AUTO: 4.63 10*6/MM3 (ref 4.14–5.8)
SODIUM SERPL-SCNC: 130 MMOL/L (ref 136–145)
WBC NRBC COR # BLD AUTO: 8.73 10*3/MM3 (ref 3.4–10.8)

## 2025-02-08 PROCEDURE — 84100 ASSAY OF PHOSPHORUS: CPT | Performed by: INTERNAL MEDICINE

## 2025-02-08 PROCEDURE — 82948 REAGENT STRIP/BLOOD GLUCOSE: CPT

## 2025-02-08 PROCEDURE — 63710000001 INSULIN LISPRO (HUMAN) PER 5 UNITS: Performed by: STUDENT IN AN ORGANIZED HEALTH CARE EDUCATION/TRAINING PROGRAM

## 2025-02-08 PROCEDURE — 97116 GAIT TRAINING THERAPY: CPT

## 2025-02-08 PROCEDURE — 83735 ASSAY OF MAGNESIUM: CPT | Performed by: INTERNAL MEDICINE

## 2025-02-08 PROCEDURE — 85025 COMPLETE CBC W/AUTO DIFF WBC: CPT | Performed by: NEUROLOGICAL SURGERY

## 2025-02-08 PROCEDURE — 63710000001 DEXAMETHASONE PER 0.25 MG: Performed by: STUDENT IN AN ORGANIZED HEALTH CARE EDUCATION/TRAINING PROGRAM

## 2025-02-08 PROCEDURE — 63710000001 INSULIN GLARGINE PER 5 UNITS: Performed by: STUDENT IN AN ORGANIZED HEALTH CARE EDUCATION/TRAINING PROGRAM

## 2025-02-08 PROCEDURE — 25010000002 DIAZEPAM PER 5 MG: Performed by: INTERNAL MEDICINE

## 2025-02-08 PROCEDURE — 80048 BASIC METABOLIC PNL TOTAL CA: CPT | Performed by: NEUROLOGICAL SURGERY

## 2025-02-08 RX ADMIN — INSULIN LISPRO 3 UNITS: 100 INJECTION, SOLUTION INTRAVENOUS; SUBCUTANEOUS at 17:19

## 2025-02-08 RX ADMIN — INSULIN GLARGINE 10 UNITS: 100 INJECTION, SOLUTION SUBCUTANEOUS at 21:03

## 2025-02-08 RX ADMIN — INSULIN LISPRO 2 UNITS: 100 INJECTION, SOLUTION INTRAVENOUS; SUBCUTANEOUS at 12:05

## 2025-02-08 RX ADMIN — FAMOTIDINE 40 MG: 20 TABLET, FILM COATED ORAL at 08:32

## 2025-02-08 RX ADMIN — TAMSULOSIN HYDROCHLORIDE 0.4 MG: 0.4 CAPSULE ORAL at 08:31

## 2025-02-08 RX ADMIN — RIVAROXABAN 20 MG: 20 TABLET, FILM COATED ORAL at 17:19

## 2025-02-08 RX ADMIN — ATORVASTATIN CALCIUM 10 MG: 20 TABLET, FILM COATED ORAL at 08:33

## 2025-02-08 RX ADMIN — MUPIROCIN 1 APPLICATION: 20 OINTMENT TOPICAL at 21:03

## 2025-02-08 RX ADMIN — Medication 10 ML: at 08:33

## 2025-02-08 RX ADMIN — Medication 10 ML: at 21:03

## 2025-02-08 RX ADMIN — INSULIN LISPRO 3 UNITS: 100 INJECTION, SOLUTION INTRAVENOUS; SUBCUTANEOUS at 21:03

## 2025-02-08 RX ADMIN — DEXAMETHASONE 4 MG: 4 TABLET ORAL at 17:19

## 2025-02-08 RX ADMIN — MUPIROCIN 1 APPLICATION: 20 OINTMENT TOPICAL at 08:33

## 2025-02-08 RX ADMIN — INSULIN LISPRO 2 UNITS: 100 INJECTION, SOLUTION INTRAVENOUS; SUBCUTANEOUS at 08:31

## 2025-02-08 RX ADMIN — DIAZEPAM 5 MG: 5 INJECTION INTRAMUSCULAR; INTRAVENOUS at 21:38

## 2025-02-08 RX ADMIN — DEXAMETHASONE 4 MG: 4 TABLET ORAL at 08:33

## 2025-02-08 NOTE — THERAPY TREATMENT NOTE
Patient Name: Sumit Jules  : 1945    MRN: [id number]                              Today's Date: 2025       Admit Date: 2025    Visit Dx:     ICD-10-CM ICD-9-CM   1. Brain mass  G93.89 348.89     Patient Active Problem List   Diagnosis    Antithrombin III deficiency    Atherosclerosis of aorta    Prostate cancer    Benign essential hypertension    Chronic coronary artery disease    Coagulation defect    Hyperlipidemia    Essential (primary) hypertension    Pancreatic lesion    Long term current use of anticoagulant    Asymptomatic varicose veins of lower extremity    Abnormal CT scan, gastrointestinal tract    Benign prostatic hyperplasia without lower urinary tract symptoms    Type 2 diabetes mellitus with hyperglycemia, without long-term current use of insulin    Ventral incisional hernia    Brain mass    Vasogenic cerebral edema    Slurred speech    History of DVT (deep vein thrombosis)    Pharyngeal dysphagia     Past Medical History:   Diagnosis Date    Acute pancreatitis 2024    Antithrombin III deficiency     Atherosclerosis of aorta 2015    Benign essential hypertension 2014    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   Converted from [employer]:   Description - DEEP VENOUS THROMBOPHLEBITIS, RECURRENT    Congenital deficiency of clotting factors 2012    Diabetes mellitus     pre - no insulin    DVT (deep venous thrombosis)     History of complete eye exam SCHEDULED    Hyperlipidemia     Idiopathic acute pancreatitis without infection or necrosis 2024    Impaired fasting glucose 2011    Obesity     Other seborrheic keratosis 2013    Formatting of this note might be different from the original.   Converted from [employer]:   Description - SEBORRHEIC KERATOSIS    Pain of foot 2013    Formatting of this note might be different from the original.   Converted  from [employer]:   Description - HEEL PAIN    Prostate cancer     Thrombocytopenia 03/19/2013    Formatting of this note might be different from the original.   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   Converted from [employer]:   Description - VASCULAR DISORDER OF SKIN     Past Surgical History:   Procedure Laterality Date    BRAIN BIOPSY Left 2/3/2025    Procedure: Left frontal stereotactic brain biopsy with Stealth;  Surgeon: Reyes Thakur MD;  Location: [facility] OR;  Service: Neurosurgery;  Laterality: Left;    CHOLECYSTECTOMY      COLONOSCOPY  10/2013    UPPER ENDOSCOPIC ULTRASOUND W/ FNA N/A 5/3/2024    Procedure: ENDOSCOPIC ULTRASOUND with fine needle aspiration x1 area;  Surgeon: Gifty Ribera MD;  Location: [facility];  Service: Gastroenterology;  Laterality: N/A;  Post- pancreatic cyst      General Information       Row Name 02/08/25 1451          Physical Therapy Time and Intention    Document Type therapy note (daily note)  -MP     Mode of Treatment individual therapy;physical therapy  -MP       Row Name 02/08/25 1451          General Information    Patient Profile Reviewed yes  -MP     Existing Precautions/Restrictions fall  -MP       Row Name 02/08/25 1451          Cognition    Orientation Status (Cognition) oriented to;person  -MP       Row Name 02/08/25 1451          Safety Issues/Impairments Affecting Functional Mobility    Impairments Affecting Function (Mobility) cognition;coordination;endurance/activity tolerance;motor planning;postural/trunk control  -MP               User Key  (r) = Recorded By, (t) = Taken By, (c) = Cosigned By      Initials Name Provider Type    Sandy Buchanan PT Physical Therapist                   Mobility       Row Name 02/08/25 1452          Bed Mobility    Bed Mobility  supine-sit;sit-supine  -MP     All Activities, [place] (Bed Mobility) standby assist  -MP     Supine-Sit [place] (Bed Mobility) standby assist;verbal cues  -MP     Sit-Supine [place] (Bed Mobility) standby assist;verbal cues  -MP       Row Name 02/08/25 1452          Sit-Stand Transfer    Sit-Stand [place] (Transfers) verbal cues;contact guard  -MP     Assistive Device (Sit-Stand Transfers) walker, front-wheeled  -MP       Row Name 02/08/25 1452          Gait/Stairs (Locomotion)    [place] Level (Gait) contact guard;verbal cues;1 person assist  -MP     Assistive Device (Gait) walker, front-wheeled  -MP     Distance in Feet (Gait) 150  -MP     Deviations/Abnormal Patterns (Gait) base of support, wide;festinating/shuffling;stride length decreased  -MP     Bilateral Gait Deviations forward flexed posture  -MP     Right Sided Gait Deviations heel strike decreased;foot drop/toe drag  -MP               User Key  (r) = Recorded By, (t) = Taken By, (c) = Cosigned By      Initials Name Provider Type    Sandy Buchanan, PT Physical Therapist                   Obj/Interventions    No documentation.                  Goals/Plan    No documentation.                  Clinical Impression       Row Name 02/08/25 1452          Pain    Pretreatment Pain Rating 0/10 - no pain  -MP     Posttreatment Pain Rating 0/10 - no pain  -MP       Row Name 02/08/25 1452          Plan of Care Review    Plan of Care Reviewed With patient  -MP     Outcome Evaluation Pt. agreeable to PT, benefits from frequent cueing to reduce impulsivity with mobility. Required CGA for bed mobility, STS and ambulation for 150' with rwx. Cueing when fatigued to improve R LE clearance and heel strike. Due to safety awareness may benefit from rehab prior to D/C home.  -MP       Row Name 02/08/25 1452          Therapy Assessment/Plan (PT)    Rehab Potential (PT) good  -MP     Criteria for Skilled Interventions Met (PT) skilled treatment is  necessary  -MP     Therapy Frequency (PT) 6 times/wk  -MP       Row Name 02/08/25 1452          Positioning and Restraints    Pre-Treatment Position in bed  -MP     Post Treatment Position bed  -MP     In Bed supine;call light within reach;encouraged to call for assist;exit alarm on;with family/caregiver  -MP               User Key  (r) = Recorded By, (t) = Taken By, (c) = Cosigned By      Initials Name Provider Type    Sandy Buchanan PT Physical Therapist                   Outcome Measures       Row Name 02/08/25 1453 02/08/25 0830       How much help from another person do you currently need...    Turning from your back to your side while in flat bed without using bedrails? 4  -MP 4  -MM    Moving from lying on back to sitting on the side of a flat bed without bedrails? 3  -MP 3  -MM    Moving to and from a bed to a chair (including a wheelchair)? 3  -MP 3  -MM    Standing up from a chair using your arms (e.g., wheelchair, bedside chair)? 3  -MP 3  -MM    Climbing 3-5 steps with a railing? 2  -MP 2  -MM    To walk in hospital room? 3  -MP 3  -MM    AM-PAC 6 Clicks Score (PT) 18  -MP 18  -MM    Highest Level of Mobility Goal 6 --> Walk 10 steps or more  -MP 6 --> Walk 10 steps or more  -MM      Row Name 02/08/25 1453          Functional Assessment    Outcome Measure Options AM-PAC 6 Clicks Basic Mobility (PT)  -MP               User Key  (r) = Recorded By, (t) = Taken By, (c) = Cosigned By      Initials Name Provider Type    Sandy Buchanan, MANISH Physical Therapist    Mary Poole, RN Registered Nurse                                 Physical Therapy Education       Title: PT OT SLP Therapies (Done)       Topic: Physical Therapy (Done)       Point: Mobility training (Done)       Learning Progress Summary            Patient Acceptance, E,TB,D, VU,DU,NR by AURELIA at 2/8/2025 1454    Acceptance, E, VU,NR by POLO at 2/7/2025 1601    Acceptance, E, NR by POLO at 2/5/2025 1517    Acceptance, E, NR by  at 2/4/2025  1438    Acceptance, E,D, VU by RS at 2/1/2025 1434   Family Acceptance, E, VU,NR by DJ at 2/7/2025 1601    Acceptance, E, NR by DJ at 2/5/2025 1517                      Point: Home exercise program (Done)       Learning Progress Summary            Patient Acceptance, E, VU,NR by DJ at 2/7/2025 1601    Acceptance, E, NR by DJ at 2/5/2025 1517    Acceptance, E, NR by CH at 2/4/2025 1438    Acceptance, E,D, VU by RS at 2/1/2025 1434   Family Acceptance, E, VU,NR by DJ at 2/7/2025 1601    Acceptance, E, NR by DJ at 2/5/2025 1517                      Point: Body mechanics (Done)       Learning Progress Summary            Patient Acceptance, E,TB,D, VU,DU,NR by MP at 2/8/2025 1454    Acceptance, E, VU,NR by DJ at 2/7/2025 1601    Acceptance, E, NR by DJ at 2/5/2025 1517    Acceptance, E, NR by  at 2/4/2025 1438    Acceptance, E,D, VU by RS at 2/1/2025 1434   Family Acceptance, E, VU,NR by DJ at 2/7/2025 1601    Acceptance, E, NR by DJ at 2/5/2025 1517                      Point: Precautions (Done)       Learning Progress Summary            Patient Acceptance, E,TB,D, VU,DU,NR by  at 2/8/2025 1454    Acceptance, E, VU,NR by DJ at 2/7/2025 1601    Acceptance, E, NR by DJ at 2/5/2025 1517    Acceptance, E, NR by  at 2/4/2025 1438    Acceptance, E,D, VU by RS at 2/1/2025 1434   Family Acceptance, E, VU,NR by DJ at 2/7/2025 1601    Acceptance, E, NR by  at 2/5/2025 1517                                      User Key       Initials Effective Dates Name Provider Type Discipline     06/16/21 -  Rosalie Canela, PT Physical Therapist PT    DJ 10/25/19 -  Shanthi Hill, PT Physical Therapist PT    MP 02/07/24 -  Sandy John, PT Physical Therapist PT     01/10/25 -  Deuce Piña, PT Student PT Student PT                  PT Recommendation and Plan     Outcome Evaluation: Pt. agreeable to PT, benefits from frequent cueing to reduce impulsivity with mobility. Required CGA for bed mobility, STS and ambulation for 150'  with rwx. Cueing when fatigued to improve R LE clearance and heel strike. Due to safety awareness may benefit from rehab prior to D/C home.     Time Calculation:         PT Charges       Row Name 02/08/25 1454             Time Calculation    Start Time 1354  -MP      Stop Time 1406  -MP      Time Calculation (min) 12 min  -MP      PT Received On 02/08/25  -MP      PT - Next Appointment 02/10/25  -MP         Time Calculation- PT    Total Timed Code Minutes- PT 12 minute(s)  -MP         Timed Charges    02085 - Gait Training Minutes  12  -MP         Total Minutes    Timed Charges Total Minutes 12  -MP       Total Minutes 12  -MP                User Key  (r) = Recorded By, (t) = Taken By, (c) = Cosigned By      Initials Name Provider Type    Sandy Buchanan PT Physical Therapist                  Therapy Charges for Today       Code Description Service Date Service Provider Modifiers Qty    82268069319 HC GAIT TRAINING EA 15 MIN 2/8/2025 Sandy John, MANISH GP 1            PT G-Codes  Outcome Measure Options: AM-PAC 6 Clicks Basic Mobility (PT)  AM-PAC 6 Clicks Score (PT): 18  PT Discharge Summary  Anticipated Discharge Disposition (PT): inpatient rehabilitation facility    Sandy John PT  2/8/2025

## 2025-02-08 NOTE — PLAN OF CARE
Goal Outcome Evaluation:  Plan of Care Reviewed With: patient           Outcome Evaluation: Pt. agreeable to PT, benefits from frequent cueing to reduce impulsivity with mobility. Required CGA for bed mobility, STS and ambulation for 150' with rwx. Cueing when fatigued to improve R LE clearance and heel strike. Due to safety awareness may benefit from rehab prior to D/C home.    Anticipated Discharge Disposition (PT): inpatient rehabilitation facility

## 2025-02-08 NOTE — PLAN OF CARE
Problem: Adult Inpatient Plan of Care  Goal: Plan of Care Review  Outcome: Progressing  Goal: Patient-Specific Goal (Individualized)  Outcome: Progressing  Goal: Absence of Hospital-Acquired Illness or Injury  Outcome: Progressing  Intervention: Identify and Manage Fall Risk  Recent Flowsheet Documentation  Taken 2/8/2025 0209 by Skylar Farah RN  Safety Promotion/Fall Prevention:   safety round/check completed   room organization consistent   nonskid shoes/slippers when out of bed   fall prevention program maintained   clutter free environment maintained   assistive device/personal items within reach  Taken 2/8/2025 0043 by Skylar Farah RN  Safety Promotion/Fall Prevention:   safety round/check completed   room organization consistent   nonskid shoes/slippers when out of bed   fall prevention program maintained   assistive device/personal items within reach   clutter free environment maintained  Taken 2/7/2025 2206 by Skylar Farah RN  Safety Promotion/Fall Prevention:   safety round/check completed   room organization consistent   nonskid shoes/slippers when out of bed   fall prevention program maintained   clutter free environment maintained   assistive device/personal items within reach  Taken 2/7/2025 2035 by Syklar Farah RN  Safety Promotion/Fall Prevention:   safety round/check completed   room organization consistent   nonskid shoes/slippers when out of bed   fall prevention program maintained   clutter free environment maintained  Intervention: Prevent Skin Injury  Recent Flowsheet Documentation  Taken 2/7/2025 2035 by Skylar Farah RN  Skin Protection: incontinence pads utilized  Intervention: Prevent Infection  Recent Flowsheet Documentation  Taken 2/8/2025 0209 by Skylar Farah RN  Infection Prevention:   single patient room provided   rest/sleep promoted   environmental surveillance performed  Taken 2/8/2025 0043 by Skylar Farah RN  Infection Prevention:   single patient  room provided   rest/sleep promoted   environmental surveillance performed  Taken 2/7/2025 2206 by Skylar Farah RN  Infection Prevention:   single patient room provided   rest/sleep promoted   environmental surveillance performed  Taken 2/7/2025 2035 by Skylar Farah RN  Infection Prevention:   rest/sleep promoted   single patient room provided   environmental surveillance performed  Goal: Optimal Comfort and Wellbeing  Outcome: Progressing  Intervention: Provide Person-Centered Care  Recent Flowsheet Documentation  Taken 2/7/2025 2035 by Skylar Farah RN  Trust Relationship/Rapport:   care explained   questions answered   questions encouraged   thoughts/feelings acknowledged  Goal: Readiness for Transition of Care  Outcome: Progressing     Problem: Fall Injury Risk  Goal: Absence of Fall and Fall-Related Injury  Outcome: Progressing  Intervention: Identify and Manage Contributors  Recent Flowsheet Documentation  Taken 2/8/2025 0209 by Skylar Farah RN  Medication Review/Management: medications reviewed  Taken 2/8/2025 0043 by Skylar Farah RN  Medication Review/Management: medications reviewed  Taken 2/7/2025 2206 by Skylar Farah RN  Medication Review/Management: medications reviewed  Taken 2/7/2025 2035 by Skylar Farah RN  Medication Review/Management: medications reviewed  Intervention: Promote Injury-Free Environment  Recent Flowsheet Documentation  Taken 2/8/2025 0209 by Skylar Farah RN  Safety Promotion/Fall Prevention:   safety round/check completed   room organization consistent   nonskid shoes/slippers when out of bed   fall prevention program maintained   clutter free environment maintained   assistive device/personal items within reach  Taken 2/8/2025 0043 by Skylar Farah RN  Safety Promotion/Fall Prevention:   safety round/check completed   room organization consistent   nonskid shoes/slippers when out of bed   fall prevention program maintained   assistive  device/personal items within reach   clutter free environment maintained  Taken 2/7/2025 2206 by Skylar Farah, RN  Safety Promotion/Fall Prevention:   safety round/check completed   room organization consistent   nonskid shoes/slippers when out of bed   fall prevention program maintained   clutter free environment maintained   assistive device/personal items within reach  Taken 2/7/2025 2035 by Skylar Farah, RN  Safety Promotion/Fall Prevention:   safety round/check completed   room organization consistent   nonskid shoes/slippers when out of bed   fall prevention program maintained   clutter free environment maintained     Problem: Skin Injury Risk Increased  Goal: Skin Health and Integrity  Outcome: Progressing  Intervention: Optimize Skin Protection  Recent Flowsheet Documentation  Taken 2/7/2025 2035 by Skylar Farah, RN  Pressure Reduction Techniques:   frequent weight shift encouraged   weight shift assistance provided  Skin Protection: incontinence pads utilized   Goal Outcome Evaluation:

## 2025-02-09 LAB
ANION GAP SERPL CALCULATED.3IONS-SCNC: 7 MMOL/L (ref 5–15)
BASOPHILS # BLD AUTO: 0.01 10*3/MM3 (ref 0–0.2)
BASOPHILS NFR BLD AUTO: 0.1 % (ref 0–1.5)
BUN SERPL-MCNC: 18 MG/DL (ref 8–23)
BUN/CREAT SERPL: 26.5 (ref 7–25)
CALCIUM SPEC-SCNC: 7.7 MG/DL (ref 8.6–10.5)
CHLORIDE SERPL-SCNC: 99 MMOL/L (ref 98–107)
CO2 SERPL-SCNC: 25 MMOL/L (ref 22–29)
CREAT SERPL-MCNC: 0.68 MG/DL (ref 0.76–1.27)
DEPRECATED RDW RBC AUTO: 43.2 FL (ref 37–54)
EGFRCR SERPLBLD CKD-EPI 2021: 95.1 ML/MIN/1.73
EOSINOPHIL # BLD AUTO: 0 10*3/MM3 (ref 0–0.4)
EOSINOPHIL NFR BLD AUTO: 0 % (ref 0.3–6.2)
ERYTHROCYTE [DISTWIDTH] IN BLOOD BY AUTOMATED COUNT: 13.3 % (ref 12.3–15.4)
GLUCOSE BLDC GLUCOMTR-MCNC: 151 MG/DL (ref 70–130)
GLUCOSE BLDC GLUCOMTR-MCNC: 189 MG/DL (ref 70–130)
GLUCOSE BLDC GLUCOMTR-MCNC: 215 MG/DL (ref 70–130)
GLUCOSE BLDC GLUCOMTR-MCNC: 240 MG/DL (ref 70–130)
GLUCOSE SERPL-MCNC: 178 MG/DL (ref 65–99)
HCT VFR BLD AUTO: 41.3 % (ref 37.5–51)
HGB BLD-MCNC: 14.1 G/DL (ref 13–17.7)
IMM GRANULOCYTES # BLD AUTO: 0.09 10*3/MM3 (ref 0–0.05)
IMM GRANULOCYTES NFR BLD AUTO: 1.1 % (ref 0–0.5)
LYMPHOCYTES # BLD AUTO: 0.54 10*3/MM3 (ref 0.7–3.1)
LYMPHOCYTES NFR BLD AUTO: 6.7 % (ref 19.6–45.3)
MAGNESIUM SERPL-MCNC: 2.4 MG/DL (ref 1.6–2.4)
MCH RBC QN AUTO: 29.9 PG (ref 26.6–33)
MCHC RBC AUTO-ENTMCNC: 34.1 G/DL (ref 31.5–35.7)
MCV RBC AUTO: 87.7 FL (ref 79–97)
MONOCYTES # BLD AUTO: 0.73 10*3/MM3 (ref 0.1–0.9)
MONOCYTES NFR BLD AUTO: 9 % (ref 5–12)
NEUTROPHILS NFR BLD AUTO: 6.74 10*3/MM3 (ref 1.7–7)
NEUTROPHILS NFR BLD AUTO: 83.1 % (ref 42.7–76)
PHOSPHATE SERPL-MCNC: 3 MG/DL (ref 2.5–4.5)
PLATELET # BLD AUTO: 122 10*3/MM3 (ref 140–450)
PMV BLD AUTO: 12.6 FL (ref 6–12)
POTASSIUM SERPL-SCNC: 4.4 MMOL/L (ref 3.5–5.2)
RBC # BLD AUTO: 4.71 10*6/MM3 (ref 4.14–5.8)
SODIUM SERPL-SCNC: 131 MMOL/L (ref 136–145)
WBC NRBC COR # BLD AUTO: 8.11 10*3/MM3 (ref 3.4–10.8)

## 2025-02-09 PROCEDURE — 80048 BASIC METABOLIC PNL TOTAL CA: CPT | Performed by: NEUROLOGICAL SURGERY

## 2025-02-09 PROCEDURE — 84100 ASSAY OF PHOSPHORUS: CPT | Performed by: INTERNAL MEDICINE

## 2025-02-09 PROCEDURE — 83735 ASSAY OF MAGNESIUM: CPT | Performed by: INTERNAL MEDICINE

## 2025-02-09 PROCEDURE — 63710000001 INSULIN GLARGINE PER 5 UNITS: Performed by: STUDENT IN AN ORGANIZED HEALTH CARE EDUCATION/TRAINING PROGRAM

## 2025-02-09 PROCEDURE — 82948 REAGENT STRIP/BLOOD GLUCOSE: CPT

## 2025-02-09 PROCEDURE — 63710000001 DEXAMETHASONE PER 0.25 MG: Performed by: STUDENT IN AN ORGANIZED HEALTH CARE EDUCATION/TRAINING PROGRAM

## 2025-02-09 PROCEDURE — 85025 COMPLETE CBC W/AUTO DIFF WBC: CPT | Performed by: NEUROLOGICAL SURGERY

## 2025-02-09 PROCEDURE — 63710000001 INSULIN LISPRO (HUMAN) PER 5 UNITS: Performed by: STUDENT IN AN ORGANIZED HEALTH CARE EDUCATION/TRAINING PROGRAM

## 2025-02-09 RX ADMIN — RIVAROXABAN 20 MG: 20 TABLET, FILM COATED ORAL at 17:34

## 2025-02-09 RX ADMIN — ATORVASTATIN CALCIUM 10 MG: 20 TABLET, FILM COATED ORAL at 09:04

## 2025-02-09 RX ADMIN — AMLODIPINE BESYLATE 5 MG: 5 TABLET ORAL at 09:05

## 2025-02-09 RX ADMIN — INSULIN GLARGINE 10 UNITS: 100 INJECTION, SOLUTION SUBCUTANEOUS at 22:22

## 2025-02-09 RX ADMIN — POLYETHYLENE GLYCOL 3350 17 G: 17 POWDER, FOR SOLUTION ORAL at 09:17

## 2025-02-09 RX ADMIN — INSULIN LISPRO 3 UNITS: 100 INJECTION, SOLUTION INTRAVENOUS; SUBCUTANEOUS at 09:16

## 2025-02-09 RX ADMIN — INSULIN LISPRO 2 UNITS: 100 INJECTION, SOLUTION INTRAVENOUS; SUBCUTANEOUS at 11:55

## 2025-02-09 RX ADMIN — Medication 10 ML: at 20:06

## 2025-02-09 RX ADMIN — INSULIN LISPRO 2 UNITS: 100 INJECTION, SOLUTION INTRAVENOUS; SUBCUTANEOUS at 17:34

## 2025-02-09 RX ADMIN — LOSARTAN POTASSIUM 25 MG: 25 TABLET, FILM COATED ORAL at 09:04

## 2025-02-09 RX ADMIN — FAMOTIDINE 40 MG: 20 TABLET, FILM COATED ORAL at 09:05

## 2025-02-09 RX ADMIN — SENNOSIDES AND DOCUSATE SODIUM 2 TABLET: 50; 8.6 TABLET ORAL at 09:17

## 2025-02-09 RX ADMIN — TAMSULOSIN HYDROCHLORIDE 0.4 MG: 0.4 CAPSULE ORAL at 09:04

## 2025-02-09 RX ADMIN — DEXAMETHASONE 4 MG: 4 TABLET ORAL at 09:05

## 2025-02-09 RX ADMIN — INSULIN LISPRO 2 UNITS: 100 INJECTION, SOLUTION INTRAVENOUS; SUBCUTANEOUS at 22:22

## 2025-02-09 RX ADMIN — DEXAMETHASONE 4 MG: 4 TABLET ORAL at 17:34

## 2025-02-09 RX ADMIN — Medication 10 ML: at 09:05

## 2025-02-09 NOTE — PLAN OF CARE
Goal Outcome Evaluation:  Plan of Care Reviewed With: patient, spouse        Progress: no change

## 2025-02-09 NOTE — PROGRESS NOTES
Name: Sumit Jules ADMIT: 2025   : 1945  PCP: Carol Rios LEONARDO, APRN    MRN: [id number] LOS: 10 days   AGE/SEX: 79 y.o. male  ROOM: [facility]     Subjective   Subjective   Sitting up in chair, denies any new problems.  Family at bedside.    Objective   Objective   Vital Signs  Temp:  [97.9 °F (36.6 °C)-98.6 °F (37 °C)] 98.5 °F (36.9 °C)  Heart Rate:  [35-66] 44  Resp:  [16-18] 18  BP: (127-148)/(54-75) 135/54  SpO2:  [95 %-98 %] 98 %  on   ;   Device (Oxygen Therapy): room air  Body mass index is 37.61 kg/m².  Physical Exam  Constitutional:       Appearance: He is ill-appearing.   Pulmonary:      Effort: Pulmonary effort is normal. No respiratory distress.      Breath sounds: No stridor.   Skin:     Coloration: Skin is not pale.   Neurological:      Mental Status: He is alert.      Comments: Expressive aphasia, improved from          Results Review     I reviewed the patient's new clinical results.  Results from last 7 days   Lab Units 25  0525 25  0711 25  0649   WBC 10*3/mm3 8.11 8.73 9.27 11.85*   HEMOGLOBIN g/dL 14.1 14.0 13.8 14.2   PLATELETS 10*3/mm3 122* 113* 118* 129*     Results from last 7 days   Lab Units 25  04225  0525 25  0919 25  1216   SODIUM mmol/L 131* 130* 130* 130*   POTASSIUM mmol/L 4.4 4.3 4.5 4.5   CHLORIDE mmol/L 99 95* 97* 95*   CO2 mmol/L 25.0 27.0 27.9 27.0   BUN mg/dL 18 19 19 20   CREATININE mg/dL 0.68* 0.71* 0.92 0.83   GLUCOSE mg/dL 178* 174* 266* 221*   EGFR mL/min/1.73 95.1 93.9 85.1 89.6           Results from last 7 days   Lab Units 25  0428 25  0525 25  0919 25  0711 25  1216 25  0649   CALCIUM mg/dL 7.7* 7.7* 8.0*  --  7.8*  --    MAGNESIUM mg/dL 2.4 2.1 2.1  --  2.2  --    PHOSPHORUS mg/dL 3.0 3.1  --  3.4  --  3.1       Glucose   Date/Time Value Ref Range Status   2025 2049 216 (H) 70 - 130 mg/dL Final   2025 1635 228 (H) 70 - 130 mg/dL Final    02/08/2025 1154 179 (H) 70 - 130 mg/dL Final   02/08/2025 0805 172 (H) 70 - 130 mg/dL Final   02/07/2025 2010 194 (H) 70 - 130 mg/dL Final   02/07/2025 1630 232 (H) 70 - 130 mg/dL Final   02/07/2025 1131 291 (H) 70 - 130 mg/dL Final       No radiology results for the last day  Scheduled Medications  amLODIPine, 5 mg, Oral, Q24H  atorvastatin, 10 mg, Oral, Daily  dexAMETHasone, 4 mg, Oral, BID With Meals   Followed by  [START ON 2/13/2025] dexAMETHasone, 3 mg, Oral, BID With Meals   Followed by  [START ON 2/20/2025] dexAMETHasone, 2 mg, Oral, BID With Meals   Followed by  [START ON 2/27/2025] dexAMETHasone, 1 mg, Oral, Daily  famotidine, 40 mg, Oral, Daily  insulin glargine, 10 Units, Subcutaneous, Nightly  insulin lispro, 2-7 Units, Subcutaneous, 4x Daily With Meals & Nightly  losartan, 25 mg, Oral, Q24H  rivaroxaban, 20 mg, Oral, Daily With Dinner  sodium chloride, 10 mL, Intravenous, Q12H  tamsulosin, 0.4 mg, Oral, Daily    Infusions     Diet  Diet: Diabetic; Consistent Carbohydrate; Fluid Consistency: Thin (IDDSI 0)       Assessment/Plan     Active Hospital Problems    Diagnosis  POA    **Brain mass [G93.89]  Yes    Pharyngeal dysphagia [R13.13]  Yes    Vasogenic cerebral edema [G93.6]  Yes    Slurred speech [R47.81]  Yes    History of DVT (deep vein thrombosis) [Z86.718]  Not Applicable    Type 2 diabetes mellitus with hyperglycemia, without long-term current use of insulin [E11.65]  Yes    Pancreatic lesion [K86.9]  Yes    Hyperlipidemia [E78.5]  Yes    Essential (primary) hypertension [I10]  Yes    Chronic coronary artery disease [I25.10]  Yes    Prostate cancer [C61]  Yes    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       79 y.o. male admitted with Brain mass.      02/09/25  Acute rehab referral pending.  BG is better today.    Brain mass  with vasogenic edema with dysarthria  Concern for lymphoma  -Neurosurgery followed  -brain bx 2/3, intra-op path c/w lymphoma; final path pending  -transition IV to PO steroids 2/6  -Onc eval'd-plan to follow-up with Dr. Fish ([facility])      Pancreas mass  -Outside hospital CT scan shows ill-defined lesion of the pancreas head concerning for possible pancreatic malignancy.  Patient reportedly had FNA biopsy in May 2024 that was negative for malignancy.  Patient also with changes concerning for chronic pancreatitis.  Left adrenal adenoma also noted.  CA 19-9 elevated.     DM2 with hyperglycemia  -Glargine to 10 units nightly; SSI; monitor for hypoglycemia     Essential hypertension  -Amlodipine, losartan     Hyperlipidemia  -statin     Antithrombin III deficiency on long-term anticoagulation:  -Xarelto     BPH  -Flomax         DVT prophylaxis: Xarelto (home med)   Discussed with patient and family.  Anticipated discharge acute rehab, once arrangements made            Rayray Bell MD  [facility]  02/09/25  08:29 EST

## 2025-02-09 NOTE — PROGRESS NOTES
Pathology report is still pending.    Patient will follow-up with his primary oncologist Dr. Fish at [facility] after discharge.    I spoke with the patient and his wife at the bedside.  They voiced understanding.    Will sign off.  Please call if any questions.    SHARON MONTE M.D., Ph.D.

## 2025-02-10 ENCOUNTER — READMISSION MANAGEMENT (OUTPATIENT)
Dept: CALL CENTER | Facility: HOSPITAL | Age: 80
End: 2025-02-10
Payer: MEDICARE

## 2025-02-10 ENCOUNTER — HOME HEALTH ADMISSION (OUTPATIENT)
Dept: HOME HEALTH SERVICES | Facility: HOME HEALTHCARE | Age: 80
End: 2025-02-10
Payer: MEDICARE

## 2025-02-10 ENCOUNTER — DOCUMENTATION (OUTPATIENT)
Dept: HOME HEALTH SERVICES | Facility: HOME HEALTHCARE | Age: 80
End: 2025-02-10
Payer: MEDICARE

## 2025-02-10 VITALS
SYSTOLIC BLOOD PRESSURE: 127 MMHG | WEIGHT: 277.34 LBS | BODY MASS INDEX: 37.56 KG/M2 | HEART RATE: 52 BPM | HEIGHT: 72 IN | RESPIRATION RATE: 20 BRPM | DIASTOLIC BLOOD PRESSURE: 70 MMHG | OXYGEN SATURATION: 97 % | TEMPERATURE: 97.3 F

## 2025-02-10 PROBLEM — E87.1 HYPONATREMIA: Status: ACTIVE | Noted: 2025-02-10

## 2025-02-10 LAB
ANION GAP SERPL CALCULATED.3IONS-SCNC: 4.5 MMOL/L (ref 5–15)
BASOPHILS # BLD AUTO: 0 10*3/MM3 (ref 0–0.2)
BASOPHILS NFR BLD AUTO: 0 % (ref 0–1.5)
BUN SERPL-MCNC: 17 MG/DL (ref 8–23)
BUN/CREAT SERPL: 22.1 (ref 7–25)
CALCIUM SPEC-SCNC: 7.8 MG/DL (ref 8.6–10.5)
CHLORIDE SERPL-SCNC: 97 MMOL/L (ref 98–107)
CO2 SERPL-SCNC: 25.5 MMOL/L (ref 22–29)
CREAT SERPL-MCNC: 0.77 MG/DL (ref 0.76–1.27)
CYTO UR: NORMAL
DEPRECATED RDW RBC AUTO: 42.9 FL (ref 37–54)
DX PRELIMINARY: NORMAL
EGFRCR SERPLBLD CKD-EPI 2021: 91.6 ML/MIN/1.73
EOSINOPHIL # BLD AUTO: 0 10*3/MM3 (ref 0–0.4)
EOSINOPHIL NFR BLD AUTO: 0 % (ref 0.3–6.2)
ERYTHROCYTE [DISTWIDTH] IN BLOOD BY AUTOMATED COUNT: 13.4 % (ref 12.3–15.4)
GLUCOSE BLDC GLUCOMTR-MCNC: 149 MG/DL (ref 70–130)
GLUCOSE BLDC GLUCOMTR-MCNC: 187 MG/DL (ref 70–130)
GLUCOSE BLDC GLUCOMTR-MCNC: 257 MG/DL (ref 70–130)
GLUCOSE SERPL-MCNC: 171 MG/DL (ref 65–99)
HCT VFR BLD AUTO: 41.3 % (ref 37.5–51)
HGB BLD-MCNC: 13.9 G/DL (ref 13–17.7)
IMM GRANULOCYTES # BLD AUTO: 0.1 10*3/MM3 (ref 0–0.05)
IMM GRANULOCYTES NFR BLD AUTO: 1.2 % (ref 0–0.5)
LAB AP CASE REPORT: NORMAL
LAB AP CLINICAL INFORMATION: NORMAL
LAB AP DIAGNOSIS COMMENT: NORMAL
LAB AP SPECIAL STAINS: NORMAL
LYMPHOCYTES # BLD AUTO: 0.84 10*3/MM3 (ref 0.7–3.1)
LYMPHOCYTES NFR BLD AUTO: 9.8 % (ref 19.6–45.3)
Lab: NORMAL
MAGNESIUM SERPL-MCNC: 2.1 MG/DL (ref 1.6–2.4)
MCH RBC QN AUTO: 30 PG (ref 26.6–33)
MCHC RBC AUTO-ENTMCNC: 33.7 G/DL (ref 31.5–35.7)
MCV RBC AUTO: 89.2 FL (ref 79–97)
MONOCYTES # BLD AUTO: 0.7 10*3/MM3 (ref 0.1–0.9)
MONOCYTES NFR BLD AUTO: 8.2 % (ref 5–12)
NEUTROPHILS NFR BLD AUTO: 6.94 10*3/MM3 (ref 1.7–7)
NEUTROPHILS NFR BLD AUTO: 80.8 % (ref 42.7–76)
NRBC BLD AUTO-RTO: 0 /100 WBC (ref 0–0.2)
PATH REPORT.FINAL DX SPEC: NORMAL
PATH REPORT.GROSS SPEC: NORMAL
PHOSPHATE SERPL-MCNC: 3.3 MG/DL (ref 2.5–4.5)
PLATELET # BLD AUTO: 104 10*3/MM3 (ref 140–450)
PMV BLD AUTO: 13 FL (ref 6–12)
POTASSIUM SERPL-SCNC: 4.5 MMOL/L (ref 3.5–5.2)
RBC # BLD AUTO: 4.63 10*6/MM3 (ref 4.14–5.8)
SODIUM SERPL-SCNC: 127 MMOL/L (ref 136–145)
WBC NRBC COR # BLD AUTO: 8.58 10*3/MM3 (ref 3.4–10.8)

## 2025-02-10 PROCEDURE — 63710000001 INSULIN LISPRO (HUMAN) PER 5 UNITS: Performed by: STUDENT IN AN ORGANIZED HEALTH CARE EDUCATION/TRAINING PROGRAM

## 2025-02-10 PROCEDURE — 63710000001 DEXAMETHASONE PER 0.25 MG: Performed by: STUDENT IN AN ORGANIZED HEALTH CARE EDUCATION/TRAINING PROGRAM

## 2025-02-10 PROCEDURE — 82948 REAGENT STRIP/BLOOD GLUCOSE: CPT

## 2025-02-10 PROCEDURE — 83735 ASSAY OF MAGNESIUM: CPT | Performed by: INTERNAL MEDICINE

## 2025-02-10 PROCEDURE — 84100 ASSAY OF PHOSPHORUS: CPT | Performed by: INTERNAL MEDICINE

## 2025-02-10 PROCEDURE — 80048 BASIC METABOLIC PNL TOTAL CA: CPT | Performed by: NEUROLOGICAL SURGERY

## 2025-02-10 PROCEDURE — 85025 COMPLETE CBC W/AUTO DIFF WBC: CPT | Performed by: NEUROLOGICAL SURGERY

## 2025-02-10 RX ORDER — FAMOTIDINE 40 MG/1
40 TABLET, FILM COATED ORAL DAILY
Qty: 30 TABLET | Refills: 0 | Status: SHIPPED | OUTPATIENT
Start: 2025-02-11

## 2025-02-10 RX ORDER — SODIUM CHLORIDE 1 G/1
1 TABLET ORAL 2 TIMES DAILY WITH MEALS
Status: DISCONTINUED | OUTPATIENT
Start: 2025-02-10 | End: 2025-02-10 | Stop reason: HOSPADM

## 2025-02-10 RX ORDER — SODIUM CHLORIDE 1 G/1
1 TABLET ORAL 2 TIMES DAILY WITH MEALS
Qty: 8 TABLET | Refills: 0 | Status: SHIPPED | OUTPATIENT
Start: 2025-02-10 | End: 2025-02-17

## 2025-02-10 RX ORDER — AMLODIPINE BESYLATE 10 MG/1
5 TABLET ORAL
Start: 2025-02-10

## 2025-02-10 RX ORDER — DEXAMETHASONE 1 MG
TABLET ORAL
Qty: 101 TABLET | Refills: 0 | Status: SHIPPED | OUTPATIENT
Start: 2025-02-10 | End: 2025-03-05

## 2025-02-10 RX ADMIN — INSULIN LISPRO 2 UNITS: 100 INJECTION, SOLUTION INTRAVENOUS; SUBCUTANEOUS at 09:04

## 2025-02-10 RX ADMIN — AMLODIPINE BESYLATE 5 MG: 5 TABLET ORAL at 08:50

## 2025-02-10 RX ADMIN — TAMSULOSIN HYDROCHLORIDE 0.4 MG: 0.4 CAPSULE ORAL at 08:51

## 2025-02-10 RX ADMIN — FAMOTIDINE 40 MG: 20 TABLET, FILM COATED ORAL at 08:50

## 2025-02-10 RX ADMIN — DEXAMETHASONE 4 MG: 4 TABLET ORAL at 16:57

## 2025-02-10 RX ADMIN — INSULIN LISPRO 4 UNITS: 100 INJECTION, SOLUTION INTRAVENOUS; SUBCUTANEOUS at 17:08

## 2025-02-10 RX ADMIN — Medication 10 ML: at 08:52

## 2025-02-10 RX ADMIN — LOSARTAN POTASSIUM 25 MG: 25 TABLET, FILM COATED ORAL at 08:51

## 2025-02-10 RX ADMIN — ATORVASTATIN CALCIUM 10 MG: 20 TABLET, FILM COATED ORAL at 08:50

## 2025-02-10 RX ADMIN — RIVAROXABAN 20 MG: 20 TABLET, FILM COATED ORAL at 16:57

## 2025-02-10 RX ADMIN — SODIUM CHLORIDE TAB 1 GM 1 G: 1 TAB at 16:17

## 2025-02-10 RX ADMIN — BISACODYL 5 MG: 5 TABLET, COATED ORAL at 09:05

## 2025-02-10 RX ADMIN — DEXAMETHASONE 4 MG: 4 TABLET ORAL at 08:50

## 2025-02-10 NOTE — PROGRESS NOTES
Nutrition Services    Patient Name: Sumit Jules  YOB: 1945  MRN: [id number]  Admission date: 1/30/2025    PROGRESS NOTE      Encounter Information: Pt laying in bed with wife at bedside. Pt states his appetite remains good and he has been eating well. Per EMR, pt eating % all recorded intake x 1 week. Pt denied any n/v or chew/swallow issues.        PO Diet: Diet: Diabetic; Consistent Carbohydrate; Fluid Consistency: Thin (IDDSI 0)   PO Supplements: -   PO Intake:  %        Current nutrition support: -   Nutrition support review: -       Labs (reviewed below): Na 127        GI Function:  Last BM 2/5, PRN bowel meds given 2/9       Nutrition Intervention Updates: Encourage PO intake  Recommend initiating scheduled bowel regimen       Results from last 7 days   Lab Units 02/10/25  0626 02/09/25  0428 02/08/25  0525   SODIUM mmol/L 127* 131* 130*   POTASSIUM mmol/L 4.5 4.4 4.3   CHLORIDE mmol/L 97* 99 95*   CO2 mmol/L 25.5 25.0 27.0   BUN mg/dL 17 18 19   CREATININE mg/dL 0.77 0.68* 0.71*   CALCIUM mg/dL 7.8* 7.7* 7.7*   GLUCOSE mg/dL 171* 178* 174*     Results from last 7 days   Lab Units 02/10/25  0626 02/09/25  0428 02/08/25  0525   MAGNESIUM mg/dL 2.1 2.4 2.1   PHOSPHORUS mg/dL 3.3 3.0 3.1   HEMOGLOBIN g/dL 13.9 14.1 14.0   HEMATOCRIT % 41.3 41.3 40.4     COVID19   Date Value Ref Range Status   02/12/2024 Not Detected Not Detected - Ref. Range Final     Lab Results   Component Value Date    HGBA1C 7.40 (H) 12/03/2024       Wt Readings from Last 10 Encounters:   02/03/25 2200 126 kg (277 lb 5.4 oz)   01/30/25 2114 125 kg (275 lb)   01/29/25 1121 127 kg (279 lb 1.6 oz)   01/21/25 1529 129 kg (285 lb)   12/31/24 1129 131 kg (289 lb 4.8 oz)   12/03/24 1108 130 kg (287 lb 11.2 oz)   06/14/24 1100 134 kg (296 lb 6.4 oz)   05/03/24 1020 (!) 136 kg (300 lb 4.3 oz)   04/24/24 1323 136 kg (299 lb)   03/08/24 1110 136 kg (299 lb 12.8 oz)   02/23/24 1021 (!) 140 kg (309 lb)   02/12/24 1800 (!) 144  kg (318 lb 2 oz)   02/12/24 0903 (!) 144 kg (317 lb 7.4 oz)       RD to follow up per protocol.    Electronically signed by:  Sathish Ortez RDN, LD  02/10/25 09:38 EST

## 2025-02-10 NOTE — CASE MANAGEMENT/SOCIAL WORK
Continued Stay Note  [facility]     Patient Name: Sumit Jules  MRN: [id number]  Today's Date: 2/10/2025    Admit Date: 1/30/2025    Plan: Plans discharge home with assist of family and [facility].   Discharge Plan       Row Name 02/10/25 1536       Plan    Plan Plans discharge home with assist of family and [facility].    Patient/Family in Agreement with Plan yes    Plan Comments Discussed discharge plans with patient and family at bedside. States he wants to go home. Message sent to [facility] to f/u on referral. States able to accept. Plans discharge home with assist of family and [facility]. Rolling walker to be delivered to bedside per [facility]. Family to transport....[facility]                   Discharge Codes    No documentation.                 Expected Discharge Date and Time       Expected Discharge Date Expected Discharge Time    Feb 10, 2025               Katia Cameron RN

## 2025-02-10 NOTE — OUTREACH NOTE
Prep Survey      Flowsheet Row Responses   [facility] patient discharged [facility]? [facility]   Is LACE score < 7 ? No   Eligibility [facility]   Date of Admission 01/30/25   Date of Discharge 02/10/25   Discharge Disposition Home or Self Care   Discharge diagnosis Brain mass-Left frontal sterotactic brain biopsy   Does the patient have one of the following disease processes/diagnoses(primary or secondary)? General Surgery   Does the patient have Home health ordered? Yes   What is the Home health agency?  [facility]    Is there a DME ordered? Yes   What DME was ordered? [facility] for DME   Prep survey completed? Yes            OSMEL LEIJA - Registered Nurse

## 2025-02-10 NOTE — PLAN OF CARE
Pt rested well overnight, is nicki while sleeping but HR returns to 60+ once awake. Confused and incontinent but cooperative. Free from fall and injury. Turns independently, family at bedside. Plan is acute rehab.           Problem: Adult Inpatient Plan of Care  Goal: Plan of Care Review  Outcome: Progressing  Goal: Patient-Specific Goal (Individualized)  Outcome: Progressing  Goal: Absence of Hospital-Acquired Illness or Injury  Outcome: Progressing  Intervention: Identify and Manage Fall Risk  Recent Flowsheet Documentation  Taken 2/10/2025 0600 by Chris Finch, RN  Safety Promotion/Fall Prevention:   activity supervised   assistive device/personal items within reach   clutter free environment maintained   fall prevention program maintained   nonskid shoes/slippers when out of bed   room organization consistent   safety round/check completed  Taken 2/10/2025 0400 by Chris Finch, RN  Safety Promotion/Fall Prevention:   activity supervised   assistive device/personal items within reach   clutter free environment maintained   fall prevention program maintained   nonskid shoes/slippers when out of bed   room organization consistent   safety round/check completed  Taken 2/10/2025 0200 by Chris Finch, RN  Safety Promotion/Fall Prevention:   activity supervised   assistive device/personal items within reach   clutter free environment maintained   fall prevention program maintained   nonskid shoes/slippers when out of bed   room organization consistent   safety round/check completed  Taken 2/10/2025 0000 by Chris Finch, RN  Safety Promotion/Fall Prevention:   activity supervised   assistive device/personal items within reach   fall prevention program maintained   clutter free environment maintained   nonskid shoes/slippers when out of bed   room organization consistent   safety round/check completed  Taken 2/9/2025 2200 by Chris Finch, RN  Safety Promotion/Fall Prevention:   activity supervised    assistive device/personal items within reach   clutter free environment maintained   fall prevention program maintained   nonskid shoes/slippers when out of bed   room organization consistent   safety round/check completed  Taken 2/9/2025 2010 by Chris Finch RN  Safety Promotion/Fall Prevention:   activity supervised   assistive device/personal items within reach   clutter free environment maintained   fall prevention program maintained   nonskid shoes/slippers when out of bed   room organization consistent   safety round/check completed  Intervention: Prevent Skin Injury  Recent Flowsheet Documentation  Taken 2/10/2025 0600 by Chris Finch RN  Body Position: position changed independently  Skin Protection: incontinence pads utilized  Taken 2/10/2025 0400 by Chris Finch RN  Body Position: position changed independently  Skin Protection: incontinence pads utilized  Taken 2/10/2025 0200 by Chris Finch RN  Body Position: position changed independently  Skin Protection: incontinence pads utilized  Taken 2/10/2025 0000 by Chris Finch RN  Body Position: position changed independently  Skin Protection: incontinence pads utilized  Taken 2/9/2025 2200 by Chris Finch RN  Body Position: position changed independently  Skin Protection: incontinence pads utilized  Taken 2/9/2025 2010 by Chris Finch RN  Body Position: position changed independently  Skin Protection: incontinence pads utilized  Intervention: Prevent Infection  Recent Flowsheet Documentation  Taken 2/10/2025 0600 by Chris Finch RN  Infection Prevention: environmental surveillance performed  Taken 2/10/2025 0400 by Chris Finch RN  Infection Prevention: environmental surveillance performed  Taken 2/10/2025 0200 by Chris Finch RN  Infection Prevention: environmental surveillance performed  Taken 2/10/2025 0000 by Chris Finch RN  Infection Prevention: environmental surveillance performed  Taken 2/9/2025 2200 by  Chris Finch, RN  Infection Prevention: environmental surveillance performed  Taken 2/9/2025 2010 by Chris Finch, RN  Infection Prevention: environmental surveillance performed  Goal: Optimal Comfort and Wellbeing  Outcome: Progressing  Goal: Readiness for Transition of Care  Outcome: Progressing     Problem: Fall Injury Risk  Goal: Absence of Fall and Fall-Related Injury  Outcome: Progressing  Intervention: Promote Injury-Free Environment  Recent Flowsheet Documentation  Taken 2/10/2025 0600 by Chris Finch, RN  Safety Promotion/Fall Prevention:   activity supervised   assistive device/personal items within reach   clutter free environment maintained   fall prevention program maintained   nonskid shoes/slippers when out of bed   room organization consistent   safety round/check completed  Taken 2/10/2025 0400 by Chris Finch, RN  Safety Promotion/Fall Prevention:   activity supervised   assistive device/personal items within reach   clutter free environment maintained   fall prevention program maintained   nonskid shoes/slippers when out of bed   room organization consistent   safety round/check completed  Taken 2/10/2025 0200 by Chris Finch, RN  Safety Promotion/Fall Prevention:   activity supervised   assistive device/personal items within reach   clutter free environment maintained   fall prevention program maintained   nonskid shoes/slippers when out of bed   room organization consistent   safety round/check completed  Taken 2/10/2025 0000 by Chris Finch, RN  Safety Promotion/Fall Prevention:   activity supervised   assistive device/personal items within reach   fall prevention program maintained   clutter free environment maintained   nonskid shoes/slippers when out of bed   room organization consistent   safety round/check completed  Taken 2/9/2025 2200 by Chris Finch, RN  Safety Promotion/Fall Prevention:   activity supervised   assistive device/personal items within reach   clutter  free environment maintained   fall prevention program maintained   nonskid shoes/slippers when out of bed   room organization consistent   safety round/check completed  Taken 2/9/2025 2010 by Chris Finch RN  Safety Promotion/Fall Prevention:   activity supervised   assistive device/personal items within reach   clutter free environment maintained   fall prevention program maintained   nonskid shoes/slippers when out of bed   room organization consistent   safety round/check completed     Problem: Skin Injury Risk Increased  Goal: Skin Health and Integrity  Outcome: Progressing  Intervention: Optimize Skin Protection  Recent Flowsheet Documentation  Taken 2/10/2025 0600 by Chris Finch RN  Activity Management: activity encouraged  Pressure Reduction Techniques:   frequent weight shift encouraged   sit time limited to 2 hours   weight shift assistance provided  Pressure Reduction Devices:   heel offloading device utilized   positioning supports utilized   pressure-redistributing mattress utilized  Skin Protection: incontinence pads utilized  Taken 2/10/2025 0400 by Chris Finch RN  Activity Management: activity encouraged  Pressure Reduction Techniques:   frequent weight shift encouraged   sit time limited to 2 hours   weight shift assistance provided  Pressure Reduction Devices:   heel offloading device utilized   positioning supports utilized   pressure-redistributing mattress utilized  Skin Protection: incontinence pads utilized  Taken 2/10/2025 0200 by Chris Finch RN  Activity Management: activity encouraged  Pressure Reduction Techniques:   frequent weight shift encouraged   sit time limited to 2 hours   weight shift assistance provided  Pressure Reduction Devices:   heel offloading device utilized   positioning supports utilized   pressure-redistributing mattress utilized  Skin Protection: incontinence pads utilized  Taken 2/10/2025 0000 by Chris Finch RN  Activity Management: activity  encouraged  Pressure Reduction Techniques:   frequent weight shift encouraged   weight shift assistance provided   sit time limited to 2 hours  Pressure Reduction Devices:   heel offloading device utilized   pressure-redistributing mattress utilized   positioning supports utilized  Skin Protection: incontinence pads utilized  Taken 2/9/2025 2200 by Chris Finch RN  Activity Management: activity encouraged  Pressure Reduction Techniques:   frequent weight shift encouraged   sit time limited to 2 hours   weight shift assistance provided  Pressure Reduction Devices:   heel offloading device utilized   positioning supports utilized   pressure-redistributing mattress utilized  Skin Protection: incontinence pads utilized  Taken 2/9/2025 2010 by Chris Finch RN  Activity Management: activity encouraged  Pressure Reduction Techniques:   frequent weight shift encouraged   sit time limited to 2 hours   weight shift assistance provided  Head of Bed (HOB) Positioning: HOB elevated  Pressure Reduction Devices:   heel offloading device utilized   positioning supports utilized   pressure-redistributing mattress utilized  Skin Protection: incontinence pads utilized   Goal Outcome Evaluation:

## 2025-02-10 NOTE — PROGRESS NOTES
[facility] to provide Home Care services. Patient and family agreeable. PCP and contact information confirmed. Please call wife Bina to schedule HH visits, [phone].

## 2025-02-10 NOTE — DISCHARGE SUMMARY
NAME: Sumit Jules ADMIT: 2025   : 1945  PCP: Carol Rios APRN    MRN: [id number] LOS: 11 days   AGE/SEX: 79 y.o. male  ROOM: P597/1     Date of Admission:  2025  Date of Discharge:  2/10/2025    PCP: Carol Rios APRN    CHIEF COMPLAINT  No chief complaint on file.      DISCHARGE DIAGNOSIS  Active Hospital Problems    Diagnosis  POA    **Brain mass [G93.89]  Yes    Hyponatremia [E87.1]  Yes    Pharyngeal dysphagia [R13.13]  Yes    Vasogenic cerebral edema [G93.6]  Yes    Slurred speech [R47.81]  Yes    History of DVT (deep vein thrombosis) [Z86.718]  Not Applicable    Type 2 diabetes mellitus with hyperglycemia, without long-term current use of insulin [E11.65]  Yes    Pancreatic lesion [K86.9]  Yes    Hyperlipidemia [E78.5]  Yes    Essential (primary) hypertension [I10]  Yes    Chronic coronary artery disease [I25.10]  Yes    Prostate cancer [C61]  Yes    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   Converted from [employer]:   Description - DEEP VENOUS THROMBOPHLEBITIS, RECURRENT    Congenital deficiency of clotting factors 2012    Diabetes mellitus     pre - no insulin    DVT (deep venous thrombosis)     History of complete eye exam SCHEDULED    Hyperlipidemia     Idiopathic acute pancreatitis without infection or necrosis 2024    Impaired fasting glucose 2011    Obesity     Other seborrheic keratosis 2013    Formatting of this note might  be different from the original.   Converted from [employer]:   Description - SEBORRHEIC KERATOSIS    Pain of foot 03/19/2013    Formatting of this note might be different from the original.   Converted from [employer]:   Description - HEEL PAIN    Prostate cancer     Thrombocytopenia 03/19/2013    Formatting of this note might be different from the original.   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   Converted from [employer]:   Description - VASCULAR DISORDER OF SKIN       CONSULTS   [facility]  Oncology    HOSPITAL COURSE  Patient is a 79 y.o. male with complicated PMH including, Hypertension  Antithrombin III deficiency, History of DVTs, Diabetes type 2 and other medical issues. Transferred from [place] for neurosurgery brain biopsy of brain mass.  Reported history of melanoma and pancreas mass that was previously biopsied benign.      Underwent biopsy on 2/3/2025.  Initial path consistent with lymphoma.  On steroid taper. They are plan on following up with oncologist at [place]. Patient and family wish for discharge today with home health. He has had some hyponatremia which is likely multifactorial with his brain mass, decreased sodium intake and other process. I discussed potentially staying in the hospital but patient and family feel well and wish for dc today with risks of being in the hospital including weakness and infection. I did discuss that worsening sodium can increase risk of seizure and confusion. A reasonable plan which patient wishes for is to be on a trial of NACL tablets and he will get a repeat BMP in ~2 days with PCP for further monitoring. I also did advise him to avoid excess free water consumption which also may have been contributing over the past week or two.     DIAGNOSTICS    02/10/2025 0626 02/10/2025 0727 Basic Metabolic Panel  [[id number]]    (Abnormal)   Blood    Final result Component Value Units   Glucose 171 High  mg/dL   BUN 17 mg/dL   Creatinine 0.77 mg/dL   Sodium 127 Low  mmol/L   Potassium 4.5 mmol/L   Chloride 97 Low  mmol/L   CO2 25.5 mmol/L   Calcium 7.8 Low  mg/dL   BUN/Creatinine Ratio 22.1    Anion Gap 4.5 Low  mmol/L   eGFR 91.6 mL/min/1.73          02/10/2025 0626 02/10/2025 0727 Magnesium [[id number]]   Blood    Final result Component Value Units   Magnesium 2.1 mg/dL          02/10/2025 0626 02/10/2025 0727 Phosphorus [[id number]]   Blood    Final result Component Value Units   Phosphorus 3.3 mg/dL          02/10/2025 0626 02/10/2025 0705 CBC Auto Differential [[id number]]   (Abnormal)   Blood    Final result Component Value Units   WBC 8.58 10*3/mm3   RBC 4.63 10*6/mm3   Hemoglobin 13.9 g/dL   Hematocrit 41.3 %   MCV 89.2 fL   MCH 30.0 pg   MCHC 33.7 g/dL   RDW 13.4 %   RDW-SD 42.9 fl   MPV 13.0 High  fL   Platelets 104 Low  10*3/mm3   Neutrophil % 80.8 High  %   Lymphocyte % 9.8 Low  %   Monocyte % 8.2 %   Eosinophil % 0.0 Low  %   Basophil % 0.0 %   Immature Grans % 1.2 High  %   Neutrophils, Absolute 6.94 10*3/mm3   Lymphocytes, Absolute 0.84 10*3/mm3   Monocytes, Absolute 0.70 10*3/mm3   Eosinophils, Absolute 0.00 10*3/mm3   Basophils, Absolute 0.00 10*3/mm3   Immature Grans, Absolute 0.10 High  10*3/mm3   nRBC 0.0 /100 WBC          02/09/2025 0428 02/09/2025 0517 Basic Metabolic Panel [[id number]]    (Abnormal)   Blood    Final result Component Value Units   Glucose 178 High  mg/dL   BUN 18 mg/dL   Creatinine 0.68 Low  mg/dL   Sodium 131 Low  mmol/L   Potassium 4.4 mmol/L   Chloride 99 mmol/L   CO2 25.0 mmol/L   Calcium 7.7 Low  mg/dL   BUN/Creatinine Ratio 26.5 High     Anion Gap 7.0 mmol/L   eGFR 95.1 mL/min/1.73          02/09/2025 0428 02/09/2025 0517 Magnesium [[id number]]   Blood    Final result Component Value Units   Magnesium 2.4 mg/dL          02/09/2025 0428 02/09/2025 0517 Phosphorus [[id number]]   Blood     Final result Component Value Units   Phosphorus 3.0 mg/dL          02/09/2025 0428 02/09/2025 0501 CBC Auto Differential [[id number]]   (Abnormal)   Blood    Final result Component Value Units   WBC 8.11 10*3/mm3   RBC 4.71 10*6/mm3   Hemoglobin 14.1 g/dL   Hematocrit 41.3 %   MCV 87.7 fL   MCH 29.9 pg   MCHC 34.1 g/dL   RDW 13.3 %   RDW-SD 43.2 fl   MPV 12.6 High  fL   Platelets 122 Low  10*3/mm3   Neutrophil % 83.1 High  %   Lymphocyte % 6.7 Low  %   Monocyte % 9.0 %   Eosinophil % 0.0 Low  %   Basophil % 0.1 %   Immature Grans % 1.1 High  %   Neutrophils, Absolute 6.74 10*3/mm3   Lymphocytes, Absolute 0.54 Low  10*3/mm3   Monocytes, Absolute 0.73 10*3/mm3   Eosinophils, Absolute 0.00 10*3/mm3   Basophils, Absolute 0.01 10*3/mm3   Immature Grans, Absolute 0.09 High  10*3/mm3          02/08/2025 0525 02/08/2025 0628 Basic Metabolic Panel [[id number]]    (Abnormal)   Blood    Final result Component Value Units   Glucose 174 High  mg/dL   BUN 19 mg/dL   Creatinine 0.71 Low  mg/dL   Sodium 130 Low  mmol/L   Potassium 4.3  mmol/L   Chloride 95 Low  mmol/L   CO2 27.0 mmol/L   Calcium 7.7 Low  mg/dL   BUN/Creatinine Ratio 26.8 High     Anion Gap 8.0 mmol/L   eGFR 93.9 mL/min/1.73         CT Head Without Contrast [[id number]] Sumeet as Reviewed   Order Status: Completed Collected: 02/04/25 0810    Updated: 02/05/25 0709   Narrative:     CT SCAN OF THE HEAD WITHOUT CONTRAST 02/04/2025     CLINICAL HISTORY: This is a 79-year-old male patient who has multiple  enhancing brain lesions and underwent a left frontal approach  stereotactic biopsy of the left basal ganglia enhancing mass yesterday  on 02/03/2025; this is a follow-up evaluation.     TECHNIQUE: Spiral CT images were obtained from the base of the skull to  the vertex without intravenous contrast. The images were reformatted and  are submitted in 3 mm thick axial, sagittal and coronal CT sections with  brain algorithm.     This is correlated to 2 prior head CTs with the  preoperative head CTs on  01/29/2025 and a preoperative head CT with and without contrast on  02/01/2025, and a preoperative MRI of the brain from [facility] on 01/29/2025.     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 Yesterday, the patient  underwent a superior lateral left frontal bone kyle hole covered by thin  malleable plate.  There is a tubular tract extending through the left  frontal lobe down to the enhancing lesion that is present extending from  the left basal ganglia region into the medial left temporal lobe. There  is a punctate 3 mm focus of postbiopsy hemorrhage within the lesion.  There is tubular air in the left frontal lobe parenchyma along the  tubular tract for the biopsy. These are expected postoperative findings.  There is some parenchymal low-density in the left putamen and internal  capsule of medial left temporal lobe felt to be vasogenic edema  surrounding known enhancing brain lesion seen on the prior MRI of the  brain. Overall, the ventricles are normal in size. There is no midline  shift. No extra-axial fluid collections are identified.  No additional  areas of acute intracranial hemorrhage are seen.      Impression:        1. Yesterday on 02/03/2025, the patient underwent kyle hole through the  superior lateral left frontal bone, and there is a tubular tract that  has some air within it extending through the left frontal lobe  parenchyma down to the lesion in the left basal ganglia region extending  into the medial left temporal lobe that was biopsied and there is a  punctate 3 mm focus of hemorrhage at the biopsy site. 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 Expected postbiopsy changes are present with no complicating  features.  There is stable vasogenic edema in the left basal ganglia  region and medial left temporal lobe that were present on preoperative  MRI 01/29/2025. Correlation with biopsy results is suggested.  The  remainder of the head CT is normal.     Radiation dose reduction techniques were utilized, including automated  exposure control and exposure modulation based on body size.        This report was finalized on 2/5/2025 7:06 AM by Dr. Pranay Burton M.D on  Workstation: [id number]       FL Video Swallow With Speech Single Contrast [[id number]] Sumeet as Reviewed   Order Status: Completed Collected: 02/03/25 0917    Updated: 02/03/25 1330   Narrative:     VIDEO ESOPHAGRAM 02/01/2025     HISTORY: Possible aspiration.     Fluoroscopy was used during performance of the video esophagram by  speech pathology.     VFSS completed this date with Dr. Swanson present. Patient presents  with mild oropharyngeal dysphagia. Trace transient penetration with thin  liquid by cup. Increased amount of penetration that enters laryngeal  vestibule with thin by straw and is mostly transient. No aspiration.  Mild vallecula and pyriform residue with thin liquid and solid, moderate  with puree clears with cued reswallow or liquid wash. Please see full  speech pathology report.     FLUOROSCOPY TIME:  One minute 55 seconds, 3251 images, 6.9 mGy.     This report was finalized on 2/3/2025 1:27 PM by Dr. Moiz Ernst M.D on Workstation: [id number]       CT Head With & Without Contrast [[id number]] Sumeet as  Reviewed   Order Status: Completed Collected: 02/01/25 0817    Updated: 02/01/25 0827   Narrative:     CT SCAN OF THE BRAIN WITHOUT AND WITH CONTRAST     HISTORY: Preop for brain surgery. Multiple enhancing lesions noted on  outside MRI scan dated 1/29/2025 with the largest involving the left  basal ganglia.     The CT scan was performed through the brain without and with IV contrast  with thin 1 mm section imaging for stereotactic Stealth protocol. There  is a very vague area of decreased density in the left basal ganglia with  associated mass effect that effaces the left lateral ventricle. There is  some associated enhancement within this area anteriorly and this  corresponds to the largest enhancing mass noted on the MRI scan dated  1/29/2025. A smaller enhancing mass involves the region of the corpus  callosum at the midline between the lateral ventricles.                 Radiation dose reduction techniques were utilized, including automated  exposure control and exposure modulation based on body size.        This report was finalized on 2/1/2025 8:24 AM by Dr. David Swanson M.D  on Workstation: [id number]       CT Chest With Contrast Diagnostic [[id number]] Sumeet as Reviewed   Order Status: Completed Collected: 01/29/25 1625    Updated: 01/29/25 1647   Narrative:     CT ABDOMEN PELVIS W CONTRAST, CT CHEST W CONTRAST DIAGNOSTIC    Date of Exam: 1/29/2025 4:10 PM EST    Indication: metastatic disease evaluation.    Comparison: 2/12/2024    Technique: Axial CT images were obtained of the chest abdomen and pelvis following the uneventful intravenous administration of iodinated contrast. Sagittal and coronal reconstructions were performed.  Automated exposure control and iterative  reconstruction methods were used.        FINDINGS:  Scattered atelectasis is noted. No well-defined consolidations or pleural effusions are observed. There is a tiny nodule in the left upper lobe measuring 4 mm on image #31 series 3. A  calcified granuloma is noted in the left lower lobe. No suspicious  pulmonary nodules or abnormal pulmonary masses are seen.    There is a mildly prominent subcarinal lymph node measuring 2.2 cm on image #37 series 2. Otherwise nonspecific lymph nodes are seen throughout the hilar, mediastinal, and axillary regions. Mild atherosclerosis is noted. Coronary artery calcifications  are observed. The thyroid gland is unremarkable. The esophagus is unremarkable.    The liver and spleen are stable. The patient is status post cholecystectomy. There is atrophy throughout the pancreas. There is ill-defined fullness involving the pancreatic head. There is a subtle area of apparent decreased attenuation which measures  approximately 2 cm. The findings suggest a lesion within the pancreatic head. Multiple low-density foci are seen throughout the pancreatic head, body, and tail suggesting cysts. There is a prominent focus in the pancreatic body seen on the prior study  measuring approximately 1.8 cm. There may be dilatation of the proximal pancreatic duct as well at the level of the pancreatic head and proximal body. The edema surrounding the pancreas has improved since the prior.    There is a small hypodense nodule involving the left adrenal gland likely related to a small adenoma measuring 1.2 cm. The right adrenal gland is unremarkable. The bilateral kidneys are stable.    The liver, spleen, and pancreas are stable. The gallbladder and bile ducts are unremarkable. The bilateral adrenal glands are stable. The bilateral kidneys are stable.    There is no bowel dilatation or obstruction. There is no evidence for acute appendicitis. No significant free fluid is observed. No abnormal fluid collections are identified. No significant lymphadenopathy is seen throughout the abdomen or pelvis. The  bladder is partially decompressed. The celiac and superior mesenteric arterial distributions are opacified without evidence for occlusion.  Mild atherosclerosis is noted.    No acute osseous abnormalities are observed. No new aggressive lytic or sclerotic lesions are seen.   Impression:     1.Evidence for an ill-defined lesion involving the pancreatic head measuring approximately 2 cm. The findings suggest possible pancreatic malignancy of the pancreatic head. Changes secondary to pancreatitis may also contribute to this appearance.  Recommend correlation with MRI of the pancreas for better characterization.  2.Additional hypodense foci are seen throughout the pancreas suggesting cysts. There is mild prominence of the pancreatic duct at the level of proximal pancreatic head/proximal body.  3.A mildly prominent subcarinal lymph node is noted in the chest. Recommend correlation with PET/CT imaging to exclude abnormal activity. No suspicious pulmonary nodules are identified.  4.No evidence for acute abnormality throughout the chest abdomen or pelvis.  5.No evidence for additional definitive metastatic disease throughout the abdomen or pelvis.  6.Evidence for small adenoma involving the left adrenal gland measuring 1.2 cm.              Electronically Signed: Shiv Lozano MD   1/29/2025 4:45 PM EST   Workstation ID: [id number]    CT Abdomen Pelvis With Contrast [[id number]] Sumeet as Reviewed   Order Status: Completed Collected: 01/29/25 1625    Updated: 01/29/25 1647   Narrative:     CT ABDOMEN PELVIS W CONTRAST, CT CHEST W CONTRAST DIAGNOSTIC    Date of Exam: 1/29/2025 4:10 PM EST    Indication: metastatic disease evaluation.    Comparison: 2/12/2024    Technique: Axial CT images were obtained of the chest abdomen and pelvis following the uneventful intravenous administration of iodinated contrast. Sagittal and coronal reconstructions were performed.  Automated exposure control and iterative  reconstruction methods were used.        FINDINGS:  Scattered atelectasis is noted. No well-defined consolidations or pleural effusions are observed. There is a tiny  nodule in the left upper lobe measuring 4 mm on image #31 series 3. A calcified granuloma is noted in the left lower lobe. No suspicious  pulmonary nodules or abnormal pulmonary masses are seen.    There is a mildly prominent subcarinal lymph node measuring 2.2 cm on image #37 series 2. Otherwise nonspecific lymph nodes are seen throughout the hilar, mediastinal, and axillary regions. Mild atherosclerosis is noted. Coronary artery calcifications  are observed. The thyroid gland is unremarkable. The esophagus is unremarkable.    The liver and spleen are stable. The patient is status post cholecystectomy. There is atrophy throughout the pancreas. There is ill-defined fullness involving the pancreatic head. There is a subtle area of apparent decreased attenuation which measures  approximately 2 cm. The findings suggest a lesion within the pancreatic head. Multiple low-density foci are seen throughout the pancreatic head, body, and tail suggesting cysts. There is a prominent focus in the pancreatic body seen on the prior study  measuring approximately 1.8 cm. There may be dilatation of the proximal pancreatic duct as well at the level of the pancreatic head and proximal body. The edema surrounding the pancreas has improved since the prior.    There is a small hypodense nodule involving the left adrenal gland likely related to a small adenoma measuring 1.2 cm. The right adrenal gland is unremarkable. The bilateral kidneys are stable.    The liver, spleen, and pancreas are stable. The gallbladder and bile ducts are unremarkable. The bilateral adrenal glands are stable. The bilateral kidneys are stable.    There is no bowel dilatation or obstruction. There is no evidence for acute appendicitis. No significant free fluid is observed. No abnormal fluid collections are identified. No significant lymphadenopathy is seen throughout the abdomen or pelvis. The  bladder is partially decompressed. The celiac and superior mesenteric  arterial distributions are opacified without evidence for occlusion. Mild atherosclerosis is noted.    No acute osseous abnormalities are observed. No new aggressive lytic or sclerotic lesions are seen.   Impression:     1.Evidence for an ill-defined lesion involving the pancreatic head measuring approximately 2 cm. The findings suggest possible pancreatic malignancy of the pancreatic head. Changes secondary to pancreatitis may also contribute to this appearance.  Recommend correlation with MRI of the pancreas for better characterization.  2.Additional hypodense foci are seen throughout the pancreas suggesting cysts. There is mild prominence of the pancreatic duct at the level of proximal pancreatic head/proximal body.  3.A mildly prominent subcarinal lymph node is noted in the chest. Recommend correlation with PET/CT imaging to exclude abnormal activity. No suspicious pulmonary nodules are identified.  4.No evidence for acute abnormality throughout the chest abdomen or pelvis.  5.No evidence for additional definitive metastatic disease throughout the abdomen or pelvis.  6.Evidence for small adenoma involving the left adrenal gland measuring 1.2 cm.              Electronically Signed: Shiv Lozano MD   1/29/2025 4:45 PM EST   Workstation ID: [id number]    MRI Brain With & Without Contrast [[id number]] Sumeet as Reviewed   Order Status: Completed Collected: 01/29/25 1443    Updated: 01/29/25 1502   Narrative:     MRI BRAIN W WO CONTRAST    Date of Exam: 1/29/2025 1:56 PM EST    Indication: speech abnormality confusion.     Comparison: Noncontrast head CT from today    Technique:  Routine multiplanar/multisequence sequence images of the brain were obtained before and after the uneventful administration of Prohance.      FINDINGS:  Irregular enhancing masslike focus centered about the left basal ganglia and left anterior temporal lobe measuring 2.9 x 1.9 x 2.5 cm. Lesion demonstrates somewhat heterogeneous, intermediate  DWI signal.    1.6 x 0.6 cm enhancing lesion involving the mid body of the corpus callosum (series 17, image 100).    Mild patchy enhancement along the left lateral ventricle centered on series 17, image 108.    1 cm irregular enhancing focus within the right thalamus (series 17, image 85).    There is edema about the left basal ganglia, left anterior temporal lobe, and extending into the left cerebral peduncle.      Foci of T2/FLAIR signal hyperintensity are seen within the bilateral hemispheric white matter    No midline shift or hydrocephalus is seen. No acute infarct is definitely identified. The visualized intracranial flow-voids appear unremarkable. Scattered paranasal sinus mucosal thickening is seen. Orbital structures are unremarkable. The visualized  superficial soft tissues and cervical spine demonstrate no significant abnormality.   Impression:       1.Multiple enhancing brain lesions, largest involving the left basal ganglia/left anterior temporal lobe measuring 2.9 x 1.9 x 2.5 cm. Smaller lesions involving the mid body of the corpus callosum, left frontal lobe adjacent to the left lateral  ventricle, and within the right thalamus. Findings are concerning for underlying neoplastic process with considerations including infiltrating glial neoplasm, CNS lymphoma, or metastatic disease. Subacute ischemia, tumefactive demyelination, or  infectious/inflammatory processes considered less likely but not excluded.  2.Edema about the left basal ganglia, left anterior temporal lobe, and left cerebral peduncle. No appreciable midline shift or hydrocephalus.  3.No acute infarct is definitely identified.  4.Additional findings compatible with chronic microvascular ischemic change.      Electronically Signed: Rene Clifford MD   1/29/2025 3:00 PM EST   Workstation ID: [id number]    CT Head Without Contrast Stroke Protocol [[id number]] Sumeet as Reviewed   Order Status: Completed Collected: 01/29/25 1257    Updated:  01/29/25 1321   Narrative:     CT HEAD WO CONTRAST STROKE PROTOCOL, CT FACIAL BONES WO CONTRAST    Date of Exam: 1/29/2025 12:40 PM EST    Indication: Stroke, follow up  Neuro deficit, acute, stroke suspected.    Comparison: None available.    Technique: Axial CT images were obtained of the head and maxillofacial bones without contrast administration.  Reconstructed coronal and sagittal images were also obtained. Automated exposure control and iterative construction methods were used.      Results discussed with Dr. Espinal at time of interpretation.        FINDINGS:    Brain/Ventricles: There is limitation secondary to streak artifact and motion. There is areas of low-attenuation surrounding the left basal ganglia involving the frontal temporal and to lesser the parietal lobes. This appears to surround a relatively  circumferential area of preserved or increased density within the left basal ganglia, subinsular cortex measuring approximately 2.5 cm. This could be artifactual from subacute infarct or related to an underlying lesion, mass with surrounding vasogenic  edema. Changes also appear to extend into the left cerebral peduncle and to lesser extent the mony. There is very mild degree of mass effect on the lateral ventricle. No acute hemorrhage is noted. There is a mild degree of diffuse atrophy and white  matter changes additionally noted not unexpected for patient stated age.    Orbits: The visualized portion of the orbits demonstrate no acute abnormality.    Sinuses and maxillofacial bones.: Paranasal sinuses appear to be clear with some mucosal thickening, possible mucous retention cyst inferiorly within the maxillary sinuses. No air-fluid levels noted. The maxillofacial bones appear to be grossly intact.  There is some mild irregularity along the inferior aspect of the left side of the nasal bone which is age indeterminate. Nasal bone fracture is not excluded.    Chronic left-sided mastoid effusion  noted.    Mandible and temporomandibular joints are grossly unremarkable in appearance    Soft Tissues/Skull: No acute abnormality of the visualized skull or soft tissues.   Impression:     1.There is limitation secondary to motion and streak artifact.  2.There is a large area of low-attenuation surrounding the left basal ganglia and extending into the left cerebral peduncle and mony. Findings are concerning for underlying mass lesion with surrounding vasogenic edema. Changes from subacute infarct with  areas of preserved density of brain parenchyma also a potential consideration. Recommend correlation with patient history particularly any known history of cancer.. Recommend MRI of the brain with and without contrast for further evaluation.  3.No acute intracranial hemorrhage.  4.Irregularity of the inferior aspect of the left side of the nasal bone is age indeterminate. Please correlate for point tenderness.  5.Chronic left-sided mastoid effusion.        Electronically Signed: Obey Burgos MD   1/29/2025 1:18 PM EST   Workstation ID: [id number]    CT Facial Bones Without Contrast [[id number]] Sumeet as Reviewed   Order Status: Completed Collected: 01/29/25 1257    Updated: 01/29/25 1321   Narrative:     CT HEAD WO CONTRAST STROKE PROTOCOL, CT FACIAL BONES WO CONTRAST    Date of Exam: 1/29/2025 12:40 PM EST    Indication: Stroke, follow up  Neuro deficit, acute, stroke suspected.    Comparison: None available.    Technique: Axial CT images were obtained of the head and maxillofacial bones without contrast administration.  Reconstructed coronal and sagittal images were also obtained. Automated exposure control and iterative construction methods were used.      Results discussed with Dr. Espinal at time of interpretation.        FINDINGS:    Brain/Ventricles: There is limitation secondary to streak artifact and motion. There is areas of low-attenuation surrounding the left basal ganglia involving the frontal temporal  and to lesser the parietal lobes. This appears to surround a relatively  circumferential area of preserved or increased density within the left basal ganglia, subinsular cortex measuring approximately 2.5 cm. This could be artifactual from subacute infarct or related to an underlying lesion, mass with surrounding vasogenic  edema. Changes also appear to extend into the left cerebral peduncle and to lesser extent the mony. There is very mild degree of mass effect on the lateral ventricle. No acute hemorrhage is noted. There is a mild degree of diffuse atrophy and white  matter changes additionally noted not unexpected for patient stated age.    Orbits: The visualized portion of the orbits demonstrate no acute abnormality.    Sinuses and maxillofacial bones.: Paranasal sinuses appear to be clear with some mucosal thickening, possible mucous retention cyst inferiorly within the maxillary sinuses. No air-fluid levels noted. The maxillofacial bones appear to be grossly intact.  There is some mild irregularity along the inferior aspect of the left side of the nasal bone which is age indeterminate. Nasal bone fracture is not excluded.    Chronic left-sided mastoid effusion noted.    Mandible and temporomandibular joints are grossly unremarkable in appearance    Soft Tissues/Skull: No acute abnormality of the visualized skull or soft tissues.   Impression:     1.There is limitation secondary to motion and streak artifact.  2.There is a large area of low-attenuation surrounding the left basal ganglia and extending into the left cerebral peduncle and mony. Findings are concerning for underlying mass lesion with surrounding vasogenic edema. Changes from subacute infarct with  areas of preserved density of brain parenchyma also a potential consideration. Recommend correlation with patient history particularly any known history of cancer.. Recommend MRI of the brain with and without contrast for further evaluation.  3.No acute  intracranial hemorrhage.  4.Irregularity of the inferior aspect of the left side of the nasal bone is age indeterminate. Please correlate for point tenderness.  5.Chronic left-sided mastoid effusion.        Electronically Signed: Obey Burgos MD   1/29/2025 1:18 PM EST   Workstation ID: [id number]    CT Cervical Spine Without Contrast [[id number]] Sumeet as Reviewed   Order Status: Completed Collected: 01/29/25 1302    Updated: 01/29/25 1315   Narrative:       CT CERVICAL SPINE WO CONTRAST    Date of Exam: 1/29/2025 12:40 PM EST    Indication: trauma.    Comparison: None available.    Technique: Axial CT images were obtained of the cervical spine without contrast administration.  Sagittal and coronal reconstructions were performed.  Automated exposure control and iterative reconstruction methods were used.      Findings:  Craniocervical alignment is normal. No cervical spine fracture or subluxation is seen. Multilevel degenerative changes are present. There is a continuation in the upper lobes may reflect changes of chronic small airways disease, and the remainder the  paraspinal soft tissues are within normal limits.    There are degenerative changes of the C2 dens/anterior C1 ring junction.    At C2-3, advanced left facet arthropathy is present. No significant disc bulge or canal stenosis. Right neural foramen is patent. Mild to moderate left neural foraminal stenosis.    At C3-4, severe left facet arthropathy and moderate right facet arthropathy is present. There is left greater than right uncovertebral spurring and moderate disc bulge. No significant central canal stenosis. Severe left neural foraminal stenosis. Mild to   moderate right neural foraminal stenosis    At C4-5, mild posterior disc osteophyte formation is present with bilateral uncovertebral spurring. Severe left and moderate right facet arthropathy is present. Mild central canal stenosis. Severe bilateral neural foraminal stenosis.    At C5-6,  "posterior disc osteophyte formation is present with borderline to mild central canal stenosis. Severe left and moderate right facet arthropathy is present with bilateral uncovertebral spurring. Severe left neural foraminal stenosis. Moderate to  severe right neural foraminal stenosis.    At C6-7, mild posterior disc osteophyte formation is present. Borderline to mild central canal stenosis. Right greater than left uncovertebral spurring with mild to moderate facet arthropathy. Severe right neural foraminal stenosis. Moderate left neural  foraminal stenosis    At C7-T1, no significant disc bulge or canal stenosis. Mild bilateral facet arthropathy. No significant neural foraminal stenosis.   Impression:     Impression:    1. Degenerative changes in the cervical spine. No acute cervical spine findings.      Electronically Signed: Naty Keenan MD   1/29/2025 1:12 PM EST   Workstation ID: [id number]    XR Chest 1 View [[id number]] Sumeet as Reviewed   Order Status: Completed Collected: 01/29/25 1234    Updated: 01/29/25 1237   Narrative:     XR CHEST 1 VW    Date of Exam: 1/29/2025 12:10 PM EST    Indication: Stroke Protocol (Onset > 12 hrs)    Comparison: None available.    Findings:  No acute airspace disease. Heart size is normal. Mild right hemidiaphragm elevation. No pleural effusion or pneumothorax or acute osseous abnormalities identified.   Impression:     Impression:  No acute cardiopulmonary findings.       PHYSICAL EXAM  Objective:  Vital signs: (most recent): Blood pressure 127/70, pulse 52, temperature 97.3 °F (36.3 °C), temperature source Oral, resp. rate 20, height 182.9 cm (72\"), weight 126 kg (277 lb 5.4 oz), SpO2 97%.                Alert  nad  No resp distress  Chronic venous insufficiency  Post op changes to head  Wishes for dc today     CONDITION ON DISCHARGE  Stable.      DISCHARGE DISPOSITION   Home or Self Care      DISCHARGE MEDICATIONS       Your medication list        START taking these " medications        Instructions Last Dose Given Next Dose Due   dexAMETHasone 1 MG tablet  Commonly known as: DECADRON  Start taking on: February 10, 2025      Take 4 tablets by mouth 2 (Two) Times a Day With Meals for 3 days, THEN 3 tablets 2 (Two) Times a Day With Meals for 7 days, THEN 2 tablets 2 (Two) Times a Day With Meals for 7 days, THEN 1 tablet Daily for 7 days.       famotidine 40 MG tablet  Commonly known as: PEPCID  Start taking on: February 11, 2025      Take 1 tablet by mouth Daily.       sodium chloride 1 g tablet      Take 1 tablet by mouth 2 (Two) Times a Day With Meals.              CHANGE how you take these medications        Instructions Last Dose Given Next Dose Due   amLODIPine 10 MG tablet  Commonly known as: NORVASC  What changed: how much to take      Take 0.5 tablets by mouth every night at bedtime.              CONTINUE taking these medications        Instructions Last Dose Given Next Dose Due   acetaminophen 325 MG tablet  Commonly known as: TYLENOL      Take 2 tablets by mouth Every 6 (Six) Hours As Needed for Mild Pain.       atorvastatin 10 MG tablet  Commonly known as: LIPITOR      TAKE 1 TABLET BY MOUTH EVERY DAY       dapagliflozin 5 MG tablet tablet  Commonly known as: Farxiga      Take 1 tablet by mouth Daily.       losartan 25 MG tablet  Commonly known as: COZAAR      TAKE 1 TABLET BY MOUTH EVERY DAY       metFORMIN 500 MG tablet  Commonly known as: GLUCOPHAGE      TAKE 1 TABLET BY MOUTH TWICE A DAY WITH FOOD       rivaroxaban 20 MG tablet  Commonly known as: XARELTO      Take 1 tablet by mouth Daily.       tamsulosin 0.4 MG capsule 24 hr capsule  Commonly known as: FLOMAX      Take 1 capsule by mouth Daily.                 Where to Get Your Medications        These medications were sent to [facility]/pharmacy #[id number] - [facility] [place] - [address] [address] - [phone]  - [phone]   [address] [place] [address]      Phone: [phone]   dexAMETHasone 1 MG tablet  famotidine 40  MG tablet  sodium chloride 1 g tablet       Information about where to get these medications is not yet available    Ask your nurse or doctor about these medications  amLODIPine 10 MG tablet          Future Appointments   Date Time Provider Department Center   2/12/2025  2:30 PM [facility]   3/31/2025 11:00 AM Carol Rios APRN [facility]   12/11/2025 11:00 AM Carol Rios APRN [facility]     Additional Instructions for the Follow-ups that You Need to Schedule       Ambulatory Referral to Home Health ([facility])   As directed      Carol Rios APRN    Order Comments: Carol Rios APRN    Face to Face Visit Date: 2/10/2025   Follow-up provider for Plan of Care?: I treated the patient in an acute care facility and will not continue treatment after discharge.   Follow-up provider: CAROL RIOS [[id number]]   Reason/Clinical Findings: s/p brain surgery, weakness, lymphoma, hyponatremia   Describe mobility limitations that make leaving home difficult: s/p brain surgery, weakness, lymphoma, hyponatremia   Nursing/Therapeutic Services Requested: Skilled Nursing Physical Therapy Speech Therapy   Skilled nursing orders: Medication education   PT orders: Therapeutic exercise Strengthening   SLP orders: Dysphagia therapy Language Cognition        Discharge Follow-up with Specialty: PCP this week with a BMP basic metabolic panel in ~2 days with PCP or Oncology. Oncology call for appointment. IVANIA in 2 weeks   As directed      Specialty: PCP this week with a BMP basic metabolic panel in ~2 days with PCP or Oncology. Oncology call for appointment. IVANIA in 2 weeks               Follow-up Information       Thanh Fish MD Follow up in 1 week(s).    Specialties: Hematology and Oncology, Oncology  Contact information:  [address] [facility] [place] [address] [phone]               Carol Rios APRN .    Specialties: Nurse Practitioner, Family  Medicine  Contact information:  [address] [facility] [address]  [facility] [address]  [place] [facility]  [phone]                             TEST  RESULTS PENDING AT DISCHARGE  Pending Labs       Order Current Status    Tissue Pathology Exam Preliminary result               Vazquez Lemus MD  [facility]  02/10/25  15:33 EST      Time: greater than 32 minutes on discharge  It was a pleasure taking care of this patient while in the hospital.

## 2025-02-10 NOTE — PAYOR COMM NOTE
"This is discharge notification for Minerva Jules.    Dc'd 1/30/25.    AUTHORIZATION : [id number]   THANK YOU.      Ekaterina Roman RN, CCM  Utilization Nurse  [facility]   [address] [place] [address]   [phone]   [phone]     Minerva Jules (79 y.o. Male)       Date of Birth   1945    Social Security Number       Address   [address] [place] [address] [address] [place] [address]    Home Phone   [phone]    MRN   [id number]       Anglican   None    Marital Status                               Admission Date   1/29/25    Admission Type   Emergency    Admitting Provider   Ulisses Ford MD    Attending Provider       Department, Room/Bed   [facility] MEDICAL INPATIENT, 201/1       Discharge Date   1/30/2025    Discharge Disposition   [facility] ([facility])    Discharge Destination                                 Attending Provider: (none)   Allergies: No Known Allergies    Isolation: None   Infection: None   Code Status: CPR    Ht: 182.9 cm (72\")   Wt: 127 kg (279 lb 1.6 oz)    Admission Cmt: None   Principal Problem: Brain mass [G93.89]                   Active Insurance as of 1/29/2025       Primary Coverage       Payor Plan Insurance Group Employer/Plan Group    [employer]ETNA MEDICARE REPLACEMENT [id number]       Payor Plan Address Payor Plan Phone Number Payor Plan Fax Number Effective Dates    [address] [phone]  2/1/2024 - None Entered    [facility] [id number]         Subscriber Name Subscriber Birth Date Member ID       MINERVA JULES 1945 [id number]                     Emergency Contacts        (Rel.) Home Phone Work Phone Mobile Phone    Bina Jules (Spouse) -- -- [phone]    Dianna Zambrano (Sister) -- -- [phone]                 Discharge Summary        Charlie Burch MD at 01/30/25 1541                       [facility] Medicine Services  Discharge Summary    Date of Service: 1/30/24  Patient Name: " "Sumit Jules  : 1945  MRN: [id number]    Date of Admission: 2025  Discharge Diagnosis:     Slurred speech likely secondary to below  Brain lesion, left basal ganglia/left anterior temporal lobe measuring 2.9 x 1.9 x 2.5 cm     Date of Discharge:  24  Primary Care Physician: Carol Rios APRN      Presenting Problem:   Brain mass [G93.89]  Speech disturbance, unspecified type [R47.9]    Active and Resolved Hospital Problems:  Active Hospital Problems    Diagnosis POA    **Brain mass [G93.89] Yes      Resolved Hospital Problems   No resolved problems to display.         Hospital Course     HPI:  Per the H&P written by Hollie Long PA-C , dated 25:  \"Weakness, slurred speech \"    Hospital Course:  Sumit Jules is a 79 y.o. 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  With intermittent confusion and is unable to provide reliable history at this time.  Family is not at bedside, however she has obtained via phone call.  Family states that for the last 3 months, patient has had increasing bilateral lower extremity weakness, confusion, bizarre behavior, expressive aphasia as well as inability to write.  For patient HydroVal instructed states within the last week.  Patient was evaluated by PCP on 2024 for similar complaints which labs were ordered and MRI was scheduled for .     MRI obtained on admission showed: MRI brain: Multiple enhancing brain lesions, largest involving the left basal ganglia/left anterior temporal lobe measuring 2.9 x 1.9 x 2.5 cm. Smaller lesions involving the mid body of the corpus callosum, left frontal lobe adjacent to the left lateral. NSGY was consulted, recommended patient to transfer to [facility] for possible biopsy. Transfer was initiated per NSGY recommendations, discussed with the patient. Patient " Xarelto has been held, no steroids or keppra per NSGY recommendations until primary malignancy determined.     CTAP: also showed Evidence for an ill-defined lesion involving the pancreatic head measuring approximately 2 cm. The findings suggest possible pancreatic malignancy of the pancreatic head. Based on this patient brain lesion could be secondary pancreatic cancer, however will need further work up if pancreatic lesion is benign vs malignant.     Patient is currently aaox1, has expressive aphasia, moves all extremities. Patient is pending transfer to [facility] for further work up.     DISCHARGE Follow Up Recommendations for labs and diagnostics:     Transfer to [facility]       Reasons For Change In Medications and Indications for New Medications:      Day of Discharge     Vital Signs:  Temp:  [97.6 °F (36.4 °C)-98.7 °F (37.1 °C)] 97.6 °F (36.4 °C)  Heart Rate:  [47-80] 69  Resp:  [13-18] 16  BP: (113-177)/(61-91) 136/61    Physical Exam:  General Appearance:  Awake alert, disoriented   Head:  Atraumatic normocephalic   Eyes:        No sclera icterus   Neck: Non tender   Pulm: Clear to auscultation   Cardio: HR sounds normal   Extremities: Moves all extremities   Abdomen: Soft non tender                             Pertinent  and/or Most Recent Results     LAB RESULTS:      Lab 01/30/25  0510 01/29/25  1302   WBC 6.00 6.84   HEMOGLOBIN 12.7* 13.9   HEMATOCRIT 41.7 44.3   PLATELETS 110* 115*   NEUTROS ABS 3.65 4.73   IMMATURE GRANS (ABS) 0.01 0.01   LYMPHS ABS 1.66 1.40   MONOS ABS 0.54 0.63   EOS ABS 0.12 0.05   MCV 96.1 92.9   PROTIME  --  23.7*         Lab 01/30/25  0510 01/29/25  1302   SODIUM 139 141   POTASSIUM 4.1 4.2   CHLORIDE 105 104   CO2 23.2 27.3   ANION GAP 10.8 9.7   BUN 13 14   CREATININE 0.97 1.08   EGFR 79.9 70.2   GLUCOSE 109* 134*   CALCIUM 8.8 9.7   MAGNESIUM 2.3  --          Lab 01/29/25  1302   TOTAL PROTEIN 7.6   ALBUMIN 4.2   GLOBULIN 3.4   ALT (SGPT) 13   AST (SGOT) 20    BILIRUBIN 0.7   ALK PHOS 85         Lab 01/29/25  1302   PROTIME 23.7*   INR 2.11*             Lab 01/29/25  1302   ABO TYPING A   RH TYPING Negative   ANTIBODY SCREEN Negative         Brief Urine Lab Results  (Last result in the past 365 days)        Color   Clarity   Blood   Leuk Est   Nitrite   Protein   CREAT   Urine HCG        01/29/25 1531 Yellow   Clear   Large (3+)   Negative   Negative   Negative                 Microbiology Results (last 10 days)       ** No results found for the last 240 hours. **            CT Chest With Contrast Diagnostic    Result Date: 1/29/2025  Impression: 1.Evidence for an ill-defined lesion involving the pancreatic head measuring approximately 2 cm. The findings suggest possible pancreatic malignancy of the pancreatic head. Changes secondary to pancreatitis may also contribute to this appearance. Recommend correlation with MRI of the pancreas for better characterization. 2.Additional hypodense foci are seen throughout the pancreas suggesting cysts. There is mild prominence of the pancreatic duct at the level of proximal pancreatic head/proximal body. 3.A mildly prominent subcarinal lymph node is noted in the chest. Recommend correlation with PET/CT imaging to exclude abnormal activity. No suspicious pulmonary nodules are identified. 4.No evidence for acute abnormality throughout the chest abdomen or pelvis. 5.No evidence for additional definitive metastatic disease throughout the abdomen or pelvis. 6.Evidence for small adenoma involving the left adrenal gland measuring 1.2 cm. Electronically Signed: Shiv Lozano MD  1/29/2025 4:45 PM EST  Workstation ID: [id number]    CT Abdomen Pelvis With Contrast    Result Date: 1/29/2025  Impression: 1.Evidence for an ill-defined lesion involving the pancreatic head measuring approximately 2 cm. The findings suggest possible pancreatic malignancy of the pancreatic head. Changes secondary to pancreatitis may also contribute to this  appearance. Recommend correlation with MRI of the pancreas for better characterization. 2.Additional hypodense foci are seen throughout the pancreas suggesting cysts. There is mild prominence of the pancreatic duct at the level of proximal pancreatic head/proximal body. 3.A mildly prominent subcarinal lymph node is noted in the chest. Recommend correlation with PET/CT imaging to exclude abnormal activity. No suspicious pulmonary nodules are identified. 4.No evidence for acute abnormality throughout the chest abdomen or pelvis. 5.No evidence for additional definitive metastatic disease throughout the abdomen or pelvis. 6.Evidence for small adenoma involving the left adrenal gland measuring 1.2 cm. Electronically Signed: Shiv Lozano MD  1/29/2025 4:45 PM EST  Workstation ID: [id number]    MRI Brain With & Without Contrast    Result Date: 1/29/2025  Impression: 1.Multiple enhancing brain lesions, largest involving the left basal ganglia/left anterior temporal lobe measuring 2.9 x 1.9 x 2.5 cm. Smaller lesions involving the mid body of the corpus callosum, left frontal lobe adjacent to the left lateral ventricle, and within the right thalamus. Findings are concerning for underlying neoplastic process with considerations including infiltrating glial neoplasm, CNS lymphoma, or metastatic disease. Subacute ischemia, tumefactive demyelination, or infectious/inflammatory processes considered less likely but not excluded. 2.Edema about the left basal ganglia, left anterior temporal lobe, and left cerebral peduncle. No appreciable midline shift or hydrocephalus. 3.No acute infarct is definitely identified. 4.Additional findings compatible with chronic microvascular ischemic change. Electronically Signed: Rene Clifford MD  1/29/2025 3:00 PM EST  Workstation ID: [id number]    CT Head Without Contrast Stroke Protocol    Result Date: 1/29/2025  Impression: 1.There is limitation secondary to motion and streak artifact. 2.There is  a large area of low-attenuation surrounding the left basal ganglia and extending into the left cerebral peduncle and mony. Findings are concerning for underlying mass lesion with surrounding vasogenic edema. Changes from subacute infarct with areas of preserved density of brain parenchyma also a potential consideration. Recommend correlation with patient history particularly any known history of cancer.. Recommend MRI of the brain with and without contrast for further evaluation. 3.No acute intracranial hemorrhage. 4.Irregularity of the inferior aspect of the left side of the nasal bone is age indeterminate. Please correlate for point tenderness. 5.Chronic left-sided mastoid effusion. Electronically Signed: Obey Burgos MD  1/29/2025 1:18 PM EST  Workstation ID: [id number]    CT Facial Bones Without Contrast    Result Date: 1/29/2025  Impression: 1.There is limitation secondary to motion and streak artifact. 2.There is a large area of low-attenuation surrounding the left basal ganglia and extending into the left cerebral peduncle and mony. Findings are concerning for underlying mass lesion with surrounding vasogenic edema. Changes from subacute infarct with areas of preserved density of brain parenchyma also a potential consideration. Recommend correlation with patient history particularly any known history of cancer.. Recommend MRI of the brain with and without contrast for further evaluation. 3.No acute intracranial hemorrhage. 4.Irregularity of the inferior aspect of the left side of the nasal bone is age indeterminate. Please correlate for point tenderness. 5.Chronic left-sided mastoid effusion. Electronically Signed: Obey Burgos MD  1/29/2025 1:18 PM EST  Workstation ID: [id number]    CT Cervical Spine Without Contrast    Result Date: 1/29/2025  Impression: Impression: 1. Degenerative changes in the cervical spine. No acute cervical spine findings. Electronically Signed: Naty Keenan MD  1/29/2025 1:12  PM EST  Workstation ID: [id number]    XR Chest 1 View    Result Date: 1/29/2025  Impression: Impression: No acute cardiopulmonary findings. Electronically Signed: Naty Keenan MD  1/29/2025 12:35 PM EST  Workstation ID: [id number]                 Labs Pending at Discharge:  Pending Results       None            Procedures Performed           Consults:   Consults       Date and Time Order Name Status Description    1/29/2025  4:57 PM Hematology & Oncology Inpatient Consult      1/29/2025  3:58 PM Hospitalist (on-call MD unless specified)      1/29/2025  3:04 PM Neurosurgery (on-call MD unless specified) Completed     1/29/2025 12:30 PM Inpatient Neurology Consult Stroke                Discharge Details        Discharge Medications        Continue These Medications        Instructions Start Date   acetaminophen 325 MG tablet  Commonly known as: TYLENOL   650 mg, Oral, Every 6 Hours PRN      amLODIPine 10 MG tablet  Commonly known as: NORVASC   10 mg, Oral, Every Night at Bedtime      atorvastatin 10 MG tablet  Commonly known as: LIPITOR   10 mg, Oral, Daily      dapagliflozin 5 MG tablet tablet  Commonly known as: Farxiga   5 mg, Oral, Daily      losartan 25 MG tablet  Commonly known as: COZAAR   25 mg, Oral, Daily      metFORMIN 500 MG tablet  Commonly known as: GLUCOPHAGE   500 mg, Oral, 2 Times Daily With Meals      tamsulosin 0.4 MG capsule 24 hr capsule  Commonly known as: FLOMAX   0.4 mg, Oral, Daily      Xarelto 20 MG tablet  Generic drug: rivaroxaban   20 mg, Oral, Daily               No Known Allergies      Discharge Disposition:       Diet:  Hospital:  Diet Order   Procedures    Diet: Regular/House; Fluid Consistency: Thin (IDDSI 0)         Discharge Activity:         CODE STATUS:  Code Status and Medical Interventions: CPR (Attempt to Resuscitate); Full Support   Ordered at: 01/29/25 1631     Code Status (Patient has no pulse and is not breathing):    CPR (Attempt to Resuscitate)     Medical Interventions  (Patient has pulse or is breathing):    Full Support         Future Appointments   Date Time Provider Department Center   2/12/2025  2:30 PM [facility]   3/31/2025 11:00 AM Carol Rios APRN [facility]   12/11/2025 11:00 AM Carol Rios APRN [facility]           Time spent on Discharge including face to face service:  >30 minutes    Part of this note may be an electronic transcription/translation of spoken language to printed text using the Dragon Dictation System.    Signature: Electronically signed by Charlie Burch MD, 01/30/25, 15:41 EST.  [facility] Hospitalist Team     Electronically signed by Charlie Burch MD at 01/30/25 1548

## 2025-02-11 ENCOUNTER — TRANSITIONAL CARE MANAGEMENT TELEPHONE ENCOUNTER (OUTPATIENT)
Dept: CALL CENTER | Facility: HOSPITAL | Age: 80
End: 2025-02-11
Payer: MEDICARE

## 2025-02-11 ENCOUNTER — TELEPHONE (OUTPATIENT)
Dept: FAMILY MEDICINE CLINIC | Facility: CLINIC | Age: 80
End: 2025-02-11

## 2025-02-11 ENCOUNTER — HOME CARE VISIT (OUTPATIENT)
Dept: HOME HEALTH SERVICES | Facility: HOME HEALTHCARE | Age: 80
End: 2025-02-11

## 2025-02-11 NOTE — TELEPHONE ENCOUNTER
Caller: Bina Jules    Relationship: Emergency Contact    Best call back number: [phone]    What orders are you requesting (i.e. lab or imaging): PATIENT NEEDS LABS FOR HOSPITAL FOLLOW UP ON 02/13 THAT HOSPITAL ORDERED    In what timeframe would the patient need to come in: 02/13    Where will you receive your lab/imaging services: OFFICE    Additional notes: PLEASE ADVISE.

## 2025-02-11 NOTE — OUTREACH NOTE
Call Center TCM Note      Flowsheet Row Responses   [facility] by [facility] patient discharged from? [facility]   Does the patient have one of the following disease processes/diagnoses(primary or secondary)? General Surgery   TCM attempt successful? No   Unsuccessful attempts Attempt 2            Cira Rogers MA    2/11/2025, 16:09 EST

## 2025-02-11 NOTE — PAYOR COMM NOTE
"Minerva Jules (79 y.o. Male)          DC SUMMARY FOR [id number]      F# [phone]         Date of Birth   1945    Social Security Number       Address   [address] [place] [address] [address] [place] [facility]    Home Phone   [phone]    MRN   [id number]       Islam   None    Marital Status                               Admission Date   25    Admission Type   Urgent    Admitting Provider   Dusty Farrell MD    Attending Provider       Department, Room/Bed   [address], P597/       Discharge Date   2/10/2025    Discharge Disposition   Home or Self Care    Discharge Destination                                 Attending Provider: (none)   Allergies: No Known Allergies    Isolation: None   Infection: None   Code Status: Prior    Ht: 182.9 cm (72\")   Wt: 126 kg (277 lb 5.4 oz)    Admission Cmt: None   Principal Problem: Brain mass [G93.89]                   Active Insurance as of 2025       Primary Coverage       Payor Plan Insurance Group Employer/Plan Group    AETNA MEDICARE REPLACEMENT [id number]       Payor Plan Address Payor Plan Phone Number Payor Plan Fax Number Effective Dates    [address] [phone]  2024 - None Entered    [place] [address]         Subscriber Name Subscriber Birth Date Member ID       MINERVA JULES 1945 [id number]                     Emergency Contacts        (Rel.) Home Phone Work Phone Mobile Phone    Bina Jules (Spouse) -- -- [phone]    Dianna Zambrano (Sister) -- -- [phone]                 Discharge Summary        Vazquez Lemus MD at 02/10/25 1533                 NAME: Minerva Jules ADMIT: 2025   : 1945  PCP: Carol Rios APRN    MRN: [id number] LOS: 11 days   AGE/SEX: 79 y.o. male  ROOM: P597/     Date of Admission:  2025  Date of Discharge:  2/10/2025    PCP: Carol Rios APRN    CHIEF COMPLAINT  No chief complaint on " file.      DISCHARGE DIAGNOSIS  Active Hospital Problems    Diagnosis  POA    **Brain mass [G93.89]  Yes    Hyponatremia [E87.1]  Yes    Pharyngeal dysphagia [R13.13]  Yes    Vasogenic cerebral edema [G93.6]  Yes    Slurred speech [R47.81]  Yes    History of DVT (deep vein thrombosis) [Z86.718]  Not Applicable    Type 2 diabetes mellitus with hyperglycemia, without long-term current use of insulin [E11.65]  Yes    Pancreatic lesion [K86.9]  Yes    Hyperlipidemia [E78.5]  Yes    Essential (primary) hypertension [I10]  Yes    Chronic coronary artery disease [I25.10]  Yes    Prostate cancer [C61]  Yes    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   Converted from [employer]:   Description - SEBORRHEIC KERATOSIS    Pain of foot 03/19/2013    Formatting of this note might be different from the original.   Converted from [employer]:   Description - HEEL PAIN    Prostate cancer     Thrombocytopenia 03/19/2013     Formatting of this note might be different from the original.   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   Converted from [employer]:   Description - VASCULAR DISORDER OF SKIN       CONSULTS   [facility]AN[facility]  Oncology    HOSPITAL COURSE  Patient is a 79 y.o. male with complicated PMH including, Hypertension  Antithrombin III deficiency, History of DVTs, Diabetes type 2 and other medical issues. Transferred from [place] for neurosurgery brain biopsy of brain mass.  Reported history of melanoma and pancreas mass that was previously biopsied benign.      Underwent biopsy on 2/3/2025.  Initial path consistent with lymphoma.  On steroid taper. They are plan on following up with oncologist at [place]. Patient and family wish for discharge today with home health. He has had some hyponatremia which is likely multifactorial with his brain mass, decreased sodium intake and other process. I discussed potentially staying in the hospital but patient and family feel well and wish for dc today with risks of being in the hospital including weakness and infection. I did discuss that worsening sodium can increase risk of seizure and confusion. A reasonable plan which patient wishes for is to be on a trial of NACL tablets and he will get a repeat BMP in ~2 days with PCP for further monitoring. I also did advise him to avoid excess free water consumption which also may have been contributing over the past week or two.     DIAGNOSTICS    02/10/2025 0626 02/10/2025 0727 Basic Metabolic Panel [[id number]]    (Abnormal)   Blood    Final result Component Value Units   Glucose 171 High  mg/dL   BUN 17 mg/dL   Creatinine 0.77 mg/dL   Sodium 127 Low  mmol/L   Potassium 4.5 mmol/L   Chloride 97 Low  mmol/L   CO2 25.5 mmol/L   Calcium 7.8 Low  mg/dL   BUN/Creatinine Ratio 22.1    Anion Gap 4.5  Low  mmol/L   eGFR 91.6 mL/min/1.73          02/10/2025 0626 02/10/2025 0727 Magnesium [[id number]]   Blood    Final result Component Value Units   Magnesium 2.1 mg/dL          02/10/2025 0626 02/10/2025 0727 Phosphorus [[id number]]   Blood    Final result Component Value Units   Phosphorus 3.3 mg/dL          02/10/2025 0626 02/10/2025 0705 CBC Auto Differential [[id number]]   (Abnormal)   Blood    Final result Component Value Units   WBC 8.58 10*3/mm3   RBC 4.63 10*6/mm3   Hemoglobin 13.9 g/dL   Hematocrit 41.3 %   MCV 89.2 fL   MCH 30.0 pg   MCHC 33.7 g/dL   RDW 13.4 %   RDW-SD 42.9 fl   MPV 13.0 High  fL   Platelets 104 Low  10*3/mm3   Neutrophil % 80.8 High  %   Lymphocyte % 9.8 Low  %   Monocyte % 8.2 %   Eosinophil % 0.0 Low  %   Basophil % 0.0 %   Immature Grans % 1.2 High  %   Neutrophils, Absolute 6.94 10*3/mm3   Lymphocytes, Absolute 0.84 10*3/mm3   Monocytes, Absolute 0.70 10*3/mm3   Eosinophils, Absolute 0.00 10*3/mm3   Basophils, Absolute 0.00 10*3/mm3   Immature Grans, Absolute 0.10 High  10*3/mm3   nRBC 0.0 /100 WBC          02/09/2025 0428 02/09/2025 0517 Basic Metabolic Panel [[id number]]    (Abnormal)   Blood    Final result Component Value Units   Glucose 178 High  mg/dL   BUN 18 mg/dL   Creatinine 0.68 Low  mg/dL   Sodium 131 Low  mmol/L   Potassium 4.4 mmol/L   Chloride 99 mmol/L   CO2 25.0 mmol/L   Calcium 7.7 Low  mg/dL   BUN/Creatinine Ratio 26.5 High     Anion Gap 7.0 mmol/L   eGFR 95.1 mL/min/1.73          02/09/2025 0428 02/09/2025 0517 Magnesium [[id number]]   Blood    Final result Component Value Units   Magnesium 2.4 mg/dL          02/09/2025 0428 02/09/2025 0517 Phosphorus [[id number]]   Blood    Final result Component Value Units   Phosphorus 3.0 mg/dL          02/09/2025 0428 02/09/2025 0501 CBC Auto Differential [[id number]]   (Abnormal)   Blood    Final result Component Value Units   WBC 8.11 10*3/mm3   RBC 4.71 10*6/mm3   Hemoglobin 14.1 g/dL   Hematocrit 41.3 %   MCV 87.7 fL   MCH 29.9  pg   MCHC 34.1 g/dL   RDW 13.3 %   RDW-SD 43.2 fl   MPV 12.6 High  fL   Platelets 122 Low  10*3/mm3   Neutrophil % 83.1 High  %   Lymphocyte % 6.7 Low  %   Monocyte % 9.0 %   Eosinophil % 0.0 Low  %   Basophil % 0.1 %   Immature Grans % 1.1 High  %   Neutrophils, Absolute 6.74 10*3/mm3   Lymphocytes, Absolute 0.54 Low  10*3/mm3   Monocytes, Absolute 0.73 10*3/mm3   Eosinophils, Absolute 0.00 10*3/mm3   Basophils, Absolute 0.01 10*3/mm3   Immature Grans, Absolute 0.09 High  10*3/mm3          02/08/2025 0525 02/08/2025 0628 Basic Metabolic Panel [[id number]]    (Abnormal)   Blood    Final result Component Value Units   Glucose 174 High  mg/dL   BUN 19 mg/dL   Creatinine 0.71 Low  mg/dL   Sodium 130 Low  mmol/L   Potassium 4.3  mmol/L   Chloride 95 Low  mmol/L   CO2 27.0 mmol/L   Calcium 7.7 Low  mg/dL   BUN/Creatinine Ratio 26.8 High     Anion Gap 8.0 mmol/L   eGFR 93.9 mL/min/1.73         CT Head Without Contrast [[id number]] Sumeet as Reviewed   Order Status: Completed Collected: 02/04/25 0810    Updated: 02/05/25 0709   Narrative:     CT SCAN OF THE HEAD WITHOUT CONTRAST 02/04/2025     CLINICAL HISTORY: This is a 79-year-old male patient who has multiple  enhancing brain lesions and underwent a left frontal approach  stereotactic biopsy of the left basal ganglia enhancing mass yesterday  on 02/03/2025; this is a follow-up evaluation.     TECHNIQUE: Spiral CT images were obtained from the base of the skull to  the vertex without intravenous contrast. The images were reformatted and  are submitted in 3 mm thick axial, sagittal and coronal CT sections with  brain algorithm.     This is correlated to 2 prior head CTs with the preoperative head CTs on  01/29/2025 and a preoperative head CT with and without contrast on  02/01/2025, and a preoperative MRI of the brain from [facility] on 01/29/2025.     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 Yesterday, the patient  underwent a superior lateral left frontal bone kyle hole covered by thin  malleable plate.  There is a tubular tract extending through the left  frontal lobe down to the enhancing lesion that is present extending from  the left basal ganglia region into the medial left temporal lobe. There  is a punctate 3 mm focus of postbiopsy hemorrhage within the lesion.  There is tubular air in the left frontal lobe parenchyma along the  tubular tract for the biopsy. These are expected postoperative findings.  There is some parenchymal low-density in the left putamen and internal  capsule of medial left temporal lobe felt to be vasogenic edema  surrounding known enhancing brain lesion seen on the prior MRI of the  brain. Overall, the ventricles are normal in size. There is no midline  shift. No extra-axial fluid collections are identified.  No additional  areas of acute intracranial hemorrhage are seen.      Impression:        1. Yesterday on 02/03/2025, the patient underwent kyle hole through the  superior lateral left frontal bone, and there is a tubular tract that  has some air within it extending through the left frontal lobe  parenchyma down to the lesion in the left basal ganglia region extending  into the medial left temporal lobe that was biopsied and there is a  punctate 3 mm focus of hemorrhage at the biopsy site. 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 Expected postbiopsy changes are present with no complicating  features.  There is stable vasogenic edema in the left basal ganglia  region and medial left temporal lobe that were present on preoperative  MRI 01/29/2025. Correlation with biopsy results is suggested.  The  remainder of the head CT is normal.     Radiation dose reduction techniques were utilized, including automated  exposure control and exposure modulation based on body size.        This report was finalized on 2/5/2025 7:06 AM by Dr. Pranay Burton M.D on  Workstation: [id number]       FL Video Swallow With Speech Single Contrast [[id number]] Sumeet as Reviewed   Order Status: Completed Collected: 02/03/25 0917    Updated: 02/03/25 1330   Narrative:     VIDEO ESOPHAGRAM 02/01/2025     HISTORY: Possible aspiration.     Fluoroscopy was used during performance of the video esophagram by  speech pathology.     VFSS completed this date with Dr. Swanson present. Patient presents  with mild oropharyngeal dysphagia. Trace transient penetration with thin  liquid by cup. Increased amount of penetration that enters laryngeal  vestibule with thin by straw and is mostly transient. No aspiration.  Mild vallecula and pyriform residue with thin liquid and solid, moderate  with puree clears with cued reswallow or liquid wash. Please see full  speech pathology report.     FLUOROSCOPY TIME:  One minute 55 seconds, 3251 images, 6.9 mGy.     This report was finalized on 2/3/2025 1:27 PM by Dr. Moiz Ernst M.D on Workstation: [id number]       CT Head With & Without Contrast [[id number]] Sumeet as Reviewed   Order Status: Completed Collected: 02/01/25 0817    Updated: 02/01/25 0827   Narrative:     CT SCAN OF THE BRAIN WITHOUT AND WITH CONTRAST     HISTORY: Preop for brain surgery. Multiple enhancing lesions noted on  outside MRI scan dated 1/29/2025 with the largest involving the left  basal  ganglia.     The CT scan was performed through the brain without and with IV contrast  with thin 1 mm section imaging for stereotactic Stealth protocol. There  is a very vague area of decreased density in the left basal ganglia with  associated mass effect that effaces the left lateral ventricle. There is  some associated enhancement within this area anteriorly and this  corresponds to the largest enhancing mass noted on the MRI scan dated  1/29/2025. A smaller enhancing mass involves the region of the corpus  callosum at the midline between the lateral ventricles.                 Radiation dose reduction techniques were utilized, including automated  exposure control and exposure modulation based on body size.        This report was finalized on 2/1/2025 8:24 AM by Dr. David Swanson M.D  on Workstation: [id number]       CT Chest With Contrast Diagnostic [[id number]] Sumeet as Reviewed   Order Status: Completed Collected: 01/29/25 1625    Updated: 01/29/25 1647   Narrative:     CT ABDOMEN PELVIS W CONTRAST, CT CHEST W CONTRAST DIAGNOSTIC    Date of Exam: 1/29/2025 4:10 PM EST    Indication: metastatic disease evaluation.    Comparison: 2/12/2024    Technique: Axial CT images were obtained of the chest abdomen and pelvis following the uneventful intravenous administration of iodinated contrast. Sagittal and coronal reconstructions were performed.  Automated exposure control and iterative  reconstruction methods were used.        FINDINGS:  Scattered atelectasis is noted. No well-defined consolidations or pleural effusions are observed. There is a tiny nodule in the left upper lobe measuring 4 mm on image #31 series 3. A calcified granuloma is noted in the left lower lobe. No suspicious  pulmonary nodules or abnormal pulmonary masses are seen.    There is a mildly prominent subcarinal lymph node measuring 2.2 cm on image #37 series 2. Otherwise nonspecific lymph nodes are seen throughout the hilar, mediastinal, and  axillary regions. Mild atherosclerosis is noted. Coronary artery calcifications  are observed. The thyroid gland is unremarkable. The esophagus is unremarkable.    The liver and spleen are stable. The patient is status post cholecystectomy. There is atrophy throughout the pancreas. There is ill-defined fullness involving the pancreatic head. There is a subtle area of apparent decreased attenuation which measures  approximately 2 cm. The findings suggest a lesion within the pancreatic head. Multiple low-density foci are seen throughout the pancreatic head, body, and tail suggesting cysts. There is a prominent focus in the pancreatic body seen on the prior study  measuring approximately 1.8 cm. There may be dilatation of the proximal pancreatic duct as well at the level of the pancreatic head and proximal body. The edema surrounding the pancreas has improved since the prior.    There is a small hypodense nodule involving the left adrenal gland likely related to a small adenoma measuring 1.2 cm. The right adrenal gland is unremarkable. The bilateral kidneys are stable.    The liver, spleen, and pancreas are stable. The gallbladder and bile ducts are unremarkable. The bilateral adrenal glands are stable. The bilateral kidneys are stable.    There is no bowel dilatation or obstruction. There is no evidence for acute appendicitis. No significant free fluid is observed. No abnormal fluid collections are identified. No significant lymphadenopathy is seen throughout the abdomen or pelvis. The  bladder is partially decompressed. The celiac and superior mesenteric arterial distributions are opacified without evidence for occlusion. Mild atherosclerosis is noted.    No acute osseous abnormalities are observed. No new aggressive lytic or sclerotic lesions are seen.   Impression:     1.Evidence for an ill-defined lesion involving the pancreatic head measuring approximately 2 cm. The findings suggest possible pancreatic malignancy  of the pancreatic head. Changes secondary to pancreatitis may also contribute to this appearance.  Recommend correlation with MRI of the pancreas for better characterization.  2.Additional hypodense foci are seen throughout the pancreas suggesting cysts. There is mild prominence of the pancreatic duct at the level of proximal pancreatic head/proximal body.  3.A mildly prominent subcarinal lymph node is noted in the chest. Recommend correlation with PET/CT imaging to exclude abnormal activity. No suspicious pulmonary nodules are identified.  4.No evidence for acute abnormality throughout the chest abdomen or pelvis.  5.No evidence for additional definitive metastatic disease throughout the abdomen or pelvis.  6.Evidence for small adenoma involving the left adrenal gland measuring 1.2 cm.              Electronically Signed: Shiv Lozano MD   1/29/2025 4:45 PM EST   Workstation ID: [id number]    CT Abdomen Pelvis With Contrast [[id number]] Sumeet as Reviewed   Order Status: Completed Collected: 01/29/25 1625    Updated: 01/29/25 1647   Narrative:     CT ABDOMEN PELVIS W CONTRAST, CT CHEST W CONTRAST DIAGNOSTIC    Date of Exam: 1/29/2025 4:10 PM EST    Indication: metastatic disease evaluation.    Comparison: 2/12/2024    Technique: Axial CT images were obtained of the chest abdomen and pelvis following the uneventful intravenous administration of iodinated contrast. Sagittal and coronal reconstructions were performed.  Automated exposure control and iterative  reconstruction methods were used.        FINDINGS:  Scattered atelectasis is noted. No well-defined consolidations or pleural effusions are observed. There is a tiny nodule in the left upper lobe measuring 4 mm on image #31 series 3. A calcified granuloma is noted in the left lower lobe. No suspicious  pulmonary nodules or abnormal pulmonary masses are seen.    There is a mildly prominent subcarinal lymph node measuring 2.2 cm on image #37 series 2. Otherwise  nonspecific lymph nodes are seen throughout the hilar, mediastinal, and axillary regions. Mild atherosclerosis is noted. Coronary artery calcifications  are observed. The thyroid gland is unremarkable. The esophagus is unremarkable.    The liver and spleen are stable. The patient is status post cholecystectomy. There is atrophy throughout the pancreas. There is ill-defined fullness involving the pancreatic head. There is a subtle area of apparent decreased attenuation which measures  approximately 2 cm. The findings suggest a lesion within the pancreatic head. Multiple low-density foci are seen throughout the pancreatic head, body, and tail suggesting cysts. There is a prominent focus in the pancreatic body seen on the prior study  measuring approximately 1.8 cm. There may be dilatation of the proximal pancreatic duct as well at the level of the pancreatic head and proximal body. The edema surrounding the pancreas has improved since the prior.    There is a small hypodense nodule involving the left adrenal gland likely related to a small adenoma measuring 1.2 cm. The right adrenal gland is unremarkable. The bilateral kidneys are stable.    The liver, spleen, and pancreas are stable. The gallbladder and bile ducts are unremarkable. The bilateral adrenal glands are stable. The bilateral kidneys are stable.    There is no bowel dilatation or obstruction. There is no evidence for acute appendicitis. No significant free fluid is observed. No abnormal fluid collections are identified. No significant lymphadenopathy is seen throughout the abdomen or pelvis. The  bladder is partially decompressed. The celiac and superior mesenteric arterial distributions are opacified without evidence for occlusion. Mild atherosclerosis is noted.    No acute osseous abnormalities are observed. No new aggressive lytic or sclerotic lesions are seen.   Impression:     1.Evidence for an ill-defined lesion involving the pancreatic head measuring  approximately 2 cm. The findings suggest possible pancreatic malignancy of the pancreatic head. Changes secondary to pancreatitis may also contribute to this appearance.  Recommend correlation with MRI of the pancreas for better characterization.  2.Additional hypodense foci are seen throughout the pancreas suggesting cysts. There is mild prominence of the pancreatic duct at the level of proximal pancreatic head/proximal body.  3.A mildly prominent subcarinal lymph node is noted in the chest. Recommend correlation with PET/CT imaging to exclude abnormal activity. No suspicious pulmonary nodules are identified.  4.No evidence for acute abnormality throughout the chest abdomen or pelvis.  5.No evidence for additional definitive metastatic disease throughout the abdomen or pelvis.  6.Evidence for small adenoma involving the left adrenal gland measuring 1.2 cm.              Electronically Signed: Shiv Lozano MD   1/29/2025 4:45 PM EST   Workstation ID: [id number]    MRI Brain With & Without Contrast [[id number]] Sumeet as Reviewed   Order Status: Completed Collected: 01/29/25 1443    Updated: 01/29/25 1502   Narrative:     MRI BRAIN W WO CONTRAST    Date of Exam: 1/29/2025 1:56 PM EST    Indication: speech abnormality confusion.     Comparison: Noncontrast head CT from today    Technique:  Routine multiplanar/multisequence sequence images of the brain were obtained before and after the uneventful administration of Prohance.      FINDINGS:  Irregular enhancing masslike focus centered about the left basal ganglia and left anterior temporal lobe measuring 2.9 x 1.9 x 2.5 cm. Lesion demonstrates somewhat heterogeneous, intermediate DWI signal.    1.6 x 0.6 cm enhancing lesion involving the mid body of the corpus callosum (series 17, image 100).    Mild patchy enhancement along the left lateral ventricle centered on series 17, image 108.    1 cm irregular enhancing focus within the right thalamus (series 17, image  85).    There is edema about the left basal ganglia, left anterior temporal lobe, and extending into the left cerebral peduncle.      Foci of T2/FLAIR signal hyperintensity are seen within the bilateral hemispheric white matter    No midline shift or hydrocephalus is seen. No acute infarct is definitely identified. The visualized intracranial flow-voids appear unremarkable. Scattered paranasal sinus mucosal thickening is seen. Orbital structures are unremarkable. The visualized  superficial soft tissues and cervical spine demonstrate no significant abnormality.   Impression:       1.Multiple enhancing brain lesions, largest involving the left basal ganglia/left anterior temporal lobe measuring 2.9 x 1.9 x 2.5 cm. Smaller lesions involving the mid body of the corpus callosum, left frontal lobe adjacent to the left lateral  ventricle, and within the right thalamus. Findings are concerning for underlying neoplastic process with considerations including infiltrating glial neoplasm, CNS lymphoma, or metastatic disease. Subacute ischemia, tumefactive demyelination, or  infectious/inflammatory processes considered less likely but not excluded.  2.Edema about the left basal ganglia, left anterior temporal lobe, and left cerebral peduncle. No appreciable midline shift or hydrocephalus.  3.No acute infarct is definitely identified.  4.Additional findings compatible with chronic microvascular ischemic change.      Electronically Signed: Rene Clifford MD   1/29/2025 3:00 PM EST   Workstation ID: [id number]    CT Head Without Contrast Stroke Protocol [[id number]] Sumeet as Reviewed   Order Status: Completed Collected: 01/29/25 1257    Updated: 01/29/25 1321   Narrative:     CT HEAD WO CONTRAST STROKE PROTOCOL, CT FACIAL BONES WO CONTRAST    Date of Exam: 1/29/2025 12:40 PM EST    Indication: Stroke, follow up  Neuro deficit, acute, stroke suspected.    Comparison: None available.    Technique: Axial CT images were obtained of the  head and maxillofacial bones without contrast administration.  Reconstructed coronal and sagittal images were also obtained. Automated exposure control and iterative construction methods were used.      Results discussed with Dr. Espinal at time of interpretation.        FINDINGS:    Brain/Ventricles: There is limitation secondary to streak artifact and motion. There is areas of low-attenuation surrounding the left basal ganglia involving the frontal temporal and to lesser the parietal lobes. This appears to surround a relatively  circumferential area of preserved or increased density within the left basal ganglia, subinsular cortex measuring approximately 2.5 cm. This could be artifactual from subacute infarct or related to an underlying lesion, mass with surrounding vasogenic  edema. Changes also appear to extend into the left cerebral peduncle and to lesser extent the mony. There is very mild degree of mass effect on the lateral ventricle. No acute hemorrhage is noted. There is a mild degree of diffuse atrophy and white  matter changes additionally noted not unexpected for patient stated age.    Orbits: The visualized portion of the orbits demonstrate no acute abnormality.    Sinuses and maxillofacial bones.: Paranasal sinuses appear to be clear with some mucosal thickening, possible mucous retention cyst inferiorly within the maxillary sinuses. No air-fluid levels noted. The maxillofacial bones appear to be grossly intact.  There is some mild irregularity along the inferior aspect of the left side of the nasal bone which is age indeterminate. Nasal bone fracture is not excluded.    Chronic left-sided mastoid effusion noted.    Mandible and temporomandibular joints are grossly unremarkable in appearance    Soft Tissues/Skull: No acute abnormality of the visualized skull or soft tissues.   Impression:     1.There is limitation secondary to motion and streak artifact.  2.There is a large area of low-attenuation  surrounding the left basal ganglia and extending into the left cerebral peduncle and mony. Findings are concerning for underlying mass lesion with surrounding vasogenic edema. Changes from subacute infarct with  areas of preserved density of brain parenchyma also a potential consideration. Recommend correlation with patient history particularly any known history of cancer.. Recommend MRI of the brain with and without contrast for further evaluation.  3.No acute intracranial hemorrhage.  4.Irregularity of the inferior aspect of the left side of the nasal bone is age indeterminate. Please correlate for point tenderness.  5.Chronic left-sided mastoid effusion.        Electronically Signed: Obey Burgos MD   1/29/2025 1:18 PM EST   Workstation ID: [id number]    CT Facial Bones Without Contrast [[id number]] Sumeet as Reviewed   Order Status: Completed Collected: 01/29/25 1257    Updated: 01/29/25 1321   Narrative:     CT HEAD WO CONTRAST STROKE PROTOCOL, CT FACIAL BONES WO CONTRAST    Date of Exam: 1/29/2025 12:40 PM EST    Indication: Stroke, follow up  Neuro deficit, acute, stroke suspected.    Comparison: None available.    Technique: Axial CT images were obtained of the head and maxillofacial bones without contrast administration.  Reconstructed coronal and sagittal images were also obtained. Automated exposure control and iterative construction methods were used.      Results discussed with Dr. Espinal at time of interpretation.        FINDINGS:    Brain/Ventricles: There is limitation secondary to streak artifact and motion. There is areas of low-attenuation surrounding the left basal ganglia involving the frontal temporal and to lesser the parietal lobes. This appears to surround a relatively  circumferential area of preserved or increased density within the left basal ganglia, subinsular cortex measuring approximately 2.5 cm. This could be artifactual from subacute infarct or related to an underlying lesion, mass  with surrounding vasogenic  edema. Changes also appear to extend into the left cerebral peduncle and to lesser extent the mony. There is very mild degree of mass effect on the lateral ventricle. No acute hemorrhage is noted. There is a mild degree of diffuse atrophy and white  matter changes additionally noted not unexpected for patient stated age.    Orbits: The visualized portion of the orbits demonstrate no acute abnormality.    Sinuses and maxillofacial bones.: Paranasal sinuses appear to be clear with some mucosal thickening, possible mucous retention cyst inferiorly within the maxillary sinuses. No air-fluid levels noted. The maxillofacial bones appear to be grossly intact.  There is some mild irregularity along the inferior aspect of the left side of the nasal bone which is age indeterminate. Nasal bone fracture is not excluded.    Chronic left-sided mastoid effusion noted.    Mandible and temporomandibular joints are grossly unremarkable in appearance    Soft Tissues/Skull: No acute abnormality of the visualized skull or soft tissues.   Impression:     1.There is limitation secondary to motion and streak artifact.  2.There is a large area of low-attenuation surrounding the left basal ganglia and extending into the left cerebral peduncle and mony. Findings are concerning for underlying mass lesion with surrounding vasogenic edema. Changes from subacute infarct with  areas of preserved density of brain parenchyma also a potential consideration. Recommend correlation with patient history particularly any known history of cancer.. Recommend MRI of the brain with and without contrast for further evaluation.  3.No acute intracranial hemorrhage.  4.Irregularity of the inferior aspect of the left side of the nasal bone is age indeterminate. Please correlate for point tenderness.  5.Chronic left-sided mastoid effusion.        Electronically Signed: Obey Burgos MD   1/29/2025 1:18 PM EST   Workstation ID:  [id number]    CT Cervical Spine Without Contrast [[id number]] Sumeet as Reviewed   Order Status: Completed Collected: 01/29/25 1302    Updated: 01/29/25 1315   Narrative:       CT CERVICAL SPINE WO CONTRAST    Date of Exam: 1/29/2025 12:40 PM EST    Indication: trauma.    Comparison: None available.    Technique: Axial CT images were obtained of the cervical spine without contrast administration.  Sagittal and coronal reconstructions were performed.  Automated exposure control and iterative reconstruction methods were used.      Findings:  Craniocervical alignment is normal. No cervical spine fracture or subluxation is seen. Multilevel degenerative changes are present. There is a continuation in the upper lobes may reflect changes of chronic small airways disease, and the remainder the  paraspinal soft tissues are within normal limits.    There are degenerative changes of the C2 dens/anterior C1 ring junction.    At C2-3, advanced left facet arthropathy is present. No significant disc bulge or canal stenosis. Right neural foramen is patent. Mild to moderate left neural foraminal stenosis.    At C3-4, severe left facet arthropathy and moderate right facet arthropathy is present. There is left greater than right uncovertebral spurring and moderate disc bulge. No significant central canal stenosis. Severe left neural foraminal stenosis. Mild to   moderate right neural foraminal stenosis    At C4-5, mild posterior disc osteophyte formation is present with bilateral uncovertebral spurring. Severe left and moderate right facet arthropathy is present. Mild central canal stenosis. Severe bilateral neural foraminal stenosis.    At C5-6, posterior disc osteophyte formation is present with borderline to mild central canal stenosis. Severe left and moderate right facet arthropathy is present with bilateral uncovertebral spurring. Severe left neural foraminal stenosis. Moderate to  severe right neural foraminal stenosis.    At C6-7,  "mild posterior disc osteophyte formation is present. Borderline to mild central canal stenosis. Right greater than left uncovertebral spurring with mild to moderate facet arthropathy. Severe right neural foraminal stenosis. Moderate left neural  foraminal stenosis    At C7-T1, no significant disc bulge or canal stenosis. Mild bilateral facet arthropathy. No significant neural foraminal stenosis.   Impression:     Impression:    1. Degenerative changes in the cervical spine. No acute cervical spine findings.      Electronically Signed: Naty Keenan MD   1/29/2025 1:12 PM EST   Workstation ID: [id number]    XR Chest 1 View [[id number]] Sumeet as Reviewed   Order Status: Completed Collected: 01/29/25 1234    Updated: 01/29/25 1237   Narrative:     XR CHEST 1 VW    Date of Exam: 1/29/2025 12:10 PM EST    Indication: Stroke Protocol (Onset > 12 hrs)    Comparison: None available.    Findings:  No acute airspace disease. Heart size is normal. Mild right hemidiaphragm elevation. No pleural effusion or pneumothorax or acute osseous abnormalities identified.   Impression:     Impression:  No acute cardiopulmonary findings.       PHYSICAL EXAM  Objective:  Vital signs: (most recent): Blood pressure 127/70, pulse 52, temperature 97.3 °F (36.3 °C), temperature source Oral, resp. rate 20, height 182.9 cm (72\"), weight 126 kg (277 lb 5.4 oz), SpO2 97%.                Alert  nad  No resp distress  Chronic venous insufficiency  Post op changes to head  Wishes for dc today     CONDITION ON DISCHARGE  Stable.      DISCHARGE DISPOSITION   Home or Self Care      DISCHARGE MEDICATIONS       Your medication list        START taking these medications        Instructions Last Dose Given Next Dose Due   dexAMETHasone 1 MG tablet  Commonly known as: DECADRON  Start taking on: February 10, 2025      Take 4 tablets by mouth 2 (Two) Times a Day With Meals for 3 days, THEN 3 tablets 2 (Two) Times a Day With Meals for 7 days, THEN 2 tablets 2 (Two) " Times a Day With Meals for 7 days, THEN 1 tablet Daily for 7 days.       famotidine 40 MG tablet  Commonly known as: PEPCID  Start taking on: February 11, 2025      Take 1 tablet by mouth Daily.       sodium chloride 1 g tablet      Take 1 tablet by mouth 2 (Two) Times a Day With Meals.              CHANGE how you take these medications        Instructions Last Dose Given Next Dose Due   amLODIPine 10 MG tablet  Commonly known as: NORVASC  What changed: how much to take      Take 0.5 tablets by mouth every night at bedtime.              CONTINUE taking these medications        Instructions Last Dose Given Next Dose Due   acetaminophen 325 MG tablet  Commonly known as: TYLENOL      Take 2 tablets by mouth Every 6 (Six) Hours As Needed for Mild Pain.       atorvastatin 10 MG tablet  Commonly known as: LIPITOR      TAKE 1 TABLET BY MOUTH EVERY DAY       dapagliflozin 5 MG tablet tablet  Commonly known as: Farxiga      Take 1 tablet by mouth Daily.       losartan 25 MG tablet  Commonly known as: COZAAR      TAKE 1 TABLET BY MOUTH EVERY DAY       metFORMIN 500 MG tablet  Commonly known as: GLUCOPHAGE      TAKE 1 TABLET BY MOUTH TWICE A DAY WITH FOOD       rivaroxaban 20 MG tablet  Commonly known as: XARELTO      Take 1 tablet by mouth Daily.       tamsulosin 0.4 MG capsule 24 hr capsule  Commonly known as: FLOMAX      Take 1 capsule by mouth Daily.                 Where to Get Your Medications        These medications were sent to [facility]/pharmacy #[id number] - [place], [place] - [address] [address] - [phone] [address] [phone]   [address] [place] [address] [facility] [place] [address]      Phone: [phone]   dexAMETHasone 1 MG tablet  famotidine 40 MG tablet  sodium chloride 1 g tablet       Information about where to get these medications is not yet available    Ask your nurse or doctor about these medications  amLODIPine 10 MG tablet          Future Appointments   Date Time Provider Department Center   2/12/2025  2:30 PM [facility] 2  [facility]    3/31/2025 11:00 AM Carol Rios APRN [facility]   12/11/2025 11:00 AM Carol Rios APRN [facility]     Additional Instructions for the Follow-ups that You Need to Schedule       Ambulatory Referral to Home Health (Hospital)   As directed      Carol Rios APRN    Order Comments: Carol Rios APRN    Face to Face Visit Date: 2/10/2025   Follow-up provider for Plan of Care?: I treated the patient in an acute care facility and will not continue treatment after discharge.   Follow-up provider: CAROL RIOS [[id number]]   Reason/Clinical Findings: s/p brain surgery, weakness, lymphoma, hyponatremia   Describe mobility limitations that make leaving home difficult: s/p brain surgery, weakness, lymphoma, hyponatremia   Nursing/Therapeutic Services Requested: Skilled Nursing Physical Therapy Speech Therapy   Skilled nursing orders: Medication education   PT orders: Therapeutic exercise Strengthening   SLP orders: Dysphagia therapy Language Cognition        Discharge Follow-up with Specialty: PCP this week with a BMP basic metabolic panel in ~2 days with PCP or Oncology. Oncology call for appointment. IVANIA in 2 weeks   As directed      Specialty: PCP this week with a BMP basic metabolic panel in ~2 days with PCP or Oncology. Oncology call for appointment. IVANIA in 2 weeks               Follow-up Information       Thanh Fish MD Follow up in 1 week(s).    Specialties: Hematology and Oncology, Oncology  Contact information:  [address] [facility] [place] [address] [phone]               Carol Rios APRN .    Specialties: Nurse Practitioner, Family Medicine  Contact information:  [facility] [address]  [facility] [address]  [place] [address] [phone]                             TEST  RESULTS PENDING AT DISCHARGE  Pending Labs       Order Current Status    Tissue Pathology Exam Preliminary result               Vazquez Lemus MD  [facility]  02/10/25  15:33 EST      Time: greater than 32 minutes on discharge  It was a pleasure taking care of this patient while in the hospital.       Electronically signed by Vazquez Lemus MD at 02/10/25 6829

## 2025-02-11 NOTE — CASE MANAGEMENT/SOCIAL WORK
Case Management Discharge Note      Final Note: Home with family and [facility] per private auto    Provided Post Acute Provider List?: N/A  N/A Provider List Comment: Will discuss with family once therapy evals done  Provided Post Acute Provider Quality & Resource List?: N/A    Selected Continued Care - Discharged on 2/10/2025 Admission date: 1/30/2025 - Discharge disposition: Home or Self Care      Destination    No services have been selected for the patient.                Durable Medical Equipment Coordination complete.      Service Provider Services Address Phone Fax Patient Preferred    [facility] [address] [facility] [address] [address] [phone] --              Dialysis/Infusion    No services have been selected for the patient.                Home Medical Care Coordination complete.      Service Provider Services Address Phone Fax Patient Preferred    [facility] [address] [facility] [place] [address] [phone] --              Therapy    No services have been selected for the patient.                Community Resources    No services have been selected for the patient.                Community & DME    No services have been selected for the patient.                    Transportation Services  Private: Car    Final Discharge Disposition Code: 06 - home with home health care

## 2025-02-11 NOTE — OUTREACH NOTE
Call Center TCM Note      Flowsheet Row Responses   [facility] [place] operated by [facility] patient discharged from? [place]   Does the patient have one of the following disease processes/diagnoses(primary or secondary)? General Surgery   TCM attempt successful? No   Unsuccessful attempts Attempt 1   Call Status Voice mail issues  [MAILBOX FULL]            Cira Rogers MA    2/11/2025, 15:19 EST

## 2025-02-12 ENCOUNTER — HOME CARE VISIT (OUTPATIENT)
Dept: HOME HEALTH SERVICES | Facility: HOME HEALTHCARE | Age: 80
End: 2025-02-12

## 2025-02-12 ENCOUNTER — TRANSITIONAL CARE MANAGEMENT TELEPHONE ENCOUNTER (OUTPATIENT)
Dept: CALL CENTER | Facility: HOSPITAL | Age: 80
End: 2025-02-12
Payer: MEDICARE

## 2025-02-12 NOTE — OUTREACH NOTE
Call Center TCM Note      Flowsheet Row Responses   [facility] patient discharged from? [place]   Does the patient have one of the following disease processes/diagnoses(primary or secondary)? General Surgery   TCM attempt successful? Yes   Call start time 1136   Call end time 1140   Discharge diagnosis Brain mass-Left frontal sterotactic brain biopsy   Person spoke with today (if not patient) and relationship Wife   Meds reviewed with patient/caregiver? Yes   Is the patient having any side effects they believe may be caused by any medication additions or changes? No   Does the patient have all medications related to this admission filled (includes all antibiotics, pain medications, etc.) Yes   Is the patient taking all medications as directed (includes completed medication regime)? Yes   Comments HOSP DC FU appt 2/13/25 930 am   Does the patient have an appointment with their PCP within 7-14 days of discharge? Yes   What is the Home health agency?  [facility]    Has home health visited the patient within 72 hours of discharge? Call prior to 72 hours   What DME was ordered? [facility] for DME   Has all DME been delivered? Yes   Psychosocial issues? No   Did the patient receive a copy of their discharge instructions? Yes   Nursing interventions Reviewed instructions with patient   What is the patient's perception of their health status since discharge? Improving   Nursing interventions Nurse provided patient education   Is the patient/caregiver able to teach back signs and symptoms of incisional infection? Increased redness, swelling or pain at the incisonal site, Fever, Pus or odor from incision, Incisional warmth, Increased drainage or bleeding   Is the patient/caregiver able to teach back steps to recovery at home? Set small, achievable goals for return to baseline health   Is the patient/caregiver able to teach back the hierarchy of who to call/visit for symptoms/problems? PCP, Specialist, Home health nurse,  Urgent Care, ED, 911 Yes   TCM call completed? Yes   Wrap up additional comments Wife will call for oncology appt. PCP appt set. HH will be out tomorrow.   Call end time 1140            Sujey Calvo RN    2/12/2025, 11:43 EST

## 2025-02-13 ENCOUNTER — LAB (OUTPATIENT)
Dept: FAMILY MEDICINE CLINIC | Facility: CLINIC | Age: 80
End: 2025-02-13
Payer: MEDICARE

## 2025-02-13 ENCOUNTER — OFFICE VISIT (OUTPATIENT)
Dept: FAMILY MEDICINE CLINIC | Facility: CLINIC | Age: 80
End: 2025-02-13
Payer: MEDICARE

## 2025-02-13 ENCOUNTER — HOME CARE VISIT (OUTPATIENT)
Dept: HOME HEALTH SERVICES | Facility: HOME HEALTHCARE | Age: 80
End: 2025-02-13

## 2025-02-13 VITALS
DIASTOLIC BLOOD PRESSURE: 62 MMHG | HEART RATE: 55 BPM | RESPIRATION RATE: 18 BRPM | OXYGEN SATURATION: 99 % | HEIGHT: 72 IN | BODY MASS INDEX: 37.13 KG/M2 | TEMPERATURE: 96.7 F | SYSTOLIC BLOOD PRESSURE: 132 MMHG | WEIGHT: 274.1 LBS

## 2025-02-13 DIAGNOSIS — E11.65 TYPE 2 DIABETES MELLITUS WITH HYPERGLYCEMIA, WITHOUT LONG-TERM CURRENT USE OF INSULIN: ICD-10-CM

## 2025-02-13 DIAGNOSIS — Z09 HOSPITAL DISCHARGE FOLLOW-UP: Primary | ICD-10-CM

## 2025-02-13 DIAGNOSIS — G93.89 BRAIN MASS: ICD-10-CM

## 2025-02-13 DIAGNOSIS — Z79.01 LONG TERM CURRENT USE OF ANTICOAGULANT: ICD-10-CM

## 2025-02-13 LAB
ALBUMIN SERPL-MCNC: 3.9 G/DL (ref 3.5–5.2)
ALBUMIN/GLOB SERPL: 1.3 G/DL
ALP SERPL-CCNC: 75 U/L (ref 39–117)
ALT SERPL W P-5'-P-CCNC: 36 U/L (ref 1–41)
ANION GAP SERPL CALCULATED.3IONS-SCNC: 11.7 MMOL/L (ref 5–15)
AST SERPL-CCNC: 20 U/L (ref 1–40)
BACTERIA UR QL AUTO: ABNORMAL /HPF
BASOPHILS # BLD AUTO: 0.03 10*3/MM3 (ref 0–0.2)
BASOPHILS NFR BLD AUTO: 0.2 % (ref 0–1.5)
BILIRUB SERPL-MCNC: 1.1 MG/DL (ref 0–1.2)
BILIRUB UR QL STRIP: NEGATIVE
BUN SERPL-MCNC: 22 MG/DL (ref 8–23)
BUN/CREAT SERPL: 24.7 (ref 7–25)
CALCIUM SPEC-SCNC: 8.9 MG/DL (ref 8.6–10.5)
CHLORIDE SERPL-SCNC: 94 MMOL/L (ref 98–107)
CLARITY UR: ABNORMAL
CO2 SERPL-SCNC: 24.3 MMOL/L (ref 22–29)
COD CRY URNS QL: ABNORMAL /HPF
COLOR UR: YELLOW
CREAT SERPL-MCNC: 0.89 MG/DL (ref 0.76–1.27)
DEPRECATED RDW RBC AUTO: 43.8 FL (ref 37–54)
EGFRCR SERPLBLD CKD-EPI 2021: 87.7 ML/MIN/1.73
EOSINOPHIL # BLD AUTO: 0 10*3/MM3 (ref 0–0.4)
EOSINOPHIL NFR BLD AUTO: 0 % (ref 0.3–6.2)
ERYTHROCYTE [DISTWIDTH] IN BLOOD BY AUTOMATED COUNT: 13.4 % (ref 12.3–15.4)
GLOBULIN UR ELPH-MCNC: 2.9 GM/DL
GLUCOSE SERPL-MCNC: 175 MG/DL (ref 65–99)
GLUCOSE UR STRIP-MCNC: ABNORMAL MG/DL
HCT VFR BLD AUTO: 49 % (ref 37.5–51)
HGB BLD-MCNC: 15.9 G/DL (ref 13–17.7)
HGB UR QL STRIP.AUTO: ABNORMAL
HYALINE CASTS UR QL AUTO: ABNORMAL /LPF
IMM GRANULOCYTES # BLD AUTO: 0.13 10*3/MM3 (ref 0–0.05)
IMM GRANULOCYTES NFR BLD AUTO: 0.7 % (ref 0–0.5)
KETONES UR QL STRIP: ABNORMAL
LARGE PLATELETS: NORMAL
LEUKOCYTE ESTERASE UR QL STRIP.AUTO: NEGATIVE
LYMPHOCYTES # BLD AUTO: 0.95 10*3/MM3 (ref 0.7–3.1)
LYMPHOCYTES NFR BLD AUTO: 5.4 % (ref 19.6–45.3)
MCH RBC QN AUTO: 29.2 PG (ref 26.6–33)
MCHC RBC AUTO-ENTMCNC: 32.4 G/DL (ref 31.5–35.7)
MCV RBC AUTO: 90.1 FL (ref 79–97)
MONOCYTES # BLD AUTO: 1.12 10*3/MM3 (ref 0.1–0.9)
MONOCYTES NFR BLD AUTO: 6.4 % (ref 5–12)
NEUTROPHILS NFR BLD AUTO: 15.27 10*3/MM3 (ref 1.7–7)
NEUTROPHILS NFR BLD AUTO: 87.3 % (ref 42.7–76)
NITRITE UR QL STRIP: NEGATIVE
NRBC BLD AUTO-RTO: 0 /100 WBC (ref 0–0.2)
PH UR STRIP.AUTO: 5.5 [PH] (ref 5–8)
PLATELET # BLD AUTO: 129 10*3/MM3 (ref 140–450)
PMV BLD AUTO: 13.4 FL (ref 6–12)
POTASSIUM SERPL-SCNC: 4.5 MMOL/L (ref 3.5–5.2)
PROT SERPL-MCNC: 6.8 G/DL (ref 6–8.5)
PROT UR QL STRIP: NEGATIVE
RBC # BLD AUTO: 5.44 10*6/MM3 (ref 4.14–5.8)
RBC # UR STRIP: ABNORMAL /HPF
RBC MORPH BLD: NORMAL
REF LAB TEST METHOD: ABNORMAL
SODIUM SERPL-SCNC: 130 MMOL/L (ref 136–145)
SP GR UR STRIP: 1.03 (ref 1–1.03)
SQUAMOUS #/AREA URNS HPF: ABNORMAL /HPF
UROBILINOGEN UR QL STRIP: ABNORMAL
WBC # UR STRIP: ABNORMAL /HPF
WBC MORPH BLD: NORMAL
WBC NRBC COR # BLD AUTO: 17.5 10*3/MM3 (ref 3.4–10.8)

## 2025-02-13 PROCEDURE — 3075F SYST BP GE 130 - 139MM HG: CPT | Performed by: NURSE PRACTITIONER

## 2025-02-13 PROCEDURE — 1126F AMNT PAIN NOTED NONE PRSNT: CPT | Performed by: NURSE PRACTITIONER

## 2025-02-13 PROCEDURE — 85007 BL SMEAR W/DIFF WBC COUNT: CPT | Performed by: NURSE PRACTITIONER

## 2025-02-13 PROCEDURE — 1160F RVW MEDS BY RX/DR IN RCRD: CPT | Performed by: NURSE PRACTITIONER

## 2025-02-13 PROCEDURE — 1159F MED LIST DOCD IN RCRD: CPT | Performed by: NURSE PRACTITIONER

## 2025-02-13 PROCEDURE — 80053 COMPREHEN METABOLIC PANEL: CPT | Performed by: NURSE PRACTITIONER

## 2025-02-13 PROCEDURE — 1111F DSCHRG MED/CURRENT MED MERGE: CPT | Performed by: NURSE PRACTITIONER

## 2025-02-13 PROCEDURE — 99495 TRANSJ CARE MGMT MOD F2F 14D: CPT | Performed by: NURSE PRACTITIONER

## 2025-02-13 PROCEDURE — 3078F DIAST BP <80 MM HG: CPT | Performed by: NURSE PRACTITIONER

## 2025-02-13 PROCEDURE — 85025 COMPLETE CBC W/AUTO DIFF WBC: CPT | Performed by: NURSE PRACTITIONER

## 2025-02-13 PROCEDURE — 81001 URINALYSIS AUTO W/SCOPE: CPT | Performed by: NURSE PRACTITIONER

## 2025-02-13 RX ORDER — LANCETS 28 GAUGE
EACH MISCELLANEOUS
Qty: 100 EACH | Refills: 3 | Status: SHIPPED | OUTPATIENT
Start: 2025-02-13

## 2025-02-13 RX ORDER — BLOOD-GLUCOSE METER
1 EACH MISCELLANEOUS TAKE AS DIRECTED
Qty: 1 KIT | Refills: 0 | Status: SHIPPED | OUTPATIENT
Start: 2025-02-13

## 2025-02-13 NOTE — PROGRESS NOTES
Transitional Care Follow Up Visit  Subjective     Sumit Jules is a 79 y.o. male who presents for a transitional care management visit.    Within 48 business hours after discharge our office contacted him via telephone to coordinate his care and needs.      I reviewed and discussed the details of that call along with the discharge summary, hospital problems, inpatient lab results, inpatient diagnostic studies, and consultation reports with Sumit.     Current outpatient and discharge medications have been reconciled for the patient.  Reviewed by: JARROD Mix          2/10/2025     6:08 PM   Date of TCM Phone Call   [facility]   Date of Admission 1/30/2025   Date of Discharge 2/10/2025   Discharge Disposition Home or Self Care     Risk for Readmission (LACE) Score: 13 (2/10/2025  6:00 AM)      History of Present Illness   Course During Hospital Stay: Patient is accompanied to visit today by wife, Bina and daughter, Elina.  There was ER visit at [facility] on January 30 with transfer to [facility] for brain mass.  Patient was admitted and discharged from [facility] on February 10.  Discharge diagnosis included brain mass, hyponatremia, pharyngeal dysphagia, vasogenic cerebral edema, slurred speech, history DVT, type 2 diabetes with hyperglycemia, pancreatic lesion, hyperlipidemia, hypertension, coronary artery disease, prostate cancer, Antithrombin III deficiency, long-term current use of anticoagulant, and coagulation defect.  Started sodium chloride tablets yesterday.  Following Decadron taper at home.  Started famotidine 40 mg at bedtime.  Aware of need to take amlodipine 10 mg half tab daily.  Per family -having difficulty speaking, recalling events, and handwriting.  Difficulty urinating since hospital discharge.  To follow-up with speech therapy, physical therapy, and oncology provider.  Hospital summary as below.      Hospital discharge summary per Dr. Hernandez  Allan 2/10/25:  Patient is a 79 y.o. male with complicated PMH including, Hypertension  Antithrombin III deficiency, History of DVTs, Diabetes type 2 and other medical issues. Transferred from [place] for neurosurgery brain biopsy of brain mass.  Reported history of melanoma and pancreas mass that was previously biopsied benign.       Underwent biopsy on 2/3/2025.  Initial path consistent with lymphoma.  On steroid taper. They are plan on following up with oncologist at [place]. Patient and family wish for discharge today with home health. He has had some hyponatremia which is likely multifactorial with his brain mass, decreased sodium intake and other process. I discussed potentially staying in the hospital but patient and family feel well and wish for dc today with risks of being in the hospital including weakness and infection. I did discuss that worsening sodium can increase risk of seizure and confusion. A reasonable plan which patient wishes for is to be on a trial of NACL tablets and he will get a repeat BMP in ~2 days with PCP for further monitoring. I also did advise him to avoid excess free water consumption which also may have been contributing over the past week or two.          The following portions of the patient's history were reviewed and updated as appropriate: allergies, current medications, past family history, past medical history, past social history, past surgical history, and problem list.    Review of Systems   Constitutional:  Positive for fatigue. Negative for chills and fever.   HENT:  Negative for congestion, ear pain, sore throat and trouble swallowing.    Respiratory:  Negative for cough, chest tightness, shortness of breath and wheezing.    Cardiovascular:  Negative for chest pain, palpitations and leg swelling.   Gastrointestinal:  Negative for abdominal pain, constipation, diarrhea, nausea and vomiting.   Genitourinary:  Positive for difficulty urinating. Negative for dysuria,  "frequency and urgency.   Musculoskeletal:  Negative for arthralgias and myalgias.   Skin:  Negative for rash.   Neurological:  Positive for speech difficulty and weakness. Negative for dizziness, numbness and headaches.   Psychiatric/Behavioral:  Positive for decreased concentration. The patient is not nervous/anxious.        Objective   /62 (BP Location: Left arm, Patient Position: Sitting, Cuff Size: Adult)   Pulse 55   Temp 96.7 °F (35.9 °C) (Temporal)   Resp 18   Ht 182.9 cm (72\")   Wt 124 kg (274 lb 1.6 oz)   SpO2 99%   BMI 37.17 kg/m²       Physical Exam  Constitutional:       General: He is not in acute distress.     Appearance: He is well-groomed. He is obese.      Comments: Pleasant, converses appropriately    HENT:      Head: Normocephalic and atraumatic.      Right Ear: Tympanic membrane, ear canal and external ear normal.      Left Ear: Tympanic membrane, ear canal and external ear normal.      Nose: Nose normal.      Mouth/Throat:      Lips: Pink.      Mouth: Mucous membranes are moist.      Pharynx: Oropharynx is clear.   Eyes:      Conjunctiva/sclera: Conjunctivae normal.   Cardiovascular:      Rate and Rhythm: Normal rate and regular rhythm.      Chest Wall: PMI is not displaced.      Pulses: Normal pulses.      Heart sounds: Normal heart sounds, S1 normal and S2 normal.   Pulmonary:      Effort: Pulmonary effort is normal.      Breath sounds: Normal breath sounds.   Abdominal:      General: Bowel sounds are normal.      Palpations: Abdomen is soft.      Tenderness: There is no abdominal tenderness.   Musculoskeletal:         General: Normal range of motion.      Cervical back: Neck supple.      Right lower leg: No edema.      Left lower leg: No edema.   Skin:     General: Skin is warm and dry.      Comments: Incision site left scalp clean dry and intact with Dermabond.   Neurological:      General: No focal deficit present.      Mental Status: He is alert and oriented to person, place, " and time.      Sensory: Sensation is intact.      Motor: No weakness.      Gait: Gait is intact.      Comments: Difficulty speaking, difficulty word finding.   Psychiatric:         Mood and Affect: Mood and affect normal.         Thought Content: Thought content normal.         Judgment: Judgment normal.         Assessment & Plan   Problems Addressed this Visit       Long term current use of anticoagulant    Relevant Orders    CBC & Differential (Completed)    Type 2 diabetes mellitus with hyperglycemia, without long-term current use of insulin    Relevant Medications    glucose blood test strip    Blood Glucose Monitoring Suppl (Accu-Chek Guide) w/Device kit    Lancets (freestyle) lancets    Brain mass     Recommended calling for appointment with oncology provider.           Other Visit Diagnoses       Hospital discharge follow-up    -  Primary    Relevant Orders    CBC & Differential (Completed)    Comprehensive Metabolic Panel (Completed)    Urinalysis With Culture If Indicated - Urine, Clean Catch (Completed)    Urinalysis, Microscopic Only - Urine, Clean Catch (Completed)          Diagnoses         Codes Comments    Hospital discharge follow-up    -  Primary ICD-10-CM: Z09  ICD-9-CM: V67.59     Long term current use of anticoagulant     ICD-10-CM: Z79.01  ICD-9-CM: V58.61     Type 2 diabetes mellitus with hyperglycemia, without long-term current use of insulin     ICD-10-CM: E11.65  ICD-9-CM: 250.00, 790.29     Brain mass     ICD-10-CM: G93.89  ICD-9-CM: 348.89

## 2025-02-13 NOTE — PROGRESS NOTES
HEMATOLOGY ONCOLOGY OUTPATIENT CONSULTATION       Patient name: Sumit Jules  : 1945  MRN: [id number]  Primary Care Physician: Carol Rios APRN  Referring Physician: Ryan Lynn MD PhD  Reason For Consult: CNS lymphoma.     History of Present Illness:    2025: I was asked to see Mr. Jules who was brought to the hospital with a history of confusion and progressively more bizarre behavior. He had imaging of the brain at the time of admission that identified multiple enhancing brain lesions, the largest of which involve the basal ganglia on the left and that measure 2.9 x 1.9 x 2.5 cm. Smaller lesions involving the corpus callosum, the left frontal lobe and the right thalamus were also present. The possibility of a metastatic malignancy versus CNS lymphoma were brought up. It was very difficult to obtain a history from him but I was able to discussed with his spouse who reported that for at least 3 or 4 weeks he has behavior had become progressively more unusual and his speech less and less understandable. He had a history of skin cancer and had undergone several surgical procedures but he had never been told that he had melanoma. He was found to have a nodule on the pancreatic head that however, it did not seem to be malignant based on an FNA done in May 2024. On exam he is alert, conversant and in no distress. He is speech however is not understandable and often times his responses are not congruent with the question. He is neither jaundiced nor pale. The pupils are reactive to light and there is no facial asymmetry. Muscle strength seems to be preserved. The lungs are clear bilaterally and the heart is regular. The abdomen is protuberant but soft and nontender. There is no edema. Reviewed all imaging studies. No obvious source of a metastatic process is evident on the CT scans of the chest, abdomen and pelvis. The nodule on the pancreas is again identified. It  would not appear to have changed much in shape or size. Discussed with him and discussed that with his spouse whom I reached on the phone. A biopsy is essential. Given the history of skin cancer, could this be metastatic melanoma?. I will remain vigilant to the results of the biopsies. He will be transferred today to [facility] in [place] to facilitate the biopsy.     2/17/2025: In the office after he underwent a biopsy of the CNS malignancy identified in late January of 2025. He is much better and has been less confused. He has been taking dexamethasone, now on a tapering schedule, since the admission. He underwent the biopsy without any complications. He has been eating well and has not had any nausea or vomiting. No chest pain or cough and no abdominal pain. On exam he is conversant and seems oriented. No distress. A surgical incision on the left scalp is healing well. There is no jaundice. No oral lesions and respirations not labored. Lungs clear bilaterally. Heart is regular. Abdomen is soft and not tender. No edema. The cranial nerves are grossly intact; muscle strength is preserved on both sides of the body. Reviewed the final report of pathology. 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  However, flow cytometry showed a kappa restricted B-cell population in a polyclonal background.  The tissue was sent to [facility] pathology for expert opinion.  A final report from them was not available at the time of this dictation.  Plans for staging including bone marrow biopsy and PET scan were made.  Insertion of a port was requested.  Discussed with them the use of high-dose methotrexate in the treatment of CNS lymphoma.    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   Converted from [employer]:   Description - SEBORRHEIC KERATOSIS    Pain of foot 03/19/2013    Formatting of this note might be different from the original.   Converted from [employer]:   Description - HEEL PAIN    Prostate cancer     Thrombocytopenia 03/19/2013    Formatting of this note might be different from the original.   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   Converted from [employer]:   Description - VASCULAR DISORDER OF SKIN     Past Surgical History:   Procedure Laterality Date    BRAIN BIOPSY Left 2/3/2025    Procedure: Left frontal stereotactic brain biopsy with Stealth;  Surgeon: Reyes Thakur MD;  Location: [facility] OR;  Service: Neurosurgery;  Laterality: Left;    CHOLECYSTECTOMY      COLONOSCOPY  10/2013    UPPER ENDOSCOPIC ULTRASOUND W/ FNA N/A 5/3/2024    Procedure: ENDOSCOPIC ULTRASOUND with fine needle aspiration x1 area;  Surgeon: Gifty Ribera MD;  Location: [facility] ENDOSCOPY;  Service: Gastroenterology;  Laterality: N/A;  Post- pancreatic cyst       Current Outpatient Medications:     acetaminophen (TYLENOL) 325 MG tablet, Take 2 tablets by mouth Every 6 (Six) Hours As Needed for Mild Pain., Disp:  , Rfl:     amLODIPine (NORVASC) 10 MG tablet, Take 0.5 tablets by mouth every night at bedtime., Disp: , Rfl:     atorvastatin (LIPITOR) 10 MG tablet, TAKE 1 TABLET BY MOUTH EVERY DAY, Disp: 90 tablet, Rfl: 1    dapagliflozin (Farxiga) 5 MG tablet tablet, Take 1 tablet by mouth Daily., Disp: 90 tablet, Rfl: 0    dexAMETHasone (DECADRON) 1 MG tablet, Take 4 tablets by mouth 2 (Two) Times a Day With Meals for 3 days, THEN 3 tablets 2 (Two) Times a Day With Meals for 7 days, THEN 2 tablets 2 (Two) Times a Day With Meals for 7 days, THEN 1 tablet Daily for 7 days., Disp: 101 tablet, Rfl: 0    famotidine (PEPCID) 40 MG tablet, Take 1 tablet by mouth Daily., Disp: 30 tablet, Rfl: 0    losartan (COZAAR) 25 MG tablet, TAKE 1 TABLET BY MOUTH EVERY DAY, Disp: 90 tablet, Rfl: 0    metFORMIN (GLUCOPHAGE) 500 MG tablet, TAKE 1 TABLET BY MOUTH TWICE A DAY WITH FOOD, Disp: 180 tablet, Rfl: 0    rivaroxaban (XARELTO) 20 MG tablet, Take 1 tablet by mouth Daily., Disp: , Rfl:     sodium chloride 1 g tablet, Take 1 tablet by mouth 2 (Two) Times a Day With Meals., Disp: 8 tablet, Rfl: 0    tamsulosin (FLOMAX) 0.4 MG capsule 24 hr capsule, Take 1 capsule by mouth Daily., Disp: 90 capsule, Rfl: 0    No Known Allergies    Family History   Problem Relation Age of Onset    Breast cancer Other     Diabetes Other     Deep vein thrombosis Other     Diabetes type II Other      Cancer-related family history includes Breast cancer in an other family member.    Social History     Tobacco Use    Smoking status: Never     Passive exposure: Never    Smokeless tobacco: Never   Vaping Use    Vaping status: Never Used   Substance Use Topics    Alcohol use: No    Drug use: Never     Social History     Social History Narrative    Not on file     ROS:   Review of Systems   Constitutional:  Positive for activity change and fatigue. Negative for appetite change, chills, diaphoresis, fever and unexpected weight change.   HENT:  Negative for congestion, dental  problem, drooling, ear discharge, ear pain, facial swelling, hearing loss, mouth sores, nosebleeds, postnasal drip, rhinorrhea, sinus pressure, sinus pain, sneezing, sore throat, tinnitus, trouble swallowing and voice change.    Eyes:  Negative for photophobia, pain, discharge, redness, itching and visual disturbance.   Respiratory:  Negative for apnea, cough, choking, chest tightness, shortness of breath, wheezing and stridor.    Cardiovascular:  Negative for chest pain, palpitations and leg swelling.   Gastrointestinal:  Negative for abdominal distention, abdominal pain, anal bleeding, blood in stool, constipation, diarrhea, nausea, rectal pain and vomiting.   Endocrine: Negative for cold intolerance, heat intolerance, polydipsia and polyuria.   Genitourinary:  Negative for decreased urine volume, difficulty urinating, dysuria, flank pain, frequency, genital sores, hematuria and urgency.   Musculoskeletal:  Negative for arthralgias, back pain, gait problem, joint swelling, myalgias, neck pain and neck stiffness.   Skin:  Negative for color change, pallor and rash.   Neurological:  Negative for dizziness, tremors, seizures, syncope, facial asymmetry, speech difficulty, weakness, light-headedness, numbness and headaches.   Hematological:  Negative for adenopathy. Does not bruise/bleed easily.   Psychiatric/Behavioral:  Negative for agitation, behavioral problems, confusion, decreased concentration, hallucinations, self-injury, sleep disturbance and suicidal ideas. The patient is not nervous/anxious.        Objective:    Vital Signs:  There were no vitals filed for this visit.  There is no height or weight on file to calculate BMI.    ECOG  (2) Ambulatory and capable of self care, unable to carry out work activity, up and about > 50% or waking hours    Physical Exam:   Physical Exam  Constitutional:       General: He is not in acute distress.     Appearance: He is not ill-appearing, toxic-appearing or diaphoretic.    HENT:      Head: Normocephalic and atraumatic.      Right Ear: External ear normal.      Left Ear: External ear normal.      Nose: Nose normal.      Mouth/Throat:      Mouth: Mucous membranes are moist.      Pharynx: Oropharynx is clear.   Eyes:      General: No scleral icterus.        Right eye: No discharge.         Left eye: No discharge.      Conjunctiva/sclera: Conjunctivae normal.      Pupils: Pupils are equal, round, and reactive to light.   Cardiovascular:      Rate and Rhythm: Normal rate and regular rhythm.      Pulses: Normal pulses.      Heart sounds: Normal heart sounds. No murmur heard.     No friction rub. No gallop.   Pulmonary:      Effort: No respiratory distress.      Breath sounds: No stridor. No wheezing, rhonchi or rales.   Chest:      Chest wall: No tenderness.   Abdominal:      General: Abdomen is flat. Bowel sounds are normal. There is no distension.      Palpations: Abdomen is soft. There is no mass.      Tenderness: There is no abdominal tenderness. There is no right CVA tenderness, left CVA tenderness, guarding or rebound.   Musculoskeletal:         General: No swelling, tenderness, deformity or signs of injury.      Cervical back: No rigidity.      Right lower leg: No edema.      Left lower leg: No edema.   Lymphadenopathy:      Cervical: No cervical adenopathy.   Skin:     General: Skin is warm and dry.      Coloration: Skin is not jaundiced or pale.      Findings: No bruising or rash.   Neurological:      General: No focal deficit present.      Mental Status: He is alert and oriented to person, place, and time.      Cranial Nerves: No cranial nerve deficit.   Psychiatric:         Mood and Affect: Mood normal.         Behavior: Behavior normal.         Thought Content: Thought content normal.         Judgment: Judgment normal.     I Thanh Fish MD performed the physical exam on 2/17/2025 as documented above.     Lab Results - Last 18 Months   Lab Units 02/10/25  3970  02/09/25  0428 02/08/25  0525   WBC 10*3/mm3 8.58 8.11 8.73   HEMOGLOBIN g/dL 13.9 14.1 14.0   HEMATOCRIT % 41.3 41.3 40.4   PLATELETS 10*3/mm3 104* 122* 113*   MCV fL 89.2 87.7 87.3     Lab Results - Last 18 Months   Lab Units 02/13/25  1048 02/10/25  0626 02/09/25  0428 01/30/25  0510 01/29/25  1302 01/03/25  0908   SODIUM mmol/L 130* 127* 131*   < > 141 140   POTASSIUM mmol/L 4.5 4.5 4.4   < > 4.2 4.0   CHLORIDE mmol/L 94* 97* 99   < > 104 105   CO2 mmol/L 24.3 25.5 25.0   < > 27.3 24.4   BUN mg/dL 22 17 18   < > 14 14   CREATININE mg/dL 0.89 0.77 0.68*   < > 1.08 1.08   CALCIUM mg/dL 8.9 7.8* 7.7*   < > 9.7 9.0   BILIRUBIN mg/dL 1.1  --   --   --  0.7 0.8   ALK PHOS U/L 75  --   --   --  85 87   ALT (SGPT) U/L 36  --   --   --  13 8   AST (SGOT) U/L 20  --   --   --  20 15   GLUCOSE mg/dL 175* 171* 178*   < > 134* 149*    < > = values in this interval not displayed.     Lab Results   Component Value Date    GLUCOSE 175 (H) 02/13/2025    BUN 22 02/13/2025    CREATININE 0.89 02/13/2025    EGFRIFNONA 55 (L) 09/20/2016    EGFRIFAFRI 66 09/20/2016    BCR 24.7 02/13/2025    K 4.5 02/13/2025    CO2 24.3 02/13/2025    CALCIUM 8.9 02/13/2025    ALBUMIN 3.9 02/13/2025    AST 20 02/13/2025    ALT 36 02/13/2025     Lab Results - Last 18 Months   Lab Units 02/04/25  0330 01/29/25  1302 05/03/24  1037   INR  1.15* 2.11* 1.02*   APTT seconds 22.7  --   --      LDH   Date Value Ref Range Status   01/03/2025 178 135 - 225 U/L Final     Lab Results   Component Value Date    MACY Negative 02/13/2024     Lab Results   Component Value Date    PTT 22.7 02/04/2025    INR 1.15 (H) 02/04/2025     Assessment & Plan     CNS lymphoma?: It appears the diagnosis is not going to be easy. Tissue was sent to [place] for expert opinion and the final report is not yet available. Additional tests for staging are necessary. This will include a bone marrow biopsy and aspiration, as well as a PET scan.   He will need a port to be inserted. He is to see  Dr. Frank.   Reviewed the records from [facility]. Reviewed the laboratory exams and reviewed the report of pathology. Discussed with him and his family.   He is to see me in approximately 10 days with results.     Thanh Fish MD on 2/17/2025 at 16:43

## 2025-02-15 DIAGNOSIS — I10 BENIGN ESSENTIAL HYPERTENSION: ICD-10-CM

## 2025-02-17 ENCOUNTER — HOME CARE VISIT (OUTPATIENT)
Dept: HOME HEALTH SERVICES | Facility: HOME HEALTHCARE | Age: 80
End: 2025-02-17
Payer: MEDICARE

## 2025-02-17 ENCOUNTER — APPOINTMENT (OUTPATIENT)
Dept: LAB | Facility: HOSPITAL | Age: 80
End: 2025-02-17
Payer: MEDICARE

## 2025-02-17 ENCOUNTER — CONSULT (OUTPATIENT)
Dept: ONCOLOGY | Facility: CLINIC | Age: 80
End: 2025-02-17
Payer: MEDICARE

## 2025-02-17 ENCOUNTER — TELEPHONE (OUTPATIENT)
Dept: FAMILY MEDICINE CLINIC | Facility: CLINIC | Age: 80
End: 2025-02-17
Payer: MEDICARE

## 2025-02-17 VITALS
BODY MASS INDEX: 36.16 KG/M2 | RESPIRATION RATE: 14 BRPM | TEMPERATURE: 97.3 F | WEIGHT: 267 LBS | SYSTOLIC BLOOD PRESSURE: 136 MMHG | OXYGEN SATURATION: 95 % | HEART RATE: 90 BPM | HEIGHT: 72 IN | DIASTOLIC BLOOD PRESSURE: 74 MMHG

## 2025-02-17 DIAGNOSIS — E11.65 TYPE 2 DIABETES MELLITUS WITH HYPERGLYCEMIA, WITHOUT LONG-TERM CURRENT USE OF INSULIN: ICD-10-CM

## 2025-02-17 DIAGNOSIS — C85.90 LYMPHOMA, UNSPECIFIED BODY REGION, UNSPECIFIED LYMPHOMA TYPE: ICD-10-CM

## 2025-02-17 DIAGNOSIS — C83.390 CNS LYMPHOMA: ICD-10-CM

## 2025-02-17 DIAGNOSIS — K86.9 PANCREATIC LESION: Primary | ICD-10-CM

## 2025-02-17 LAB
ALBUMIN SERPL-MCNC: 3.9 G/DL (ref 3.5–5.2)
ALBUMIN/GLOB SERPL: 1.4 G/DL
ALP SERPL-CCNC: 72 U/L (ref 39–117)
ALT SERPL W P-5'-P-CCNC: 27 U/L (ref 1–41)
ANION GAP SERPL CALCULATED.3IONS-SCNC: 11 MMOL/L (ref 5–15)
AST SERPL-CCNC: 19 U/L (ref 1–40)
BASOPHILS # BLD AUTO: 0.01 10*3/MM3 (ref 0–0.2)
BASOPHILS NFR BLD AUTO: 0.1 % (ref 0–1.5)
BILIRUB SERPL-MCNC: 1.3 MG/DL (ref 0–1.2)
BUN SERPL-MCNC: 20 MG/DL (ref 8–23)
BUN/CREAT SERPL: 21.5 (ref 7–25)
CALCIUM SPEC-SCNC: 8.8 MG/DL (ref 8.6–10.5)
CHLORIDE SERPL-SCNC: 98 MMOL/L (ref 98–107)
CO2 SERPL-SCNC: 26 MMOL/L (ref 22–29)
CREAT SERPL-MCNC: 0.93 MG/DL (ref 0.76–1.27)
DEPRECATED RDW RBC AUTO: 46.4 FL (ref 37–54)
EGFRCR SERPLBLD CKD-EPI 2021: 84 ML/MIN/1.73
EOSINOPHIL # BLD AUTO: 0.01 10*3/MM3 (ref 0–0.4)
EOSINOPHIL NFR BLD AUTO: 0.1 % (ref 0.3–6.2)
ERYTHROCYTE [DISTWIDTH] IN BLOOD BY AUTOMATED COUNT: 14 % (ref 12.3–15.4)
GLOBULIN UR ELPH-MCNC: 2.8 GM/DL
GLUCOSE SERPL-MCNC: 177 MG/DL (ref 65–99)
HCT VFR BLD AUTO: 48.4 % (ref 37.5–51)
HGB BLD-MCNC: 15.6 G/DL (ref 13–17.7)
LYMPHOCYTES # BLD AUTO: 0.49 10*3/MM3 (ref 0.7–3.1)
LYMPHOCYTES NFR BLD AUTO: 4.1 % (ref 19.6–45.3)
MCH RBC QN AUTO: 29.9 PG (ref 26.6–33)
MCHC RBC AUTO-ENTMCNC: 32.2 G/DL (ref 31.5–35.7)
MCV RBC AUTO: 92.7 FL (ref 79–97)
MONOCYTES # BLD AUTO: 0.6 10*3/MM3 (ref 0.1–0.9)
MONOCYTES NFR BLD AUTO: 5 % (ref 5–12)
NEUTROPHILS NFR BLD AUTO: 10.88 10*3/MM3 (ref 1.7–7)
NEUTROPHILS NFR BLD AUTO: 90.7 % (ref 42.7–76)
PLATELET # BLD AUTO: 98 10*3/MM3 (ref 140–450)
PMV BLD AUTO: 13.1 FL (ref 6–12)
POTASSIUM SERPL-SCNC: 4.7 MMOL/L (ref 3.5–5.2)
PROT SERPL-MCNC: 6.7 G/DL (ref 6–8.5)
RBC # BLD AUTO: 5.22 10*6/MM3 (ref 4.14–5.8)
SODIUM SERPL-SCNC: 135 MMOL/L (ref 136–145)
WBC NRBC COR # BLD AUTO: 11.99 10*3/MM3 (ref 3.4–10.8)

## 2025-02-17 PROCEDURE — 1160F RVW MEDS BY RX/DR IN RCRD: CPT | Performed by: INTERNAL MEDICINE

## 2025-02-17 PROCEDURE — 36415 COLL VENOUS BLD VENIPUNCTURE: CPT | Performed by: INTERNAL MEDICINE

## 2025-02-17 PROCEDURE — 3078F DIAST BP <80 MM HG: CPT | Performed by: INTERNAL MEDICINE

## 2025-02-17 PROCEDURE — 3075F SYST BP GE 130 - 139MM HG: CPT | Performed by: INTERNAL MEDICINE

## 2025-02-17 PROCEDURE — 80053 COMPREHEN METABOLIC PANEL: CPT | Performed by: INTERNAL MEDICINE

## 2025-02-17 PROCEDURE — 1159F MED LIST DOCD IN RCRD: CPT | Performed by: INTERNAL MEDICINE

## 2025-02-17 PROCEDURE — 1126F AMNT PAIN NOTED NONE PRSNT: CPT | Performed by: INTERNAL MEDICINE

## 2025-02-17 PROCEDURE — 85025 COMPLETE CBC W/AUTO DIFF WBC: CPT | Performed by: INTERNAL MEDICINE

## 2025-02-17 PROCEDURE — 99214 OFFICE O/P EST MOD 30 MIN: CPT | Performed by: INTERNAL MEDICINE

## 2025-02-17 RX ORDER — DAPAGLIFLOZIN 5 MG/1
5 TABLET, FILM COATED ORAL DAILY
Qty: 90 TABLET | Refills: 0 | Status: SHIPPED | OUTPATIENT
Start: 2025-02-17 | End: 2025-02-18

## 2025-02-17 RX ORDER — AMLODIPINE BESYLATE 10 MG/1
10 TABLET ORAL
Qty: 90 TABLET | OUTPATIENT
Start: 2025-02-17

## 2025-02-17 RX ORDER — LOSARTAN POTASSIUM 25 MG/1
25 TABLET ORAL DAILY
Qty: 90 TABLET | Refills: 0 | Status: SHIPPED | OUTPATIENT
Start: 2025-02-17

## 2025-02-17 NOTE — HOME HEALTH
Patient missed a  ST visit from [facility] on 2-17-25.     Reason: ST called patient thrice on the evening of 2-26-25 and 4 times this morning and afternoon. No one answered the phone and the recording stated that patient's voicemail box was full so ST could not leave a message.       For your records only.   Per CMS Guidance, MD must be notified of missed/cancelled visits; therefore the prescribed frequency was not met.

## 2025-02-17 NOTE — TELEPHONE ENCOUNTER
NEEDS ASAP     Caller: Bina Jules    Relationship: Emergency Contact    Best call back number:     [phone] (Mobile)       Requested Prescriptions:   Requested Prescriptions     Pending Prescriptions Disp Refills    dapagliflozin (Farxiga) 5 MG tablet tablet 90 tablet 0     Sig: Take 1 tablet by mouth Daily. Indications: Type 2 Diabetes        Pharmacy where request should be sent: [id number] - [place], [place] - [address] - [phone]  - [phone] FX     Last office visit with prescribing clinician: 2/13/2025   Last telemedicine visit with prescribing clinician: Visit date not found   Next office visit with prescribing clinician: 3/31/2025     Additional details provided by patient:     Does the patient have less than a 3 day supply:  [x] Yes  [] No    Would you like a call back once the refill request has been completed: [] Yes [] No    If the office needs to give you a call back, can they leave a voicemail: [] Yes [] No    Chris Jimenez Rep   02/17/25 11:44 EST

## 2025-02-17 NOTE — TELEPHONE ENCOUNTER
PT'S SPOUSE CAME IN TODAY AND SAID SHE WENT TO GO GET PT'S FARXIGA. SHE SAID IT COSTS $800. DO WE EITHER HAVE SOME SAMPLES WE COULD GIVE HIM OR SOMETHING DIFFERENT TO PRESCRIBE HIM.    PLEASE ADVISE

## 2025-02-18 ENCOUNTER — HOME CARE VISIT (OUTPATIENT)
Dept: HOME HEALTH SERVICES | Facility: HOME HEALTHCARE | Age: 80
End: 2025-02-18
Payer: MEDICARE

## 2025-02-18 DIAGNOSIS — E11.65 TYPE 2 DIABETES MELLITUS WITH HYPERGLYCEMIA, WITHOUT LONG-TERM CURRENT USE OF INSULIN: Primary | ICD-10-CM

## 2025-02-18 RX ORDER — GLIPIZIDE 2.5 MG/1
2.5 TABLET ORAL 2 TIMES DAILY
Qty: 180 TABLET | Refills: 0 | Status: SHIPPED | OUTPATIENT
Start: 2025-02-18

## 2025-02-18 NOTE — CASE COMMUNICATION
Patient missed a PT visit from [facility] on 2/18/2025    Reason: Patient's spouse declines PT at this time. She asserts that patient is getting around fine in the home at this time.    For your records only.   Per CMS Guidance, MD must be notified of missed/cancelled visits; therefore the prescribed frequency was not met.

## 2025-02-19 ENCOUNTER — PATIENT ROUNDING (BHMG ONLY) (OUTPATIENT)
Dept: ONCOLOGY | Facility: CLINIC | Age: 80
End: 2025-02-19
Payer: MEDICARE

## 2025-02-19 NOTE — PROGRESS NOTES
February 19, 2025    Hello, may I speak with Sumit Jules?    My name is Leanna Hernandez      I am  with [facility]  [address] [place] [address] [phone].    Before we get started may I verify your date of birth? 1945    I am calling to officially welcome you to our practice and ask about your recent visit. Is this a good time to talk? no    Tell me about your visit with us. What things went well?  A My Chart message was sent to the patient.         We're always looking for ways to make our patients' experiences even better. Do you have recommendations on ways we may improve?  no    Overall were you satisfied with your first visit to our practice? yes       I appreciate you taking the time to speak with me today. Is there anything else I can do for you? no      Thank you, and have a great day.

## 2025-02-20 ENCOUNTER — READMISSION MANAGEMENT (OUTPATIENT)
Dept: CALL CENTER | Facility: HOSPITAL | Age: 80
End: 2025-02-20
Payer: MEDICARE

## 2025-02-20 ENCOUNTER — HOSPITAL ENCOUNTER (OUTPATIENT)
Dept: CT IMAGING | Facility: HOSPITAL | Age: 80
Discharge: HOME | End: 2025-02-20
Payer: MEDICARE

## 2025-02-20 VITALS
SYSTOLIC BLOOD PRESSURE: 117 MMHG | OXYGEN SATURATION: 96 % | DIASTOLIC BLOOD PRESSURE: 59 MMHG | RESPIRATION RATE: 14 BRPM | HEIGHT: 72 IN | TEMPERATURE: 97.2 F | WEIGHT: 267 LBS | HEART RATE: 52 BPM | BODY MASS INDEX: 36.16 KG/M2

## 2025-02-20 DIAGNOSIS — C85.90 LYMPHOMA, UNSPECIFIED BODY REGION, UNSPECIFIED LYMPHOMA TYPE: ICD-10-CM

## 2025-02-20 LAB
APTT PPP: 24 SECONDS (ref 22.7–35.4)
BASOPHILS # BLD AUTO: 0.01 10*3/MM3 (ref 0–0.2)
BASOPHILS NFR BLD AUTO: 0.1 % (ref 0–1.5)
DEPRECATED RDW RBC AUTO: 44.2 FL (ref 37–54)
EOSINOPHIL # BLD AUTO: 0.01 10*3/MM3 (ref 0–0.4)
EOSINOPHIL NFR BLD AUTO: 0.1 % (ref 0.3–6.2)
ERYTHROCYTE [DISTWIDTH] IN BLOOD BY AUTOMATED COUNT: 13.5 % (ref 12.3–15.4)
HCT VFR BLD AUTO: 48.4 % (ref 37.5–51)
HGB BLD-MCNC: 15.9 G/DL (ref 13–17.7)
IMM GRANULOCYTES # BLD AUTO: 0.09 10*3/MM3 (ref 0–0.05)
IMM GRANULOCYTES NFR BLD AUTO: 0.7 % (ref 0–0.5)
INR PPP: 1.32 (ref 0.9–1.1)
LARGE PLATELETS: NORMAL
LYMPHOCYTES # BLD AUTO: 0.86 10*3/MM3 (ref 0.7–3.1)
LYMPHOCYTES NFR BLD AUTO: 6.7 % (ref 19.6–45.3)
MCH RBC QN AUTO: 29.5 PG (ref 26.6–33)
MCHC RBC AUTO-ENTMCNC: 32.9 G/DL (ref 31.5–35.7)
MCV RBC AUTO: 89.8 FL (ref 79–97)
MONOCYTES # BLD AUTO: 0.63 10*3/MM3 (ref 0.1–0.9)
MONOCYTES NFR BLD AUTO: 4.9 % (ref 5–12)
NEUTROPHILS NFR BLD AUTO: 11.23 10*3/MM3 (ref 1.7–7)
NEUTROPHILS NFR BLD AUTO: 87.5 % (ref 42.7–76)
NRBC BLD AUTO-RTO: 0 /100 WBC (ref 0–0.2)
PLATELET # BLD AUTO: 96 10*3/MM3 (ref 140–450)
PMV BLD AUTO: 12.3 FL (ref 6–12)
PROTHROMBIN TIME: 16.3 SECONDS (ref 11.7–14.2)
RBC # BLD AUTO: 5.39 10*6/MM3 (ref 4.14–5.8)
RBC MORPH BLD: NORMAL
SMALL PLATELETS BLD QL SMEAR: NORMAL
WBC MORPH BLD: NORMAL
WBC NRBC COR # BLD AUTO: 12.83 10*3/MM3 (ref 3.4–10.8)

## 2025-02-20 PROCEDURE — 25010000002 FENTANYL CITRATE (PF) 50 MCG/ML SOLUTION

## 2025-02-20 PROCEDURE — 85007 BL SMEAR W/DIFF WBC COUNT: CPT | Performed by: RADIOLOGY

## 2025-02-20 PROCEDURE — 85730 THROMBOPLASTIN TIME PARTIAL: CPT | Performed by: RADIOLOGY

## 2025-02-20 PROCEDURE — 25010000002 ONDANSETRON PER 1 MG

## 2025-02-20 PROCEDURE — 85610 PROTHROMBIN TIME: CPT | Performed by: RADIOLOGY

## 2025-02-20 PROCEDURE — 25010000002 LIDOCAINE 1 % SOLUTION

## 2025-02-20 PROCEDURE — 25010000002 MIDAZOLAM PER 1 MG

## 2025-02-20 PROCEDURE — C1830 POWER BONE MARROW BX NEEDLE: HCPCS

## 2025-02-20 PROCEDURE — 85025 COMPLETE CBC W/AUTO DIFF WBC: CPT | Performed by: RADIOLOGY

## 2025-02-20 PROCEDURE — 99152 MOD SED SAME PHYS/QHP 5/>YRS: CPT

## 2025-02-20 PROCEDURE — 77012 CT SCAN FOR NEEDLE BIOPSY: CPT

## 2025-02-20 RX ORDER — SODIUM CHLORIDE 0.9 % (FLUSH) 0.9 %
10 SYRINGE (ML) INJECTION EVERY 12 HOURS SCHEDULED
Status: DISCONTINUED | OUTPATIENT
Start: 2025-02-20 | End: 2025-02-21 | Stop reason: HOSPADM

## 2025-02-20 RX ORDER — LIDOCAINE HYDROCHLORIDE 10 MG/ML
INJECTION, SOLUTION INFILTRATION; PERINEURAL AS NEEDED
Status: DISCONTINUED | OUTPATIENT
Start: 2025-02-20 | End: 2025-02-21 | Stop reason: HOSPADM

## 2025-02-20 RX ORDER — MIDAZOLAM HYDROCHLORIDE 1 MG/ML
INJECTION, SOLUTION INTRAMUSCULAR; INTRAVENOUS AS NEEDED
Status: DISCONTINUED | OUTPATIENT
Start: 2025-02-20 | End: 2025-02-21 | Stop reason: HOSPADM

## 2025-02-20 RX ORDER — ONDANSETRON 2 MG/ML
INJECTION INTRAMUSCULAR; INTRAVENOUS AS NEEDED
Status: DISCONTINUED | OUTPATIENT
Start: 2025-02-20 | End: 2025-02-21 | Stop reason: HOSPADM

## 2025-02-20 RX ORDER — FENTANYL CITRATE 50 UG/ML
INJECTION, SOLUTION INTRAMUSCULAR; INTRAVENOUS AS NEEDED
Status: DISCONTINUED | OUTPATIENT
Start: 2025-02-20 | End: 2025-02-21 | Stop reason: HOSPADM

## 2025-02-20 RX ORDER — SODIUM CHLORIDE 0.9 % (FLUSH) 0.9 %
10 SYRINGE (ML) INJECTION AS NEEDED
Status: DISCONTINUED | OUTPATIENT
Start: 2025-02-20 | End: 2025-02-21 | Stop reason: HOSPADM

## 2025-02-20 RX ADMIN — LIDOCAINE HYDROCHLORIDE 10 ML: 10 INJECTION, SOLUTION INFILTRATION; PERINEURAL at 13:00

## 2025-02-20 RX ADMIN — FENTANYL CITRATE 100 MCG: 50 INJECTION, SOLUTION INTRAMUSCULAR; INTRAVENOUS at 12:50

## 2025-02-20 RX ADMIN — FENTANYL CITRATE 50 MCG: 50 INJECTION, SOLUTION INTRAMUSCULAR; INTRAVENOUS at 12:57

## 2025-02-20 RX ADMIN — MIDAZOLAM 0.5 MG: 1 INJECTION INTRAMUSCULAR; INTRAVENOUS at 12:57

## 2025-02-20 RX ADMIN — MIDAZOLAM 1 MG: 1 INJECTION INTRAMUSCULAR; INTRAVENOUS at 12:50

## 2025-02-20 RX ADMIN — ONDANSETRON 4 MG: 2 INJECTION INTRAMUSCULAR; INTRAVENOUS at 12:51

## 2025-02-20 NOTE — H&P
[facility]   Interventional Radiology H&P    Patient Name: Sumit Jules  : 1945  MRN: [id number]  Primary Care Physician:  Carol Rios APRN  Referring Physician: Thanh Fish MD  Date of admission: 2025    Subjective   Subjective     HPI:  Sumit Jules is a 79 y.o. male with lymphoma.     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   Converted from [employer]:   Description - DEEP VENOUS THROMBOPHLEBITIS, RECURRENT    Congenital deficiency of clotting factors 2012    Diabetes mellitus     pre - no insulin    DVT (deep venous thrombosis)     History of complete eye exam SCHEDULED    Hyperlipidemia     Idiopathic acute pancreatitis without infection or necrosis 2024    Impaired fasting glucose 2011    Obesity     Other seborrheic keratosis 2013    Formatting of this note might be different from the original.   Converted from [employer]:   Description - SEBORRHEIC KERATOSIS    Pain of foot 2013    Formatting of this note might be different from the original.   Converted from [employer]:   Description - HEEL PAIN    Prostate cancer     Thrombocytopenia 2013    Formatting of this note might be different from the original.   Converted from [employer]:   Description - THROMBOCYTOPENIA    UTI (urinary tract infection) 2024    Vasculitis limited to skin 2024     Formatting of this note might be different from the original.   Converted from [employer]:   Description - VASCULAR DISORDER OF SKIN     Past Surgical History:   Procedure Laterality Date    BRAIN BIOPSY Left 2/3/2025    Procedure: Left frontal stereotactic brain biopsy with Stealth;  Surgeon: Reyes Thakur MD;  Location: [facility] OR;  Service: Neurosurgery;  Laterality: Left;    CHOLECYSTECTOMY      COLONOSCOPY  10/2013    UPPER ENDOSCOPIC ULTRASOUND W/ FNA N/A 5/3/2024    Procedure: ENDOSCOPIC ULTRASOUND with fine needle aspiration x1 area;  Surgeon: Gifty Ribera MD;  Location: [facility] ENDOSCOPY;  Service: Gastroenterology;  Laterality: N/A;  Post- pancreatic cyst       Social History:  reports that he has never smoked. He has never been exposed to tobacco smoke. He has never used smokeless tobacco. He reports that he does not drink alcohol and does not use drugs.    Medications:  (Not in a hospital admission)    Current medications:  sodium chloride, 10 mL, Intravenous, Q12H      Current IV drips:       Allergies:  No Known Allergies    Objective    Objective     Vitals:   Temp:  [97.2 °F (36.2 °C)] 97.2 °F (36.2 °C)  Heart Rate:  [52-54] 54  Resp:  [12-17] 17  BP: (129-144)/(60-70) 144/70      Physical Exam:   Constitutional: Awake, alert, No acute distress    Respiratory: Clear to auscultation bilaterally, nonlabored respirations    Cardiovascular: RRR, no murmurs, rubs, or gallops, palpable pedal pulses bilaterally   Gastrointestinal: Positive bowel sounds, soft, nontender, nondistended        ASA SCALE ASSESSMENT:  2-Mild to moderate systemic disease, medically well controlled, with no functional limitation    MALLAMPATI CLASSIFICATION:  2-Able to visualize the soft palate, fauces, uvula. The anterior & posterior tonsilar pillars are hidden by the tongue.       Result Review        Result Review:     Sodium   Date Value Ref Range Status   02/17/2025 135 (L) 136 - 145 mmol/L Final       Potassium   Date  "Value Ref Range Status   02/17/2025 4.7 3.5 - 5.2 mmol/L Final       Chloride   Date Value Ref Range Status   02/17/2025 98 98 - 107 mmol/L Final       No results found for: \"PLASMABICARB\"    BUN   Date Value Ref Range Status   02/17/2025 20 8 - 23 mg/dL Final       Creatinine   Date Value Ref Range Status   02/17/2025 0.93 0.76 - 1.27 mg/dL Final       Calcium   Date Value Ref Range Status   02/17/2025 8.8 8.6 - 10.5 mg/dL Final           No components found for: \"GLUCOSE.*\"  Results from last 7 days   Lab Units 02/20/25  1111   WBC 10*3/mm3 12.83*   HEMOGLOBIN g/dL 15.9   HEMATOCRIT % 48.4   PLATELETS 10*3/mm3 96*      Results from last 7 days   Lab Units 02/20/25  1111   INR  1.32*           Assessment / Plan     Assesment:   Lymphoma.      Plan:   CT guided bone marrow aspiration and biopsy.     The risks and benefits of the procedure were discussed with the patient.    Electronically signed by JARROD Curtis, 02/20/25, 12:49 PM EST.  "

## 2025-02-20 NOTE — DISCHARGE INSTRUCTIONS
A responsible adult should stay with you and you should rest quietly for the rest of the day. Do not drink alcohol, drive or cook for 24 hours following your procedure.  Progress your diet as tolerated.  Resume your usual medications today EXCEPT Xarelto.  You may resume Xarelto tomorrow (2/21) evening.  When you remove your dressing in 48 hours, a small amount of blood is to be expected. Do not be alarmed.  If you feel it is bleeding excessively apply pressure and proceed to the Emergency room.  Do not shower, bath, or get your dressing wet at all for 48 hours.  You may shower after the dressing is removed. No lifting more that 10 pounds for 48 hours.  If severe pain, increased shortness of air or racing heartbeat occur, seek immediate medical attention.  Follow up with Dr. Fish for results.

## 2025-02-20 NOTE — OUTREACH NOTE
General Surgery Week 2 Survey      Flowsheet Row Responses   [facility], [place], operated by [facility] patient discharged from? [place]   Does the patient have one of the following disease processes/diagnoses(primary or secondary)? General Surgery   Week 2 attempt successful? Yes   Call start time 1029   Call end time 1031   Discharge diagnosis Brain mass-Left frontal sterotactic brain biopsy   Person spoke with today (if not patient) and relationship Wife   Meds reviewed with patient/caregiver? Yes   Is the patient having any side effects they believe may be caused by any medication additions or changes? No   Is the patient taking all medications as directed (includes completed medication regime)? Yes   Does the patient have a follow up appointment scheduled with their surgeon? No   Has the patient kept scheduled appointments due by today? Yes   What is the Home health agency?  [facility]    Has home health visited the patient within 72 hours of discharge? Yes   Psychosocial issues? No   What is the patient's perception of their health status since discharge? Improving   Nursing interventions Nurse provided patient education   Is the patient/caregiver able to teach back signs and symptoms of incisional infection? Increased redness, swelling or pain at the incisonal site   Is the patient/caregiver able to teach back steps to recovery at home? Set small, achievable goals for return to baseline health   Is the patient/caregiver able to teach back the hierarchy of who to call/visit for symptoms/problems? PCP, Specialist, Home health nurse, Urgent Care, ED, 911 Yes   Week 2 call completed? Yes   Graduated Yes   Is the patient interested in additional calls from an ambulatory ? No   Would this patient benefit from a Referral to Amb Social Work? No   Wrap up additional comments Spouse reports pt is doing well, no concerns at this time.   Call end time 1031            Sandy ROJAS - Registered Nurse

## 2025-02-21 ENCOUNTER — HOME CARE VISIT (OUTPATIENT)
Dept: HOME HEALTH SERVICES | Facility: HOME HEALTHCARE | Age: 80
End: 2025-02-21
Payer: MEDICARE

## 2025-02-21 LAB — Lab: NORMAL

## 2025-02-24 ENCOUNTER — HOSPITAL ENCOUNTER (OUTPATIENT)
Dept: PET IMAGING | Facility: HOSPITAL | Age: 80
Discharge: HOME | End: 2025-02-24
Admitting: INTERNAL MEDICINE
Payer: MEDICARE

## 2025-02-24 ENCOUNTER — HOME CARE VISIT (OUTPATIENT)
Dept: HOME HEALTH SERVICES | Facility: HOME HEALTHCARE | Age: 80
End: 2025-02-24
Payer: MEDICARE

## 2025-02-24 DIAGNOSIS — K86.9 PANCREATIC LESION: ICD-10-CM

## 2025-02-24 DIAGNOSIS — C83.390 CNS LYMPHOMA: ICD-10-CM

## 2025-02-24 LAB
CYTO UR: NORMAL
GLUCOSE BLDC GLUCOMTR-MCNC: 103 MG/DL (ref 70–105)
LAB AP CASE REPORT: NORMAL
LAB AP CLINICAL INFORMATION: NORMAL
LAB AP FLOW CYTOMETRY SUMMARY: NORMAL
PATH REPORT.FINAL DX SPEC: NORMAL
PATH REPORT.GROSS SPEC: NORMAL

## 2025-02-24 PROCEDURE — 34310000005 FLUDEOXYGLUCOSE F18 SOLUTION: Performed by: INTERNAL MEDICINE

## 2025-02-24 PROCEDURE — 82948 REAGENT STRIP/BLOOD GLUCOSE: CPT

## 2025-02-24 PROCEDURE — A9552 F18 FDG: HCPCS | Performed by: INTERNAL MEDICINE

## 2025-02-24 PROCEDURE — 78815 PET IMAGE W/CT SKULL-THIGH: CPT

## 2025-02-24 RX ADMIN — FLUDEOXYGLUCOSE F 18 1 DOSE: 200 INJECTION, SOLUTION INTRAVENOUS at 12:26

## 2025-02-25 NOTE — HOME HEALTH
Patient missed a  ST visit from [facility] on 2-24-25.     Reason: ST called patient and patient declines ST services.       For your records only.   Per CMS Guidance, MD must be notified of missed/cancelled visits; therefore the prescribed frequency was not met.

## 2025-02-26 DIAGNOSIS — E11.65 TYPE 2 DIABETES MELLITUS WITH HYPERGLYCEMIA, WITHOUT LONG-TERM CURRENT USE OF INSULIN: ICD-10-CM

## 2025-02-26 LAB — CYTOGENETICS RESULT: NORMAL

## 2025-02-26 NOTE — PROGRESS NOTES
HEMATOLOGY ONCOLOGY OUTPATIENT FOLLOW UP       Patient name: Sumit Jules  : 1945  MRN: [id number]  Primary Care Physician: Carol Rios APRN  Referring Physician: Carol Rios AP*  Reason For Consult: CNS lymphoma.     History of Present Illness:    2025: I was asked to see Mr. Jules who was brought to the hospital with a history of confusion and progressively more bizarre behavior. He had imaging of the brain at the time of admission that identified multiple enhancing brain lesions, the largest of which involve the basal ganglia on the left and that measure 2.9 x 1.9 x 2.5 cm. Smaller lesions involving the corpus callosum, the left frontal lobe and the right thalamus were also present. The possibility of a metastatic malignancy versus CNS lymphoma were brought up. It was very difficult to obtain a history from him but I was able to discussed with his spouse who reported that for at least 3 or 4 weeks he has behavior had become progressively more unusual and his speech less and less understandable. He had a history of skin cancer and had undergone several surgical procedures but he had never been told that he had melanoma. He was found to have a nodule on the pancreatic head that however, it did not seem to be malignant based on an FNA done in May 2024. On exam he is alert, conversant and in no distress. He is speech however is not understandable and often times his responses are not congruent with the question. He is neither jaundiced nor pale. The pupils are reactive to light and there is no facial asymmetry. Muscle strength seems to be preserved. The lungs are clear bilaterally and the heart is regular. The abdomen is protuberant but soft and nontender. There is no edema. Reviewed all imaging studies. No obvious source of a metastatic process is evident on the CT scans of the chest, abdomen and pelvis. The nodule on the pancreas is again identified. It  would not appear to have changed much in shape or size. Discussed with him and discussed that with his spouse whom I reached on the phone. A biopsy is essential. Given the history of skin cancer, could this be metastatic melanoma?. I will remain vigilant to the results of the biopsies. He will be transferred today to [facility] in [place] to facilitate the biopsy.     2/17/2025: In the office after he underwent a biopsy of the CNS malignancy identified in late January of 2025. He is much better and has been less confused. He has been taking dexamethasone, now on a tapering schedule, since the admission. He underwent the biopsy without any complications. He has been eating well and has not had any nausea or vomiting. No chest pain or cough and no abdominal pain. On exam he is conversant and seems oriented. No distress. A surgical incision on the left scalp is healing well. There is no jaundice. No oral lesions and respirations not labored. Lungs clear bilaterally. Heart is regular. Abdomen is soft and not tender. No edema. The cranial nerves are grossly intact; muscle strength is preserved on both sides of the body. Reviewed the final report of pathology. 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  However, flow cytometry showed a kappa restricted B-cell population in a polyclonal background.  The tissue was sent to [facility] pathology for expert opinion.  A final report from them was not available at the time of this dictation.  Plans for staging including bone marrow biopsy and PET scan were made.  Insertion of a port was requested.  Discussed with them the use of high-dose methotrexate in the treatment of CNS lymphoma.    2/28/2025: Returns to review the PET scan and the bone marrow biopsy.  He feels about the same as at the time of the last visit.  Generally speaking he has been active and able to perform all the  activities of daily living, at times with assistance from his spouse.  He has been afebrile.  He denies headaches and has not experienced any seizures.  He has word finding difficulties have improved.  He denies dyspnea or cough.  No chest pain or abdominal pain.  Has maintained regular bowel activity and no dysuria.  No edema.  Reviewed the bone marrow biopsy which is negative for involvement by the lymphoma.  The PET scan is as well negative.  I discussed with Dr. Charanjit Carter at the [facility] for coordination of care.  The treatment of CNS lymphoma is best done at centers where high-dose methotrexate is used regularly.  We do not have the means to treat his condition safely and does referral to the [facility] as a reasonable alternative to an attempt at treatment here.  I discussed with him and with his family my limited experience in the treatment of this condition and the importance of seeking attention from the [facility].  A referral has been made and he will be seen early next week by  or another physician in the bone marrow transplant team.    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   Converted from [employer]:   Description - SEBORRHEIC KERATOSIS    Pain of foot 03/19/2013    Formatting of this note might be different from the original.   Converted from [employer]:   Description - HEEL PAIN    Prostate cancer     Thrombocytopenia 03/19/2013    Formatting of this note might be different from the original.   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   Converted from [employer]:   Description - VASCULAR DISORDER OF SKIN     Past Surgical History:   Procedure Laterality Date    BRAIN BIOPSY Left 2/3/2025    Procedure: Left frontal stereotactic brain biopsy with Stealth;  Surgeon: Reyes Thakur MD;  Location: [facility] OR;  Service: Neurosurgery;  Laterality: Left;    CHOLECYSTECTOMY      COLONOSCOPY  10/2013    UPPER ENDOSCOPIC ULTRASOUND W/ FNA N/A 5/3/2024    Procedure: ENDOSCOPIC ULTRASOUND with fine needle aspiration x1 area;  Surgeon: Gifty Ribera MD;  Location: [facility] ENDOSCOPY;  Service: Gastroenterology;  Laterality: N/A;  Post- pancreatic cyst       Current Outpatient Medications:     acetaminophen (TYLENOL) 325 MG tablet, Take 2 tablets by mouth Every 6 (Six) Hours As Needed for Mild Pain. Indications: Pain, Disp: , Rfl:     amLODIPine (NORVASC) 10 MG tablet, Take 0.5 tablets by mouth every night at bedtime., Disp: , Rfl:     atorvastatin (LIPITOR) 10 MG tablet, TAKE 1 TABLET BY MOUTH EVERY DAY, Disp: 90 tablet, Rfl: 1    dexAMETHasone (DECADRON) 1 MG tablet, Take 4 tablets by mouth 2 (Two) Times a Day With Meals for 3 days, THEN 3 tablets 2 (Two) Times a Day With Meals for 7 days, THEN 2 tablets 2 (Two) Times a Day With Meals for 7 days, THEN 1 tablet Daily for 7 days., Disp: 101 tablet, Rfl: 0    famotidine (PEPCID) 40 MG tablet, Take 1 tablet by mouth Daily., Disp: 30 tablet, Rfl: 0    glipiZIDE 2.5 MG  tablet, Take 2.5 mg by mouth 2 (Two) Times a Day. Indications: Type 2 Diabetes, Disp: 180 tablet, Rfl: 0    losartan (COZAAR) 25 MG tablet, TAKE 1 TABLET BY MOUTH EVERY DAY, Disp: 90 tablet, Rfl: 0    metFORMIN (GLUCOPHAGE) 1000 MG tablet, TAKE 1 TABLET BY MOUTH TWICE A DAY WITH FOOD, Disp: 180 tablet, Rfl: 1    metFORMIN (GLUCOPHAGE) 500 MG tablet, TAKE 1 TABLET BY MOUTH TWICE A DAY WITH FOOD, Disp: 180 tablet, Rfl: 0    rivaroxaban (XARELTO) 20 MG tablet, Take 1 tablet by mouth Daily. Indications: history of DVT/PE, Disp: , Rfl:     tamsulosin (FLOMAX) 0.4 MG capsule 24 hr capsule, Take 1 capsule by mouth Daily., Disp: 90 capsule, Rfl: 0    Blood Glucose Monitoring Suppl (Accu-Chek Guide) w/Device kit, Use 1 each Take As Directed. To check fasting blood glucose daily and as needed  Indications: Diabetes (Patient not taking: Reported on 2/28/2025), Disp: 1 kit, Rfl: 0    glucose blood test strip, 1 each by Other route Daily. Use as instructed  Indications: Diabetes (Patient not taking: Reported on 2/28/2025), Disp: 100 each, Rfl: 2    Lancets (freestyle) lancets, Use to check fasting blood glucose once daily and as needed  Indications: Diabetes (Patient not taking: Reported on 2/28/2025), Disp: 100 each, Rfl: 3    No Known Allergies    Family History   Problem Relation Age of Onset    Breast cancer Other     Diabetes Other     Deep vein thrombosis Other     Diabetes type II Other      Cancer-related family history includes Breast cancer in an other family member.    Social History     Tobacco Use    Smoking status: Never     Passive exposure: Never    Smokeless tobacco: Never   Vaping Use    Vaping status: Never Used   Substance Use Topics    Alcohol use: No    Drug use: Never     Social History     Social History Narrative    Not on file     ROS:   Review of Systems   Constitutional:  Positive for activity change and fatigue. Negative for appetite change, chills, diaphoresis, fever and unexpected weight change.  "  HENT:  Negative for congestion, dental problem, drooling, ear discharge, ear pain, facial swelling, hearing loss, mouth sores, nosebleeds, postnasal drip, rhinorrhea, sinus pressure, sinus pain, sneezing, sore throat, tinnitus, trouble swallowing and voice change.    Eyes:  Negative for photophobia, pain, discharge, redness, itching and visual disturbance.   Respiratory:  Negative for apnea, cough, choking, chest tightness, shortness of breath, wheezing and stridor.    Cardiovascular:  Negative for chest pain, palpitations and leg swelling.   Gastrointestinal:  Negative for abdominal distention, abdominal pain, anal bleeding, blood in stool, constipation, diarrhea, nausea, rectal pain and vomiting.   Endocrine: Negative for cold intolerance, heat intolerance, polydipsia and polyuria.   Genitourinary:  Negative for decreased urine volume, difficulty urinating, dysuria, flank pain, frequency, genital sores, hematuria and urgency.   Musculoskeletal:  Negative for arthralgias, back pain, gait problem, joint swelling, myalgias, neck pain and neck stiffness.   Skin:  Negative for color change, pallor and rash.   Neurological:  Negative for dizziness, tremors, seizures, syncope, facial asymmetry, speech difficulty, weakness, light-headedness, numbness and headaches.   Hematological:  Negative for adenopathy. Does not bruise/bleed easily.   Psychiatric/Behavioral:  Negative for agitation, behavioral problems, confusion, decreased concentration, hallucinations, self-injury, sleep disturbance and suicidal ideas. The patient is not nervous/anxious.      Objective:    Vital Signs:  Vitals:    02/28/25 1209   BP: 139/80   Pulse: 72   Resp: 15   Temp: 97.3 °F (36.3 °C)   SpO2: 96%   Weight: 118 kg (261 lb)   Height: 182.9 cm (72\")   PainSc: 0-No pain     Body mass index is 35.4 kg/m².    ECOG  (2) Ambulatory and capable of self care, unable to carry out work activity, up and about > 50% or waking hours    Physical Exam: "   Physical Exam  Constitutional:       General: He is not in acute distress.     Appearance: He is not ill-appearing, toxic-appearing or diaphoretic.   HENT:      Head: Normocephalic and atraumatic.      Right Ear: External ear normal.      Left Ear: External ear normal.      Nose: Nose normal.      Mouth/Throat:      Mouth: Mucous membranes are moist.      Pharynx: Oropharynx is clear.   Eyes:      General: No scleral icterus.        Right eye: No discharge.         Left eye: No discharge.      Conjunctiva/sclera: Conjunctivae normal.      Pupils: Pupils are equal, round, and reactive to light.   Cardiovascular:      Rate and Rhythm: Normal rate and regular rhythm.      Pulses: Normal pulses.      Heart sounds: Normal heart sounds. No murmur heard.     No friction rub. No gallop.   Pulmonary:      Effort: No respiratory distress.      Breath sounds: No stridor. No wheezing, rhonchi or rales.   Chest:      Chest wall: No tenderness.   Abdominal:      General: Abdomen is flat. Bowel sounds are normal. There is no distension.      Palpations: Abdomen is soft. There is no mass.      Tenderness: There is no abdominal tenderness. There is no right CVA tenderness, left CVA tenderness, guarding or rebound.   Musculoskeletal:         General: No swelling, tenderness, deformity or signs of injury.      Cervical back: No rigidity.      Right lower leg: No edema.      Left lower leg: No edema.   Lymphadenopathy:      Cervical: No cervical adenopathy.   Skin:     General: Skin is warm and dry.      Coloration: Skin is not jaundiced or pale.      Findings: No bruising or rash.   Neurological:      General: No focal deficit present.      Mental Status: He is alert and oriented to person, place, and time.      Cranial Nerves: No cranial nerve deficit.   Psychiatric:         Mood and Affect: Mood normal.         Behavior: Behavior normal.         Thought Content: Thought content normal.         Judgment: Judgment normal.     I  Thanh Fish MD performed the physical exam on 2/28/2025 as documented above.    Lab Results - Last 18 Months   Lab Units 02/28/25  1202 02/20/25  1111 02/17/25  1435   WBC 10*3/mm3 7.44 12.83* 11.99*   HEMOGLOBIN g/dL 14.7 15.9 15.6   HEMATOCRIT % 46.1 48.4 48.4   PLATELETS 10*3/mm3 88* 96* 98*   MCV fL 94.3 89.8 92.7     Lab Results - Last 18 Months   Lab Units 02/17/25  1435 02/13/25  1048 02/10/25  0626 01/30/25  0510 01/29/25  1302   SODIUM mmol/L 135* 130* 127*   < > 141   POTASSIUM mmol/L 4.7 4.5 4.5   < > 4.2   CHLORIDE mmol/L 98 94* 97*   < > 104   CO2 mmol/L 26.0 24.3 25.5   < > 27.3   BUN mg/dL 20 22 17   < > 14   CREATININE mg/dL 0.93 0.89 0.77   < > 1.08   CALCIUM mg/dL 8.8 8.9 7.8*   < > 9.7   BILIRUBIN mg/dL 1.3* 1.1  --   --  0.7   ALK PHOS U/L 72 75  --   --  85   ALT (SGPT) U/L 27 36  --   --  13   AST (SGOT) U/L 19 20  --   --  20   GLUCOSE mg/dL 177* 175* 171*   < > 134*    < > = values in this interval not displayed.     Lab Results   Component Value Date    GLUCOSE 177 (H) 02/17/2025    BUN 20 02/17/2025    CREATININE 0.93 02/17/2025    EGFRIFNONA 55 (L) 09/20/2016    EGFRIFAFRI 66 09/20/2016    BCR 21.5 02/17/2025    K 4.7 02/17/2025    CO2 26.0 02/17/2025    CALCIUM 8.8 02/17/2025    ALBUMIN 3.9 02/17/2025    AST 19 02/17/2025    ALT 27 02/17/2025     Lab Results - Last 18 Months   Lab Units 02/20/25  1111 02/04/25  0330 01/29/25  1302   INR  1.32* 1.15* 2.11*   APTT seconds 24.0 22.7  --      LDH   Date Value Ref Range Status   01/03/2025 178 135 - 225 U/L Final     Lab Results   Component Value Date    MACY Negative 02/13/2024     Lab Results   Component Value Date    PTT 24.0 02/20/2025    INR 1.32 (H) 02/20/2025     Assessment & Plan     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  No suggestion of involvement outside of the brain.  Treatment with high-dose methotrexate is indicated.  He has been referred to the bone marrow transplant program at  the [facility] and I had a telephone conversation with Dr. Charanjit Carter for coordination of care.  Port to be inserted by Dr. Frank in the near future.  Independently reviewed the images of the PET scan.  Reviewed the report.  Reviewed the results of pathology and laboratory exams.  Refer to the [facility].  To see me in approximately 6 weeks.    Thanh Fish MD on 2/28/2025 at 1741.

## 2025-02-27 ENCOUNTER — HOME CARE VISIT (OUTPATIENT)
Dept: HOME HEALTH SERVICES | Facility: HOME HEALTHCARE | Age: 80
End: 2025-02-27
Payer: MEDICARE

## 2025-02-27 DIAGNOSIS — E11.65 TYPE 2 DIABETES MELLITUS WITH HYPERGLYCEMIA, WITHOUT LONG-TERM CURRENT USE OF INSULIN: ICD-10-CM

## 2025-02-28 ENCOUNTER — OFFICE VISIT (OUTPATIENT)
Dept: ONCOLOGY | Facility: CLINIC | Age: 80
End: 2025-02-28
Payer: MEDICARE

## 2025-02-28 ENCOUNTER — LAB (OUTPATIENT)
Dept: LAB | Facility: HOSPITAL | Age: 80
End: 2025-02-28
Payer: MEDICARE

## 2025-02-28 VITALS
WEIGHT: 261 LBS | HEART RATE: 72 BPM | HEIGHT: 72 IN | DIASTOLIC BLOOD PRESSURE: 80 MMHG | BODY MASS INDEX: 35.35 KG/M2 | TEMPERATURE: 97.3 F | OXYGEN SATURATION: 96 % | SYSTOLIC BLOOD PRESSURE: 139 MMHG | RESPIRATION RATE: 15 BRPM

## 2025-02-28 DIAGNOSIS — C85.90 LYMPHOMA, UNSPECIFIED BODY REGION, UNSPECIFIED LYMPHOMA TYPE: ICD-10-CM

## 2025-02-28 DIAGNOSIS — D68.59 ANTITHROMBIN III DEFICIENCY: Primary | ICD-10-CM

## 2025-02-28 DIAGNOSIS — C83.390 CNS LYMPHOMA: Primary | ICD-10-CM

## 2025-02-28 LAB
BASOPHILS # BLD AUTO: 0 10*3/MM3 (ref 0–0.2)
BASOPHILS NFR BLD AUTO: 0 % (ref 0–1.5)
DEPRECATED RDW RBC AUTO: 50.1 FL (ref 37–54)
EOSINOPHIL # BLD AUTO: 0.04 10*3/MM3 (ref 0–0.4)
EOSINOPHIL NFR BLD AUTO: 0.5 % (ref 0.3–6.2)
ERYTHROCYTE [DISTWIDTH] IN BLOOD BY AUTOMATED COUNT: 14.7 % (ref 12.3–15.4)
HCT VFR BLD AUTO: 46.1 % (ref 37.5–51)
HGB BLD-MCNC: 14.7 G/DL (ref 13–17.7)
HOLD SPECIMEN: NORMAL
HOLD SPECIMEN: NORMAL
LYMPHOCYTES # BLD AUTO: 0.88 10*3/MM3 (ref 0.7–3.1)
LYMPHOCYTES NFR BLD AUTO: 11.8 % (ref 19.6–45.3)
MCH RBC QN AUTO: 30.1 PG (ref 26.6–33)
MCHC RBC AUTO-ENTMCNC: 31.9 G/DL (ref 31.5–35.7)
MCV RBC AUTO: 94.3 FL (ref 79–97)
MONOCYTES # BLD AUTO: 0.61 10*3/MM3 (ref 0.1–0.9)
MONOCYTES NFR BLD AUTO: 8.2 % (ref 5–12)
NEUTROPHILS NFR BLD AUTO: 5.91 10*3/MM3 (ref 1.7–7)
NEUTROPHILS NFR BLD AUTO: 79.5 % (ref 42.7–76)
PLATELET # BLD AUTO: 88 10*3/MM3 (ref 140–450)
PMV BLD AUTO: 11.5 FL (ref 6–12)
RBC # BLD AUTO: 4.89 10*6/MM3 (ref 4.14–5.8)
WBC NRBC COR # BLD AUTO: 7.44 10*3/MM3 (ref 3.4–10.8)
WHOLE BLOOD HOLD COAG: NORMAL

## 2025-02-28 PROCEDURE — 85025 COMPLETE CBC W/AUTO DIFF WBC: CPT | Performed by: INTERNAL MEDICINE

## 2025-02-28 PROCEDURE — 36415 COLL VENOUS BLD VENIPUNCTURE: CPT

## 2025-03-02 DIAGNOSIS — E78.2 MIXED HYPERLIPIDEMIA: ICD-10-CM

## 2025-03-03 ENCOUNTER — OFFICE VISIT (OUTPATIENT)
Dept: SURGERY | Facility: CLINIC | Age: 80
End: 2025-03-03
Payer: MEDICARE

## 2025-03-03 VITALS
WEIGHT: 258.4 LBS | SYSTOLIC BLOOD PRESSURE: 114 MMHG | HEART RATE: 91 BPM | BODY MASS INDEX: 35 KG/M2 | HEIGHT: 72 IN | OXYGEN SATURATION: 95 % | DIASTOLIC BLOOD PRESSURE: 81 MMHG | TEMPERATURE: 98 F

## 2025-03-03 DIAGNOSIS — C85.10 B-CELL LYMPHOMA, UNSPECIFIED B-CELL LYMPHOMA TYPE, UNSPECIFIED BODY REGION: Primary | ICD-10-CM

## 2025-03-03 RX ORDER — SODIUM CHLORIDE 9 MG/ML
40 INJECTION, SOLUTION INTRAVENOUS AS NEEDED
OUTPATIENT
Start: 2025-03-03

## 2025-03-03 RX ORDER — SODIUM CHLORIDE 0.9 % (FLUSH) 0.9 %
3 SYRINGE (ML) INJECTION EVERY 12 HOURS SCHEDULED
OUTPATIENT
Start: 2025-03-03

## 2025-03-03 RX ORDER — SODIUM CHLORIDE 9 MG/ML
100 INJECTION, SOLUTION INTRAVENOUS CONTINUOUS
OUTPATIENT
Start: 2025-03-03 | End: 2025-03-04

## 2025-03-03 RX ORDER — SODIUM CHLORIDE 0.9 % (FLUSH) 0.9 %
3-10 SYRINGE (ML) INJECTION AS NEEDED
OUTPATIENT
Start: 2025-03-03

## 2025-03-03 RX ORDER — ATORVASTATIN CALCIUM 10 MG/1
10 TABLET, FILM COATED ORAL DAILY
Qty: 90 TABLET | Refills: 1 | Status: SHIPPED | OUTPATIENT
Start: 2025-03-03

## 2025-03-03 NOTE — PROGRESS NOTES
General Surgery History and Physical      Referring Provider: Thanh Fish MD    Chief Complaint:    Referral for port placement  CNS lymphoma    History of Present Illness  The patient is a 79-year-old male here for evaluation for possible port placement for lymphoma.    He experienced altered mental status, which prompted a comprehensive diagnostic workup. Brain biopsies were performed, revealing the presence of lymphoma.  Currently sees Dr. Fish however he is seeing another oncologist later this week for a second opinion due to the unique nature of his condition. The consensus is that he will require a port for treatment. He has no prior history of procedures involving the neck or upper chest region.    His medical history includes blood clots in the legs, for which he is on Xarelto, managed by his primary care provider, Dr. Mercedes Rios. His last episode of a blood clot occurred in the 1970s.     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   Converted from [employer]:   Description - SEBORRHEIC KERATOSIS    Pain of foot 03/19/2013    Formatting of this note might be different from the original.   Converted  from [employer]:   Description - HEEL PAIN    Prostate cancer     Thrombocytopenia 03/19/2013    Formatting of this note might be different from the original.   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   Converted from [employer]:   Description - VASCULAR DISORDER OF SKIN      Past Surgical History:    Past Surgical History:   Procedure Laterality Date    BRAIN BIOPSY Left 2/3/2025    Procedure: Left frontal stereotactic brain biopsy with Stealth;  Surgeon: Reyes Thakur MD;  Location: [facility] OR;  Service: Neurosurgery;  Laterality: Left;    CHOLECYSTECTOMY      COLONOSCOPY  10/2013    UPPER ENDOSCOPIC ULTRASOUND W/ FNA N/A 5/3/2024    Procedure: ENDOSCOPIC ULTRASOUND with fine needle aspiration x1 area;  Surgeon: Gifty Ribera MD;  Location: [facility] ENDOSCOPY;  Service: Gastroenterology;  Laterality: N/A;  Post- pancreatic cyst     Family History:    Family History   Problem Relation Age of Onset    Breast cancer Other     Diabetes Other     Deep vein thrombosis Other     Diabetes type II Other      Social History:    Social History     Socioeconomic History    Marital status:    Tobacco Use    Smoking status: Never     Passive exposure: Never    Smokeless tobacco: Never   Vaping Use    Vaping status: Never Used   Substance and Sexual Activity    Alcohol use: No    Drug use: Never    Sexual activity: Defer     Allergies:   No Known Allergies    Medications:     Current Outpatient Medications:     acetaminophen (TYLENOL) 325 MG tablet, Take 2 tablets by mouth Every 6 (Six) Hours As Needed for Mild Pain. Indications: Pain, Disp: , Rfl:     amLODIPine (NORVASC) 10 MG tablet, Take 0.5 tablets by mouth every night at bedtime., Disp: , Rfl:     atorvastatin (LIPITOR) 10 MG tablet, TAKE 1 TABLET BY MOUTH EVERY DAY, Disp: 90 tablet, Rfl: 1     Blood Glucose Monitoring Suppl (Accu-Chek Guide) w/Device kit, Use 1 each Take As Directed. To check fasting blood glucose daily and as needed  Indications: Diabetes (Patient not taking: Reported on 2/28/2025), Disp: 1 kit, Rfl: 0    dexAMETHasone (DECADRON) 1 MG tablet, Take 4 tablets by mouth 2 (Two) Times a Day With Meals for 3 days, THEN 3 tablets 2 (Two) Times a Day With Meals for 7 days, THEN 2 tablets 2 (Two) Times a Day With Meals for 7 days, THEN 1 tablet Daily for 7 days., Disp: 101 tablet, Rfl: 0    famotidine (PEPCID) 40 MG tablet, Take 1 tablet by mouth Daily., Disp: 30 tablet, Rfl: 0    glipiZIDE 2.5 MG tablet, Take 2.5 mg by mouth 2 (Two) Times a Day. Indications: Type 2 Diabetes, Disp: 180 tablet, Rfl: 0    glucose blood test strip, 1 each by Other route Daily. Use as instructed  Indications: Diabetes (Patient not taking: Reported on 2/28/2025), Disp: 100 each, Rfl: 2    Lancets (freestyle) lancets, Use to check fasting blood glucose once daily and as needed  Indications: Diabetes (Patient not taking: Reported on 2/28/2025), Disp: 100 each, Rfl: 3    losartan (COZAAR) 25 MG tablet, TAKE 1 TABLET BY MOUTH EVERY DAY, Disp: 90 tablet, Rfl: 0    metFORMIN (GLUCOPHAGE) 1000 MG tablet, TAKE 1 TABLET BY MOUTH TWICE A DAY WITH FOOD, Disp: 180 tablet, Rfl: 1    metFORMIN (GLUCOPHAGE) 500 MG tablet, TAKE 1 TABLET BY MOUTH TWICE A DAY WITH FOOD, Disp: 180 tablet, Rfl: 0    rivaroxaban (XARELTO) 20 MG tablet, Take 1 tablet by mouth Daily. Indications: history of DVT/PE, Disp: , Rfl:     tamsulosin (FLOMAX) 0.4 MG capsule 24 hr capsule, Take 1 capsule by mouth Daily., Disp: 90 capsule, Rfl: 0    Radiology/Endoscopy:    CT PET 2/24/2025  Impression:  1. No hypermetabolic adenopathy.  2. Negative for splenomegaly.  3. Chronic incidental findings above.    Labs:    Recent labs reviewed    Review of Systems:   As noted above in HPI    Physical Exam:   No acute distress, alert  Nonlabored respirations  Upper chest and  neck region without extensive lymphadenopathy or evidence of prior lines/surgeries  Extremities warm and well-perfused with no gross deformities    Assessment & Plan  71-year-old male with CNS lymphoma.    - We discussed port placement along with associate risk, benefits, alternatives  - Risk discussed include but are not limited to bleeding, infection, vascular injury requiring further surgical intervention, pneumothorax  - Patient expressed understanding of everything discussed and is agreeable to proceed with port placement; will schedule   - Will defer to patient's primary care provider on whether or not we will just hold the Xarelto versus bridged with Lovenox     Sydni Frank MD  General Surgery    Patient or patient representative verbalized consent for the use of Ambient Listening during the visit with  Sydni Frank MD for chart documentation. 3/3/2025  15:54 EST

## 2025-03-04 ENCOUNTER — TELEPHONE (OUTPATIENT)
Dept: FAMILY MEDICINE CLINIC | Facility: CLINIC | Age: 80
End: 2025-03-04
Payer: MEDICARE

## 2025-03-04 NOTE — TELEPHONE ENCOUNTER
"Physical Therapy Daily Progress Note/30 day re-assessment    Patient: Mary Deleon   : 1948  Diagnosis/ICD-10 Code:  Cervicalgia [M54.2]  Referring practitioner: Yuliana Issa MD  Date of Initial Visit: Type: THERAPY  Noted: 2021  Today's Date: 2021  Patient seen for 16 sessions           Subjective Evaluation    History of Present Illness    Subjective comment: feels like long break in PT caused her progress to slip away. Saw MD recently where she did have more x-rays taken and mentioned \"lots so spurs\" but no report available via [facility]. pt will try to contact office to have roeprot sent to us for scanning in. Goal is to r/o any thing that would prevent use of mechanical traction safely. No pain management/injections planned. interested in getting a TENS again as it helped her some when she had chronic LBP in the past with relief. also open to over the door pulley        Objective   See Exercise, Manual, and Modality Logs for complete treatment.       Assessment & Plan     Assessment  Assessment details: Active Range of Motion   Cervical/Thoracic Spine                                       21  Cervical     Flexion: 30 degrees with pain                                45  Extension: 10 degrees with pain                            40  Left lateral flexion: 10 degrees with pain                  20  Right lateral flexion: 20 degrees with pain                25  Left rotation: 40 degrees with pain                            45  Right rotation: 45 degrees with pain                          50  Left Shoulder Active  Flexion: 90 degrees with pain                                   70  Abduction: 70 degrees with pain                              50    Right Shoulder   Normal active range of motion  Goals  Plan Goals: STGs 4 weeks:  1. Increase cervical ROM 5-10 degrees in each plane to help with ADLs and driving. MET  2. Decrease spasm L neck to decrease max pain to < 2/10. NOT MET. Still can be " Lizabeth with [facility] called about this patient, there is a form that needs signed to discontinue a medication before surgery on Friday. I didn't see it in his chart, so I looked in the mailbox at the front office and it is here. I put the form on your desk for review.    4-5/10  3. Pt to be indep in variety of easy home ex.ONGOING  4. NDI to improve from 24 to < 18%. NOT MET.increased to 26%  LTGs 8 weeks:  1. Improve neck ROM by 15 degrees to state ease of driving and increased confidence in safety.ONGOING  2. Resolve spasm L neck.ONGOING  3. Pt indep in ex to keep L UE from freezing.ONGOING  4. NDI < 14% by dc.NOT MET    Pt seen for 16 sessions with slow progress affected by stops in PT but more so due to chronic nature of L rotator cuff tear with just 2/5 strength and functional AROM only to 60-80 degrees. Hx cardiac disease and CABG most likely would hinder reverse TSA to restore AROM. This appears to cause strain to L cervical muscles. Awaiting report of recent x-rays to r/o listhesis or any instability that could affect safety of mechanical traction.   P: stronger manual or mechanical traction pending imaging. Home pulley and home TENS affordable for pt.   Work towards dc for more indep rx at home of chronic neck pain.     Barriers to therapy: torn rotator cuff L sh    Plan  Therapy options: will be seen for skilled physical therapy services               Timed:    Manual Therapy:    12     mins  96591;  Therapeutic Exercise:    15     mins  42130;     Neuromuscular Selvin:        mins  85452;    Therapeutic Activity:          mins  05348;     Gait Training:           mins  95845;     Ultrasound:          mins  61080;    Electrical Stimulation:         mins  46417 ( );  Iontophoresis         mins 97995;  Aquatic Therapy         mins 59289;  Dry Needling                   mins    Untimed:  Electrical Stimulation:    15     mins  81658 ( );  Mechanical Traction:         mins  82398;     Timed Treatment:   27   mins   Total Treatment:     60   mins  Zorre Zeno Kimura, PT  Physical Therapist

## 2025-03-05 ENCOUNTER — TELEPHONE (OUTPATIENT)
Dept: SURGERY | Facility: CLINIC | Age: 80
End: 2025-03-05
Payer: MEDICARE

## 2025-03-05 LAB
CYTO UR: NORMAL
LAB AP CASE REPORT: NORMAL
LAB AP CLINICAL INFORMATION: NORMAL
LAB AP CYTOGENETICS REPORT,ADDENDUM: NORMAL
LAB AP FLOW CYTOMETRY SUMMARY: NORMAL
PATH REPORT.FINAL DX SPEC: NORMAL
PATH REPORT.GROSS SPEC: NORMAL

## 2025-03-05 NOTE — TELEPHONE ENCOUNTER
Call from patient's wife, Bina, stating that they saw Dr. Rivera with [facility] yesterday and he is going to admit the patient to [facility] tomorrow and have port placed then to start treatment. They need to cancel the patient's surgery with Dr. Frank scheduled for Friday, 3/7/2025 please. They will be continuing treatment through the [facility] going forward. Advised I would pass along the information and to please let us know if there is anything we can do for them in the future. Patient's wife thanked us for helping them out.     Messages sent to Dr. Frank and our surgery scheduler to have surgery cancelled per patient and family request.

## 2025-03-06 ENCOUNTER — HOME CARE VISIT (OUTPATIENT)
Dept: HOME HEALTH SERVICES | Facility: HOME HEALTHCARE | Age: 80
End: 2025-03-06
Payer: MEDICARE

## 2025-03-14 LAB — CCV RESULT: NORMAL

## 2025-03-27 NOTE — TELEPHONE ENCOUNTER
Caller: Bina Jules    Relationship: Emergency Contact    Best call back number: [phone]     Requested Prescriptions:   Requested Prescriptions     Pending Prescriptions Disp Refills    rivaroxaban (XARELTO) 20 MG tablet 30 tablet      Sig: Take 1 tablet by mouth Daily. Indications: history of DVT/PE        Pharmacy where request should be sent: [facility]/PHARMACY #[id number] - [facility] [address] - [phone]  - [phone] FX     Last office visit with prescribing clinician: 2/13/2025   Last telemedicine visit with prescribing clinician: Visit date not found   Next office visit with prescribing clinician: 3/31/2025     Additional details provided by patient:     Does the patient have less than a 3 day supply:  [x] Yes  [] No    Would you like a call back once the refill request has been completed: [] Yes [x] No    If the office needs to give you a call back, can they leave a voicemail: [] Yes [x] No    Chris Ashraf Rep   03/27/25 14:47 EDT

## 2025-04-09 DIAGNOSIS — E11.65 TYPE 2 DIABETES MELLITUS WITH HYPERGLYCEMIA, WITHOUT LONG-TERM CURRENT USE OF INSULIN: ICD-10-CM

## 2025-04-09 DIAGNOSIS — I10 BENIGN ESSENTIAL HYPERTENSION: ICD-10-CM

## 2025-04-09 RX ORDER — AMLODIPINE BESYLATE 10 MG/1
10 TABLET ORAL
Qty: 90 TABLET | Refills: 0 | Status: SHIPPED | OUTPATIENT
Start: 2025-04-09

## 2025-04-09 RX ORDER — LOSARTAN POTASSIUM 25 MG/1
25 TABLET ORAL DAILY
Qty: 90 TABLET | Refills: 0 | Status: SHIPPED | OUTPATIENT
Start: 2025-04-09

## 2025-04-09 RX ORDER — GLIPIZIDE 2.5 MG/1
TABLET ORAL
Qty: 180 TABLET | Refills: 0 | Status: SHIPPED | OUTPATIENT
Start: 2025-04-09

## 2025-05-23 LAB
CYTO UR: NORMAL
DX PRELIMINARY: NORMAL
LAB AP CASE REPORT: NORMAL
LAB AP CLINICAL INFORMATION: NORMAL
LAB AP DIAGNOSIS COMMENT: NORMAL
LAB AP SPECIAL STAINS: NORMAL
Lab: NORMAL
PATH REPORT.ADDENDUM SPEC: NORMAL
PATH REPORT.FINAL DX SPEC: NORMAL
PATH REPORT.GROSS SPEC: NORMAL

## 2025-05-25 DIAGNOSIS — E11.65 TYPE 2 DIABETES MELLITUS WITH HYPERGLYCEMIA, WITHOUT LONG-TERM CURRENT USE OF INSULIN: ICD-10-CM

## 2025-06-06 ENCOUNTER — TELEPHONE (OUTPATIENT)
Dept: FAMILY MEDICINE CLINIC | Facility: CLINIC | Age: 80
End: 2025-06-06

## 2025-06-06 RX ORDER — TRAZODONE HYDROCHLORIDE 50 MG/1
50 TABLET ORAL NIGHTLY
Qty: 90 TABLET | Refills: 0 | Status: SHIPPED | OUTPATIENT
Start: 2025-06-06

## 2025-06-06 NOTE — TELEPHONE ENCOUNTER
Caller: Bina Jules    Relationship: Emergency Contact    Best call back number: [phone]     What medication are you requesting: TRAZODONE    What are your current symptoms: HELP HIM SLEEP.    How long have you been experiencing symptoms:      Have you had these symptoms before:    [] Yes  [] No    Have you been treated for these symptoms before:   [] Yes  [] No    If a prescription is needed, what is your preferred pharmacy and phone number:      Additional notes:  WAS GIVEN AT THE HOSPITAL MAY 30, 2025 AT [facility]

## 2025-06-10 ENCOUNTER — TELEPHONE (OUTPATIENT)
Dept: FAMILY MEDICINE CLINIC | Facility: CLINIC | Age: 80
End: 2025-06-10

## 2025-06-10 ENCOUNTER — OFFICE VISIT (OUTPATIENT)
Dept: FAMILY MEDICINE CLINIC | Facility: CLINIC | Age: 80
End: 2025-06-10
Payer: MEDICARE

## 2025-06-10 VITALS
RESPIRATION RATE: 18 BRPM | OXYGEN SATURATION: 97 % | BODY MASS INDEX: 31.36 KG/M2 | TEMPERATURE: 97.8 F | HEIGHT: 72 IN | HEART RATE: 96 BPM | WEIGHT: 231.5 LBS | DIASTOLIC BLOOD PRESSURE: 72 MMHG | SYSTOLIC BLOOD PRESSURE: 110 MMHG

## 2025-06-10 DIAGNOSIS — G47.00 INSOMNIA, UNSPECIFIED TYPE: Primary | ICD-10-CM

## 2025-06-10 DIAGNOSIS — E11.65 TYPE 2 DIABETES MELLITUS WITH HYPERGLYCEMIA, WITHOUT LONG-TERM CURRENT USE OF INSULIN: ICD-10-CM

## 2025-06-10 PROBLEM — C83.390 PRIMARY CNS LYMPHOMA: Status: ACTIVE | Noted: 2025-06-10

## 2025-06-10 PROBLEM — H25.10 NUCLEAR SENILE CATARACT: Status: ACTIVE | Noted: 2025-06-10

## 2025-06-10 PROBLEM — H00.19 CHALAZION: Status: RESOLVED | Noted: 2025-06-10 | Resolved: 2025-06-10

## 2025-06-10 RX ORDER — AMOXICILLIN 250 MG
2 CAPSULE ORAL DAILY
COMMUNITY

## 2025-06-10 RX ORDER — ACYCLOVIR 800 MG/1
400 TABLET ORAL 2 TIMES DAILY
COMMUNITY

## 2025-06-10 RX ORDER — ONDANSETRON 4 MG/1
4 TABLET, FILM COATED ORAL EVERY 8 HOURS PRN
COMMUNITY

## 2025-06-10 RX ORDER — TEMOZOLOMIDE 180 MG/1
150 CAPSULE ORAL DAILY
COMMUNITY

## 2025-06-10 RX ORDER — DABIGATRAN ETEXILATE 150 MG/1
150 CAPSULE ORAL 2 TIMES DAILY
COMMUNITY

## 2025-06-10 RX ORDER — DAPAGLIFLOZIN 5 MG/1
5 TABLET, FILM COATED ORAL DAILY
COMMUNITY

## 2025-06-10 RX ORDER — POTASSIUM CHLORIDE 1500 MG/1
40 TABLET, EXTENDED RELEASE ORAL
COMMUNITY

## 2025-06-10 NOTE — PATIENT INSTRUCTIONS
Increase trazodone to 50 mg x 2 tablets nightly for now. Call me if no improvement in next few days.

## 2025-06-10 NOTE — PROGRESS NOTES
"Chief Complaint  Insomnia (Been going on for a long time. )    Subjective        Sumit Jules presents to [facility]    Patient presents today for sleep problem.     Following with Dr. 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  Reported last intravenous infusion approximately 1 week ago.  Starting oral medication on June 16.      Sleep: Onset years ago. No change. Occurs nightly-  Snoring improved.  Recent trial of trazodone 50 mg at bedtime without improvement.  Has also tried melatonin at bedtime.  There has been a 36 pound weight loss since February.  Wife states patient goes to bed each night and then gets out of bed and goes to the living room within 10 to 15 minutes due to restlessness.  Patient reports that mood has definitely improved in comparison to 3 months ago. Speech continues to be difficult although much better.  Associated symptoms include fatigue, loss of appetite, and generalized weakness.  Negative for headache, chest pain, diaphoresis, sore throat, dysuria, vomiting, and abdominal pain.        Objective   Vital Signs:  /72 (BP Location: Left arm, Patient Position: Sitting, Cuff Size: Adult)   Pulse 96   Temp 97.8 °F (36.6 °C) (Temporal)   Resp 18   Ht 182.9 cm (72\")   Wt 105 kg (231 lb 8 oz)   SpO2 97%   BMI 31.40 kg/m²   Estimated body mass index is 31.4 kg/m² as calculated from the following:    Height as of this encounter: 182.9 cm (72\").    Weight as of this encounter: 105 kg (231 lb 8 oz).          Physical Exam  Constitutional:       General: He is not in acute distress.     Appearance: He is well-groomed. He is obese.   Cardiovascular:      Rate and Rhythm: Normal rate and regular rhythm.      Heart sounds: Normal heart sounds, S1 normal and S2 normal.      Comments: Wearing MARCO hose bilaterally.  Pulmonary:      Effort: Pulmonary effort is normal.      Breath " sounds: Normal breath sounds and air entry.   Musculoskeletal:      Right lower leg: No edema.      Left lower leg: No edema.   Skin:     General: Skin is warm and dry.   Neurological:      General: No focal deficit present.      Mental Status: He is alert and oriented to person, place, and time.      Motor: Weakness present.      Gait: Gait abnormal.   Psychiatric:         Mood and Affect: Mood normal.         Speech: Speech is delayed.         Behavior: Behavior is slowed.         Thought Content: Thought content normal.         Judgment: Judgment normal.        Result Review :    CMP          2/10/2025    06:26 2/13/2025    10:48 2/17/2025    14:35   CMP   Glucose 171  175  177    BUN 17  22  20    Creatinine 0.77  0.89  0.93    EGFR 91.6  87.7  84.0    Sodium 127  130  135    Potassium 4.5  4.5  4.7    Chloride 97  94  98    Calcium 7.8  8.9  8.8    Total Protein  6.8  6.7    Albumin  3.9  3.9    Globulin  2.9  2.8    Total Bilirubin  1.1  1.3    Alkaline Phosphatase  75  72    AST (SGOT)  20  19    ALT (SGPT)  36  27    Albumin/Globulin Ratio  1.3  1.4    BUN/Creatinine Ratio 22.1  24.7  21.5    Anion Gap 4.5  11.7  11.0      CBC          2/17/2025    14:35 2/20/2025    11:11 2/28/2025    12:02   CBC   WBC 11.99  12.83  7.44    RBC 5.22  5.39  4.89    Hemoglobin 15.6  15.9  14.7    Hematocrit 48.4  48.4  46.1    MCV 92.7  89.8  94.3    MCH 29.9  29.5  30.1    MCHC 32.2  32.9  31.9    RDW 14.0  13.5  14.7    Platelets 98  96  88      CBC w/diff          2/17/2025    14:35 2/20/2025    11:11 2/28/2025    12:02   CBC w/Diff   WBC 11.99  12.83  7.44    RBC 5.22  5.39  4.89    Hemoglobin 15.6  15.9  14.7    Hematocrit 48.4  48.4  46.1    MCV 92.7  89.8  94.3    MCH 29.9  29.5  30.1    MCHC 32.2  32.9  31.9    RDW 14.0  13.5  14.7    Platelets 98  96  88    Neutrophil Rel % 90.7  87.5  79.5    Immature Granulocyte Rel %  0.7     Lymphocyte Rel % 4.1  6.7  11.8    Monocyte Rel % 5.0  4.9  8.2    Eosinophil Rel % 0.1   0.1  0.5    Basophil Rel % 0.1  0.1  0.0      Lipid Panel          12/3/2024    12:01   Lipid Panel   Total Cholesterol 136    Triglycerides 99    HDL Cholesterol 35    VLDL Cholesterol 19    LDL Cholesterol  82    LDL/HDL Ratio 2.32      TSH          1/3/2025    09:08   TSH   TSH 3.380      Electrolytes          2/10/2025    06:26 2/13/2025    10:48 2/17/2025    14:35   Electrolytes   Sodium 127  130  135    Potassium 4.5  4.5  4.7    Chloride 97  94  98    Calcium 7.8  8.9  8.8      BMP          2/10/2025    06:26 2/13/2025    10:48 2/17/2025    14:35   BMP   BUN 17  22  20    Creatinine 0.77  0.89  0.93    Sodium 127  130  135    Potassium 4.5  4.5  4.7    Chloride 97  94  98    CO2 25.5  24.3  26.0    Calcium 7.8  8.9  8.8      Most Recent A1C          12/3/2024    12:01   HGBA1C Most Recent   Hemoglobin A1C 7.40             Component  Ref Range & Units 5 d ago   WBC  4.0 - 10.8 x10(3)/ul 3.7 Low    RBC  4.37 - 5.74 x10(6)/ul 3.2 Low    Hemoglobin  13.0 - 17.5 Gram/dL 10 Low    Hematocrit  38.0 - 51.0 % 30.1 Low    MCV  79.0 - 92.2 fL 94.1 High    MCH  25.6 - 32.2 pg 31.2   MCHC  32.3 - 36.5 Gram/dL 33.1   RDW  11.0 - 15.5 % 16.9 High    Platelets  140 - 420 x10(3)/ul 138 Low    MPV  6.5 - 12.0 fL 10.1   Scan Slide NONE   Resulting Agency [facility] Rem 2.0   Narrative  Performed by [facility]  Performed by [facility], [address] [place] [address], [place] [address]    Specimen Collected: 06/05/25 12:13    Performed by: [facility] Last Resulted: 06/05/25 12:17   Received From: [facility]  Result Received: 06/10/25 16:01     Component  Ref Range & Units 5 d ago   Sodium  136 - 145 mmol/L 138   Potassium  3.5 - 5.1 mmol/L 3.2 Low    Chloride  98 - 110 mmol/L 100   CO2  21 - 31 mmol/L 29   Anion Gap  2.0 - 11.0 9   Calcium  8.6 - 10.2 mg/dL 8.2 Low    Glucose  74 - 109 mg/dL 105   BUN  7 - 25 mg/dL 6 Low    Creatinine  0.70 - 1.30 mg/dL 0.61 Low    BUN/Creatinine  Ratio  6.0 - 22.0 9.8   Albumin  3.5 - 5.2 Gram/dL 3.1 Low    Total Protein  6.4 - 8.9 Gram/dL 5.4 Low    A/G Ratio  1.0 - 1.8 1.3   Alkaline Phosphatase  34 - 104 Units/Liter 77   ALT (SGPT)  <=32 Units/Liter 7   AST  13 - 39 Units/Liter 16   Total Bilirubin  0.3 - 1.0 mg/dL 0.6   Globulin, Total  2.0 - 3.5 Gram/dL 2.3   EGFR  >=60 mL/min/1.73m2 98   Comment: eGFR calculation performed using the CKD-EPI 2021 equation (race variable excluded)   Resulting Agency [facility] Rem 2.0   Narrative  Performed by [facility]  6/5 rapid ritux  Performed by [facility], [address] [place] [address]    Specimen Collected: 06/05/25 12:13    Performed by: [facility] Last Resulted: 06/05/25 12:36   Received From: [facility]  Result Received: 06/10/25 16:01                Assessment and Plan   Diagnoses and all orders for this visit:    1. Insomnia, unspecified type (Primary)  Assessment & Plan:  Recommended increasing trazodone 50 mg to 2 tablets at bedtime.  If no improvement, will trial Ambien 5 mg at bedtime.  Drug contract signed at today's visit if alternative medication required.  Education provided regarding sleep apnea.  Patient not interested in testing at this time.  Patient's wife to send message with update within the next week.      2. Type 2 diabetes mellitus with hyperglycemia, without long-term current use of insulin    Discussed need for follow-up labs for diabetic monitoring.  Will review outside labs and contact patient tomorrow with additional follow-up need.        I spent 30 minutes caring for Sumit on this date of service. This time includes time spent by me in the following activities:preparing for the visit, reviewing tests, obtaining and/or reviewing a separately obtained history, performing a medically appropriate examination and/or evaluation , counseling and educating the patient/family/caregiver, ordering medications, tests, or  procedures, documenting information in the medical record, independently interpreting results and communicating that information with the patient/family/caregiver, and care coordination  Follow Up   Return for Next scheduled follow up.  Patient was given instructions and counseling regarding his condition or for health maintenance advice. Please see specific information pulled into the AVS if appropriate.

## 2025-06-10 NOTE — TELEPHONE ENCOUNTER
Caller: Bina Jules    Relationship: Emergency Contact    Best call back number: [phone]    What medication are you requesting: ALTERNATIVE SLEEP MEDICATION    What are your current symptoms: DIFFICULTY SLEEPING     Have you had these symptoms before:    [x] Yes  [] No    Have you been treated for these symptoms before:   [x] Yes  [] No    If a prescription is needed, what is your preferred pharmacy and phone number: [facility]/PHARMACY #[id number] - [place], [place] - [address] - [phone]  - [phone] FX     Additional notes: STATED THAT THEY ARE NEEDING TO SEE ABOUT GETTING AN ALTERNATIVE SLEEP MEDICATION AS THE TRAZODONE THEY HAVE BEEN TAKING IS NOT HELPING ANYMORE. PLEASE CALL AND ADVISE

## 2025-06-11 NOTE — ASSESSMENT & PLAN NOTE
Recommended increasing trazodone 50 mg to 2 tablets at bedtime.  If no improvement, will trial Ambien 5 mg at bedtime.  Drug contract signed at today's visit if alternative medication required.  Education provided regarding sleep apnea.  Patient not interested in testing at this time.  Patient's wife to send message with update within the next week.

## 2025-06-13 ENCOUNTER — TELEPHONE (OUTPATIENT)
Dept: FAMILY MEDICINE CLINIC | Facility: CLINIC | Age: 80
End: 2025-06-13
Payer: MEDICARE

## 2025-06-13 DIAGNOSIS — E11.65 TYPE 2 DIABETES MELLITUS WITH HYPERGLYCEMIA, WITHOUT LONG-TERM CURRENT USE OF INSULIN: Primary | ICD-10-CM

## 2025-06-13 NOTE — TELEPHONE ENCOUNTER
Please call patient and wife and inform that I have updated final medication list provided at the end of recent visit.  Please ask him to schedule a fasting lab appointment soon followed by an office appointment to discuss diabetes.  Would also recommend bringing all current prescription bottles to verify.

## 2025-06-18 ENCOUNTER — LAB (OUTPATIENT)
Dept: FAMILY MEDICINE CLINIC | Facility: CLINIC | Age: 80
End: 2025-06-18
Payer: MEDICARE

## 2025-06-18 DIAGNOSIS — E11.65 TYPE 2 DIABETES MELLITUS WITH HYPERGLYCEMIA, WITHOUT LONG-TERM CURRENT USE OF INSULIN: ICD-10-CM

## 2025-06-18 LAB
ALBUMIN SERPL-MCNC: 3 G/DL (ref 3.5–5.2)
ALBUMIN/GLOB SERPL: 1.1 G/DL
ALP SERPL-CCNC: 71 U/L (ref 39–117)
ALT SERPL W P-5'-P-CCNC: 9 U/L (ref 1–41)
ANION GAP SERPL CALCULATED.3IONS-SCNC: 12.7 MMOL/L (ref 5–15)
AST SERPL-CCNC: 29 U/L (ref 1–40)
BILIRUB SERPL-MCNC: 0.6 MG/DL (ref 0–1.2)
BUN SERPL-MCNC: 8 MG/DL (ref 8–23)
BUN/CREAT SERPL: 10 (ref 7–25)
CALCIUM SPEC-SCNC: 8.5 MG/DL (ref 8.6–10.5)
CHLORIDE SERPL-SCNC: 104 MMOL/L (ref 98–107)
CHOLEST SERPL-MCNC: 87 MG/DL (ref 0–200)
CO2 SERPL-SCNC: 22.3 MMOL/L (ref 22–29)
CREAT SERPL-MCNC: 0.8 MG/DL (ref 0.76–1.27)
EGFRCR SERPLBLD CKD-EPI 2021: 90.6 ML/MIN/1.73
GLOBULIN UR ELPH-MCNC: 2.7 GM/DL
GLUCOSE SERPL-MCNC: 98 MG/DL (ref 65–99)
HBA1C MFR BLD: 5.1 % (ref 4.8–5.6)
HDLC SERPL-MCNC: 40 MG/DL (ref 40–60)
LDLC SERPL CALC-MCNC: 31 MG/DL (ref 0–100)
LDLC/HDLC SERPL: 0.79 {RATIO}
POTASSIUM SERPL-SCNC: 4.4 MMOL/L (ref 3.5–5.2)
PROT SERPL-MCNC: 5.7 G/DL (ref 6–8.5)
SODIUM SERPL-SCNC: 139 MMOL/L (ref 136–145)
TRIGL SERPL-MCNC: 77 MG/DL (ref 0–150)
VLDLC SERPL-MCNC: 16 MG/DL (ref 5–40)

## 2025-06-18 PROCEDURE — 83036 HEMOGLOBIN GLYCOSYLATED A1C: CPT | Performed by: NURSE PRACTITIONER

## 2025-06-18 PROCEDURE — 80061 LIPID PANEL: CPT | Performed by: NURSE PRACTITIONER

## 2025-06-18 PROCEDURE — 80053 COMPREHEN METABOLIC PANEL: CPT | Performed by: NURSE PRACTITIONER

## 2025-06-19 ENCOUNTER — RESULTS FOLLOW-UP (OUTPATIENT)
Dept: FAMILY MEDICINE CLINIC | Facility: CLINIC | Age: 80
End: 2025-06-19
Payer: MEDICARE

## 2025-06-20 ENCOUNTER — OFFICE VISIT (OUTPATIENT)
Dept: FAMILY MEDICINE CLINIC | Facility: CLINIC | Age: 80
End: 2025-06-20
Payer: MEDICARE

## 2025-06-20 VITALS
SYSTOLIC BLOOD PRESSURE: 110 MMHG | WEIGHT: 227 LBS | HEART RATE: 101 BPM | TEMPERATURE: 96.8 F | DIASTOLIC BLOOD PRESSURE: 72 MMHG | BODY MASS INDEX: 30.75 KG/M2 | OXYGEN SATURATION: 97 % | RESPIRATION RATE: 18 BRPM | HEIGHT: 72 IN

## 2025-06-20 DIAGNOSIS — E78.2 MIXED HYPERLIPIDEMIA: ICD-10-CM

## 2025-06-20 DIAGNOSIS — I10 BENIGN ESSENTIAL HYPERTENSION: ICD-10-CM

## 2025-06-20 DIAGNOSIS — E11.65 TYPE 2 DIABETES MELLITUS WITH HYPERGLYCEMIA, WITHOUT LONG-TERM CURRENT USE OF INSULIN: Primary | ICD-10-CM

## 2025-06-20 DIAGNOSIS — G47.00 INSOMNIA, UNSPECIFIED TYPE: ICD-10-CM

## 2025-06-20 DIAGNOSIS — Z79.01 LONG TERM CURRENT USE OF ANTICOAGULANT: ICD-10-CM

## 2025-06-20 RX ORDER — METOPROLOL SUCCINATE 25 MG/1
25 TABLET, EXTENDED RELEASE ORAL DAILY
Qty: 90 TABLET | Refills: 0 | Status: SHIPPED | OUTPATIENT
Start: 2025-06-20

## 2025-06-20 RX ORDER — AMLODIPINE BESYLATE 5 MG/1
5 TABLET ORAL DAILY
Qty: 90 TABLET | Refills: 0 | Status: SHIPPED | OUTPATIENT
Start: 2025-06-20

## 2025-06-20 RX ORDER — ZOLPIDEM TARTRATE 5 MG/1
5 TABLET ORAL NIGHTLY PRN
Qty: 30 TABLET | Refills: 0 | Status: SHIPPED | OUTPATIENT
Start: 2025-06-20

## 2025-06-20 NOTE — PROGRESS NOTES
"Chief Complaint  Primary Care Follow-Up    Subjective        Sumit Jules presents to [facility]  History of Present Illness    Patient presents today to follow-up on diabetes, hypertension, hyperlipidemia, and insomnia.        Diabetes: Status is controlled.  Current medication includes glipizide 2.5 mg twice daily and metformin 500 mg twice daily.  Farxiga 5 mg not taken due to cost $$$.  Positive for weight loss.  Negative for chest pain, shortness of breath, nausea, vomiting, and abdominal pain, frequent infection, paresthesia.    Hypertension: Status is controlled.  Current medication includes amlodipine 10 mg daily and losartan 25 mg daily.  There is associated lower extremity swelling elevated heart rate.  Negative for headache, chest pain, dizziness, diaphoresis, nausea, vomiting, and visual disturbance.    Hyperlipidemia: Controlled with current medication atorvastatin 10 mg daily.    Insomnia: Status is mildly improved.  Current medication includes melatonin 5 mg at bedtime and trazodone 50 mg x 2 tablets at bedtime.  Continues to be up several times last night.     Long-term anticoagulation: Now taking Pradaxa 100 mg twice daily.           Objective   Vital Signs:  /72 (BP Location: Left arm, Patient Position: Sitting, Cuff Size: Adult)   Pulse 101   Temp 96.8 °F (36 °C) (Temporal)   Resp 18   Ht 182.9 cm (72\")   Wt 103 kg (227 lb)   SpO2 97%   BMI 30.79 kg/m²   Estimated body mass index is 30.79 kg/m² as calculated from the following:    Height as of this encounter: 182.9 cm (72\").    Weight as of this encounter: 103 kg (227 lb).          Physical Exam  Constitutional:       General: He is not in acute distress.     Appearance: He is obese.   Cardiovascular:      Rate and Rhythm: Regular rhythm. Tachycardia present.      Heart sounds: Normal heart sounds, S1 normal and S2 normal.      Comments: Bilateral MARCO hose.  Pulmonary:      Effort: Pulmonary effort " is normal.      Breath sounds: Normal breath sounds and air entry.   Musculoskeletal:      Right lower leg: No edema.      Left lower leg: No edema.   Skin:     General: Skin is warm and dry.   Neurological:      Mental Status: He is alert and oriented to person, place, and time.      Gait: Gait is intact.   Psychiatric:         Mood and Affect: Mood normal.         Thought Content: Thought content normal.         Judgment: Judgment normal.        Result Review :    CMP          2/13/2025    10:48 2/17/2025    14:35 6/18/2025    11:01   CMP   Glucose 175  177  98    BUN 22  20  8.0    Creatinine 0.89  0.93  0.80    EGFR 87.7  84.0  90.6    Sodium 130  135  139    Potassium 4.5  4.7  4.4    Chloride 94  98  104    Calcium 8.9  8.8  8.5    Total Protein 6.8  6.7  5.7    Albumin 3.9  3.9  3.0    Globulin 2.9  2.8  2.7    Total Bilirubin 1.1  1.3  0.6    Alkaline Phosphatase 75  72  71    AST (SGOT) 20  19  29    ALT (SGPT) 36  27  9    Albumin/Globulin Ratio 1.3  1.4  1.1    BUN/Creatinine Ratio 24.7  21.5  10.0    Anion Gap 11.7  11.0  12.7      CBC          2/17/2025    14:35 2/20/2025    11:11 2/28/2025    12:02   CBC   WBC 11.99  12.83  7.44    RBC 5.22  5.39  4.89    Hemoglobin 15.6  15.9  14.7    Hematocrit 48.4  48.4  46.1    MCV 92.7  89.8  94.3    MCH 29.9  29.5  30.1    MCHC 32.2  32.9  31.9    RDW 14.0  13.5  14.7    Platelets 98  96  88      CBC w/diff          2/17/2025    14:35 2/20/2025    11:11 2/28/2025    12:02   CBC w/Diff   WBC 11.99  12.83  7.44    RBC 5.22  5.39  4.89    Hemoglobin 15.6  15.9  14.7    Hematocrit 48.4  48.4  46.1    MCV 92.7  89.8  94.3    MCH 29.9  29.5  30.1    MCHC 32.2  32.9  31.9    RDW 14.0  13.5  14.7    Platelets 98  96  88    Neutrophil Rel % 90.7  87.5  79.5    Immature Granulocyte Rel %  0.7     Lymphocyte Rel % 4.1  6.7  11.8    Monocyte Rel % 5.0  4.9  8.2    Eosinophil Rel % 0.1  0.1  0.5    Basophil Rel % 0.1  0.1  0.0      Lipid Panel          12/3/2024    12:01  6/18/2025    11:01   Lipid Panel   Total Cholesterol 136  87    Triglycerides 99  77    HDL Cholesterol 35  40    VLDL Cholesterol 19  16    LDL Cholesterol  82  31    LDL/HDL Ratio 2.32  0.79      TSH          1/3/2025    09:08   TSH   TSH 3.380      BMP          2/13/2025    10:48 2/17/2025    14:35 6/18/2025    11:01   BMP   BUN 22  20  8.0    Creatinine 0.89  0.93  0.80    Sodium 130  135  139    Potassium 4.5  4.7  4.4    Chloride 94  98  104    CO2 24.3  26.0  22.3    Calcium 8.9  8.8  8.5      Most Recent A1C          6/18/2025    11:01   HGBA1C Most Recent   Hemoglobin A1C 5.10      Microalbumin          12/3/2024    12:01   Microalbumin   Microalbumin, Urine 2.1      UA          12/3/2024    12:01 1/29/2025    15:31 2/13/2025    10:48   Urinalysis   Squamous Epithelial Cells, UA  0-2  None Seen    Specific [place], UA 1.026  1.036  1.031    Ketones, UA Negative  Trace  Trace    Blood, UA Negative  Large (3+)  Moderate (2+)    Leukocytes, UA Negative  Negative  Negative    Nitrite, UA Negative  Negative  Negative    RBC, UA  Too Numerous to Count  3-5    WBC, UA  0-2  None Seen    Bacteria, UA  None Seen  None Seen                  Assessment and Plan   Diagnoses and all orders for this visit:    1. Type 2 diabetes mellitus with hyperglycemia, without long-term current use of insulin (Primary)  Assessment & Plan:  Diabetes is stable.   Continue metformin 500 mg twice daily.  Continue glipizide 2.5 mg twice daily.  Continue current treatment regimen.  Regular aerobic exercise.  Discussed foot care.  Reminded to get yearly retinal exam.  Diabetes will be reassessed in 3 months      2. Benign essential hypertension  Assessment & Plan:  Start metoprolol 25 mg daily and Start amlodipine 5 mg daily. STOP the amlodipine 10 mg daily.     Orders:  -     metoprolol succinate XL (TOPROL-XL) 25 MG 24 hr tablet; Take 1 tablet by mouth Daily.  Dispense: 90 tablet; Refill: 0  -     amLODIPine (NORVASC) 5 MG tablet; Take 1  tablet by mouth Daily.  Dispense: 90 tablet; Refill: 0    3. Mixed hyperlipidemia  Assessment & Plan:   Lipid abnormalities are stable    Plan:  Continue same medication/s without change.      Discussed medication dosage, use, side effects, and goals of treatment in detail.    Counseled patient on lifestyle modifications to help control hyperlipidemia.     Patient Treatment Goals:   LDL goal is less than 70    Followup in 3 months.      4. Insomnia, unspecified type  Assessment & Plan:  Start ambien 5 mg (can take 1/2 tablet) at bedtime and STOP the trazodone 50 mg tablets.    Orders:  -     zolpidem (Ambien) 5 MG tablet; Take 1 tablet by mouth At Night As Needed for Sleep.  Dispense: 30 tablet; Refill: 0    5. Long term current use of anticoagulant             Follow Up   Return in about 3 months (around 9/20/2025).  Patient was given instructions and counseling regarding his condition or for health maintenance advice. Please see specific information pulled into the AVS if appropriate.

## 2025-06-20 NOTE — PATIENT INSTRUCTIONS
Start ambien 5 mg (can take 1/2 tablet) at bedtime and STOP the trazodone 50 mg tablets.  Start metoprolol 25 mg daily and Start amlodipine 5 mg daily. STOP the amlodipine 10 mg daily.

## 2025-06-20 NOTE — ASSESSMENT & PLAN NOTE
Diabetes is stable.   Continue metformin 500 mg twice daily.  Continue glipizide 2.5 mg twice daily.  Continue current treatment regimen.  Regular aerobic exercise.  Discussed foot care.  Reminded to get yearly retinal exam.  Diabetes will be reassessed in 3 months

## 2025-07-21 ENCOUNTER — TELEPHONE (OUTPATIENT)
Dept: FAMILY MEDICINE CLINIC | Facility: CLINIC | Age: 80
End: 2025-07-21
Payer: MEDICARE

## 2025-07-25 ENCOUNTER — OFFICE VISIT (OUTPATIENT)
Dept: FAMILY MEDICINE CLINIC | Facility: CLINIC | Age: 80
End: 2025-07-25
Payer: MEDICARE

## 2025-07-25 VITALS
HEART RATE: 86 BPM | HEIGHT: 72 IN | DIASTOLIC BLOOD PRESSURE: 62 MMHG | TEMPERATURE: 97.1 F | OXYGEN SATURATION: 97 % | WEIGHT: 233.7 LBS | BODY MASS INDEX: 31.65 KG/M2 | SYSTOLIC BLOOD PRESSURE: 100 MMHG | RESPIRATION RATE: 18 BRPM

## 2025-07-25 DIAGNOSIS — N40.0 BENIGN PROSTATIC HYPERPLASIA WITHOUT LOWER URINARY TRACT SYMPTOMS: ICD-10-CM

## 2025-07-25 DIAGNOSIS — R60.0 BILATERAL LOWER EXTREMITY EDEMA: Primary | ICD-10-CM

## 2025-07-25 DIAGNOSIS — I10 BENIGN ESSENTIAL HYPERTENSION: ICD-10-CM

## 2025-07-25 RX ORDER — POTASSIUM CHLORIDE 750 MG/1
10 TABLET, EXTENDED RELEASE ORAL 2 TIMES DAILY
Qty: 60 TABLET | Refills: 0 | Status: SHIPPED | OUTPATIENT
Start: 2025-07-25

## 2025-07-25 RX ORDER — FUROSEMIDE 20 MG/1
20 TABLET ORAL 2 TIMES DAILY
Qty: 60 TABLET | Refills: 0 | Status: SHIPPED | OUTPATIENT
Start: 2025-07-25

## 2025-07-25 RX ORDER — TAMSULOSIN HYDROCHLORIDE 0.4 MG/1
1 CAPSULE ORAL DAILY
Qty: 90 CAPSULE | Refills: 0 | Status: SHIPPED | OUTPATIENT
Start: 2025-07-25

## 2025-07-25 NOTE — PROGRESS NOTES
"Chief Complaint  Leg Swelling (Been getting worse)    Subjective        Sumit Jules presents to [facility]  History of Present Illness    History of Present Illness  The patient presents for evaluation of bilateral lower extremity edema.    He has been experiencing swelling in both legs for the past 2 to 3 weeks, which has progressively worsened. The swelling is equal in both legs and is not associated with pain, redness, or itching. It does not worsen with standing, walking, or moving. He occasionally elevates his legs at home but not consistently. He sleeps with his feet down and excessively. He used to wear compression stockings but can no longer put them on due to the swelling. He is unsure if he is currently taking potassium supplements prescribed by his oncologist. He reports no unusual coughing, chest pain, or shortness of breath.  There has been no significant weight change.  He is on Temodar once a month- this is newer medication for cancer treatment.     He monitors his blood pressure at home, but the readings are unknown.    Requesting refill for tamsulosin 0.4 mg today.    Social History:  Sleep: Sleeps with feet down and excessively            Objective   Vital Signs:  /62 (BP Location: Left arm, Patient Position: Sitting, Cuff Size: Large Adult)   Pulse 86   Temp 97.1 °F (36.2 °C) (Temporal)   Resp 18   Ht 182.9 cm (72\")   Wt 106 kg (233 lb 11.2 oz)   SpO2 97%   BMI 31.70 kg/m²   Estimated body mass index is 31.7 kg/m² as calculated from the following:    Height as of this encounter: 182.9 cm (72\").    Weight as of this encounter: 106 kg (233 lb 11.2 oz).          Physical Exam  Constitutional:       General: He is not in acute distress.     Appearance: He is well-groomed. He is obese.   Cardiovascular:      Rate and Rhythm: Normal rate and regular rhythm.      Pulses:           Dorsalis pedis pulses are 2+ on the right side and 2+ on the left side.     "    Posterior tibial pulses are 2+ on the right side and 2+ on the left side.      Heart sounds: Normal heart sounds, S1 normal and S2 normal.   Pulmonary:      Effort: Pulmonary effort is normal.      Breath sounds: Normal breath sounds and air entry.   Musculoskeletal:      Right lower le+ Edema present.      Left lower le+ Pitting Edema present.   Skin:     General: Skin is warm and dry.   Neurological:      Mental Status: He is alert and oriented to person, place, and time.      Gait: Gait is intact.   Psychiatric:         Mood and Affect: Mood normal.         Speech: Speech is delayed.         Thought Content: Thought content normal.        Result Review :    CMP          2025    10:48 2025    14:35 2025    11:01   CMP   Glucose 175  177  98    BUN 22  20  8.0    Creatinine 0.89  0.93  0.80    EGFR 87.7  84.0  90.6    Sodium 130  135  139    Potassium 4.5  4.7  4.4    Chloride 94  98  104    Calcium 8.9  8.8  8.5    Total Protein 6.8  6.7  5.7    Albumin 3.9  3.9  3.0    Globulin 2.9  2.8  2.7    Total Bilirubin 1.1  1.3  0.6    Alkaline Phosphatase 75  72  71    AST (SGOT) 20  19  29    ALT (SGPT) 36  27  9    Albumin/Globulin Ratio 1.3  1.4  1.1    BUN/Creatinine Ratio 24.7  21.5  10.0    Anion Gap 11.7  11.0  12.7      CBC          2025    14:35 2025    11:11 2025    12:02   CBC   WBC 11.99  12.83  7.44    RBC 5.22  5.39  4.89    Hemoglobin 15.6  15.9  14.7    Hematocrit 48.4  48.4  46.1    MCV 92.7  89.8  94.3    MCH 29.9  29.5  30.1    MCHC 32.2  32.9  31.9    RDW 14.0  13.5  14.7    Platelets 98  96  88      CBC w/diff          2025    14:35 2025    11:11 2025    12:02   CBC w/Diff   WBC 11.99  12.83  7.44    RBC 5.22  5.39  4.89    Hemoglobin 15.6  15.9  14.7    Hematocrit 48.4  48.4  46.1    MCV 92.7  89.8  94.3    MCH 29.9  29.5  30.1    MCHC 32.2  32.9  31.9    RDW 14.0  13.5  14.7    Platelets 98  96  88    Neutrophil Rel % 90.7  87.5  79.5     Immature Granulocyte Rel %  0.7     Lymphocyte Rel % 4.1  6.7  11.8    Monocyte Rel % 5.0  4.9  8.2    Eosinophil Rel % 0.1  0.1  0.5    Basophil Rel % 0.1  0.1  0.0      Lipid Panel          12/3/2024    12:01 6/18/2025    11:01   Lipid Panel   Total Cholesterol 136  87    Triglycerides 99  77    HDL Cholesterol 35  40    VLDL Cholesterol 19  16    LDL Cholesterol  82  31    LDL/HDL Ratio 2.32  0.79      TSH          1/3/2025    09:08   TSH   TSH 3.380      BMP          2/13/2025    10:48 2/17/2025    14:35 6/18/2025    11:01   BMP   BUN 22  20  8.0    Creatinine 0.89  0.93  0.80    Sodium 130  135  139    Potassium 4.5  4.7  4.4    Chloride 94  98  104    CO2 24.3  26.0  22.3    Calcium 8.9  8.8  8.5      Most Recent A1C          6/18/2025    11:01   HGBA1C Most Recent   Hemoglobin A1C 5.10      Microalbumin          12/3/2024    12:01   Microalbumin   Microalbumin, Urine 2.1                Assessment and Plan   Diagnoses and all orders for this visit:    1. Bilateral lower extremity edema (Primary)  -     furosemide (LASIX) 20 MG tablet; Take 1 tablet by mouth 2 (Two) Times a Day.  Dispense: 60 tablet; Refill: 0  -     potassium chloride 10 MEQ CR tablet; Take 1 tablet by mouth 2 (Two) Times a Day.  Dispense: 60 tablet; Refill: 0  -     Adult Transthoracic Echo Complete W/ Cont if Necessary Per Protocol; Future  -     BNP; Future    2. Benign essential hypertension  -     Adult Transthoracic Echo Complete W/ Cont if Necessary Per Protocol; Future  -     BNP; Future    3. Benign prostatic hyperplasia without lower urinary tract symptoms  -     tamsulosin (FLOMAX) 0.4 MG capsule 24 hr capsule; Take 1 capsule by mouth Daily. Indications: Chronic Prostate Gland Inflammation  Dispense: 90 capsule; Refill: 0      Assessment & Plan  1. Bilateral lower extremity edema.  - The patient's bilateral lower extremity edema is likely exacerbated by the dependent position of his legs during sleep and the use of amlodipine. His  chemotherapy medication, Temodar, may also contribute to the swelling.  - Physical exam reveals significant swelling in both lower extremities without redness, pain, or itching. The patient is unable to wear compression stockings due to increased swelling.  - He is advised to elevate his legs frequently throughout the day and avoid sleeping with his feet in a dependent position. An echocardiogram will be scheduled to assess cardiac function.  - A prescription for furosemide 20 mg twice daily for 30 days was provided, along with potassium 10 mEq twice daily to prevent hypokalemia. The amlodipine 5 mg will be discontinued as it may be contributing to the leg swelling. Medication sent to pharmacy.    2. Hypertension.  - Continue metoprolol 25 mg XL and losartan 25 mg daily.  - Discontinue amlodipine 5 mg daily.   - Follow-up in 3 to 4 weeks.             Follow Up   Return in about 3 weeks (around 8/15/2025).  Patient was given instructions and counseling regarding his condition or for health maintenance advice. Please see specific information pulled into the AVS if appropriate.

## 2025-07-30 RX ORDER — DABIGATRAN ETEXILATE 150 MG/1
150 CAPSULE ORAL 2 TIMES DAILY
Qty: 60 CAPSULE | Refills: 3 | Status: SHIPPED | OUTPATIENT
Start: 2025-07-30

## 2025-07-30 NOTE — TELEPHONE ENCOUNTER
Caller: Dianna Zambrano    Relationship: Emergency Contact    Best call back number: [phone]    Requested Prescriptions:   Requested Prescriptions     Pending Prescriptions Disp Refills    dabigatran etexilate (PRADAXA) 150 MG capsu 60 capsule      Sig: Take 1 capsule by mouth 2 (Two) Times a Day.        Pharmacy where request should be sent: [id number] [place] [id number] [facility] [phone] [facility] [phone]      Last office visit with prescribing clinician: 7/25/2025   Last telemedicine visit with prescribing clinician: Visit date not found   Next office visit with prescribing clinician: 8/15/2025     Additional details provided by patient: HAS BEEN OUT     Does the patient have less than a 3 day supply:  [x] Yes  [] No    Would you like a call back once the refill request has been completed: [] Yes [x] No    If the office needs to give you a call back, can they leave a voicemail: [] Yes [x] No    Tee Olivo   07/30/25 15:23 EDT

## 2025-08-04 ENCOUNTER — HOSPITAL ENCOUNTER (OUTPATIENT)
Dept: CARDIOLOGY | Facility: HOSPITAL | Age: 80
Discharge: HOME | End: 2025-08-04
Admitting: NURSE PRACTITIONER
Payer: MEDICARE

## 2025-08-04 VITALS
SYSTOLIC BLOOD PRESSURE: 87 MMHG | DIASTOLIC BLOOD PRESSURE: 50 MMHG | BODY MASS INDEX: 31.56 KG/M2 | WEIGHT: 233 LBS | HEIGHT: 72 IN

## 2025-08-04 DIAGNOSIS — R60.0 BILATERAL LOWER EXTREMITY EDEMA: ICD-10-CM

## 2025-08-04 DIAGNOSIS — I10 BENIGN ESSENTIAL HYPERTENSION: ICD-10-CM

## 2025-08-04 LAB
AORTIC DIMENSIONLESS INDEX: 0.74 (DI)
AV MEAN PRESS GRAD SYS DOP V1V2: 3.5 MMHG
AV VMAX SYS DOP: 129.3 CM/SEC
BH CV ECHO LEFT VENTRICLE GLOBAL LONGITUDINAL STRAIN: -19 %
BH CV ECHO MEAS - AO MAX PG: 6.7 MMHG
BH CV ECHO MEAS - AO V2 VTI: 23.3 CM
BH CV ECHO MEAS - AVA(I,D): 2.9 CM2
BH CV ECHO MEAS - EDV(CUBED): 89.3 ML
BH CV ECHO MEAS - EDV(MOD-SP2): 102 ML
BH CV ECHO MEAS - EDV(MOD-SP4): 122 ML
BH CV ECHO MEAS - EF(MOD-SP2): 57.9 %
BH CV ECHO MEAS - EF(MOD-SP4): 55.1 %
BH CV ECHO MEAS - ESV(CUBED): 25.5 ML
BH CV ECHO MEAS - ESV(MOD-SP2): 42.9 ML
BH CV ECHO MEAS - ESV(MOD-SP4): 54.8 ML
BH CV ECHO MEAS - FS: 34.2 %
BH CV ECHO MEAS - IVS/LVPW: 1.37 CM
BH CV ECHO MEAS - IVSD: 1.36 CM
BH CV ECHO MEAS - LA DIMENSION: 3.9 CM
BH CV ECHO MEAS - LAT PEAK E' VEL: 9.7 CM/SEC
BH CV ECHO MEAS - LV DIASTOLIC VOL/BSA (35-75): 53.7 CM2
BH CV ECHO MEAS - LV MASS(C)D: 191.2 GRAMS
BH CV ECHO MEAS - LV MAX PG: 3.2 MMHG
BH CV ECHO MEAS - LV MEAN PG: 1.62 MMHG
BH CV ECHO MEAS - LV SYSTOLIC VOL/BSA (12-30): 24.1 CM2
BH CV ECHO MEAS - LV V1 MAX: 89.7 CM/SEC
BH CV ECHO MEAS - LV V1 VTI: 17.3 CM
BH CV ECHO MEAS - LVIDD: 4.5 CM
BH CV ECHO MEAS - LVIDS: 2.9 CM
BH CV ECHO MEAS - LVOT AREA: 3.9 CM2
BH CV ECHO MEAS - LVOT DIAM: 2.22 CM
BH CV ECHO MEAS - LVPWD: 1 CM
BH CV ECHO MEAS - MED PEAK E' VEL: 5.5 CM/SEC
BH CV ECHO MEAS - MR MAX PG: 10.3 MMHG
BH CV ECHO MEAS - MR MAX VEL: 160.4 CM/SEC
BH CV ECHO MEAS - MV A MAX VEL: 72.6 CM/SEC
BH CV ECHO MEAS - MV DEC SLOPE: 248.3 CM/SEC2
BH CV ECHO MEAS - MV DEC TIME: 0.22 SEC
BH CV ECHO MEAS - MV E MAX VEL: 47.9 CM/SEC
BH CV ECHO MEAS - MV E/A: 0.66
BH CV ECHO MEAS - MV MAX PG: 2.07 MMHG
BH CV ECHO MEAS - MV MEAN PG: 1.22 MMHG
BH CV ECHO MEAS - MV P1/2T: 67.2 MSEC
BH CV ECHO MEAS - MV V2 VTI: 20.6 CM
BH CV ECHO MEAS - MVA(P1/2T): 3.3 CM2
BH CV ECHO MEAS - MVA(VTI): 3.2 CM2
BH CV ECHO MEAS - PA ACC TIME: 0.06 SEC
BH CV ECHO MEAS - PA V2 MAX: 67.7 CM/SEC
BH CV ECHO MEAS - PULM A REVS DUR: 0.15 SEC
BH CV ECHO MEAS - PULM A REVS VEL: 24.3 CM/SEC
BH CV ECHO MEAS - PULM DIAS VEL: 31.2 CM/SEC
BH CV ECHO MEAS - PULM S/D: 1.51
BH CV ECHO MEAS - PULM SYS VEL: 47 CM/SEC
BH CV ECHO MEAS - RV MAX PG: 1.51 MMHG
BH CV ECHO MEAS - RV V1 MAX: 61.4 CM/SEC
BH CV ECHO MEAS - RV V1 VTI: 10.1 CM
BH CV ECHO MEAS - SV(LVOT): 66.8 ML
BH CV ECHO MEAS - SV(MOD-SP2): 59.1 ML
BH CV ECHO MEAS - SV(MOD-SP4): 67.2 ML
BH CV ECHO MEAS - SVI(LVOT): 29.4 ML/M2
BH CV ECHO MEAS - SVI(MOD-SP2): 26 ML/M2
BH CV ECHO MEAS - SVI(MOD-SP4): 29.6 ML/M2
BH CV ECHO MEAS - TAPSE (>1.6): 1.78 CM
BH CV ECHO MEASUREMENTS AVERAGE E/E' RATIO: 6.3
BH CV XLRA - TDI S': 14.3 CM/SEC
IVRT: 108 MS
LEFT ATRIUM VOLUME INDEX: 20 ML/M2
LV EF 3D SEGMENTATION: 56 %
LV EF BIPLANE MOD: 56.9 %
SINUS: 3.4 CM
STJ: 2.9 CM

## 2025-08-04 PROCEDURE — 93356 MYOCRD STRAIN IMG SPCKL TRCK: CPT

## 2025-08-04 PROCEDURE — 76376 3D RENDER W/INTRP POSTPROCES: CPT | Performed by: INTERNAL MEDICINE

## 2025-08-04 PROCEDURE — 93356 MYOCRD STRAIN IMG SPCKL TRCK: CPT | Performed by: INTERNAL MEDICINE

## 2025-08-04 PROCEDURE — 93306 TTE W/DOPPLER COMPLETE: CPT

## 2025-08-04 PROCEDURE — 93306 TTE W/DOPPLER COMPLETE: CPT | Performed by: INTERNAL MEDICINE

## 2025-08-15 ENCOUNTER — OFFICE VISIT (OUTPATIENT)
Dept: FAMILY MEDICINE CLINIC | Facility: CLINIC | Age: 80
End: 2025-08-15
Payer: MEDICARE

## 2025-08-15 VITALS
SYSTOLIC BLOOD PRESSURE: 120 MMHG | OXYGEN SATURATION: 98 % | WEIGHT: 218 LBS | TEMPERATURE: 96.9 F | HEART RATE: 68 BPM | DIASTOLIC BLOOD PRESSURE: 60 MMHG | HEIGHT: 72 IN | BODY MASS INDEX: 29.53 KG/M2

## 2025-08-15 DIAGNOSIS — Z78.9 IMPAIRED ACTIVITIES OF DAILY LIVING: ICD-10-CM

## 2025-08-15 DIAGNOSIS — E11.65 TYPE 2 DIABETES MELLITUS WITH HYPERGLYCEMIA, WITHOUT LONG-TERM CURRENT USE OF INSULIN: ICD-10-CM

## 2025-08-15 DIAGNOSIS — G47.00 INSOMNIA, UNSPECIFIED TYPE: ICD-10-CM

## 2025-08-15 DIAGNOSIS — R60.0 BILATERAL LOWER EXTREMITY EDEMA: Primary | ICD-10-CM

## 2025-08-15 DIAGNOSIS — R53.81 PHYSICAL DECONDITIONING: ICD-10-CM

## 2025-08-15 DIAGNOSIS — I10 BENIGN ESSENTIAL HYPERTENSION: ICD-10-CM

## 2025-08-15 DIAGNOSIS — C83.390 PRIMARY CENTRAL NERVOUS SYSTEM (CNS) LYMPHOMA: ICD-10-CM

## 2025-08-15 RX ORDER — MIRTAZAPINE 7.5 MG/1
7.5 TABLET, FILM COATED ORAL NIGHTLY
COMMUNITY

## 2025-08-16 DIAGNOSIS — E11.65 TYPE 2 DIABETES MELLITUS WITH HYPERGLYCEMIA, WITHOUT LONG-TERM CURRENT USE OF INSULIN: ICD-10-CM

## 2025-08-16 DIAGNOSIS — I10 BENIGN ESSENTIAL HYPERTENSION: ICD-10-CM

## 2025-08-16 RX ORDER — FUROSEMIDE 20 MG/1
20 TABLET ORAL 2 TIMES DAILY
Qty: 60 TABLET | Refills: 0 | Status: SHIPPED | OUTPATIENT
Start: 2025-08-16

## 2025-08-16 RX ORDER — POTASSIUM CHLORIDE 750 MG/1
10 TABLET, EXTENDED RELEASE ORAL 2 TIMES DAILY
Qty: 60 TABLET | Refills: 0 | Status: SHIPPED | OUTPATIENT
Start: 2025-08-16

## 2025-08-18 RX ORDER — LOSARTAN POTASSIUM 25 MG/1
25 TABLET ORAL DAILY
Qty: 90 TABLET | Refills: 0 | Status: SHIPPED | OUTPATIENT
Start: 2025-08-18

## 2025-08-18 RX ORDER — GLIPIZIDE 2.5 MG/1
TABLET ORAL
Qty: 180 TABLET | Refills: 0 | Status: SHIPPED | OUTPATIENT
Start: 2025-08-18

## 2025-08-22 DIAGNOSIS — E11.65 TYPE 2 DIABETES MELLITUS WITH HYPERGLYCEMIA, WITHOUT LONG-TERM CURRENT USE OF INSULIN: ICD-10-CM

## 2025-08-24 DIAGNOSIS — E78.2 MIXED HYPERLIPIDEMIA: ICD-10-CM

## 2025-08-25 RX ORDER — ATORVASTATIN CALCIUM 10 MG/1
10 TABLET, FILM COATED ORAL DAILY
Qty: 90 TABLET | Refills: 1 | Status: SHIPPED | OUTPATIENT
Start: 2025-08-25

## 2025-08-28 ENCOUNTER — TELEPHONE (OUTPATIENT)
Age: 80
End: 2025-08-28
Payer: MEDICARE

## 2025-08-29 DIAGNOSIS — G47.00 INSOMNIA, UNSPECIFIED TYPE: ICD-10-CM

## 2025-08-29 RX ORDER — DABIGATRAN ETEXILATE 150 MG/1
150 CAPSULE ORAL 2 TIMES DAILY
Qty: 60 CAPSULE | Refills: 3 | Status: SHIPPED | OUTPATIENT
Start: 2025-08-29

## 2025-08-29 RX ORDER — ZOLPIDEM TARTRATE 5 MG/1
5 TABLET ORAL NIGHTLY PRN
Qty: 30 TABLET | Refills: 0 | Status: SHIPPED | OUTPATIENT
Start: 2025-08-29

## (undated) DEVICE — PERFORATOR CDM WO/ DURAGUARD 14/11

## (undated) DEVICE — PCH INST SURG INVISISHIELD 2PCKT

## (undated) DEVICE — CVR PROB 96IN LF STRL

## (undated) DEVICE — DISPOSABLE IRRIGATION BIPOLAR CORD, M1000 TYPE: Brand: KIRWAN

## (undated) DEVICE — PK NEURO SPINE 40

## (undated) DEVICE — Device

## (undated) DEVICE — DEV NDL BIOP EXPECT ENDO ULTRASND SLIM/LN/HNDL 22G

## (undated) DEVICE — STPLR SKIN VISISTAT WD 35CT

## (undated) DEVICE — PAD,NON-ADHERENT,3X8,STERILE,LF,1/PK: Brand: MEDLINE

## (undated) DEVICE — SYR LL TP 10ML STRL

## (undated) DEVICE — MAYFIELD® DISPOSABLE ADULT SKULL PIN (PLASTIC BASE): Brand: MAYFIELD®

## (undated) DEVICE — SPONGE,NEURO,.75"X.75",XR,STRL,LF,10/PK: Brand: MEDLINE

## (undated) DEVICE — CRANIOTOMY DRAPE, STERILE: Brand: MEDLINE

## (undated) DEVICE — TRAJ GUIDE KIT, 9733065, BIOPSY, EXT

## (undated) DEVICE — GLV SURG BIOGEL LTX PF 7

## (undated) DEVICE — NDL HYPO PRECISIONGLIDE REG 25G 1 1/2

## (undated) DEVICE — ANTIBACTERIAL UNDYED BRAIDED (POLYGLACTIN 910), SYNTHETIC ABSORBABLE SUTURE: Brand: COATED VICRYL

## (undated) DEVICE — LABEL SHEET CUSTOM 2X2 YELLOW: Brand: MEDLINE INDUSTRIES, INC.

## (undated) DEVICE — 3M™ STERI-STRIP™ ANTIMICROBIAL SKIN CLOSURES 1/2 INCH X 4 INCHES 50/CARTON 4 CARTONS/CASE A1847: Brand: 3M™ STERI-STRIP™

## (undated) DEVICE — SPHR MARKR STEALTH STATION

## (undated) DEVICE — ADHS SKIN SURG TISS VISC PREMIERPRO EXOFIN HI/VISC 1ML

## (undated) DEVICE — CONTAINER,SPECIMEN,OR STERILE,4OZ: Brand: MEDLINE

## (undated) DEVICE — TOWEL,OR,DSP,ST,BLUE,STD,4/PK,20PK/CS: Brand: MEDLINE

## (undated) DEVICE — Device: Brand: FABCO

## (undated) DEVICE — PK ENDO GI 50

## (undated) DEVICE — BITEBLOCK ENDO W/STRAP 60F A/ LF DISP

## (undated) DEVICE — ADHS LIQ MASTISOL 2/3ML

## (undated) DEVICE — SYR CONTRL LUERLOK 10CC

## (undated) DEVICE — GLV SURG SENSICARE W/ALOE PF LF 7.5 STRL

## (undated) DEVICE — PENCL SMOKE/EVAC MEGADYNE TELESCP 10FT

## (undated) DEVICE — TRAP FLD MINIVAC MEGADYNE 100ML

## (undated) DEVICE — SUT ETHLN 4/0 PS2 PLSTC 1667G

## (undated) DEVICE — CONN TBG Y 5 IN 1 LF STRL

## (undated) DEVICE — BIOPSY NEEDLE KIT 9733068 PASSIVE

## (undated) DEVICE — DRSNG TELFA PAD NONADH STR 1S 3X4IN